# Patient Record
Sex: FEMALE | Race: BLACK OR AFRICAN AMERICAN | Employment: OTHER | URBAN - METROPOLITAN AREA
[De-identification: names, ages, dates, MRNs, and addresses within clinical notes are randomized per-mention and may not be internally consistent; named-entity substitution may affect disease eponyms.]

---

## 2017-01-17 ENCOUNTER — HOSPITAL ENCOUNTER (EMERGENCY)
Age: 72
Discharge: HOME | End: 2017-01-18
Attending: EMERGENCY MEDICINE
Payer: MEDICARE

## 2017-01-17 ENCOUNTER — APPOINTMENT (OUTPATIENT)
Dept: GENERAL RADIOLOGY | Age: 72
End: 2017-01-17
Attending: EMERGENCY MEDICINE
Payer: MEDICARE

## 2017-01-17 DIAGNOSIS — M19.90 ARTHRITIS: Primary | ICD-10-CM

## 2017-01-17 PROCEDURE — 72050 X-RAY EXAM NECK SPINE 4/5VWS: CPT

## 2017-01-17 PROCEDURE — 74011250637 HC RX REV CODE- 250/637: Performed by: EMERGENCY MEDICINE

## 2017-01-17 PROCEDURE — 73030 X-RAY EXAM OF SHOULDER: CPT

## 2017-01-17 PROCEDURE — 96372 THER/PROPH/DIAG INJ SC/IM: CPT

## 2017-01-17 PROCEDURE — 99284 EMERGENCY DEPT VISIT MOD MDM: CPT

## 2017-01-17 PROCEDURE — 74011250636 HC RX REV CODE- 250/636: Performed by: EMERGENCY MEDICINE

## 2017-01-17 PROCEDURE — 73562 X-RAY EXAM OF KNEE 3: CPT

## 2017-01-17 PROCEDURE — 73090 X-RAY EXAM OF FOREARM: CPT

## 2017-01-17 RX ORDER — NAPROXEN 500 MG/1
500 TABLET ORAL 2 TIMES DAILY WITH MEALS
Qty: 20 TAB | Refills: 0 | Status: SHIPPED | OUTPATIENT
Start: 2017-01-17 | End: 2017-01-19

## 2017-01-17 RX ORDER — HYDROCODONE BITARTRATE AND ACETAMINOPHEN 5; 325 MG/1; MG/1
1 TABLET ORAL
Qty: 8 TAB | Refills: 0 | Status: SHIPPED | OUTPATIENT
Start: 2017-01-17 | End: 2017-01-19

## 2017-01-17 RX ORDER — HYDROCODONE BITARTRATE AND ACETAMINOPHEN 10; 325 MG/1; MG/1
1 TABLET ORAL
Status: COMPLETED | OUTPATIENT
Start: 2017-01-17 | End: 2017-01-17

## 2017-01-17 RX ORDER — KETOROLAC TROMETHAMINE 30 MG/ML
30 INJECTION, SOLUTION INTRAMUSCULAR; INTRAVENOUS
Status: COMPLETED | OUTPATIENT
Start: 2017-01-17 | End: 2017-01-17

## 2017-01-17 RX ADMIN — HYDROCODONE BITARTRATE AND ACETAMINOPHEN 1 TABLET: 10; 325 TABLET ORAL at 22:07

## 2017-01-17 RX ADMIN — KETOROLAC TROMETHAMINE 30 MG: 30 INJECTION, SOLUTION INTRAMUSCULAR at 22:07

## 2017-01-18 VITALS
BODY MASS INDEX: 41.28 KG/M2 | WEIGHT: 263 LBS | OXYGEN SATURATION: 94 % | DIASTOLIC BLOOD PRESSURE: 58 MMHG | TEMPERATURE: 98.3 F | SYSTOLIC BLOOD PRESSURE: 135 MMHG | RESPIRATION RATE: 18 BRPM | HEART RATE: 97 BPM | HEIGHT: 67 IN

## 2017-01-18 PROCEDURE — 77030036550 HC SLNG ARM PCH S2SG -A

## 2017-01-18 NOTE — DISCHARGE INSTRUCTIONS
Osteoarthritis: Care Instructions  Your Care Instructions    Arthritis is a common health problem in which the joints are inflamed. There are several kinds of arthritis. Osteoarthritis is caused by a breakdown of cartilage, the hard, thick tissue that cushions the joints. It causes pain, stiffness, and swelling, often in the spine, fingers, hips, and knees. Osteoarthritis can happen at any age, but it is most common in older people. Osteoarthritis never goes away completely, but it can be controlled. Medicine and home treatment can reduce the pain and prevent the arthritis from getting worse. Follow-up care is a key part of your treatment and safety. Be sure to make and go to all appointments, and call your doctor if you are having problems. Its also a good idea to know your test results and keep a list of the medicines you take. How can you care for yourself at home? · Take a warm shower or bath in the morning to relieve stiffness. Avoid sitting still afterwards. · If the joint is not swollen, use moist heat, like a warm, damp towel, for 20 to 30 minutes, 2 or 3 times a day. Do not use heat on a swollen joint. · If the joint is swollen, use ice or cold packs for 10 to 20 minutes, once an hour. Cold will help relieve pain and reduce inflammation. Put a thin cloth between the ice and your skin. · To prevent stiffness, gently move the joint through its full range of motion several times a day. · If the joint hurts, avoid activities that put a strain on it for a few days. Take rest breaks throughout the day. · Get regular exercise. Walking, swimming, yoga, biking, and water aerobics are good exercises that are gentle on the joints. · Reach and stay at a healthy weight. If you need to lose or maintain weight, regular exercise and a healthy diet will help. Extra weight can strain the joints, especially the knees and hips, and make the pain worse. Losing even a few pounds may help.   · Take pain medicines exactly as directed. ¨ If the doctor gave you a prescription medicine for pain, take it as prescribed. ¨ If you are not taking a prescription pain medicine, ask your doctor if you can take an over-the-counter medicine. When should you call for help? Call your doctor now or seek immediate medical care if:  · The pain is so bad that you cannot use the joint. · You have sudden back pain with weakness in your legs or loss of bowel or bladder control. · Your stools are black and tarlike or have streaks of blood. · You have severe pain and swelling in more than one joint. Watch closely for changes in your health, and be sure to contact your doctor if:  · You have side effects from the medicines, like belly pain, ongoing heartburn, or nausea. · Joint pain continues for more than 6 weeks, and home treatment is not helping. Where can you learn more? Go to http://anastasia-dang.info/. Enter B166 in the search box to learn more about \"Osteoarthritis: Care Instructions. \"  Current as of: February 24, 2016  Content Version: 11.1  © 8048-8543 [employer]. Care instructions adapted under license by [employer] (which disclaims liability or warranty for this information). If you have questions about a medical condition or this instruction, always ask your healthcare professional. [address] any warranty or liability for your use of this information. We hope that we have addressed all of your medical concerns. The examination and treatment you received in the Emergency Department were for an emergent problem and were not intended as complete care. It is important that you follow up with your healthcare provider(s) for ongoing care. If your symptoms worsen or do not improve as expected, and you are unable to reach your usual health care provider(s), you should return to the Emergency Department.       Today's healthcare is undergoing tremendous change, and patient satisfaction surveys are one of the many tools to assess the quality of medical care. You may receive a survey from the [employer] regarding your experience in the Emergency Department. I hope that your experience has been completely positive, particularly the medical care that I provided. As such, please participate in the survey; anything less than excellent does not meet my expectations or intentions. [facility] and [facility] participate in nationally recognized quality of care measures. If your blood pressure is greater than 120/80, as reported below, we urge that you seek medical care to address the potential of high blood pressure, commonly known as hypertension. Hypertension can be hereditary or can be caused by certain medical conditions, pain, stress, or \"white coat syndrome. \"       Please make an appointment with your health care provider(s) for follow up of your Emergency Department visit. VITALS:   Patient Vitals for the past 8 hrs:   Temp Pulse Resp BP SpO2   01/17/17 2030 - (!) 101 - 198/78 97 %   01/17/17 1952 - (!) 108 18 (!) 208/79 98 %   01/17/17 1927 98.3 °F (36.8 °C) (!) 108 20 (!) 225/106 98 %          Thank you for allowing us to provide you with medical care today. We realize that you have many choices for your emergency care needs. Please choose us in the future for any continued health care needs. Soyla Feeling Victory Nissen, [address] [facility] Jony Clifton [place]: [phone]            No results found for this or any previous visit (from the past 24 hour(s)). Xr Spine Cerv 4 Or 5 V    Result Date: 1/17/2017  INDICATION:  Neck and left arm pain. COMPARISON:  No old study. FINDINGS:  Five views, AP, lateral, bilateral oblique, and open-mouth odontoid were obtained of the cervical spine. C7 is poorly visualized on the lateral views.  The visualized vertebral bodies show satisfactory height and alignment. Vertebral body spurring at C4-C7 and facet arthropathy are seen. The prevertebral soft tissues are not widened. The odontoid appears intact. IMPRESSION:  Cervical degenerative spondylosis. Xr Shoulder Lt Ap/lat Min 2 V    Result Date: 1/17/2017  EXAM:  XR SHOULDER LT AP/LAT MIN 2 V INDICATION:   Left shoulder pain and difficulty with abduction. No injury. COMPARISON: None. FINDINGS: Three views of the left shoulder demonstrate no fracture, dislocation or other acute abnormality. AC joint osteoarthritis is age-appropriate with marginal osteophytes. Glenohumeral joint osteoarthritis is mild. Bone mineralization is within normal limits for age. There are calcifications in the expected location of the distal rotator cuff tendons. IMPRESSION:  1. No fracture or dislocation. 2. Age-appropriate osteoarthritis. 3. Calcific rotator cuff tendinosis. Mitzy Coma Forearm Lt Ap/lat    Result Date: 1/17/2017  EXAM:  XR FOREARM LT AP/LAT INDICATION:   Left forearm pain, heaviness, and coolness. No injury. COMPARISON: None. FINDINGS: Two views of the left radius and ulna demonstrate no fracture or other acute osseous, articular or soft tissue abnormality. Surgical anchors in the greater tuberosity indicate previous biceps tendon repair. Enthesophytes arise at the origin of the common extensor tendons of the elbow. Visualized joints are within normal limits. Bone mineralization is within normal limits. IMPRESSION:  1. No fracture. 2. Previous biceps tendon reinsertion. Xr Knee Lt 3 V    Result Date: 1/17/2017  EXAM:  XR KNEE LT 3 V INDICATION:   Left knee pain, heaviness, and coolness. No injury. COMPARISON: None. FINDINGS: Three views of the left knee demonstrate moderate suprapatellar joint effusion. There is normal bone mineralization. No acute fracture or dislocation. Meniscal chondrocalcinosis is present. Mild narrowing of the patellofemoral and medial compartments.  No erosion. IMPRESSION:  1. No fracture. 2. Joint effusion. 3. Mild arthritis. Differential diagnosis includes CPPD arthropathy and chronic osteoarthritis.

## 2017-01-18 NOTE — ED TRIAGE NOTES
Patient reports she has had left arm pain, specifically shoulder with trouble lifting arm, as well as left leg pains which are increased with lifting and standing beginning 3 weeks ago. She states she saw her PCP last Wednesday and was given a \"muscle relaxer\" and had labs. She denies any falls. Denies using any assistive devices.

## 2017-01-18 NOTE — ED PROVIDER NOTES
HPI Comments: 80 y.o. female with past medical history significant for HTN, DM, breast cancer, depression, anxiety, glaucoma, ectopic pregnancy, and miscarriage who presents to the ED with chief complaint of arm pain. Pt reports left arm pain onset about 3 weeks ago accompanied by numbness and decreased sensation in the fingers on her left hand, says her left arm \"feels cold. \" Pt states her left arm pain is exacerbated by raising it, says her left shoulder \"locks on her\" and it \"feels like someone is tying a knot in her arm. \" Pt states she has had intermittent swelling in her left arm but does not have any currently. Pt states she also has left leg pain mostly in her posterior knee and has difficulty ambulating due to the pain. Pt states she has hx of chronic back pain and has had two back surgeries. Pt denies hx of afib. Pt denies seeing an orthopedist. Pt denies neck pain, bowel or bladder issues, numbness in her genital area, fever, chills, or abdominal pain. There are no other acute medical complaints voiced at this time. Social Hx: . PCP: Hesham Zelaya MD    Note written by [place] Cecilia Dewitt, as dictated by Damaris Calix. Brian Varela MD 9:04 PM     The history is provided by the patient. Past Medical History:   Diagnosis Date    Breast cancer ([facility] [address]      right masectomy    Depression with anxiety     Diabetes ([facility] [address]     Ectopic pregnancy     Glaucoma     Hypertension     Miscarriage        Past Surgical History:   Procedure Laterality Date    Pr breast surgery procedure unlisted       right mastectomy    Hx orthopaedic       back surgery x 2    Hx orthopaedic Left      ligament attachment    Hx carpal tunnel release Bilateral     Hx tumor removal Right      leg         History reviewed. No pertinent family history.     Social History     Social History    Marital status:      Spouse name: N/A    Number of children: N/A    Years of education: N/A     Occupational History  Not on file. Social History Main Topics    Smoking status: Never Smoker    Smokeless tobacco: Never Used    Alcohol use No    Drug use: No    Sexual activity: Not on file     Other Topics Concern    Not on file     Social History Narrative         ALLERGIES: Sulfa (sulfonamide antibiotics)    Review of Systems   Constitutional: Negative for chills and fever. HENT: Negative for ear pain and sore throat. Eyes: Negative for pain. Respiratory: Negative for chest tightness and shortness of breath. Cardiovascular: Negative for chest pain and leg swelling. Gastrointestinal: Negative for abdominal pain, blood in stool, constipation, diarrhea, nausea and vomiting. Genitourinary: Negative for decreased urine volume, difficulty urinating, dysuria, flank pain, frequency, hematuria and urgency. Musculoskeletal: Positive for back pain (chronic) and gait problem (difficulty ambulating due to leg pain). Negative for neck pain. +left arm pain with intermittent swelling and \"coolness\"  +left leg pain mostly in posterior knee   Skin: Negative for rash. Neurological: Positive for numbness (fingers left hand). Negative for headaches. All other systems reviewed and are negative. Vitals:    01/17/17 1927 01/17/17 1952 01/17/17 2030   BP: (!) 225/106 (!) 208/79 198/78   Pulse: (!) 108 (!) 108 (!) 101   Resp: 20 18    Temp: 98.3 °F (36.8 °C)     SpO2: 98% 98% 97%   Weight: 119.3 kg (263 lb)     Height: 5' 7\" (1.702 m)              Physical Exam   Constitutional: No distress. Obese, elderly. HENT:   Head: Normocephalic and atraumatic. Eyes: EOM are normal. Pupils are equal, round, and reactive to light. No scleral icterus. Neck: Neck supple. No tracheal deviation present. C spine nontender. Decreased ROM neck. Cardiovascular: Normal rate, regular rhythm and normal heart sounds. Pulmonary/Chest: Effort normal and breath sounds normal. No respiratory distress. Abdominal: Soft.  She exhibits no distension. There is no tenderness. There is no rebound and no guarding. Genitourinary:   Genitourinary Comments: deferred   Musculoskeletal: She exhibits edema (2+ pitting edema). Decreased and painful ROM of left shoulder. Tenderness to palpation over left shoulder. Left knee tender to palpation. Neurological: She is alert. Decreased sensation in left upper extremity. Skin: Skin is warm and dry. Psychiatric: She has a normal mood and affect. Nursing note and vitals reviewed. Note written by Cecilia Bond, as dictated by Felecia Ascencio. Trudy Mcdaniel MD 9:05 PM    [facility]  ED Course   Pt presents with LUE numbness and \"locking\" of left shoulder. Diff dx: cervical radiculopathy, arthritis, rotator cuff arthropathy. Procedures    EKG Per My Interpretation:      LABORATORY TESTS:  No results found for this or any previous visit (from the past 12 hour(s)). IMAGING RESULTS:  Xr Spine Cerv 4 Or 5 V    Result Date: 1/17/2017  INDICATION:  Neck and left arm pain. COMPARISON:  No old study. FINDINGS:  Five views, AP, lateral, bilateral oblique, and open-mouth odontoid were obtained of the cervical spine. C7 is poorly visualized on the lateral views. The visualized vertebral bodies show satisfactory height and alignment. Vertebral body spurring at C4-C7 and facet arthropathy are seen. The prevertebral soft tissues are not widened. The odontoid appears intact. IMPRESSION:  Cervical degenerative spondylosis. Xr Shoulder Lt Ap/lat Min 2 V    Result Date: 1/17/2017  EXAM:  XR SHOULDER LT AP/LAT MIN 2 V INDICATION:   Left shoulder pain and difficulty with abduction. No injury. COMPARISON: None. FINDINGS: Three views of the left shoulder demonstrate no fracture, dislocation or other acute abnormality. AC joint osteoarthritis is age-appropriate with marginal osteophytes. Glenohumeral joint osteoarthritis is mild. Bone mineralization is within normal limits for age.  There are calcifications in the expected location of the distal rotator cuff tendons. IMPRESSION:  1. No fracture or dislocation. 2. Age-appropriate osteoarthritis. 3. Calcific rotator cuff tendinosis. Silvina Walsh Forearm Lt Ap/lat    Result Date: 1/17/2017  EXAM:  XR FOREARM LT AP/LAT INDICATION:   Left forearm pain, heaviness, and coolness. No injury. COMPARISON: None. FINDINGS: Two views of the left radius and ulna demonstrate no fracture or other acute osseous, articular or soft tissue abnormality. Surgical anchors in the greater tuberosity indicate previous biceps tendon repair. Enthesophytes arise at the origin of the common extensor tendons of the elbow. Visualized joints are within normal limits. Bone mineralization is within normal limits. IMPRESSION:  1. No fracture. 2. Previous biceps tendon reinsertion. Xr Knee Lt 3 V    Result Date: 1/17/2017  EXAM:  XR KNEE LT 3 V INDICATION:   Left knee pain, heaviness, and coolness. No injury. COMPARISON: None. FINDINGS: Three views of the left knee demonstrate moderate suprapatellar joint effusion. There is normal bone mineralization. No acute fracture or dislocation. Meniscal chondrocalcinosis is present. Mild narrowing of the patellofemoral and medial compartments. No erosion. IMPRESSION:  1. No fracture. 2. Joint effusion. 3. Mild arthritis. Differential diagnosis includes CPPD arthropathy and chronic osteoarthritis. MEDICATIONS GIVEN:  Medications   HYDROcodone-acetaminophen (NORCO)  mg tablet 1 Tab (1 Tab Oral Given 1/17/17 2207)   ketorolac (TORADOL) injection 30 mg (30 mg IntraMUSCular Given 1/17/17 2207)       IMPRESSION:  1. Arthritis        PLAN:  1. Naprosyn  2. Norco PRN  3. Follow up with Ortho  Return to ED if worsening symptoms or new concerns    Discharge Note  11:00 PM  The patient is ready for discharge.  The patient's signs, symptoms, diagnosis, and discharge instructions have been discussed and the patient has conveyed their understanding. The patient is to follow up as recommended or return to the ER should their symptoms worsen. Plan has been discussed and the patient is in agreement.     Nathaniel Leon MD

## 2017-01-19 ENCOUNTER — HOSPITAL ENCOUNTER (EMERGENCY)
Age: 72
Discharge: HOME | End: 2017-01-19
Attending: EMERGENCY MEDICINE
Payer: MEDICARE

## 2017-01-19 VITALS
BODY MASS INDEX: 41.28 KG/M2 | DIASTOLIC BLOOD PRESSURE: 49 MMHG | OXYGEN SATURATION: 97 % | HEART RATE: 105 BPM | RESPIRATION RATE: 16 BRPM | SYSTOLIC BLOOD PRESSURE: 182 MMHG | HEIGHT: 67 IN | TEMPERATURE: 98 F | WEIGHT: 263 LBS

## 2017-01-19 DIAGNOSIS — M25.512 LEFT SHOULDER PAIN, UNSPECIFIED CHRONICITY: Primary | ICD-10-CM

## 2017-01-19 LAB
ALBUMIN SERPL BCP-MCNC: 3.3 G/DL (ref 3.5–5)
ALBUMIN/GLOB SERPL: 0.7 {RATIO} (ref 1.1–2.2)
ALP SERPL-CCNC: 75 U/L (ref 45–117)
ALT SERPL-CCNC: 27 U/L (ref 12–78)
ANION GAP BLD CALC-SCNC: 9 MMOL/L (ref 5–15)
AST SERPL W P-5'-P-CCNC: 22 U/L (ref 15–37)
ATRIAL RATE: 91 BPM
BASOPHILS # BLD AUTO: 0 K/UL (ref 0–0.1)
BASOPHILS # BLD: 0 % (ref 0–1)
BILIRUB SERPL-MCNC: 0.5 MG/DL (ref 0.2–1)
BUN SERPL-MCNC: 15 MG/DL (ref 6–20)
BUN/CREAT SERPL: 17 (ref 12–20)
CALCIUM SERPL-MCNC: 8.9 MG/DL (ref 8.5–10.1)
CALCULATED P AXIS, ECG09: 7 DEGREES
CALCULATED R AXIS, ECG10: 1 DEGREES
CALCULATED T AXIS, ECG11: 64 DEGREES
CHLORIDE SERPL-SCNC: 103 MMOL/L (ref 97–108)
CO2 SERPL-SCNC: 30 MMOL/L (ref 21–32)
CREAT SERPL-MCNC: 0.86 MG/DL (ref 0.55–1.02)
DIAGNOSIS, 93000: NORMAL
EOSINOPHIL # BLD: 0.2 K/UL (ref 0–0.4)
EOSINOPHIL NFR BLD: 2 % (ref 0–7)
ERYTHROCYTE [DISTWIDTH] IN BLOOD BY AUTOMATED COUNT: 15.3 % (ref 11.5–14.5)
GLOBULIN SER CALC-MCNC: 4.6 G/DL (ref 2–4)
GLUCOSE BLD STRIP.AUTO-MCNC: 114 MG/DL (ref 65–100)
GLUCOSE BLD STRIP.AUTO-MCNC: 70 MG/DL (ref 65–100)
GLUCOSE SERPL-MCNC: 75 MG/DL (ref 65–100)
HCT VFR BLD AUTO: 42.9 % (ref 35–47)
HGB BLD-MCNC: 14 G/DL (ref 11.5–16)
LYMPHOCYTES # BLD AUTO: 19 % (ref 12–49)
LYMPHOCYTES # BLD: 1.9 K/UL (ref 0.8–3.5)
MAGNESIUM SERPL-MCNC: 1.7 MG/DL (ref 1.6–2.4)
MCH RBC QN AUTO: 27.6 PG (ref 26–34)
MCHC RBC AUTO-ENTMCNC: 32.6 G/DL (ref 30–36.5)
MCV RBC AUTO: 84.6 FL (ref 80–99)
MONOCYTES # BLD: 0.4 K/UL (ref 0–1)
MONOCYTES NFR BLD AUTO: 4 % (ref 5–13)
NEUTS SEG # BLD: 7.7 K/UL (ref 1.8–8)
NEUTS SEG NFR BLD AUTO: 75 % (ref 32–75)
P-R INTERVAL, ECG05: 148 MS
PLATELET # BLD AUTO: 301 K/UL (ref 150–400)
POTASSIUM SERPL-SCNC: 3.5 MMOL/L (ref 3.5–5.1)
PROT SERPL-MCNC: 7.9 G/DL (ref 6.4–8.2)
Q-T INTERVAL, ECG07: 370 MS
QRS DURATION, ECG06: 78 MS
QTC CALCULATION (BEZET), ECG08: 455 MS
RBC # BLD AUTO: 5.07 M/UL (ref 3.8–5.2)
SERVICE CMNT-IMP: ABNORMAL
SERVICE CMNT-IMP: NORMAL
SODIUM SERPL-SCNC: 142 MMOL/L (ref 136–145)
TROPONIN I SERPL-MCNC: <0.04 NG/ML
VENTRICULAR RATE, ECG03: 91 BPM
WBC # BLD AUTO: 10.4 K/UL (ref 3.6–11)

## 2017-01-19 PROCEDURE — 99285 EMERGENCY DEPT VISIT HI MDM: CPT

## 2017-01-19 PROCEDURE — 83735 ASSAY OF MAGNESIUM: CPT | Performed by: PHYSICIAN ASSISTANT

## 2017-01-19 PROCEDURE — 36415 COLL VENOUS BLD VENIPUNCTURE: CPT | Performed by: PHYSICIAN ASSISTANT

## 2017-01-19 PROCEDURE — 80053 COMPREHEN METABOLIC PANEL: CPT | Performed by: PHYSICIAN ASSISTANT

## 2017-01-19 PROCEDURE — 85025 COMPLETE CBC W/AUTO DIFF WBC: CPT | Performed by: PHYSICIAN ASSISTANT

## 2017-01-19 PROCEDURE — 84484 ASSAY OF TROPONIN QUANT: CPT | Performed by: PHYSICIAN ASSISTANT

## 2017-01-19 PROCEDURE — 96374 THER/PROPH/DIAG INJ IV PUSH: CPT

## 2017-01-19 PROCEDURE — 74011250636 HC RX REV CODE- 250/636: Performed by: PHYSICIAN ASSISTANT

## 2017-01-19 PROCEDURE — 93005 ELECTROCARDIOGRAM TRACING: CPT

## 2017-01-19 PROCEDURE — 82962 GLUCOSE BLOOD TEST: CPT

## 2017-01-19 RX ORDER — DICLOFENAC SODIUM 10 MG/G
GEL TOPICAL 4 TIMES DAILY
Qty: 4 G | Refills: 0 | Status: ON HOLD | OUTPATIENT
Start: 2017-01-19 | End: 2018-06-11

## 2017-01-19 RX ORDER — HYDRALAZINE HYDROCHLORIDE 20 MG/ML
20 INJECTION INTRAMUSCULAR; INTRAVENOUS
Status: COMPLETED | OUTPATIENT
Start: 2017-01-19 | End: 2017-01-19

## 2017-01-19 RX ADMIN — HYDRALAZINE HYDROCHLORIDE 20 MG: 20 INJECTION INTRAMUSCULAR; INTRAVENOUS at 16:33

## 2017-01-19 NOTE — ED PROVIDER NOTES
HPI Comments: Sasha Damico is a 80 y.o. female who presents ambulatory to the ED with a c/o being sent over by Dr. Karen Weller for evaluation of blood pressure. Pt notes she was seen in the ER 2 days ago with 2 weeks worth of left arm aching and left leg pain, worse with movement. She states she was told it was  Orthopedic in nature and to follow up. She went to ortho today, but because her blood pressure was high and there was no EKG performed in the ER, pt was sent to the ER to rule out cardiac disease as the source. Pt notes she has no cp, sob n/v/d, f/c or abd pain. Pt notes her knee pain is worse with walking and her left upper arm pain is worse with lifting her arm or adducing/ internally rotating her arm. PCP:changing doctors (was Dr Izabel Chase)    PMHx significant for: Past Medical History:    Breast cancer ([facility]                                             Comment:right masectomy    Depression with anxiety                                       Diabetes ([facility]                                                Ectopic pregnancy                                             Glaucoma                                                      Hypertension                                                  Miscarriage                                                 PSHx significant for: Past Surgical History:    MD BREAST SURGERY PROCEDURE UNLISTED                             Comment:right mastectomy    HX ORTHOPAEDIC                                                   Comment:back surgery x 2    HX ORTHOPAEDIC                                  Left                 Comment:ligament attachment    HX CARPAL TUNNEL RELEASE                        Bilateral               HX TUMOR REMOVAL                                Right                 Comment:leg  Social Hx: Tobacco: denies  EtOH: denies Illicit drug use: denies    There are no further complaints or symptoms at this time. The history is provided by the patient.         Past Medical History:   Diagnosis Date    Breast cancer ([facility]      right masectomy    Depression with anxiety     Diabetes ([facility]     Ectopic pregnancy     Glaucoma     Hypertension     Miscarriage        Past Surgical History:   Procedure Laterality Date    Pr breast surgery procedure unlisted       right mastectomy    Hx orthopaedic       back surgery x 2    Hx orthopaedic Left      ligament attachment    Hx carpal tunnel release Bilateral     Hx tumor removal Right      leg         History reviewed. No pertinent family history. Social History     Social History    Marital status:      Spouse name: N/A    Number of children: N/A    Years of education: N/A     Occupational History    Not on file. Social History Main Topics    Smoking status: Never Smoker    Smokeless tobacco: Never Used    Alcohol use No    Drug use: No    Sexual activity: Not on file     Other Topics Concern    Not on file     Social History Narrative         ALLERGIES: Lisinopril; Percocet [oxycodone-acetaminophen]; Percodan [oxycodone-aspirin]; Prednisone; and Sulfa (sulfonamide antibiotics)    Review of Systems   Constitutional: Negative for chills and fever. HENT: Negative for congestion, rhinorrhea, sneezing and sore throat. Eyes: Negative for redness and visual disturbance. Respiratory: Negative for shortness of breath. Cardiovascular: Negative for chest pain and leg swelling. Gastrointestinal: Negative for abdominal pain, nausea and vomiting. Genitourinary: Negative for difficulty urinating and frequency. Musculoskeletal: Positive for arthralgias and myalgias. Negative for back pain and neck stiffness. Skin: Negative for rash. Neurological: Negative for dizziness, syncope, weakness and headaches. Hematological: Negative for adenopathy.        Patient Vitals for the past 12 hrs:   Temp Pulse Resp BP SpO2   01/19/17 1702 - (!) 105 16 - 97 %   01/19/17 1700 - - - 182/49 -   01/19/17 1615 - 83 19 194/86 96 %   01/19/17 1605 - 81 22 (!) 213/71 94 %   01/19/17 1539 - 93 21 - 98 %   01/19/17 1533 - - - (!) 216/71 -   01/19/17 1517 98 °F (36.7 °C) 91 16 (!) 225/94 98 %              Physical Exam   Constitutional: She is oriented to person, place, and time. She appears well-developed and well-nourished. No distress. HENT:   Head: Normocephalic and atraumatic. Right Ear: External ear normal.   Left Ear: External ear normal.   Nose: Nose normal.   Mouth/Throat: Oropharynx is clear and moist.   Neck: Neck supple. Cardiovascular: Normal rate, regular rhythm, normal heart sounds and intact distal pulses. Exam reveals no gallop and no friction rub. No murmur heard. Pulmonary/Chest: Effort normal and breath sounds normal. No stridor. No respiratory distress. She has no wheezes. She has no rales. She exhibits no tenderness. Abdominal: Soft. Bowel sounds are normal. She exhibits no distension and no mass. There is no tenderness. There is no rebound and no guarding. Musculoskeletal: Normal range of motion. She exhibits tenderness. She exhibits no edema or deformity. Left upper arm TTP , worse with abducting arm or internally rotating arm. Distal n/v intact. Cap refill brisk. Normothermic. No lesions  5/5 bilaterally   Neurological: She is alert and oriented to person, place, and time. No cranial nerve deficit. Coordination normal.   Skin: No rash noted. No erythema. No pallor. Psychiatric: She has a normal mood and affect. Her behavior is normal.   Nursing note and vitals reviewed.        MDM  Number of Diagnoses or Management Options  Elevated blood pressure:   Left shoulder pain, unspecified chronicity:      Amount and/or Complexity of Data Reviewed  Clinical lab tests: ordered and reviewed  Tests in the medicine section of CPT®: reviewed and ordered  Review and summarize past medical records: yes  Independent visualization of images, tracings, or specimens: yes    Patient Progress  Patient progress: stable    ED Course       Procedures  4:17 PM  Discussed pt, sx, hx and current findings with Dr. Zo Hopkins. He is in agreement with plan   Bronwyn Phoenix. BALDEMAR Rios      ED EKG interpretation: 4:17 PM  Rhythm: normal sinus rhythm; and regular . Rate (approx.): 91; Axis: normal; P wave: normal; QRS interval: normal ; ST/T wave: non specific changes in inferior leads; Other findings: abnormal ekg, poor quality ekg. This EKG was interpreted by Frances Forbes MD,ED Provider. 5:32 PM   bp improved. Will discharge home. Bronwyn Phoenix. BALDEMAR Rios    LABORATORY TESTS:  Recent Results (from the past 12 hour(s))   CBC WITH AUTOMATED DIFF    Collection Time: 01/19/17  3:50 PM   Result Value Ref Range    WBC 10.4 3.6 - 11.0 K/uL    RBC 5.07 3.80 - 5.20 M/uL    HGB 14.0 11.5 - 16.0 g/dL    HCT 42.9 35.0 - 47.0 %    MCV 84.6 80.0 - 99.0 FL    MCH 27.6 26.0 - 34.0 PG    MCHC 32.6 30.0 - 36.5 g/dL    RDW 15.3 (H) 11.5 - 14.5 %    PLATELET 198 774 - 892 K/uL    NEUTROPHILS 75 32 - 75 %    LYMPHOCYTES 19 12 - 49 %    MONOCYTES 4 (L) 5 - 13 %    EOSINOPHILS 2 0 - 7 %    BASOPHILS 0 0 - 1 %    ABS. NEUTROPHILS 7.7 1.8 - 8.0 K/UL    ABS. LYMPHOCYTES 1.9 0.8 - 3.5 K/UL    ABS. MONOCYTES 0.4 0.0 - 1.0 K/UL    ABS. EOSINOPHILS 0.2 0.0 - 0.4 K/UL    ABS. BASOPHILS 0.0 0.0 - 0.1 K/UL   METABOLIC PANEL, COMPREHENSIVE    Collection Time: 01/19/17  3:50 PM   Result Value Ref Range    Sodium 142 136 - 145 mmol/L    Potassium 3.5 3.5 - 5.1 mmol/L    Chloride 103 97 - 108 mmol/L    CO2 30 21 - 32 mmol/L    Anion gap 9 5 - 15 mmol/L    Glucose 75 65 - 100 mg/dL    BUN 15 6 - 20 MG/DL    Creatinine 0.86 0.55 - 1.02 MG/DL    BUN/Creatinine ratio 17 12 - 20      GFR est AA >60 >60 ml/min/1.73m2    GFR est non-AA >60 >60 ml/min/1.73m2    Calcium 8.9 8.5 - 10.1 MG/DL    Bilirubin, total 0.5 0.2 - 1.0 MG/DL    ALT 27 12 - 78 U/L    AST 22 15 - 37 U/L    Alk.  phosphatase 75 45 - 117 U/L    Protein, total 7.9 6.4 - 8.2 g/dL    Albumin 3.3 (L) 3.5 - 5.0 g/dL    Globulin 4.6 (H) 2.0 - 4.0 g/dL    A-G Ratio 0.7 (L) 1.1 - 2.2     TROPONIN I    Collection Time: 17  3:50 PM   Result Value Ref Range    Troponin-I, Qt. <0.04 <0.05 ng/mL   MAGNESIUM    Collection Time: 17  3:50 PM   Result Value Ref Range    Magnesium 1.7 1.6 - 2.4 mg/dL   GLUCOSE, POC    Collection Time: 17  5:26 PM   Result Value Ref Range    Glucose (POC) 70 65 - 100 mg/dL    Performed by Mandy Johnson        IMAGING RESULTS:  No orders to display       MEDICATIONS GIVEN:  Medications   hydrALAZINE (APRESOLINE) 20 mg/mL injection 20 mg (20 mg IntraVENous Given 17 4293)       IMPRESSION:  1. Left shoulder pain, unspecified chronicity    2. Elevated blood pressure        PLAN:  1. Current Discharge Medication List      START taking these medications    Details   diclofenac (VOLTAREN) 1 % gel Apply  to affected area four (4) times daily. Qty: 4 g, Refills: 0         CONTINUE these medications which have NOT CHANGED    Details   insulin regular (NOVOLIN R, HUMULIN R) 100 unit/mL injection 5 Units by SubCUTAneous route Before breakfast, lunch, and dinner. Qty: 1 Vial, Refills: 0      amLODIPine (NORVASC) 10 mg tablet Take 10 mg by mouth daily. losartan (COZAAR) 100 mg tablet Take 100 mg by mouth daily. PARoxetine (PAXIL) 20 mg tablet Take 20 mg by mouth daily. hydrochlorothiazide (HYDRODIURIL) 25 mg tablet Take 25 mg by mouth daily.          STOP taking these medications       naproxen (NAPROSYN) 500 mg tablet Comments:   Reason for Stoppin.   Follow-up Information     Follow up With Details Comments [address], [place] Schedule an appointment as soon as possible for a visit 2-4 days for recheck [address]  [phone]      your new pcp Schedule an appointment as soon as possible for a visit 2-4 days for recheck         Return to ED if worse     5:32 PM  Pt has been reexamined. Pt has no new complaints, changes or physical findings. Care plan outlined and precautions discussed. All available results were reviewed with pt. All medications were reviewed with pt. All of pt's questions and concerns were addressed. Pt agrees to F/U as instructed and agrees to return to ED upon further deterioration. Pt is ready to go home.   Leah Cordoba PA-C

## 2017-01-19 NOTE — ED TRIAGE NOTES
Sent by Latoya Lazcano MD/ortho for EKG prior to medication administration at his office. No Chest pain reported, no SOB reported.

## 2017-01-19 NOTE — DISCHARGE INSTRUCTIONS
High Blood Pressure: Care Instructions  Your Care Instructions  If your blood pressure is usually above 140/90, you have high blood pressure, or hypertension. That means the top number is 140 or higher or the bottom number is 90 or higher, or both. Despite what a lot of people think, high blood pressure usually doesn't cause headaches or make you feel dizzy or lightheaded. It usually has no symptoms. But it does increase your risk for heart attack, stroke, and kidney or eye damage. The higher your blood pressure, the more your risk increases. Your doctor will give you a goal for your blood pressure. Your goal will be based on your health and your age. An example of a goal is to keep your blood pressure below 140/90. Lifestyle changes, such as eating healthy and being active, are always important to help lower blood pressure. You might also take medicine to reach your blood pressure goal.  Follow-up care is a key part of your treatment and safety. Be sure to make and go to all appointments, and call your doctor if you are having problems. It's also a good idea to know your test results and keep a list of the medicines you take. How can you care for yourself at home? Medical treatment  · If you stop taking your medicine, your blood pressure will go back up. You may take one or more types of medicine to lower your blood pressure. Be safe with medicines. Take your medicine exactly as prescribed. Call your doctor if you think you are having a problem with your medicine. · Talk to your doctor before you start taking aspirin every day. Aspirin can help certain people lower their risk of a heart attack or stroke. But taking aspirin isn't right for everyone, because it can cause serious bleeding. · See your doctor regularly. You may need to see the doctor more often at first or until your blood pressure comes down.   · If you are taking blood pressure medicine, talk to your doctor before you take decongestants or anti-inflammatory medicine, such as ibuprofen. Some of these medicines can raise blood pressure. · Learn how to check your blood pressure at home. Lifestyle changes  · Stay at a healthy weight. This is especially important if you put on weight around the waist. Losing even 10 pounds can help you lower your blood pressure. · If your doctor recommends it, get more exercise. Walking is a good choice. Bit by bit, increase the amount you walk every day. Try for at least 30 minutes on most days of the week. You also may want to swim, bike, or do other activities. · Avoid or limit alcohol. Talk to your doctor about whether you can drink any alcohol. · Try to limit how much sodium you eat to less than 2,300 milligrams (mg) a day. Your doctor may ask you to try to eat less than 1,500 mg a day. · Eat plenty of fruits (such as bananas and oranges), vegetables, legumes, whole grains, and low-fat dairy products. · Lower the amount of saturated fat in your diet. Saturated fat is found in animal products such as milk, cheese, and meat. Limiting these foods may help you lose weight and also lower your risk for heart disease. · Do not smoke. Smoking increases your risk for heart attack and stroke. If you need help quitting, talk to your doctor about stop-smoking programs and medicines. These can increase your chances of quitting for good. When should you call for help? Call 911 anytime you think you may need emergency care. This may mean having symptoms that suggest that your blood pressure is causing a serious heart or blood vessel problem. Your blood pressure may be over 180/110. For example, call 911 if:  · You have symptoms of a heart attack. These may include:  ¨ Chest pain or pressure, or a strange feeling in the chest.  ¨ Sweating. ¨ Shortness of breath. ¨ Nausea or vomiting. ¨ Pain, pressure, or a strange feeling in the back, neck, jaw, or upper belly or in one or both shoulders or arms.   ¨ Lightheadedness or sudden weakness. ¨ A fast or irregular heartbeat. · You have symptoms of a stroke. These may include:  ¨ Sudden numbness, tingling, weakness, or loss of movement in your face, arm, or leg, especially on only one side of your body. ¨ Sudden vision changes. ¨ Sudden trouble speaking. ¨ Sudden confusion or trouble understanding simple statements. ¨ Sudden problems with walking or balance. ¨ A sudden, severe headache that is different from past headaches. · You have severe back or belly pain. Do not wait until your blood pressure comes down on its own. Get help right away. Call your doctor now or seek immediate care if:  · Your blood pressure is much higher than normal (such as 180/110 or higher), but you don't have symptoms. · You think high blood pressure is causing symptoms, such as:  ¨ Severe headache. ¨ Blurry vision. Watch closely for changes in your health, and be sure to contact your doctor if:  · Your blood pressure measures 140/90 or higher at least 2 times. That means the top number is 140 or higher or the bottom number is 90 or higher, or both. · You think you may be having side effects from your blood pressure medicine. · Your blood pressure is usually normal, but it goes above normal at least 2 times. Where can you learn more? Go to http://anastasia-dang.info/. Enter N528 in the search box to learn more about \"High Blood Pressure: Care Instructions. \"  Current as of: August 8, 2016  Content Version: 11.1  © 8104-9015 [employer] (?????). Care instructions adapted under license by [employer] (which disclaims liability or warranty for this information). If you have questions about a medical condition or this instruction, always ask your healthcare professional. [place] [address] any warranty or liability for your use of this information.        Shoulder Pain: Care Instructions  Your Care Instructions    You can hurt your shoulder by using it too much during an activity, such as fishing or baseball. It can also happen as part of the everyday wear and tear of getting older. Shoulder injuries can be slow to heal, but your shoulder should get better with time. Your doctor may recommend a sling to rest your shoulder. If you have injured your shoulder, you may need testing and treatment. Follow-up care is a key part of your treatment and safety. Be sure to make and go to all appointments, and call your doctor if you are having problems. It's also a good idea to know your test results and keep a list of the medicines you take. How can you care for yourself at home? · Take pain medicines exactly as directed. ¨ If the doctor gave you a prescription medicine for pain, take it as prescribed. ¨ If you are not taking a prescription pain medicine, ask your doctor if you can take an over-the-counter medicine. ¨ Do not take two or more pain medicines at the same time unless the doctor told you to. Many pain medicines contain acetaminophen, which is Tylenol. Too much acetaminophen (Tylenol) can be harmful. · If your doctor recommends that you wear a sling, use it as directed. Do not take it off before your doctor tells you to. · Put ice or a cold pack on the sore area for 10 to 20 minutes at a time. Put a thin cloth between the ice and your skin. · If there is no swelling, you can put moist heat, a heating pad, or a warm cloth on your shoulder. Some doctors suggest alternating between hot and cold. · Rest your shoulder for a few days. If your doctor recommends it, you can then begin gentle exercise of the shoulder, but do not lift anything heavy. When should you call for help? Call 911 anytime you think you may need emergency care. For example, call if:  · You have chest pain or pressure. This may occur with:  ¨ Sweating. ¨ Shortness of breath. ¨ Nausea or vomiting.   ¨ Pain that spreads from the chest to the neck, jaw, or one or both shoulders or arms.  ¨ Dizziness or lightheadedness. ¨ A fast or uneven pulse. After calling 911, chew 1 adult-strength aspirin. Wait for an ambulance. Do not try to drive yourself. · Your arm or hand is cool or pale or changes color. Call your doctor now or seek immediate medical care if:  · You have signs of infection, such as:  ¨ Increased pain, swelling, warmth, or redness in your shoulder. ¨ Red streaks leading from a place on your shoulder. ¨ Pus draining from an area of your shoulder. ¨ Swollen lymph nodes in your neck, armpits, or groin. ¨ A fever. Watch closely for changes in your health, and be sure to contact your doctor if:  · You cannot use your shoulder. · Your shoulder does not get better as expected. Where can you learn more? Go to http://anastasiaEndeavor Energydang.info/. Enter E861 in the search box to learn more about \"Shoulder Pain: Care Instructions. \"  Current as of: May 23, 2016  Content Version: 11.1  © 2657-7008 [employer]. Care instructions adapted under license by [employer] (which disclaims liability or warranty for this information). If you have questions about a medical condition or this instruction, always ask your healthcare professional. [place] [address] any warranty or liability for your use of this information. We hope that we have addressed all of your medical concerns. The examination and treatment you received in the Emergency Department were for an emergent problem and were not intended as complete care. It is important that you follow up with your healthcare provider(s) for ongoing care. If your symptoms worsen or do not improve as expected, and you are unable to reach your usual health care provider(s), you should return to the Emergency Department. Today's healthcare is undergoing tremendous change, and patient satisfaction surveys are one of the many tools to assess the quality of medical care.   You may receive a survey from the [employer] regarding your experience in the Emergency Department. I hope that your experience has been completely positive, particularly the medical care that I provided. As such, please participate in the survey; anything less than excellent does not meet my expectations or intentions. [facility] and [facility] participate in nationally recognized quality of care measures. If your blood pressure is greater than 120/80, as reported below, we urge that you seek medical care to address the potential of high blood pressure, commonly known as hypertension. Hypertension can be hereditary or can be caused by certain medical conditions, pain, stress, or \"white coat syndrome. \"       Please make an appointment with your health care provider(s) for follow up of your Emergency Department visit. VITALS:   Patient Vitals for the past 8 hrs:   Temp Pulse Resp BP SpO2   01/19/17 1702 - (!) 105 16 - 97 %   01/19/17 1700 - - - 182/49 -   01/19/17 1615 - 83 19 194/86 96 %   01/19/17 1605 - 81 22 (!) 213/71 94 %   01/19/17 1539 - 93 21 - 98 %   01/19/17 1533 - - - (!) 216/71 -   01/19/17 1517 98 °F (36.7 °C) 91 16 (!) 225/94 98 %          Thank you for allowing us to provide you with medical care today. We realize that you have many choices for your emergency care needs. Please choose us in the future for any continued health care needs. [employer]  Quick, [address]: [phone]            Recent Results (from the past 24 hour(s))   CBC WITH AUTOMATED DIFF    Collection Time: 01/19/17  3:50 PM   Result Value Ref Range    WBC 10.4 3.6 - 11.0 K/uL    RBC 5.07 3.80 - 5.20 M/uL    HGB 14.0 11.5 - 16.0 g/dL    HCT 42.9 35.0 - 47.0 %    MCV 84.6 80.0 - 99.0 FL    MCH 27.6 26.0 - 34.0 PG    MCHC 32.6 30.0 - 36.5 g/dL    RDW 15.3 (H) 11.5 - 14.5 %    PLATELET 038 921 - 580 K/uL NEUTROPHILS 75 32 - 75 %    LYMPHOCYTES 19 12 - 49 %    MONOCYTES 4 (L) 5 - 13 %    EOSINOPHILS 2 0 - 7 %    BASOPHILS 0 0 - 1 %    ABS. NEUTROPHILS 7.7 1.8 - 8.0 K/UL    ABS. LYMPHOCYTES 1.9 0.8 - 3.5 K/UL    ABS. MONOCYTES 0.4 0.0 - 1.0 K/UL    ABS. EOSINOPHILS 0.2 0.0 - 0.4 K/UL    ABS. BASOPHILS 0.0 0.0 - 0.1 K/UL   METABOLIC PANEL, COMPREHENSIVE    Collection Time: 01/19/17  3:50 PM   Result Value Ref Range    Sodium 142 136 - 145 mmol/L    Potassium 3.5 3.5 - 5.1 mmol/L    Chloride 103 97 - 108 mmol/L    CO2 30 21 - 32 mmol/L    Anion gap 9 5 - 15 mmol/L    Glucose 75 65 - 100 mg/dL    BUN 15 6 - 20 MG/DL    Creatinine 0.86 0.55 - 1.02 MG/DL    BUN/Creatinine ratio 17 12 - 20      GFR est AA >60 >60 ml/min/1.73m2    GFR est non-AA >60 >60 ml/min/1.73m2    Calcium 8.9 8.5 - 10.1 MG/DL    Bilirubin, total 0.5 0.2 - 1.0 MG/DL    ALT 27 12 - 78 U/L    AST 22 15 - 37 U/L    Alk. phosphatase 75 45 - 117 U/L    Protein, total 7.9 6.4 - 8.2 g/dL    Albumin 3.3 (L) 3.5 - 5.0 g/dL    Globulin 4.6 (H) 2.0 - 4.0 g/dL    A-G Ratio 0.7 (L) 1.1 - 2.2     TROPONIN I    Collection Time: 01/19/17  3:50 PM   Result Value Ref Range    Troponin-I, Qt. <0.04 <0.05 ng/mL   MAGNESIUM    Collection Time: 01/19/17  3:50 PM   Result Value Ref Range    Magnesium 1.7 1.6 - 2.4 mg/dL       No results found.

## 2017-01-19 NOTE — ED NOTES
Prior to d/c pt began to complain of feeling \"like my sugar is getting low\". Blood glucose checked, 70 at this time. Pt provided soda, crackers and peanut butter at this time.      Will recheck blood glucose

## 2017-04-04 ENCOUNTER — HOSPITAL ENCOUNTER (OUTPATIENT)
Dept: MRI IMAGING | Age: 72
Discharge: HOME | End: 2017-04-04
Attending: ORTHOPAEDIC SURGERY
Payer: MEDICARE

## 2017-04-04 DIAGNOSIS — M25.562 LEFT KNEE PAIN: ICD-10-CM

## 2017-04-04 PROCEDURE — 73721 MRI JNT OF LWR EXTRE W/O DYE: CPT

## 2017-09-19 ENCOUNTER — HOSPITAL ENCOUNTER (OUTPATIENT)
Dept: MRI IMAGING | Age: 72
Discharge: HOME | End: 2017-09-19
Attending: ORTHOPAEDIC SURGERY
Payer: MEDICARE

## 2017-09-19 DIAGNOSIS — M17.11 OSTEOARTHRITIS OF RIGHT KNEE: ICD-10-CM

## 2017-09-19 PROCEDURE — 73721 MRI JNT OF LWR EXTRE W/O DYE: CPT

## 2018-06-11 ENCOUNTER — HOSPITAL ENCOUNTER (INPATIENT)
Age: 73
LOS: 2 days | Discharge: HOME HEALTH | DRG: 872 | End: 2018-06-13
Attending: EMERGENCY MEDICINE | Admitting: INTERNAL MEDICINE
Payer: MEDICARE

## 2018-06-11 DIAGNOSIS — R50.9 FEVER, UNSPECIFIED FEVER CAUSE: ICD-10-CM

## 2018-06-11 DIAGNOSIS — R00.0 TACHYCARDIA: ICD-10-CM

## 2018-06-11 DIAGNOSIS — D72.829 LEUKOCYTOSIS, UNSPECIFIED TYPE: ICD-10-CM

## 2018-06-11 DIAGNOSIS — L72.3 INFECTED SEBACEOUS CYST: ICD-10-CM

## 2018-06-11 DIAGNOSIS — L03.90 CELLULITIS, UNSPECIFIED CELLULITIS SITE: Primary | ICD-10-CM

## 2018-06-11 DIAGNOSIS — L08.9 INFECTED SEBACEOUS CYST: ICD-10-CM

## 2018-06-11 PROBLEM — E66.01 MORBID OBESITY (HCC): Status: ACTIVE | Noted: 2018-06-11

## 2018-06-11 PROBLEM — L02.91 ABSCESS: Status: ACTIVE | Noted: 2018-06-11

## 2018-06-11 LAB
ALBUMIN SERPL-MCNC: 2.8 G/DL (ref 3.5–5)
ALBUMIN/GLOB SERPL: 0.5 {RATIO} (ref 1.1–2.2)
ALP SERPL-CCNC: 107 U/L (ref 45–117)
ALT SERPL-CCNC: 34 U/L (ref 12–78)
ANION GAP SERPL CALC-SCNC: 7 MMOL/L (ref 5–15)
AST SERPL-CCNC: 24 U/L (ref 15–37)
BASOPHILS # BLD: 0 K/UL (ref 0–0.1)
BASOPHILS NFR BLD: 0 % (ref 0–1)
BILIRUB SERPL-MCNC: 0.7 MG/DL (ref 0.2–1)
BUN SERPL-MCNC: 9 MG/DL (ref 6–20)
BUN/CREAT SERPL: 8 (ref 12–20)
CALCIUM SERPL-MCNC: 8.7 MG/DL (ref 8.5–10.1)
CHLORIDE SERPL-SCNC: 98 MMOL/L (ref 97–108)
CO2 SERPL-SCNC: 32 MMOL/L (ref 21–32)
CREAT SERPL-MCNC: 1.11 MG/DL (ref 0.55–1.02)
DIFFERENTIAL METHOD BLD: ABNORMAL
EOSINOPHIL # BLD: 0.2 K/UL (ref 0–0.4)
EOSINOPHIL NFR BLD: 1 % (ref 0–7)
ERYTHROCYTE [DISTWIDTH] IN BLOOD BY AUTOMATED COUNT: 15.7 % (ref 11.5–14.5)
GLOBULIN SER CALC-MCNC: 5.3 G/DL (ref 2–4)
GLUCOSE BLD STRIP.AUTO-MCNC: 101 MG/DL (ref 65–100)
GLUCOSE BLD STRIP.AUTO-MCNC: 102 MG/DL (ref 65–100)
GLUCOSE BLD STRIP.AUTO-MCNC: 208 MG/DL (ref 65–100)
GLUCOSE SERPL-MCNC: 279 MG/DL (ref 65–100)
HCT VFR BLD AUTO: 42.2 % (ref 35–47)
HGB BLD-MCNC: 13.8 G/DL (ref 11.5–16)
LACTATE SERPL-SCNC: 2 MMOL/L (ref 0.4–2)
LYMPHOCYTES # BLD: 1.7 K/UL (ref 0.8–3.5)
LYMPHOCYTES NFR BLD: 10 % (ref 12–49)
MCH RBC QN AUTO: 27.6 PG (ref 26–34)
MCHC RBC AUTO-ENTMCNC: 32.7 G/DL (ref 30–36.5)
MCV RBC AUTO: 84.4 FL (ref 80–99)
MONOCYTES # BLD: 1.1 K/UL (ref 0–1)
MONOCYTES NFR BLD: 6 % (ref 5–13)
NEUTS SEG # BLD: 14.3 K/UL (ref 1.8–8)
NEUTS SEG NFR BLD: 83 % (ref 32–75)
PLATELET # BLD AUTO: 273 K/UL (ref 150–400)
PMV BLD AUTO: 10.9 FL (ref 8.9–12.9)
POTASSIUM SERPL-SCNC: 3.8 MMOL/L (ref 3.5–5.1)
PROT SERPL-MCNC: 8.1 G/DL (ref 6.4–8.2)
RBC # BLD AUTO: 5 M/UL (ref 3.8–5.2)
SERVICE CMNT-IMP: ABNORMAL
SODIUM SERPL-SCNC: 137 MMOL/L (ref 136–145)
WBC # BLD AUTO: 17.3 K/UL (ref 3.6–11)
XXWBCSUS: 0

## 2018-06-11 PROCEDURE — 96361 HYDRATE IV INFUSION ADD-ON: CPT

## 2018-06-11 PROCEDURE — 74011250637 HC RX REV CODE- 250/637: Performed by: NURSE PRACTITIONER

## 2018-06-11 PROCEDURE — 83605 ASSAY OF LACTIC ACID: CPT | Performed by: NURSE PRACTITIONER

## 2018-06-11 PROCEDURE — 85025 COMPLETE CBC W/AUTO DIFF WBC: CPT | Performed by: NURSE PRACTITIONER

## 2018-06-11 PROCEDURE — 65660000000 HC RM CCU STEPDOWN

## 2018-06-11 PROCEDURE — 96365 THER/PROPH/DIAG IV INF INIT: CPT

## 2018-06-11 PROCEDURE — 36415 COLL VENOUS BLD VENIPUNCTURE: CPT | Performed by: NURSE PRACTITIONER

## 2018-06-11 PROCEDURE — 74011250637 HC RX REV CODE- 250/637: Performed by: INTERNAL MEDICINE

## 2018-06-11 PROCEDURE — 74011636637 HC RX REV CODE- 636/637: Performed by: INTERNAL MEDICINE

## 2018-06-11 PROCEDURE — 80053 COMPREHEN METABOLIC PANEL: CPT | Performed by: NURSE PRACTITIONER

## 2018-06-11 PROCEDURE — 74011250636 HC RX REV CODE- 250/636: Performed by: INTERNAL MEDICINE

## 2018-06-11 PROCEDURE — 74011250636 HC RX REV CODE- 250/636: Performed by: NURSE PRACTITIONER

## 2018-06-11 PROCEDURE — 87040 BLOOD CULTURE FOR BACTERIA: CPT | Performed by: NURSE PRACTITIONER

## 2018-06-11 PROCEDURE — 74011000258 HC RX REV CODE- 258: Performed by: INTERNAL MEDICINE

## 2018-06-11 PROCEDURE — 82962 GLUCOSE BLOOD TEST: CPT

## 2018-06-11 PROCEDURE — 93005 ELECTROCARDIOGRAM TRACING: CPT

## 2018-06-11 PROCEDURE — 99284 EMERGENCY DEPT VISIT MOD MDM: CPT

## 2018-06-11 RX ORDER — CARVEDILOL 6.25 MG/1
6.25 TABLET ORAL 2 TIMES DAILY WITH MEALS
Status: ON HOLD | COMMUNITY
End: 2020-03-06 | Stop reason: SDUPTHER

## 2018-06-11 RX ORDER — PAROXETINE HYDROCHLORIDE 40 MG/1
40 TABLET, FILM COATED ORAL DAILY
COMMUNITY
End: 2020-07-02

## 2018-06-11 RX ORDER — LOSARTAN POTASSIUM 100 MG/1
50 TABLET ORAL EVERY EVENING
COMMUNITY
End: 2020-10-19 | Stop reason: SDUPTHER

## 2018-06-11 RX ORDER — ENOXAPARIN SODIUM 100 MG/ML
40 INJECTION SUBCUTANEOUS EVERY 24 HOURS
Status: DISCONTINUED | OUTPATIENT
Start: 2018-06-11 | End: 2018-06-11

## 2018-06-11 RX ORDER — BUSPIRONE HYDROCHLORIDE 7.5 MG/1
7.5 TABLET ORAL 3 TIMES DAILY
Status: ON HOLD | COMMUNITY
End: 2018-06-11

## 2018-06-11 RX ORDER — ENOXAPARIN SODIUM 100 MG/ML
40 INJECTION SUBCUTANEOUS EVERY 12 HOURS
Status: DISCONTINUED | OUTPATIENT
Start: 2018-06-12 | End: 2018-06-13 | Stop reason: HOSPADM

## 2018-06-11 RX ORDER — HYDROCHLOROTHIAZIDE 25 MG/1
50 TABLET ORAL DAILY
Status: DISCONTINUED | OUTPATIENT
Start: 2018-06-12 | End: 2018-06-11

## 2018-06-11 RX ORDER — HUMAN INSULIN 100 [IU]/ML
INJECTION, SOLUTION SUBCUTANEOUS 3 TIMES DAILY
Status: ON HOLD | COMMUNITY
End: 2021-07-30 | Stop reason: SDUPTHER

## 2018-06-11 RX ORDER — ASPIRIN 81 MG/1
81 TABLET ORAL DAILY
COMMUNITY
End: 2022-10-21 | Stop reason: ALTCHOICE

## 2018-06-11 RX ORDER — CARVEDILOL 3.12 MG/1
6.25 TABLET ORAL 2 TIMES DAILY WITH MEALS
Status: DISCONTINUED | OUTPATIENT
Start: 2018-06-11 | End: 2018-06-13 | Stop reason: HOSPADM

## 2018-06-11 RX ORDER — ACETAMINOPHEN 325 MG/1
650 TABLET ORAL
Status: DISCONTINUED | OUTPATIENT
Start: 2018-06-11 | End: 2018-06-13 | Stop reason: HOSPADM

## 2018-06-11 RX ORDER — MELATONIN
1000 DAILY
COMMUNITY
End: 2018-07-20

## 2018-06-11 RX ORDER — OXYCODONE HYDROCHLORIDE 5 MG/1
5 TABLET ORAL
Status: DISCONTINUED | OUTPATIENT
Start: 2018-06-11 | End: 2018-06-13 | Stop reason: HOSPADM

## 2018-06-11 RX ORDER — INSULIN LISPRO 100 [IU]/ML
INJECTION, SOLUTION INTRAVENOUS; SUBCUTANEOUS
Status: DISCONTINUED | OUTPATIENT
Start: 2018-06-11 | End: 2018-06-11

## 2018-06-11 RX ORDER — LOSARTAN POTASSIUM 50 MG/1
100 TABLET ORAL DAILY
Status: DISCONTINUED | OUTPATIENT
Start: 2018-06-12 | End: 2018-06-13 | Stop reason: HOSPADM

## 2018-06-11 RX ORDER — HYDROCHLOROTHIAZIDE 50 MG/1
50 TABLET ORAL DAILY
COMMUNITY
End: 2020-04-20

## 2018-06-11 RX ORDER — INSULIN LISPRO 100 [IU]/ML
INJECTION, SOLUTION INTRAVENOUS; SUBCUTANEOUS
Status: DISCONTINUED | OUTPATIENT
Start: 2018-06-11 | End: 2018-06-13 | Stop reason: HOSPADM

## 2018-06-11 RX ORDER — DEXTROSE 50 % IN WATER (D50W) INTRAVENOUS SYRINGE
12.5-25 AS NEEDED
Status: DISCONTINUED | OUTPATIENT
Start: 2018-06-11 | End: 2018-06-13 | Stop reason: HOSPADM

## 2018-06-11 RX ORDER — SODIUM CHLORIDE 9 MG/ML
100 INJECTION, SOLUTION INTRAVENOUS CONTINUOUS
Status: DISCONTINUED | OUTPATIENT
Start: 2018-06-11 | End: 2018-06-13

## 2018-06-11 RX ORDER — ONDANSETRON 2 MG/ML
4 INJECTION INTRAMUSCULAR; INTRAVENOUS
Status: DISCONTINUED | OUTPATIENT
Start: 2018-06-11 | End: 2018-06-13 | Stop reason: HOSPADM

## 2018-06-11 RX ORDER — ASPIRIN 81 MG/1
81 TABLET ORAL DAILY
Status: DISCONTINUED | OUTPATIENT
Start: 2018-06-12 | End: 2018-06-13 | Stop reason: HOSPADM

## 2018-06-11 RX ORDER — SODIUM CHLORIDE 0.9 % (FLUSH) 0.9 %
5-10 SYRINGE (ML) INJECTION AS NEEDED
Status: DISCONTINUED | OUTPATIENT
Start: 2018-06-11 | End: 2018-06-13 | Stop reason: HOSPADM

## 2018-06-11 RX ORDER — MAGNESIUM SULFATE 100 %
4 CRYSTALS MISCELLANEOUS AS NEEDED
Status: DISCONTINUED | OUTPATIENT
Start: 2018-06-11 | End: 2018-06-13 | Stop reason: HOSPADM

## 2018-06-11 RX ORDER — TRIAMCINOLONE ACETONIDE 1 MG/G
OINTMENT TOPICAL
COMMUNITY

## 2018-06-11 RX ORDER — ACETAMINOPHEN 325 MG/1
975 TABLET ORAL
Status: COMPLETED | OUTPATIENT
Start: 2018-06-11 | End: 2018-06-11

## 2018-06-11 RX ORDER — SODIUM CHLORIDE 0.9 % (FLUSH) 0.9 %
5-10 SYRINGE (ML) INJECTION EVERY 8 HOURS
Status: DISCONTINUED | OUTPATIENT
Start: 2018-06-11 | End: 2018-06-13 | Stop reason: HOSPADM

## 2018-06-11 RX ORDER — PAROXETINE HYDROCHLORIDE 20 MG/1
40 TABLET, FILM COATED ORAL DAILY
Status: DISCONTINUED | OUTPATIENT
Start: 2018-06-12 | End: 2018-06-13 | Stop reason: HOSPADM

## 2018-06-11 RX ADMIN — CARVEDILOL 6.25 MG: 3.12 TABLET, FILM COATED ORAL at 17:36

## 2018-06-11 RX ADMIN — ACETAMINOPHEN 650 MG: 325 TABLET ORAL at 21:09

## 2018-06-11 RX ADMIN — VANCOMYCIN HYDROCHLORIDE 2000 MG: 1 INJECTION, POWDER, LYOPHILIZED, FOR SOLUTION INTRAVENOUS at 13:22

## 2018-06-11 RX ADMIN — HUMAN INSULIN 40 UNITS: 100 INJECTION, SUSPENSION SUBCUTANEOUS at 17:35

## 2018-06-11 RX ADMIN — ACETAMINOPHEN 975 MG: 325 TABLET ORAL at 12:18

## 2018-06-11 RX ADMIN — ENOXAPARIN SODIUM 40 MG: 100 INJECTION SUBCUTANEOUS at 16:38

## 2018-06-11 RX ADMIN — OXYCODONE HYDROCHLORIDE 5 MG: 5 TABLET ORAL at 22:31

## 2018-06-11 RX ADMIN — SODIUM CHLORIDE 125 ML/HR: 900 INJECTION, SOLUTION INTRAVENOUS at 15:21

## 2018-06-11 RX ADMIN — CEFEPIME HYDROCHLORIDE 2 G: 2 INJECTION, POWDER, FOR SOLUTION INTRAVENOUS at 17:43

## 2018-06-11 RX ADMIN — INSULIN LISPRO 3 UNITS: 100 INJECTION, SOLUTION INTRAVENOUS; SUBCUTANEOUS at 16:38

## 2018-06-11 RX ADMIN — Medication 10 ML: at 15:22

## 2018-06-11 RX ADMIN — SODIUM CHLORIDE 1000 ML: 900 INJECTION, SOLUTION INTRAVENOUS at 12:22

## 2018-06-11 NOTE — ROUTINE PROCESS
Primary Nurse Rolo Tse and Daiana Dsouza RN performed a dual skin assessment on this patient Impairment noted- see wound doc flow sheet  Clayton score is 20  R lower back abscess.

## 2018-06-11 NOTE — ED NOTES
TRANSFER - OUT REPORT:    Verbal report given to Jacob Burciaga RN(name) on Jesus Stallworth  being transferred to [address] [place] [address](unit) for routine progression of care       Report consisted of patients Situation, Background, Assessment and   Recommendations(SBAR). Information from the following report(s) SBAR, ED Summary, MAR, Recent Results and Cardiac Rhythm Sinus Tach was reviewed with the receiving nurse. Lines:   Peripheral IV 06/11/18 Left Antecubital (Active)   Site Assessment Clean, dry, & intact 6/11/2018 12:13 PM   Phlebitis Assessment 0 6/11/2018 12:13 PM   Infiltration Assessment 0 6/11/2018 12:13 PM   Dressing Status Clean, dry, & intact 6/11/2018 12:13 PM   Dressing Type Transparent 6/11/2018 12:13 PM   Hub Color/Line Status Pink 6/11/2018 12:13 PM        Opportunity for questions and clarification was provided.       Patient transported with:   Monitor    IV Vanc

## 2018-06-11 NOTE — PROGRESS NOTES
[facility]: MED RECONCILIATION    Information obtained from: Patient     Allergies: Lisinopril; Percocet [oxycodone-acetaminophen]; Percodan [oxycodone-aspirin]; Prednisone; and Sulfa (sulfonamide antibiotics)    Comment:   - Patient reports using unknown over the counter antibiotic cream on her current infection site. It might be triple antibiotic. She is not sure. Prior to Admission Medications:     Medication Documentation Review Audit       Reviewed by Phill Walker PHARMD (Pharmacist) on 06/11/18 at 1707         Medication Sig Documenting Provider Last Dose Status Taking?      aspirin delayed-release 81 mg tablet Take 81 mg by mouth daily. Historical Provider 6/11/2018 Active Yes    carvedilol (COREG) 6.25 mg tablet Take 6.25 mg by mouth two (2) times daily (with meals). Historical Provider 6/11/2018 AM Active Yes    cholecalciferol (VITAMIN D3) 1,000 unit tablet Take 1,000 Units by mouth daily. Historical Provider 6/10/2018 Active Yes    hydroCHLOROthiazide (HYDRODIURIL) 50 mg tablet Take 50 mg by mouth daily. Historical Provider 6/10/2018 Active Yes    insulin NPH (NOVOLIN N NPH U-100 INSULIN) 100 unit/mL injection 75 Units by SubCUTAneous route daily (with breakfast). Historical Provider 6/11/2018 AM Active Yes    insulin NPH (NOVOLIN N NPH U-100 INSULIN) 100 unit/mL injection 80 Units by SubCUTAneous route daily (with dinner). Historical Provider 6/10/2018 Active Yes    insulin regular (NOVOLIN R REGULAR U-100 INSULN) 100 unit/mL injection 30 Units by SubCUTAneous route daily (with breakfast). Historical Provider 6/11/2018 AM Active Yes    insulin regular (NOVOLIN R REGULAR U-100 INSULN) 100 unit/mL injection 30 Units by SubCUTAneous route daily (with lunch). Historical Provider 6/10/2018 Active Yes    insulin regular (NOVOLIN R REGULAR U-100 INSULN) 100 unit/mL injection 35 Units by SubCUTAneous route daily (with dinner).  Historical Provider 6/10/2018 Active Yes    losartan (COZAAR) 100 mg tablet Take 100 mg by mouth daily. Historical Provider 6/11/2018 AM Active Yes    PARoxetine (PAXIL) 40 mg tablet Take 40 mg by mouth daily. Historical Provider 6/10/2018 Active Yes    triamcinolone acetonide (KENALOG) 0.1 % ointment Apply  to affected area two (2) times a day.  use thin layer  Applies on face at bipap allergy site Historical Provider 6/10/2018 Active Yes                  Sav Aquino, PHARMD   Contact: [phone]

## 2018-06-11 NOTE — IP AVS SNAPSHOT
[address] 
[phone] Patient: Zi Oswald MRN: [id number] EGE:[birth year] A check panda indicates which time of day the medication should be taken. My Medications START taking these medications Instructions Each Dose to Equal  
 Morning Noon Evening Bedtime  
 cefdinir 300 mg capsule Commonly known as:  OMNICEF Your last dose was: Your next dose is: Take 1 Cap by mouth two (2) times a day for 10 days. 300 mg  
    
   
   
   
  
 doxycycline 100 mg tablet Commonly known as:  ADOXA Your last dose was: Your next dose is: Take 1 Tab by mouth two (2) times a day for 10 days. 100 mg  
    
   
   
   
  
 oxyCODONE IR 5 mg immediate release tablet Commonly known as:  Arsh Garrison Your last dose was: Your next dose is: Take 1 Tab by mouth every six (6) hours as needed. Max Daily Amount: 20 mg.  
 5 mg CONTINUE taking these medications Instructions Each Dose to Equal  
 Morning Noon Evening Bedtime  
 aspirin delayed-release 81 mg tablet Your last dose was: Your next dose is: Take 81 mg by mouth daily. 81 mg  
    
   
   
   
  
 cholecalciferol 1,000 unit tablet Commonly known as:  VITAMIN D3 Your last dose was: Your next dose is: Take 1,000 Units by mouth daily. 1000 Units COREG 6.25 mg tablet Generic drug:  carvedilol Your last dose was: Your next dose is: Take 6.25 mg by mouth two (2) times daily (with meals). 6.25 mg  
    
   
   
   
  
 hydroCHLOROthiazide 50 mg tablet Commonly known as:  HYDRODIURIL Your last dose was: Your next dose is: Take 50 mg by mouth daily. 50 mg  
    
   
   
   
  
 losartan 100 mg tablet Commonly known as:  COZAAR Your last dose was: Your next dose is: Take 100 mg by mouth daily. 100 mg * NovoLIN N NPH U-100 Insulin 100 unit/mL injection Generic drug:  insulin NPH Your last dose was: Your next dose is:    
   
   
 75 Units by SubCUTAneous route daily (with breakfast). 75 Units * NovoLIN N NPH U-100 Insulin 100 unit/mL injection Generic drug:  insulin NPH Your last dose was: Your next dose is:    
   
   
 80 Units by SubCUTAneous route daily (with dinner). 80 Units * NovoLIN R Regular U-100 Insuln 100 unit/mL injection Generic drug:  insulin regular Your last dose was: Your next dose is:    
   
   
 30 Units by SubCUTAneous route daily (with breakfast). 30 Units * NovoLIN R Regular U-100 Insuln 100 unit/mL injection Generic drug:  insulin regular Your last dose was: Your next dose is:    
   
   
 30 Units by SubCUTAneous route daily (with lunch). 30 Units * NovoLIN R Regular U-100 Insuln 100 unit/mL injection Generic drug:  insulin regular Your last dose was: Your next dose is:    
   
   
 35 Units by SubCUTAneous route daily (with dinner). 35 Units PAXIL 40 mg tablet Generic drug:  PARoxetine Your last dose was: Your next dose is: Take 40 mg by mouth daily. 40 mg  
    
   
   
   
  
 triamcinolone acetonide 0.1 % ointment Commonly known as:  KENALOG Your last dose was: Your next dose is:    
   
   
 Apply  to affected area two (2) times a day. use thin layer Applies on face at bipap allergy site * Notice: This list has 5 medication(s) that are the same as other medications prescribed for you.  Read the directions carefully, and ask your doctor or other care provider to review them with you. Where to Get Your Medications Information on where to get these meds will be given to you by the nurse or doctor. ! Ask your nurse or doctor about these medications  
  cefdinir 300 mg capsule  
 doxycycline 100 mg tablet  
 oxyCODONE IR 5 mg immediate release tablet

## 2018-06-11 NOTE — ED NOTES
Timeout performed with Best [place] Paramedic with [facility]. Crew verbalizes understanding of patient destination and condition. All concerns and questions addressed. Crew given sukhjinder aguilar, ed summary. Transfer Assessment: Patient A&O x4 and in no distress. Physical re-examination reveals mild improvement in pts condition with reassessment of vital signs completed at the time of admission transfer and/or discharge.

## 2018-06-11 NOTE — H&P
Admission History and Physical      NAME:  Joe Sun   :   1945   MRN:  [id number]     PCP:  Not On File [facility]     Date/Time:  2018           Assessment/Plan:       Abscess (2018): Unclear etiology, but has worsened in the past 2 weeks. No drainage currently. Bedside US done in ED w/o obvious fluid collection noted. However, has area of induration beyond erythema with central lesion. -- check MRSA screen   -- continue vanc   -- add cefepime with hx of DM   -- gen surg consult for possible I&D    SIRS (systemic inflammatory response syndrome) ([facility] (2016): Had fever at home and currently with leukocytosis and tachycardia. Lactate 2.0. Fever seems improved in ED.   -- continue IVF   -- tylenol prn for fever    Hypertension ():  BP elevated in ED.   -- resume losartan, amlodipine, and coreg    Type 2 diabetes mellitus ([facility] ():   -- continue NPH, but use lower dose than home   -- hold mealtime insulin   -- add SSI   -- diabetic diet if I&D not needed    Depression with anxiety ():   -- continue buspar and paxil    Morbid obesity ([facility] (2018):   -- nutrition evaluation    Tachycardia:  Suspect this is due to infection. EKG done consistent with sinus tachycardia. -- continue IVF       Subjective:     CHIEF COMPLAINT:  Pain in lower back. HISTORY OF PRESENT ILLNESS:     Ms. Yudith Mayberry is a 68 y.o.  female who is admitted with Abscess. Ms. Yudith Mayberry presented to the Emergency Department today complaining of pain in right lower back. Started 2 weeks ago as small bump. Believes there was some drainage from it at first, but none recently. Has had increasing pain and a larger area of pain from the area. Also describes a tightness to the skin. Had low grade fever intermittently, but last night had temp to 103. Has nausea, but no vomiting. No HA or dizziness. No diarrhea. No abdominal pains.       Past Medical History:   Diagnosis Date    Breast cancer ([facility]     right masectomy    Cervical cancer ([facility]     Depression with anxiety     Diabetes ([facility]     Ectopic pregnancy     Glaucoma     Hypertension     Miscarriage         Past Surgical History:   Procedure Laterality Date    BREAST SURGERY PROCEDURE UNLISTED      right mastectomy    HX CARPAL TUNNEL RELEASE Bilateral     HX HYSTERECTOMY      HX ORTHOPAEDIC      back surgery x 2    HX ORTHOPAEDIC Left     ligament attachment    HX TUMOR REMOVAL Right     leg       Social History   Substance Use Topics    Smoking status: Never Smoker    Smokeless tobacco: Never Used    Alcohol use No        Family History   Problem Relation Age of Onset    Diabetes Mother     Hypertension Mother         Allergies   Allergen Reactions    Lisinopril Cough    Percocet [Oxycodone-Acetaminophen] Nausea Only    Percodan [Oxycodone-Aspirin] Nausea Only    Prednisone Nausea Only    Sulfa (Sulfonamide Antibiotics) Unknown (comments)        Prior to Admission medications    Medication Sig Start Date End Date Taking? Authorizing Provider   busPIRone (BUSPAR) 7.5 mg tablet Take 7.5 mg by mouth three (3) times daily. Yes Hesham Zelaya MD   insulin regular (NOVOLIN R, HUMULIN R) 100 unit/mL injection 5 Units by SubCUTAneous route Before breakfast, lunch, and dinner. Patient taking differently: 30 Units by SubCUTAneous route Before breakfast, lunch, and dinner. 30 units am, 30 units at lunch, 35 units pm 6/15/16  Yes Robbie BLEVINS Do, MD   hydrochlorothiazide (HYDRODIURIL) 25 mg tablet Take 25 mg by mouth daily. Yes Hesham Zelaya MD   losartan (COZAAR) 100 mg tablet Take 100 mg by mouth daily. Yes Hesham Zelaya MD   PARoxetine (PAXIL) 20 mg tablet Take 20 mg by mouth daily. Yes Hesham Zelaya MD   diclofenac (VOLTAREN) 1 % gel Apply  to affected area four (4) times daily. 1/19/17   MARLEY Ta   amLODIPine (NORVASC) 10 mg tablet Take 10 mg by mouth daily.     Historical Provider         Review of Systems:  (bold if positive, if negative)    Gen:  feverchillsEyes:  ENT:  CVS:  Pulm:  GI:  nausea  :    MS:  Skin:  erythemawoundEndo:    Hem:  Renal:    Neuro:            Objective:      VITALS:    Vital signs reviewed; most recent are:    Visit Vitals    /86 (BP 1 Location: Left arm, BP Patient Position: At rest)    Pulse (!) 112    Temp 99.5 °F (37.5 °C)    Resp 20    Ht 5' 7\" (1.702 m)    Wt 122.7 kg (270 lb 8.1 oz)    SpO2 94%    Breastfeeding No    BMI 42.37 kg/m2     SpO2 Readings from Last 6 Encounters:   06/11/18 94%   01/19/17 97%   01/17/17 94%   07/16/16 96%   06/15/16 95%   11/24/12 98%        No intake or output data in the 24 hours ending 06/11/18 8248         Exam:     Physical Exam:    Gen:  obese, in no acute distress  HEENT:  Pink conjunctivae, PERRL, hearing intact to voice, moist mucous membranes  Neck:  Supple  Resp:  No accessory muscle use, clear breath sounds without wheezes rales or rhonchi  Card:  No murmurs, normal S1, S2 without thrills or peripheral edema, radial pulses 2+ b/l  Abd:  Soft, non-tender, non-distended, normoactive bowel sounds are present  Musc:  No cyanosis, cap refill < 2 sec  Skin:  See image below for right lower back skin lesion  Neuro:  Cranial nerves 3-12 are grossly intact,  strength is 5/5 bilaterally, dorsi / plantarflexion strength is 5/5 bilaterally, follows commands appropriately  Psych:  Alert with good insight. Oriented to person, place, and time             Labs:    Recent Labs      06/11/18   1212   WBC  17.3*   HGB  13.8   HCT  42.2   PLT  273     Recent Labs      06/11/18   1212   NA  137   K  3.8   CL  98   CO2  32   GLU  279*   BUN  9   CREA  1.11*   CA  8.7   ALB  2.8*   TBILI  0.7   SGOT  24   ALT  34     Lab Results   Component Value Date/Time    Glucose (POC) 114 (H) 01/19/2017 05:45 PM    Glucose (POC) 70 01/19/2017 05:26 PM     No results for input(s): PH, PCO2, PO2, HCO3, FIO2 in the last 72 hours.   No results for input(s): INR in the last 72 hours. No lab exists for component: INREXT    Medical records reviewed in preparation for this admission: Old medical records.     Surrogate decision maker:  spouse    Total time spent in care of this patient: [address] discussed with: Patient and Family    Discussed:  Care Plan    Prophylaxis:  Lovenox    Probable Disposition:  Home w/Family           ___________________________________________________    Attending Physician: Nehemiah Banks MD

## 2018-06-11 NOTE — ROUTINE PROCESS
Bedside shift change report given to Rosy (oncoming nurse) by May Quinn (offgoing nurse). Report included the following information SBAR, Kardex and MAR.

## 2018-06-11 NOTE — PROGRESS NOTES
6/11/2018  4:19 PM  Case Management note    Met with patient and spouse to discuss discharge planning. Confirmed charted demographics correct. They live in a single story home with 2 steps to enter. Patient has a walker for use if needed. Patient has used home health in the past but cant recall name.  cell number is  and can transport on discharge. Patient does drive and normally preforms all ADL's independently. Patient gets her RX filled at [facility] in [facility] on [address]. She follows Nallely Becerril NP at [facility] in [place]. Will continue to folow    Reason for Admission:   Abscess                   RRAT Score:          12           Plan for utilizing home health:      Unable to determine at this time                    Likelihood of Readmission:  green                         Transition of Care Plan:            Most likely home, good family support. Patient was independent before hospital visit. Care Management Interventions  PCP Verified by CM:  Yes  Mode of Transport at Discharge: Self  Transition of Care Consult (CM Consult): Discharge Planning  Physical Therapy Consult: No  Occupational Therapy Consult: No  Speech Therapy Consult: No  Current Support Network: Lives with Spouse  Confirm Follow Up Transport: Family  Plan discussed with Pt/Family/Caregiver: Yes  Discharge Location  Discharge Placement: Home with family assistance  [place], [place]

## 2018-06-11 NOTE — ED PROVIDER NOTES
HPI Comments: The patient is a 78-year-old female who presents to the emergency room with complaints of \"boil\". She states that there was an eruption on her back that has come and gone for the last several months. Over the last week she has noticed significant pain, elevated blood glucose levels, and intermittent fevers. History of abscess to the left breast having to be admitted for one week and treated with antimicrobial therapy. Pt denies chills, night sweats, chest pain, pressure, SOB, BATISTA, PND, orthopnea, abdominal pain, n/v/d, melena, hematuria, dysuria, constipation, HA, dizziness, and syncope. Past Medical History:  No date: Breast cancer ([facility]      Comment: right masectomy  No date: Cervical cancer ([facility])  No date: Depression with anxiety  No date: Diabetes ([facility]  No date: Ectopic pregnancy  No date: Glaucoma  No date: Hypertension  No date: Miscarriage    Past Surgical History:  No date: BREAST SURGERY PROCEDURE UNLISTED      Comment: right mastectomy  No date: HX CARPAL TUNNEL RELEASE Bilateral  No date: HX HYSTERECTOMY  No date: HX ORTHOPAEDIC      Comment: back surgery x 2  No date: HX ORTHOPAEDIC Left      Comment: ligament attachment  No date: HX TUMOR REMOVAL Right      Comment: leg    PCP:  Not On File Bsi        Patient is a 68 y.o. female presenting with abscess. The history is provided by the patient. Abscess           Past Medical History:   Diagnosis Date    Breast cancer ([facility]     right masectomy    Cervical cancer ([facility]     Depression with anxiety     Diabetes ([facility]     Ectopic pregnancy     Glaucoma     Hypertension     Miscarriage        Past Surgical History:   Procedure Laterality Date    BREAST SURGERY PROCEDURE UNLISTED      right mastectomy    HX CARPAL TUNNEL RELEASE Bilateral     HX HYSTERECTOMY      HX ORTHOPAEDIC      back surgery x 2    HX ORTHOPAEDIC Left     ligament attachment    HX TUMOR REMOVAL Right     leg         History reviewed.  No pertinent family history. Social History     Social History    Marital status:      Spouse name: N/A    Number of children: N/A    Years of education: N/A     Occupational History    Not on file. Social History Main Topics    Smoking status: Never Smoker    Smokeless tobacco: Never Used    Alcohol use No    Drug use: No    Sexual activity: Not on file     Other Topics Concern    Not on file     Social History Narrative         ALLERGIES: Lisinopril; Percocet [oxycodone-acetaminophen]; Percodan [oxycodone-aspirin]; Prednisone; and Sulfa (sulfonamide antibiotics)    Review of Systems   Constitutional: Positive for fatigue and fever. Negative for activity change, appetite change, chills, diaphoresis and unexpected weight change. HENT: Negative for congestion, ear pain, rhinorrhea, sinus pressure, sore throat and tinnitus. Eyes: Negative for photophobia, pain, discharge and visual disturbance. Respiratory: Negative for apnea, cough, choking, chest tightness, shortness of breath, wheezing and stridor. Cardiovascular: Negative for chest pain, palpitations and leg swelling. Gastrointestinal: Negative for abdominal pain, constipation, diarrhea, nausea and vomiting. Endocrine: Negative for polydipsia, polyphagia and polyuria. Genitourinary: Negative for decreased urine volume, dyspareunia, dysuria, enuresis, flank pain, frequency, hematuria and urgency. Musculoskeletal: Negative for arthralgias, back pain, gait problem, myalgias and neck pain. Skin: Positive for wound. Negative for color change, pallor and rash. Allergic/Immunologic: Negative for immunocompromised state. Neurological: Negative for dizziness, seizures, syncope, weakness, light-headedness and headaches. Hematological: Does not bruise/bleed easily. Psychiatric/Behavioral: Negative for agitation and confusion. The patient is not nervous/anxious.         Vitals:    06/11/18 1159   BP: 161/67   Pulse: (!) 128   Resp: 20   Temp: 100.3 °F (37.9 °C)   SpO2: 96%   Weight: 122.7 kg (270 lb 8.1 oz)   Height: 5' 7\" (1.702 m)            Physical Exam   Constitutional: She is oriented to person, place, and time. She appears well-developed and well-nourished. No distress. HENT:   Head: Normocephalic. Right Ear: External ear normal.   Left Ear: External ear normal.   Mouth/Throat: Oropharynx is clear and moist. No oropharyngeal exudate. Eyes: Conjunctivae and EOM are normal. Pupils are equal, round, and reactive to light. Right eye exhibits no discharge. Left eye exhibits no discharge. No scleral icterus. Neck: Normal range of motion. Neck supple. No JVD present. No tracheal deviation present. No thyromegaly present. Cardiovascular: Regular rhythm, normal heart sounds and intact distal pulses. Tachycardia present. Exam reveals no gallop and no friction rub. No murmur heard. Pulmonary/Chest: Effort normal and breath sounds normal. No stridor. No respiratory distress. She has no wheezes. She has no rales. She exhibits no tenderness. Abdominal: Soft. Bowel sounds are normal. She exhibits no distension and no mass. There is no tenderness. There is no rebound and no guarding. Musculoskeletal: Normal range of motion. She exhibits no edema or tenderness. Lymphadenopathy:     She has no cervical adenopathy. Neurological: She is alert and oriented to person, place, and time. She displays normal reflexes. No cranial nerve deficit. Coordination normal.   Skin: Skin is warm and dry. No rash noted. She is not diaphoretic. No erythema. No pallor. Psychiatric: She has a normal mood and affect. Her behavior is normal. Judgment and thought content normal.   Nursing note and vitals reviewed.        MDM  Number of Diagnoses or Management Options  Diagnosis management comments:    * routine laboratory data and blood cultures        Amount and/or Complexity of Data Reviewed  Clinical lab tests: ordered and reviewed  Tests in the radiology section of CPT®: reviewed and ordered  Discussion of test results with the performing providers: yes  Review and summarize past medical records: yes  Discuss the patient with other providers: yes    Risk of Complications, Morbidity, and/or Mortality  General comments:    - hemodynamically stable pt in NAD    Patient Progress  Patient progress: stable        ED Course       Bedside US  Date/Time: 6/11/2018 1:05 PM  Performed by: Asif Mary  Authorized by: Asif Mary     Performed by: Attending  Supervising provider:  Isaiah Espinosa  Type of procedure: Focused soft tissue  Left leg: Indications:  Swelling, fever and pain    Skin and subcutaneous tissue:  Adequate    Subcutaneous Collection:  Absent    Interpretation:  Cellulitis        CONSULT NOTE:   1:06 PM  Marko Moreno MD spoke with Taisha Strauss MD,   Specialty: Hospitalist   Discussed pt's hx, disposition, and available diagnostic and imaging results. Reviewed care plans. Consultant agrees with plans as outlined. Admit to inpatient. Abel Santos NP         CONSULT NOTE:   1:34 PM  Dr. Isaiah Espinosa MD spoke with Dr. Aramis Duggan MD,   Specialty: Emergency Medicine at [facility]  Discussed pt's hx, disposition, and available diagnostic and imaging results. Reviewed care plans. Consultant agrees with plans as outlined. MD accepts pt for transfer to the ED. Abel Santos NP      1:35 PM  Patient is being admitted to the hospital.  The results of their tests and reasons for their admission have been discussed with them and/or available family. They convey agreement and understanding for the need to be admitted and for their admission diagnosis. Consultation has been made with the inpatient physician specialist for hospitalization.     LABORATORY TESTS:  Recent Results (from the past 12 hour(s))   METABOLIC PANEL, COMPREHENSIVE    Collection Time: 06/11/18 12:12 PM   Result Value Ref Range    Sodium 137 136 - 145 mmol/L    Potassium 3.8 3.5 - 5.1 mmol/L    Chloride 98 97 - 108 mmol/L    CO2 32 21 - 32 mmol/L    Anion gap 7 5 - 15 mmol/L    Glucose 279 (H) 65 - 100 mg/dL    BUN 9 6 - 20 MG/DL    Creatinine 1.11 (H) 0.55 - 1.02 MG/DL    BUN/Creatinine ratio 8 (L) 12 - 20      GFR est AA 59 (L) >60 ml/min/1.73m2    GFR est non-AA 48 (L) >60 ml/min/1.73m2    Calcium 8.7 8.5 - 10.1 MG/DL    Bilirubin, total 0.7 0.2 - 1.0 MG/DL    ALT (SGPT) 34 12 - 78 U/L    AST (SGOT) 24 15 - 37 U/L    Alk. phosphatase 107 45 - 117 U/L    Protein, total 8.1 6.4 - 8.2 g/dL    Albumin 2.8 (L) 3.5 - 5.0 g/dL    Globulin 5.3 (H) 2.0 - 4.0 g/dL    A-G Ratio 0.5 (L) 1.1 - 2.2     CBC WITH AUTOMATED DIFF    Collection Time: 06/11/18 12:12 PM   Result Value Ref Range    WBC 17.3 (H) 3.6 - 11.0 K/uL    RBC 5.00 3.80 - 5.20 M/uL    HGB 13.8 11.5 - 16.0 g/dL    HCT 42.2 35.0 - 47.0 %    MCV 84.4 80.0 - 99.0 FL    MCH 27.6 26.0 - 34.0 PG    MCHC 32.7 30.0 - 36.5 g/dL    RDW 15.7 (H) 11.5 - 14.5 %    PLATELET 316 444 - 990 K/uL    MPV 10.9 8.9 - 12.9 FL    NEUTROPHILS 83 (H) 32 - 75 %    LYMPHOCYTES 10 (L) 12 - 49 %    MONOCYTES 6 5 - 13 %    EOSINOPHILS 1 0 - 7 %    BASOPHILS 0 0 - 1 %    ABS. NEUTROPHILS 14.3 (H) 1.8 - 8.0 K/UL    ABS. LYMPHOCYTES 1.7 0.8 - 3.5 K/UL    ABS. MONOCYTES 1.1 (H) 0.0 - 1.0 K/UL    ABS. EOSINOPHILS 0.2 0.0 - 0.4 K/UL    ABS. BASOPHILS 0.0 0.0 - 0.1 K/UL    DF AUTOMATED      [id number] 0     LACTIC ACID    Collection Time: 06/11/18 12:12 PM   Result Value Ref Range    Lactic acid 2.0 0.4 - 2.0 MMOL/L       IMAGING RESULTS:  No orders to display     No results found. MEDICATIONS GIVEN:  Medications   vancomycin (VANCOCIN) 2,000 mg in 0.9% sodium chloride 500 mL IVPB (2,000 mg IntraVENous New Bag 6/11/18 1322)   acetaminophen (TYLENOL) tablet 975 mg (975 mg Oral Given 6/11/18 1218)   sodium chloride 0.9 % bolus infusion 1,000 mL (0 mL IntraVENous IV Completed 6/11/18 1327)       IMPRESSION:  1. Cellulitis, unspecified cellulitis site    2. Leukocytosis, unspecified type    3. Tachycardia    4. Fever, unspecified fever cause        PLAN:  1.  Admit to Hospitalist at [address] [place] [address] via [phone], NP  1:35 PM

## 2018-06-11 NOTE — ED TRIAGE NOTES
Patient presents ambulatory to treatment area. Patient complains of \"boil\" on right lower back that has been bothering her intermittently for about two weeks. Patient states temperature has not been over 100 at home. 100.3 temp in triage.

## 2018-06-11 NOTE — PROGRESS NOTES
Pharmacy Dosing Services: 06/11/18  Lovenox dose change per P&T protocol for morbid obesity  Physician: Dr Tesfaye Ware  Enoxaparin Indication:  DVT prophylaxis  Previous Dose  Lovenox 40 mg SC daily   Serum Creatinine Lab Results   Component Value Date/Time    Creatinine 1.11 (H) 06/11/2018 12:12 PM      Creatinine Clearance Estimated Creatinine Clearance: 62.2 mL/min (based on Cr of 1.11).    Platelets Lab Results   Component Value Date/Time    PLATELET 106 03/33/7512 12:12 PM       H/H Lab Results   Component Value Date/Time    HGB 13.8 06/11/2018 12:12 PM        Adjustments:  Lovenox changed to 40 mg SC q12hr for BMI >40    Continue to monitor  Signed Bibi Davies

## 2018-06-11 NOTE — ED NOTES
IV vancomycin infusing via pump without difficulty as ordered. Patient updated regarding plan of care and associated time constraints with admission/transfer process. Patient verbalizes understanding and agreement. Spouse at bedside. Patient tolerating treatment well thus far. Call bell in reach. Will continue to monitor.

## 2018-06-11 NOTE — ED NOTES
Verbal report given to Salvador Manrique RN(name) on Hitesh Rodriguez being transferred to [facility] ED(unit) for routine progression of care    Report consisted of patient's Situation, Background, Assessment and Recommendations (SBAR)    Information from the following report(s)  SBAR, ED Summary, Intake/Output, MAR, Recent Results and Cardiac Rhythm Sinus Tachy was reviewed with the receiving nurse. Opportunity for questions and clarification was provided.     Patient transported with:  Monitor    Last Filed Values:  Temp: 100.3 °F (37.9 °C) (06/11/18 1159)  Pulse (Heart Rate): (!) 122 (06/11/18 1315)  Resp Rate: 21 (06/11/18 1315)  O2 Sat (%): 95 % (06/11/18 1315)  BP: 155/74 (06/11/18 1315)  MAP (Monitor): 91 (06/11/18 1315)  MAP (Calculated): 98 (06/11/18 1159)  Level of Consciousness: Alert (06/11/18 1315)      Lab Results   Component Value Date/Time    WBC 17.3 (H) 06/11/2018 12:12 PM    Lactic acid 2.0 06/11/2018 12:12 PM       Repeat LA:  Time Due N/A    Blood Cultures Drawn:  yes    Fluid Resuscitation:  Total needed 1000 ordered and given, Status completed, amount 1000    All Antibiotics Started:  yes, Dose Due N/A    VS x 2 post-fluid resuscitation:   yes    Vasopressor Infusion:  no nonr    Provider Reassessment needed and notified:  yes ,   Additional Interventions/Comments:  none

## 2018-06-11 NOTE — ED NOTES
IV fluids infusing via gravity without difficulty as ordered. Patient tolerating well. Patient in no distress at this time; moderate to severe discomfort with any movement. Awaiting results of ordered testing. Patient updated regarding plan of care and associated time constraints. Patient verbalizes understanding and agreement. Call bell in reach. Will continue to monitor.

## 2018-06-11 NOTE — PROGRESS NOTES
[facility] Pharmacy Dosing Services: Antimicrobial Stewardship Progress Note  Consult for antibiotic dosing of Vancomycin/Cefepime by Dr. Estella Tao  Pharmacist reviewed antibiotic appropriateness for 68year old female  for indication of an Abscess  Day of Therapy: 1    Plan:  Vancomycin therapy:  Loading dose: Vancomycin 2000 mg IV x1 dose in ED  Maintenance dose: Vancomycin 1750 mg IV q12hr  Dose calculated to approximate a therapeutic trough of 10-15 mcg/mL. Last trough level / Plan for level: after 3rd dose  Pharmacy to follow daily and will make changes to dose and/or frequency based on clinical status. Non-Kinetic Antimicrobial Dosing:   Begin Cefepime 2 gm IV q8hr    Other Antimicrobial  (not dosed by pharmacist)   none   Cultures     6/11/2018: Blood: pending  6/1/12018: Blood: pending   Serum Creatinine     Lab Results   Component Value Date/Time    Creatinine 1.11 (H) 06/11/2018 12:12 PM       Creatinine Clearance Estimated Creatinine Clearance: 62.2 mL/min (based on Cr of 1.11).      Temp   99.5 °F (37.5 °C)    WBC   Lab Results   Component Value Date/Time    WBC 17.3 (H) 06/11/2018 12:12 PM       H/H   Lab Results   Component Value Date/Time    HGB 13.8 06/11/2018 12:12 PM        Platelets   Lab Results   Component Value Date/Time    PLATELET 146 90/90/1488 12:12 PM        Pharmacist: [address] [place] [address] information: [phone]

## 2018-06-11 NOTE — IP AVS SNAPSHOT
[facility] 
 
 
 [address] [facility] 
[phone] Patient: Greg Donohue MRN: [id number] KAITLYN:[id number] About your hospitalization You were admitted on:  June 11, 2018 You last received care in the:  [facility]  MED SURG 2 You were discharged on:  June 13, 2018 Why you were hospitalized Your primary diagnosis was: Infected Sebaceous Cyst  
 Your diagnoses also included:  Abscess, Hypertension, Type 2 Diabetes Mellitus (Hcc), Depression With Anxiety, Sirs (Systemic Inflammatory Response Syndrome) (Hcc), Morbid Obesity (Hcc) Follow-up Information Follow up With Details Comments Contact Info  
 your primary care doctor Schedule an appointment as soon as possible for a visit in 1 week Delfina Flores MD Schedule an appointment as soon as possible for a visit in 2 weeks  [address] [facility] [address] 
[phone] Hesham Zelaya, MD   Patient can only remember the practice name and not the physician Discharge Orders None A check panda indicates which time of day the medication should be taken. My Medications START taking these medications Instructions Each Dose to Equal  
 Morning Noon Evening Bedtime  
 cefdinir 300 mg capsule Commonly known as:  OMNICEF Your last dose was: Your next dose is: Take 1 Cap by mouth two (2) times a day for 10 days. 300 mg  
    
   
   
   
  
 doxycycline 100 mg tablet Commonly known as:  ADOXA Your last dose was: Your next dose is: Take 1 Tab by mouth two (2) times a day for 10 days. 100 mg  
    
   
   
   
  
 oxyCODONE IR 5 mg immediate release tablet Commonly known as:  Gargopal Maid Your last dose was: Your next dose is: Take 1 Tab by mouth every six (6) hours as needed. Max Daily Amount: 20 mg.  
 5 mg CONTINUE taking these medications Instructions Each Dose to Equal  
 Morning Noon Evening Bedtime  
 aspirin delayed-release 81 mg tablet Your last dose was: Your next dose is: Take 81 mg by mouth daily. 81 mg  
    
   
   
   
  
 cholecalciferol 1,000 unit tablet Commonly known as:  VITAMIN D3 Your last dose was: Your next dose is: Take 1,000 Units by mouth daily. 1000 Units COREG 6.25 mg tablet Generic drug:  carvedilol Your last dose was: Your next dose is: Take 6.25 mg by mouth two (2) times daily (with meals). 6.25 mg  
    
   
   
   
  
 hydroCHLOROthiazide 50 mg tablet Commonly known as:  HYDRODIURIL Your last dose was: Your next dose is: Take 50 mg by mouth daily. 50 mg  
    
   
   
   
  
 losartan 100 mg tablet Commonly known as:  COZAAR Your last dose was: Your next dose is: Take 100 mg by mouth daily. 100 mg * NovoLIN N NPH U-100 Insulin 100 unit/mL injection Generic drug:  insulin NPH Your last dose was: Your next dose is:    
   
   
 75 Units by SubCUTAneous route daily (with breakfast). 75 Units * NovoLIN N NPH U-100 Insulin 100 unit/mL injection Generic drug:  insulin NPH Your last dose was: Your next dose is:    
   
   
 80 Units by SubCUTAneous route daily (with dinner). 80 Units * NovoLIN R Regular U-100 Insuln 100 unit/mL injection Generic drug:  insulin regular Your last dose was: Your next dose is:    
   
   
 30 Units by SubCUTAneous route daily (with breakfast). 30 Units * NovoLIN R Regular U-100 Insuln 100 unit/mL injection Generic drug:  insulin regular Your last dose was: Your next dose is: 30 Units by SubCUTAneous route daily (with lunch). 30 Units * NovoLIN R Regular U-100 Insuln 100 unit/mL injection Generic drug:  insulin regular Your last dose was: Your next dose is:    
   
   
 35 Units by SubCUTAneous route daily (with dinner). 35 Units PAXIL 40 mg tablet Generic drug:  PARoxetine Your last dose was: Your next dose is: Take 40 mg by mouth daily. 40 mg  
    
   
   
   
  
 triamcinolone acetonide 0.1 % ointment Commonly known as:  KENALOG Your last dose was: Your next dose is:    
   
   
 Apply  to affected area two (2) times a day. use thin layer Applies on face at [facility] allergy site * Notice: This list has 5 medication(s) that are the same as other medications prescribed for you. Read the directions carefully, and ask your doctor or other care provider to review them with you. Where to Get Your Medications Information on where to get these meds will be given to you by the nurse or doctor. ! Ask your nurse or doctor about these medications  
  cefdinir 300 mg capsule  
 doxycycline 100 mg tablet  
 oxyCODONE IR 5 mg immediate release tablet Opioid Education Prescription Opioids: What You Need to Know: 
 
 
ADMIT DATE: 2018 DISCHARGE DATE: 2018 ADMITTING DIAGNOSIS: 
Lower back abscess, infected sebaceous cyst 
 
DISCHARGE DIAGNOSIS: 
same MEDICATIONS: 
See after visit summary · It is important that you take the medication exactly as they are prescribed. · Keep your medication in the bottles provided by the pharmacist and keep a list of the medication names, dosages, and times to be taken in your wallet. · Do not take other medications without consulting your doctor Pain Management: per above medications What to do at [facility] Recommended diet:  Diabetic Diet Recommended activity: Activity as tolerated 1) Return to the hospital if you feel worse 2) If you experience any of the following symptoms then please call your primary care physician or return to the emergency room if you cannot get hold of your doctor: 
Fever, chills, nausea, vomiting, diarrhea, change in mentation, falling, bleeding, shortness of breath, chest pain, severe headache, severe abdominal pain, 3) Continue wound care per surgery instructions 4) Finish antibiotics 5) Your MRSA nasal screen was negative Follow Up: Follow-up Information Follow up With Details Comments Contact Info  
 your primary care doctor Schedule an appointment as soon as possible for a visit in 1 week Ky Reyes MD Schedule an appointment as soon as possible for a visit in 2 weeks  [facility] [address] 
[phone] Information obtained by : 
I understand that if any problems occur once I am at home I am to contact my physician. I understand and acknowledge receipt of the instructions indicated above. Physician's or R.N.'s Signature                                                                  Date/Time Patient or Representative Signature                                                          Date/Time Skin Abscess: Care Instructions Your Care Instructions A skin abscess is a bacterial infection that forms a pocket of pus. A boil is a kind of skin abscess. The doctor may have cut an opening in the abscess so that the pus can drain out. You may have gauze in the cut so that the abscess will stay open and keep draining. You may need antibiotics. You will need to follow up with your doctor to make sure the infection has gone away. The doctor has checked you carefully, but problems can develop later. If you notice any problems or new symptoms, get medical treatment right away. Follow-up care is a key part of your treatment and safety. Be sure to make and go to all appointments, and call your doctor if you are having problems. It's also a good idea to know your test results and keep a list of the medicines you take. How can you care for yourself at home? · Apply warm and dry compresses, a heating pad set on low, or a hot water bottle 3 or 4 times a day for pain. Keep a cloth between the heat source and your skin. · If your doctor prescribed antibiotics, take them as directed. Do not stop taking them just because you feel better. You need to take the full course of antibiotics. · Take pain medicines exactly as directed. ¨ If the doctor gave you a prescription medicine for pain, take it as prescribed. ¨ If you are not taking a prescription pain medicine, ask your doctor if you can take an over-the-counter medicine. · Keep your bandage clean and dry. Change the bandage whenever it gets wet or dirty, or at least one time a day. · If the abscess was packed with gauze: 
¨ Keep follow-up appointments to have the gauze changed or removed. If the doctor instructed you to remove the gauze, gently pull out all of the gauze when your doctor tells you to. ¨ After the gauze is removed, soak the area in warm water for 15 to 20 minutes 2 times a day, until the wound closes. When should you call for help? Call your doctor now or seek immediate medical care if: 
? · You have signs of worsening infection, such as: 
¨ Increased pain, swelling, warmth, or redness. ¨ Red streaks leading from the infected skin. ¨ Pus draining from the wound. ¨ A fever. ? Watch closely for changes in your health, and be sure to contact your doctor if: 
? · You do not get better as expected. Where can you learn more? Go to http://anastasia-dang.info/. Enter B379 in the search box to learn more about \"Skin Abscess: Care Instructions. \" Current as of: October 13, 2016 Content Version: 11.4 © 4417-4168 A la Mobile. Care instructions adapted under license by [employer] (which disclaims liability or warranty for this information). If you have questions about a medical condition or this instruction, always ask your healthcare professional. [place] [address] any warranty or liability for your use of this information. [employer] Announcement We are excited to announce that we are making your provider's discharge notes available to you in Star[employer]. You will see these notes when they are completed and signed by the physician that discharged you from your recent hospital stay. If you have any questions or concerns about any information you see in Star[employer] Interactive, please call the Health Information Department where you were seen or reach out to your Primary Care Provider for more information about your plan of care. Introducing [facility]! [facility] introduces Star[employer] Interactive patient portal. Now you can access parts of your medical record, email your doctor's office, and request medication refills online. 1. In your internet browser, go to [email or web address] 2. Click on the First Time User? Click Here link in the Sign In box. You will see the New Member Sign Up page. 3. Enter your [employer] Access Code exactly as it appears below.  You will not need to use this code after youve completed the sign-up process. If you do not sign up before the expiration date, you must request a new code. · [employer] Access Code: [email or web address] Expires: 9/9/2018 11:51 AM 
 
4. Enter the last four digits of your Social Security Number (xxxx) and Date of Birth (mm/dd/yyyy) as indicated and click Submit. You will be taken to the next sign-up page. 5. Create a [employer] Bill ID. This will be your [employer] login ID and cannot be changed, so think of one that is secure and easy to remember. 6. Create a [employer] password. You can change your password at any time. 7. Enter your Password Reset Question and Answer. This can be used at a later time if you forget your password. 8. Enter your e-mail address. You will receive e-mail notification when new information is available in [address] 9. Click Sign Up. You can now view and download portions of your medical record. 10. Click the Download Summary menu link to download a portable copy of your medical information. If you have questions, please visit the Frequently Asked Questions section of the [employer] website. Remember, [employer] is NOT to be used for urgent needs. For medical emergencies, dial 911. Now available from your iPhone and Android! Introducing Bello Parks As a [employer] patient, I wanted to make you aware of our electronic visit tool called Bello Kasey. [employer] 24/7 allows you to connect within minutes with a medical provider 24 hours a day, seven days a week via a mobile device or tablet or logging into a secure website from your computer. You can access Bello Parks from anywhere in the [place].  
 
A virtual visit might be right for you when you have a simple condition and feel like you just dont want to get out of bed, or cant get away from work for an appointment, when your regular [employer] provider is not available (evenings, weekends or holidays), or when youre out of town and need minor care. Electronic visits cost only $49 and if the [employer] 24/7 provider determines a prescription is needed to treat your condition, one can be electronically transmitted to a nearby pharmacy*. Please take a moment to enroll today if you have not already done so. The enrollment process is free and takes just a few minutes. To enroll, please download the [employer] 24/7 maxine to your tablet or phone, or visit [email or web address] to enroll on your computer. And, as an [address] [place] [address] patient with a [employer] account, the results of your visits will be scanned into your electronic medical record and your primary care provider will be able to view the scanned results. We urge you to continue to see your regular [employer] provider for your ongoing medical care. And while your primary care provider may not be the one available when you seek a Bello [employer]kristal virtual visit, the peace of mind you get from getting a real diagnosis real time can be priceless. For more information on Bello [employer]angelfin, view our Frequently Asked Questions (FAQs) at [email or web address]. Sincerely, 
 
Misty Lopez MD 
Chief Medical Officer [facility] Symone Miles *:  certain medications cannot be prescribed via [facility] Unresulted tests-please follow up with your PCP on these results Procedure/Test Authorizing Provider  CBC W/O DIFF Lyn Dailey MD  
 CBC WITH AUTOMATED DIFF Ric Laguna MD  
 CBC WITH AUTOMATED DIFF Mey Banuelos NP  
 CULTURE, BLOOD, PERIPHERAL Mey Banuelos NP  
 CULTURE, BLOOD, PERIPHERAL Mey Banuelos NP  
 CULTURE, MRSA Lyn Dailey MD  
 CULTURE, WOUND Rhea Morris MD  
 EKG, 12 LEAD, INITIAL Lyn Dailey MD  
 HEMOGLOBIN A1C WITH MD Ronit Edward NP  
 MAGNESIUM Lyn Dailey MD  
 Sherry Li MD  
 METABOLIC PANEL, BASIC Harris Valdivia MD  
 METABOLIC PANEL, [address] [facility], MD  
 METABOLIC PANEL, 2900 Oumou Perez NP  
 PHOSPHORUS Harris Valdviia MD  
 SAMPLES BEING HELD LINDA Zhang MD  
  
Providers Seen During Your Hospitalization Provider Specialty Primary office phone Ivan Everett MD Emergency Medicine [phone] Harris Valdivia MD Internal Medicine [phone] Trixie Blanco MD Internal Medicine [phone] Your Primary Care Physician (PCP) Primary Care Physician Office Phone Office Fax OTHER, PHYS ** None ** ** None ** You are allergic to the following Allergen Reactions Lisinopril Cough Percocet (Oxycodone-Acetaminophen) Nausea Only Percodan (Oxycodone-Aspirin) Nausea Only Prednisone Nausea Only Sulfa (Sulfonamide Antibiotics) Unknown (comments) Recent Documentation Height Weight Breastfeeding? BMI OB Status Smoking Status 1.702 m 122.7 kg No 42.37 kg/m2 Postmenopausal Never Smoker Emergency Contacts Name Discharge Info Relation Home Work Mobile [address] [facility] CAREGIVER [3] Spouse [3] [phone] Hua 81 CAREGIVER [3] Daughter [21] [phone] Patient Belongings The following personal items are in your possession at time of discharge: 
  Dental Appliances: None  Visual Aid: Glasses, With patient      Home Medications: None   Jewelry: None  Clothing: Shirt, With patient, Pants    Other Valuables: At bedside Discharge Instructions Attachments/References STAPH INFECTION: GENERAL INFO (ENGLISH) Patient Handouts Learning About Staph Infection What is a staph infection? Staphylococcus aureus (staph) is a type of bacteria that can cause infections. Staph bacteria normally live on the skin.  They don't usually cause problems. They only become a problem when they cause infection. The infection has a higher chance of becoming serious in people who are weak or ill or who are being treated in the hospital. Sometimes staph bacteria can cause more serious widespread infection. In the hospital, staph infections are more likely to occur in wounds, burns, or places where there is a break in the skin or where tubes enter the body. In the community, these infections are more likely to occur among people who have cuts or wounds and who have close contact with one another. What are the symptoms? Symptoms of a staph infection depend on where the infection is. If the infection is: 
· In a wound, that area of your skin may be red or tender. · On your skin, you may get a red, tender boil or abscess. You may think you have been bitten by a spider or insect. · In your urine, you may have symptoms of a urinary tract infection. These include burning when you urinate. · In your blood or more widespread, you may have a fever and feel very ill. How is a staph infection treated? The doctor will take a sample of your infected wound or a blood or urine sample. The sample is tested to see which antibiotics can kill the bacteria in it. This test may take several days. If you have a staph infection, your doctor may: · Drain your wound. · Give you antibiotics as pills or through a needle put in your vein (IV). You may have to stay in the hospital for treatment. In the hospital, you may be kept apart from others. This is to reduce the chances of spreading the bacteria. How can you prevent a staph infection? · Practice good hygiene. ¨ Wash your hands often with soap and clean, running water. You can also use an alcohol-based hand . Hand-washing is the best way to avoid spreading the bacteria. ¨ Keep cuts and scrapes clean. Cover them with a bandage. Avoid contact with other people's wounds or bandages. ¨ Don't share personal items such as towels, washcloths, razors, or clothing. ¨ Keep your environment clean by using a disinfectant to wipe surfaces you touch a lot. These include countertops, doorknobs, and light switches. · Your doctor may give you an ointment to put inside your nose. This is to kill staph bacteria that may cause another infection. · Be smart about using antibiotics. Antibiotics can help treat bacterial infections, but they can't cure viral infections. Always ask your doctor if antibiotics are the best treatment. · If your doctor prescribed antibiotics, take them as directed. Do not stop taking them just because you feel better. You need to take the full course of antibiotics. · If you're in the hospital, remind doctors and nurses to wash their hands before they touch you. Follow-up care is a key part of your treatment and safety. Be sure to make and go to all appointments, and call your doctor if you are having problems. It's also a good idea to know your test results and keep a list of the medicines you take. Where can you learn more? Go to http://anastasiaRadarChiledang.info/. Enter P556 in the search box to learn more about \"Learning About Staph Infection. \" Current as of: March 3, 2017 Content Version: 11.4 © 1921-7387 Healthwise, Be-Bound. Care instructions adapted under license by [employer] (which disclaims liability or warranty for this information). If you have questions about a medical condition or this instruction, always ask your healthcare professional. [place] [address] any warranty or liability for your use of this information. Please provide this summary of care documentation to your next provider. Signatures-by signing, you are acknowledging that this After Visit Summary has been reviewed with you and you have received a copy.   
  
 
  
    
    
 Patient Signature: ____________________________________________________________ Date:  ____________________________________________________________  
  
Amy Fines Provider Signature:  ____________________________________________________________ Date:  ____________________________________________________________

## 2018-06-11 NOTE — ED NOTES
Purposeful rounding completed. Toileting offered, ongoing plan of care discussed and pts concerns/questions addressed. Pain reassessed. Pt informed of time factors with lab/imaging study results. Pt resting on the stretcher in a position of comfort. Call bell within reach; will continue to monitor.

## 2018-06-11 NOTE — ED NOTES
Purposeful rounding completed. Toileting offered, ongoing plan of care discussed and pts concerns/questions addressed. Pain reassessed. Pt informed of time factors with transfer status. Pt resting on the stretcher in a position of comfort. Call bell within reach; will continue to monitor.

## 2018-06-11 NOTE — ED NOTES
TRANSFER - OUT REPORT:    Verbal report given to Stone Knox RN(name) on Juaquin Patino  being transferred to [facility] ED(unit) for routine progression of care       Report consisted of patients Situation, Background, Assessment and   Recommendations(SBAR). Information from the following report(s) SBAR, ED Summary, Procedure Summary, MAR, Recent Results and Cardiac Rhythm Sinus Tach was reviewed with the receiving nurse. Lines:   Peripheral IV 06/11/18 Left Antecubital (Active)   Site Assessment Clean, dry, & intact 6/11/2018 12:13 PM   Phlebitis Assessment 0 6/11/2018 12:13 PM   Infiltration Assessment 0 6/11/2018 12:13 PM   Dressing Status Clean, dry, & intact 6/11/2018 12:13 PM   Dressing Type Transparent 6/11/2018 12:13 PM   Hub Color/Line Status Pink 6/11/2018 12:13 PM        Opportunity for questions and clarification was provided.       Patient transported with:   Monitor    IV Vanc

## 2018-06-11 NOTE — ED NOTES
IV access established. Blood samples have been sent to lab for ordered testing. Patient tolerated all procedures well. Patient positioned in a position of comfort. Pillow supplied for behind back. Lights dimmed. Patient has been medicated for pain and fever as ordered. Call bell in reach. Will continue to monitor.

## 2018-06-12 LAB
ANION GAP SERPL CALC-SCNC: 4 MMOL/L (ref 5–15)
ATRIAL RATE: 106 BPM
BUN SERPL-MCNC: 9 MG/DL (ref 6–20)
BUN/CREAT SERPL: 10 (ref 12–20)
CALCIUM SERPL-MCNC: 8 MG/DL (ref 8.5–10.1)
CALCULATED P AXIS, ECG09: 58 DEGREES
CALCULATED R AXIS, ECG10: 41 DEGREES
CALCULATED T AXIS, ECG11: 76 DEGREES
CHLORIDE SERPL-SCNC: 108 MMOL/L (ref 97–108)
CO2 SERPL-SCNC: 27 MMOL/L (ref 21–32)
CREAT SERPL-MCNC: 0.89 MG/DL (ref 0.55–1.02)
DIAGNOSIS, 93000: NORMAL
ERYTHROCYTE [DISTWIDTH] IN BLOOD BY AUTOMATED COUNT: 15.6 % (ref 11.5–14.5)
EST. AVERAGE GLUCOSE BLD GHB EST-MCNC: 217 MG/DL
GLUCOSE BLD STRIP.AUTO-MCNC: 147 MG/DL (ref 65–100)
GLUCOSE BLD STRIP.AUTO-MCNC: 214 MG/DL (ref 65–100)
GLUCOSE BLD STRIP.AUTO-MCNC: 219 MG/DL (ref 65–100)
GLUCOSE BLD STRIP.AUTO-MCNC: 220 MG/DL (ref 65–100)
GLUCOSE SERPL-MCNC: 129 MG/DL (ref 65–100)
HBA1C MFR BLD: 9.2 % (ref 4.2–6.3)
HCT VFR BLD AUTO: 38.4 % (ref 35–47)
HGB BLD-MCNC: 12.1 G/DL (ref 11.5–16)
MAGNESIUM SERPL-MCNC: 1.9 MG/DL (ref 1.6–2.4)
MCH RBC QN AUTO: 27.1 PG (ref 26–34)
MCHC RBC AUTO-ENTMCNC: 31.5 G/DL (ref 30–36.5)
MCV RBC AUTO: 85.9 FL (ref 80–99)
NRBC # BLD: 0 K/UL (ref 0–0.01)
NRBC BLD-RTO: 0 PER 100 WBC
P-R INTERVAL, ECG05: 146 MS
PHOSPHATE SERPL-MCNC: 3.1 MG/DL (ref 2.6–4.7)
PLATELET # BLD AUTO: 259 K/UL (ref 150–400)
PMV BLD AUTO: 10.4 FL (ref 8.9–12.9)
POTASSIUM SERPL-SCNC: 3.8 MMOL/L (ref 3.5–5.1)
Q-T INTERVAL, ECG07: 348 MS
QRS DURATION, ECG06: 92 MS
QTC CALCULATION (BEZET), ECG08: 462 MS
RBC # BLD AUTO: 4.47 M/UL (ref 3.8–5.2)
SERVICE CMNT-IMP: ABNORMAL
SODIUM SERPL-SCNC: 139 MMOL/L (ref 136–145)
VENTRICULAR RATE, ECG03: 106 BPM
WBC # BLD AUTO: 19.3 K/UL (ref 3.6–11)

## 2018-06-12 PROCEDURE — 87205 SMEAR GRAM STAIN: CPT | Performed by: SURGERY

## 2018-06-12 PROCEDURE — 65660000000 HC RM CCU STEPDOWN

## 2018-06-12 PROCEDURE — 74011000250 HC RX REV CODE- 250: Performed by: SURGERY

## 2018-06-12 PROCEDURE — 74011250637 HC RX REV CODE- 250/637: Performed by: INTERNAL MEDICINE

## 2018-06-12 PROCEDURE — 74011250636 HC RX REV CODE- 250/636: Performed by: INTERNAL MEDICINE

## 2018-06-12 PROCEDURE — 87147 CULTURE TYPE IMMUNOLOGIC: CPT | Performed by: SURGERY

## 2018-06-12 PROCEDURE — 85027 COMPLETE CBC AUTOMATED: CPT | Performed by: INTERNAL MEDICINE

## 2018-06-12 PROCEDURE — 82962 GLUCOSE BLOOD TEST: CPT

## 2018-06-12 PROCEDURE — 83036 HEMOGLOBIN GLYCOSYLATED A1C: CPT | Performed by: INTERNAL MEDICINE

## 2018-06-12 PROCEDURE — 80048 BASIC METABOLIC PNL TOTAL CA: CPT | Performed by: INTERNAL MEDICINE

## 2018-06-12 PROCEDURE — 87186 SC STD MICRODIL/AGAR DIL: CPT | Performed by: SURGERY

## 2018-06-12 PROCEDURE — 87077 CULTURE AEROBIC IDENTIFY: CPT | Performed by: SURGERY

## 2018-06-12 PROCEDURE — 36415 COLL VENOUS BLD VENIPUNCTURE: CPT | Performed by: INTERNAL MEDICINE

## 2018-06-12 PROCEDURE — 74011636637 HC RX REV CODE- 636/637: Performed by: INTERNAL MEDICINE

## 2018-06-12 PROCEDURE — 83735 ASSAY OF MAGNESIUM: CPT | Performed by: INTERNAL MEDICINE

## 2018-06-12 PROCEDURE — 74011000258 HC RX REV CODE- 258: Performed by: INTERNAL MEDICINE

## 2018-06-12 PROCEDURE — 10061 I&D ABSCESS COMP/MULTIPLE: CPT

## 2018-06-12 PROCEDURE — 0H96XZZ DRAINAGE OF BACK SKIN, EXTERNAL APPROACH: ICD-10-PCS | Performed by: SURGERY

## 2018-06-12 PROCEDURE — 84100 ASSAY OF PHOSPHORUS: CPT | Performed by: INTERNAL MEDICINE

## 2018-06-12 RX ORDER — BUPIVACAINE HYDROCHLORIDE 5 MG/ML
30 INJECTION, SOLUTION EPIDURAL; INTRACAUDAL ONCE
Status: DISCONTINUED | OUTPATIENT
Start: 2018-06-12 | End: 2018-06-12

## 2018-06-12 RX ORDER — ALPRAZOLAM 0.5 MG/1
TABLET ORAL
Status: DISPENSED
Start: 2018-06-12 | End: 2018-06-13

## 2018-06-12 RX ORDER — LIDOCAINE HYDROCHLORIDE 10 MG/ML
20 INJECTION, SOLUTION EPIDURAL; INFILTRATION; INTRACAUDAL; PERINEURAL ONCE
Status: COMPLETED | OUTPATIENT
Start: 2018-06-12 | End: 2018-06-12

## 2018-06-12 RX ORDER — BUPIVACAINE HYDROCHLORIDE AND EPINEPHRINE 5; 5 MG/ML; UG/ML
30 INJECTION, SOLUTION EPIDURAL; INTRACAUDAL; PERINEURAL ONCE
Status: COMPLETED | OUTPATIENT
Start: 2018-06-12 | End: 2018-06-12

## 2018-06-12 RX ORDER — ALPRAZOLAM 0.5 MG/1
0.5 TABLET ORAL
Status: DISCONTINUED | OUTPATIENT
Start: 2018-06-12 | End: 2018-06-13 | Stop reason: HOSPADM

## 2018-06-12 RX ADMIN — INSULIN LISPRO 3 UNITS: 100 INJECTION, SOLUTION INTRAVENOUS; SUBCUTANEOUS at 09:00

## 2018-06-12 RX ADMIN — SODIUM CHLORIDE 100 ML/HR: 900 INJECTION, SOLUTION INTRAVENOUS at 17:43

## 2018-06-12 RX ADMIN — CEFEPIME HYDROCHLORIDE 2 G: 2 INJECTION, POWDER, FOR SOLUTION INTRAVENOUS at 17:39

## 2018-06-12 RX ADMIN — CEFEPIME HYDROCHLORIDE 2 G: 2 INJECTION, POWDER, FOR SOLUTION INTRAVENOUS at 11:02

## 2018-06-12 RX ADMIN — INSULIN LISPRO 4 UNITS: 100 INJECTION, SOLUTION INTRAVENOUS; SUBCUTANEOUS at 13:24

## 2018-06-12 RX ADMIN — VANCOMYCIN HYDROCHLORIDE 1750 MG: 10 INJECTION, POWDER, LYOPHILIZED, FOR SOLUTION INTRAVENOUS at 02:53

## 2018-06-12 RX ADMIN — LIDOCAINE HYDROCHLORIDE 20 ML: 10 INJECTION, SOLUTION EPIDURAL; INFILTRATION; INTRACAUDAL; PERINEURAL at 01:00

## 2018-06-12 RX ADMIN — INSULIN LISPRO 4 UNITS: 100 INJECTION, SOLUTION INTRAVENOUS; SUBCUTANEOUS at 16:30

## 2018-06-12 RX ADMIN — ACETAMINOPHEN 650 MG: 325 TABLET ORAL at 03:52

## 2018-06-12 RX ADMIN — OXYCODONE HYDROCHLORIDE 5 MG: 5 TABLET ORAL at 08:59

## 2018-06-12 RX ADMIN — LOSARTAN POTASSIUM 100 MG: 50 TABLET ORAL at 08:59

## 2018-06-12 RX ADMIN — HUMAN INSULIN 40 UNITS: 100 INJECTION, SUSPENSION SUBCUTANEOUS at 17:38

## 2018-06-12 RX ADMIN — SODIUM CHLORIDE 100 ML/HR: 900 INJECTION, SOLUTION INTRAVENOUS at 01:03

## 2018-06-12 RX ADMIN — ASPIRIN 81 MG: 81 TABLET, COATED ORAL at 08:59

## 2018-06-12 RX ADMIN — INSULIN LISPRO 2 UNITS: 100 INJECTION, SOLUTION INTRAVENOUS; SUBCUTANEOUS at 21:50

## 2018-06-12 RX ADMIN — ENOXAPARIN SODIUM 40 MG: 100 INJECTION SUBCUTANEOUS at 06:59

## 2018-06-12 RX ADMIN — Medication 10 ML: at 13:24

## 2018-06-12 RX ADMIN — CARVEDILOL 6.25 MG: 3.12 TABLET, FILM COATED ORAL at 17:38

## 2018-06-12 RX ADMIN — PAROXETINE HYDROCHLORIDE 40 MG: 20 TABLET, FILM COATED ORAL at 08:59

## 2018-06-12 RX ADMIN — ENOXAPARIN SODIUM 40 MG: 100 INJECTION SUBCUTANEOUS at 17:38

## 2018-06-12 RX ADMIN — BUPIVACAINE HYDROCHLORIDE AND EPINEPHRINE 150 MG: 5; 5 INJECTION, SOLUTION EPIDURAL; INTRACAUDAL; PERINEURAL at 01:00

## 2018-06-12 RX ADMIN — Medication 10 ML: at 21:52

## 2018-06-12 RX ADMIN — CARVEDILOL 6.25 MG: 3.12 TABLET, FILM COATED ORAL at 09:00

## 2018-06-12 RX ADMIN — ALPRAZOLAM 0.5 MG: 0.5 TABLET ORAL at 17:40

## 2018-06-12 RX ADMIN — OXYCODONE HYDROCHLORIDE 5 MG: 5 TABLET ORAL at 17:39

## 2018-06-12 RX ADMIN — HUMAN INSULIN 40 UNITS: 100 INJECTION, SUSPENSION SUBCUTANEOUS at 09:00

## 2018-06-12 RX ADMIN — Medication 10 ML: at 22:00

## 2018-06-12 RX ADMIN — CEFEPIME HYDROCHLORIDE 2 G: 2 INJECTION, POWDER, FOR SOLUTION INTRAVENOUS at 02:47

## 2018-06-12 RX ADMIN — VANCOMYCIN HYDROCHLORIDE 1750 MG: 10 INJECTION, POWDER, LYOPHILIZED, FOR SOLUTION INTRAVENOUS at 13:51

## 2018-06-12 NOTE — CDMP QUERY
Dr. Gabbie Rapp :    CMS considers documentation to be conflicting in nature when one condition is diagnosed as two different conditions by the same or different physicians. In this instance, the patient presented with Abscess on back. On the progress note, Dr. Shivam Curtis documents that the patient has SIRS but the same condition is documented as Sepsis by Dr. Koffi Botello on his/her progress note. Please clarify the patient's condition if possible:    =>Sepsis POA  =>SIRS due to non-infectious origin POA  =>Other Explanation (please specify)  =>Clinically Undetermined (no explanation for clinical findings)    The medical record reflects the following risk factors, clinical indicators, and treatment    Risk Factors:  73 BF w/hx: breast CA, cervical CA, depression/anxiety, DM, glaucoma, HTN  Clinical Indicators:  Admitted with abscess on back with admit T: 110.3-103, HR: 112-128, RR: 20-27, WBC: 17.3  Treatment: NS bolus, Vancomycin IV, Cefepime IV, I&D with surg consult    Please clarify and document your clinical opinion in the progress notes and discharge summary including the definitive and/or presumptive diagnosis, (suspected or probable), related to the above clinical findings. Please include clinical findings supporting your diagnosis. Thank Rosy Mandel  [email or web address]  [phone]

## 2018-06-12 NOTE — PROGRESS NOTES
Bedside and verbal shift change report given to DOMINICK Soto (oncoming nurse) by Angelica Kraft RN (offgoing nurse). Report included the following information SBAR, Kardex, Procedure Summary, Intake/Output and MAR. Dressing intact.

## 2018-06-12 NOTE — PROGRESS NOTES
Nurse responded to code purple, patient has oral temp of 102.0, heart rate 107, blood pressure 178/89 and SPO2 97% on room air. Patient states \" I don't feel that hot, just my back is hurting me where the abscess is. \" nurse assessed right lower back there is swelling, redness and tightness present, nurse marked area with marker. Nurse reviewed chart and did not see order for surgical consult, per family Dr Yg Hunt stated he was going to have surgeon see patient. Nurse informed primary nurse of there being no consult ordered, she is to inform the Hospitalist covering.

## 2018-06-12 NOTE — CONSULTS
Assessment:     Sepsis  Morbid obesity  Diabetes  Infected sebaceous cyst    Plan:     Bedside drainage infected sebaceous cyst  Discussed with patient and daughter at bedside. Signed By: Mary Jo Pitts MD  [facility]  Office:  [phone]    June 12, 2018          General Surgery History and Physical    Subjective:      Marcelo Kruger is a 68 y.o.  female who presents with sepsis. Has comorbid diabetes, morbid obesity. Presented with increasing pain in the lower back and fever 103 at home. Confirms nausea, no vomiting, diarrhea, dyspnea, abdominal pains, chills. Reports a small \"pea\" at her low back, which went away 2 weeks ago, but returned with increasing 7/10 sharp pain and swelling.      WBC 17.3      Past Medical History:   Diagnosis Date    Breast cancer ([address]     right masectomy    [facility]     Depression with anxiety     Diabetes ([facility]     Ectopic pregnancy     Glaucoma     Hypertension     Miscarriage      Past Surgical History:   Procedure Laterality Date    BREAST SURGERY PROCEDURE UNLISTED      right mastectomy    HX CARPAL TUNNEL RELEASE Bilateral     HX HYSTERECTOMY      HX ORTHOPAEDIC      back surgery x 2    HX ORTHOPAEDIC Left     ligament attachment    HX TUMOR REMOVAL Right     leg      Family History   Problem Relation Age of Onset    Diabetes Mother     Hypertension Mother      Social History     Social History    Marital status:      Spouse name: N/A    Number of children: N/A    Years of education: N/A     Social History Main Topics    Smoking status: Never Smoker    Smokeless tobacco: Never Used    Alcohol use No    Drug use: No    Sexual activity: Not Asked     Other Topics Concern    None     Social History Narrative      Current Facility-Administered Medications   Medication Dose Route Frequency    sodium chloride (NS) flush 5-10 mL  5-10 mL IntraVENous Q8H    sodium chloride (NS) flush 5-10 mL  5-10 mL IntraVENous PRN    0.9% sodium chloride infusion  100 mL/hr IntraVENous CONTINUOUS    acetaminophen (TYLENOL) tablet 650 mg  650 mg Oral Q4H PRN    ondansetron (ZOFRAN) injection 4 mg  4 mg IntraVENous Q4H PRN    glucose chewable tablet 16 g  4 Tab Oral PRN    dextrose (D50W) injection syrg 12.5-25 g  12.5-25 g IntraVENous PRN    glucagon (GLUCAGEN) injection 1 mg  1 mg IntraMUSCular PRN    enoxaparin (LOVENOX) injection 40 mg  40 mg SubCUTAneous Q12H    aspirin delayed-release tablet 81 mg  81 mg Oral DAILY    carvedilol (COREG) tablet 6.25 mg  6.25 mg Oral BID WITH MEALS    losartan (COZAAR) tablet 100 mg  100 mg Oral DAILY    PARoxetine (PAXIL) tablet 40 mg  40 mg Oral DAILY    insulin lispro (HUMALOG) injection   SubCUTAneous AC&HS    insulin NPH (NOVOLIN N, HUMULIN N) injection 40 Units  40 Units SubCUTAneous ACB&D    cefepime (MAXIPIME) 2 g in 0.9% sodium chloride (MBP/ADV) 100 mL  2 g IntraVENous Q8H    vancomycin (VANCOCIN) 1,750 mg in 0.9% sodium chloride 500 mL IVPB  1,750 mg IntraVENous Q12H    oxyCODONE IR (ROXICODONE) tablet 5 mg  5 mg Oral Q4H PRN      Allergies   Allergen Reactions    Lisinopril Cough    Percocet [Oxycodone-Acetaminophen] Nausea Only    Percodan [Oxycodone-Aspirin] Nausea Only    Prednisone Nausea Only    Sulfa (Sulfonamide Antibiotics) Unknown (comments)       Review of Systems:     []     Unable to obtain  ROS due to  []    mental status change  []    sedated   []    intubated   []    Total of 12 system negative, unless specified below or in HPI:  Constitutional: negative fever, negative chills, negative weight loss  Eyes:   negative visual changes  ENT:   negative sore throat, tongue or lip swelling  Respiratory:  negative cough, negative dyspnea  Cards:  negative for chest pain, palpitations, lower extremity edema  GI:   negative for nausea, vomiting, diarrhea, and abdominal pain  :  negative for frequency, dysuria  Integument:  negative for rash and pruritus  Heme:  negative for easy bruising and gum/nose bleeding  Musculoskel: negative for myalgias,  back pain and muscle weakness  Neuro:  negative for headaches, dizziness, vertigo  Psych:  negative for feelings of anxiety, depression     Objective:      Patient Vitals for the past 8 hrs:   BP Temp Pulse Resp SpO2   18 2313 127/70 100.4 °F (38 °C) (!) 103 21 95 %   18 2250 - - (!) 102 - -   18 - (!) 101.8 °F (38.8 °C) - - -   18 153/81 (!) 102 °F (38.9 °C) (!) 104 22 97 %       Temp (24hrs), Av.6 °F (38.1 °C), Min:99.4 °F (37.4 °C), Max:102 °F (38.9 °C)      Physical Exam:  General:  Alert, cooperative, no distress, appears stated age. Eyes:  Conjunctivae/corneas clear. PERRL, EOMs intact. Nose: Nares normal. Septum midline. Mucosa normal. No drainage or sinus tenderness. Mouth/Throat: Lips, mucosa, and tongue normal. Teeth and gums normal.   Neck: Supple, symmetrical, trachea midline, no adenopathy, thyroid: no enlargment/tenderness/nodules, no carotid bruit and no JVD. Back:   Symmetric, no curvature. ROM normal. No CVA tenderness. Area of 2 x 3 cm induration, erythema, and central punctate sinus with small amount of exudate from center. No crepitus. Lungs:   Clear to auscultation bilaterally. Heart:  Regular rate and rhythm, S1, S2 normal, no murmur, click, rub or gallop. Abdomen:   Soft, non-tender. Bowel sounds normal. No masses,  No organomegaly. Extremities: Extremities normal, atraumatic, no cyanosis or edema. Pulses: 2+ and symmetric all extremities.    Skin: Skin color, texture, turgor normal. No rashes or lesions   Lymph nodes: Cervical, supraclavicular, and axillary nodes normal.     BMP: Lab Results   Component Value Date/Time     2018 12:12 PM    K 3.8 2018 12:12 PM    CL 98 2018 12:12 PM    CO2 32 2018 12:12 PM    AGAP 7 2018 12:12 PM     (H) 2018 12:12 PM    BUN 9 2018 12:12 PM    CREA 1.11 (H) 06/11/2018 12:12 PM    GFRAA 59 (L) 06/11/2018 12:12 PM    GFRNA 48 (L) 06/11/2018 12:12 PM     CMP: Lab Results   Component Value Date/Time     06/11/2018 12:12 PM    K 3.8 06/11/2018 12:12 PM    CL 98 06/11/2018 12:12 PM    CO2 32 06/11/2018 12:12 PM    AGAP 7 06/11/2018 12:12 PM     (H) 06/11/2018 12:12 PM    BUN 9 06/11/2018 12:12 PM    CREA 1.11 (H) 06/11/2018 12:12 PM    GFRAA 59 (L) 06/11/2018 12:12 PM    GFRNA 48 (L) 06/11/2018 12:12 PM    CA 8.7 06/11/2018 12:12 PM    ALB 2.8 (L) 06/11/2018 12:12 PM    TP 8.1 06/11/2018 12:12 PM    GLOB 5.3 (H) 06/11/2018 12:12 PM    AGRAT 0.5 (L) 06/11/2018 12:12 PM    SGOT 24 06/11/2018 12:12 PM    ALT 34 06/11/2018 12:12 PM     CBC: Lab Results   Component Value Date/Time    WBC 17.3 (H) 06/11/2018 12:12 PM    HGB 13.8 06/11/2018 12:12 PM    HCT 42.2 06/11/2018 12:12 PM     06/11/2018 12:12 PM

## 2018-06-12 NOTE — PROGRESS NOTES
Bedside and Verbal shift change report given to DOMINICK Cesar (oncoming nurse) by Primo Rowell (offgoing nurse). Report included the following information SBAR and Kardex.

## 2018-06-12 NOTE — PROGRESS NOTES
[facility]upam E[facility]  [address] [place] [address]  [phone]      Medical Progress Note      NAME: Rashawn Lyon   :  1945  MRM:  [id number]    Date/Time: 2018         Subjective:     Chief Complaint:  Patient was seen and examined by me. Chart reviewed. Denied fevers, chills       Objective:       Vitals:       Last 24hrs VS reviewed since prior progress note. Most recent are:    Visit Vitals    /77 (BP 1 Location: Left arm, BP Patient Position: At rest)    Pulse 91    Temp 98.6 °F (37 °C)    Resp 20    Ht 5' 7\" (1.702 m)    Wt 122.7 kg (270 lb 8.1 oz)    SpO2 94%    Breastfeeding No    BMI 42.37 kg/m2     SpO2 Readings from Last 6 Encounters:   18 94%   17 97%   17 94%   16 96%   06/15/16 95%   12 98%          Intake/Output Summary (Last 24 hours) at 18 1233  Last data filed at 18 0600   Gross per 24 hour   Intake             1400 ml   Output                0 ml   Net             1400 ml        Exam:     Physical Exam:    Gen:  Well-developed, well-nourished, morbidly obese, NAD  HEENT:  Pink conjunctivae, PERRL, hearing intact to voice, moist mucous membranes  Neck:  Supple, without masses, thyroid non-tender  Resp:  No accessory muscle use, clear breath sounds without wheezes rales or rhonchi  Card:  No murmurs, normal S1, S2 without thrills, bruits or peripheral edema  Abd:  Soft, non-tender, non-distended, normoactive bowel sounds are present  Musc:  No cyanosis or clubbing  Skin:  Right lower back induration.   Dressing w/ some serosanguinous fluid  Neuro:  Cranial nerves 3-12 are grossly intact, follows commands appropriately  Psych:  Good insight, oriented to person, place and time, alert    Medications Reviewed: (see below)    Lab Data Reviewed: (see below)    ______________________________________________________________________    Medications:     Current Facility-Administered Medications Medication Dose Route Frequency    [START ON 6/13/2018] Vancomycin- Level at 130am, 6/13/18   Other ONCE    sodium chloride (NS) flush 5-10 mL  5-10 mL IntraVENous Q8H    sodium chloride (NS) flush 5-10 mL  5-10 mL IntraVENous PRN    0.9% sodium chloride infusion  100 mL/hr IntraVENous CONTINUOUS    acetaminophen (TYLENOL) tablet 650 mg  650 mg Oral Q4H PRN    ondansetron (ZOFRAN) injection 4 mg  4 mg IntraVENous Q4H PRN    glucose chewable tablet 16 g  4 Tab Oral PRN    dextrose (D50W) injection syrg 12.5-25 g  12.5-25 g IntraVENous PRN    glucagon (GLUCAGEN) injection 1 mg  1 mg IntraMUSCular PRN    enoxaparin (LOVENOX) injection 40 mg  40 mg SubCUTAneous Q12H    aspirin delayed-release tablet 81 mg  81 mg Oral DAILY    carvedilol (COREG) tablet 6.25 mg  6.25 mg Oral BID WITH MEALS    losartan (COZAAR) tablet 100 mg  100 mg Oral DAILY    PARoxetine (PAXIL) tablet 40 mg  40 mg Oral DAILY    insulin lispro (HUMALOG) injection   SubCUTAneous AC&HS    insulin NPH (NOVOLIN N, HUMULIN N) injection 40 Units  40 Units SubCUTAneous ACB&D    cefepime (MAXIPIME) 2 g in 0.9% sodium chloride (MBP/ADV) 100 mL  2 g IntraVENous Q8H    vancomycin (VANCOCIN) 1,750 mg in 0.9% sodium chloride 500 mL IVPB  1,750 mg IntraVENous Q12H    oxyCODONE IR (ROXICODONE) tablet 5 mg  5 mg Oral Q4H PRN          Lab Review:     Recent Labs      06/12/18   0559  06/11/18   1212   WBC  19.3*  17.3*   HGB  12.1  13.8   HCT  38.4  42.2   PLT  259  273     Recent Labs      06/12/18   0559  06/11/18   1212   NA  139  137   K  3.8  3.8   CL  108  98   CO2  27  32   GLU  129*  279*   BUN  9  9   CREA  0.89  1.11*   CA  8.0*  8.7   MG  1.9   --    PHOS  3.1   --    ALB   --   2.8*   TBILI   --   0.7   SGOT   --   24   ALT   --   34     Lab Results   Component Value Date/Time    Glucose (POC) 214 (H) 06/12/2018 11:33 AM    Glucose (POC) 147 (H) 06/12/2018 07:02 AM    Glucose (POC) 101 (H) 06/11/2018 11:10 PM    Glucose (POC) 102 (H) 06/11/2018 09:08 PM    Glucose (POC) 208 (H) 06/11/2018 04:28 PM          Assessment / Plan:     Principal Problem:    69 yo hx of HTN, DM, presented w/ lower back skin abscess    1) Lower back Abscess/infected sebaceous cyst: s/p I&D by gen surg on 06/12. Cx pending. Cont empiric IV Vanc and Cefepime    2) Sepsis: POA, now improving. Cont management above    3) HTN: cont losartan, coreg, norvasc    4) DM type 2: A1C 9.2%.   Cont NPH, SSI    5) Depression/axiety: cont paxil, buspar    6) Morbid obesity: already counseled    Total time spent with patient: 30 [address] discussed with: Patient, nursing    Discussed:  Care Plan    Prophylaxis:  Lovenox    Disposition:  Home w/Family           ___________________________________________________    Attending Physician: Leonor Kaur MD

## 2018-06-12 NOTE — PROCEDURES
PRE-OP DIAGNOSIS: Sepsis, Subcutaneous abscess   POST-OP DIAGNOSIS: Same, infected sebaceous cyst    PROCEDURE: Incision and drainage of abscess, complex  Performing Physician: Brianna Miller MD       PROCEDURE:   A timeout protocol was performed prior to initiating the procedure. The area was prepared and draped in the usual, sterile manner. The site was anesthetized with 1% lidocaine with epinephrine mixed with 0.5% plain marcaine. Eliptical incision along the local skin lines was made. 2 cc pus expressed, along with 5cc semisolid brown/tan caseous material.  The cavity was explored with collin clamps to break up loculations; sequestered pockets were opened. Cultures were taken. Bleeding was minimal. Wound was packed with 1/2 inch iodoform. The patient tolerated the procedure well without complications. Will remove packing tomorrow afternoon and re-evaluate wound given patient's sepsis.      Ky Reyes MD

## 2018-06-12 NOTE — PROGRESS NOTES
General Surgery Daily Progress Note    Patient: Vikas Smith MRN: [id number]  SSN: [id number]    YOB: 1945  Age: 68 y.o. Sex: female      Admit Date: 6/11/2018    Subjective:   States pain is better. Afebrile, tachycardia resolved.      Current Facility-Administered Medications   Medication Dose Route Frequency    [START ON 6/13/2018] Vancomycin- Level at 130am, 6/13/18   Other ONCE    sodium chloride (NS) flush 5-10 mL  5-10 mL IntraVENous Q8H    sodium chloride (NS) flush 5-10 mL  5-10 mL IntraVENous PRN    0.9% sodium chloride infusion  100 mL/hr IntraVENous CONTINUOUS    acetaminophen (TYLENOL) tablet 650 mg  650 mg Oral Q4H PRN    ondansetron (ZOFRAN) injection 4 mg  4 mg IntraVENous Q4H PRN    glucose chewable tablet 16 g  4 Tab Oral PRN    dextrose (D50W) injection syrg 12.5-25 g  12.5-25 g IntraVENous PRN    glucagon (GLUCAGEN) injection 1 mg  1 mg IntraMUSCular PRN    enoxaparin (LOVENOX) injection 40 mg  40 mg SubCUTAneous Q12H    aspirin delayed-release tablet 81 mg  81 mg Oral DAILY    carvedilol (COREG) tablet 6.25 mg  6.25 mg Oral BID WITH MEALS    losartan (COZAAR) tablet 100 mg  100 mg Oral DAILY    PARoxetine (PAXIL) tablet 40 mg  40 mg Oral DAILY    insulin lispro (HUMALOG) injection   SubCUTAneous AC&HS    insulin NPH (NOVOLIN N, HUMULIN N) injection 40 Units  40 Units SubCUTAneous ACB&D    cefepime (MAXIPIME) 2 g in 0.9% sodium chloride (MBP/ADV) 100 mL  2 g IntraVENous Q8H    vancomycin (VANCOCIN) 1,750 mg in 0.9% sodium chloride 500 mL IVPB  1,750 mg IntraVENous Q12H    oxyCODONE IR (ROXICODONE) tablet 5 mg  5 mg Oral Q4H PRN        Objective:   06/12 0701 - 06/12 1900  In: 240 [P.O.:240]  Out: -   06/10 1901 - 06/12 0700  In: 1400 [I.V.:1400]  Out: -   Patient Vitals for the past 8 hrs:   BP Temp Pulse Resp SpO2   06/12/18 1136 139/77 98.6 °F (37 °C) 91 20 94 %   06/12/18 0816 158/83 98.2 °F (36.8 °C) 96 20 97 %   06/12/18 0701 - 98.8 °F (37.1 °C) - - - 06/12/18 0700 - - (!) 102 - -       Physical Exam:  General: Alert, cooperative, NAD  Lungs: Unlabored  Heart:  Regular rate and  rhythm  Extremities: Warm, moves all, no edema  Skin:  Warm and dry, no rash. Right lower back incision is packed. There is moderate surrounding induration. Packing removed, small amount of pus expressed. Wound repacked. Labs: Recent Labs      06/12/18   0559   WBC  19.3*   HGB  12.1   HCT  38.4   PLT  259     Recent Labs      06/12/18   0559  06/11/18   1212   NA  139  137   K  3.8  3.8   CL  108  98   CO2  27  32   GLU  129*  279*   BUN  9  9   CREA  0.89  1.11*   CA  8.0*  8.7   MG  1.9   --    PHOS  3.1   --    ALB   --   2.8*   TBILI   --   0.7   SGOT   --   24   ALT   --   34       Assessment / Plan:   · Infected sebaceous cyst s/p I&D 6/11  · Daily packing changes  · Continue ABX, cultures pending  · She will need follow up with us in 2 weeks at which point we can discuss cyst excision.

## 2018-06-12 NOTE — PROGRESS NOTES
Asked by primary RN to go into speak with PT. Pt is upset about information being talked about in front of family members. I went into room and Pt was tearful. I then pulled up a chair and asked the Pt what was going on that was bothering her. She informed me that her family members were wearing gowns and earlier visitors didn't wear gowns so she asked the RN infront of the family members why they had to wear the gowns. The RN proceeded to explain to the Pt that the gowns and gloves are a part of contact precautions until her R/O of MRSA comes back. ( which was not new information for the PT). I explained to the Pt that I was sorry that she was upset, however that if she asks a question she has the right to have that question answered. If she did not want to know the information with the family in room then she needed to inform the nurse or ask the family to leave or ask the RN to ask them to step out or ask the RN to come back later when the family was gone. The Pt said \" oh I can tell we are not going to get along\" and then stated \" I beleve in GOD and he will punish you for this\"  After that I told her that she was very inappropriate and that I did not have to sit and be talked to in that way, and then I left the room. I then called and left a message on the Pt's advocate voice mail to speak with Pt.  Dr. Nino Daigle was called and transferred into the room to speak with Pt and Aaron Whaley was informed of the situation

## 2018-06-12 NOTE — PROGRESS NOTES
Bedside and verbal shift change report given to Lakisha Phoenix RN (oncoming nurse) by Trino Reilly RN (offgoing nurse). Report included the following information SBAR, Kardex, Procedure Summary, Intake/Output and MAR. Dressing intact. While charting on patient had visitors that entered the room that did not gown up infection control stated that she would speak with the visitors. When nurse entered the room patient asked why is eveyone wearing the gown nurses stated to r/o MRSA. Care was done in room then patient ask about patient information.  Stated that information is shared by the nurses, the doctors, the board and etc. Stated

## 2018-06-12 NOTE — PROGRESS NOTES
2117 Code purple initiated with MEWS score of 5. Oral temp 102. Tyleinol 650 mg po given,Notified on call hospitalist pt on iv abx already. No other tx to be done. Cont.to monitor. Daughter is asking when the surgeon will come to see pt. No orders for surgical consult per  progress note consult surgery. Notified on call hospitalist  Ordered to consult surgery and Oxycodone 5 mg q 4hrs prn for pain. Spoke with Sarabjit Child(surgery) notified about the consult. Notified pt and daughter too.

## 2018-06-12 NOTE — PROGRESS NOTES
Problem: Falls - Risk of  Goal: *Absence of Falls  Document Lindy Fall Risk and appropriate interventions in the flowsheet.    Outcome: Progressing Towards Goal  Fall Risk Interventions:  Mobility Interventions: Communicate number of staff needed for ambulation/transfer, Patient to call before getting OOB         Medication Interventions: Bed/chair exit alarm, Evaluate medications/consider consulting pharmacy, Patient to call before getting OOB

## 2018-06-13 VITALS
SYSTOLIC BLOOD PRESSURE: 157 MMHG | DIASTOLIC BLOOD PRESSURE: 88 MMHG | TEMPERATURE: 98.2 F | BODY MASS INDEX: 42.46 KG/M2 | HEART RATE: 98 BPM | RESPIRATION RATE: 18 BRPM | OXYGEN SATURATION: 18 % | HEIGHT: 67 IN | WEIGHT: 270.5 LBS

## 2018-06-13 PROBLEM — L08.9 INFECTED SEBACEOUS CYST: Status: ACTIVE | Noted: 2018-06-13

## 2018-06-13 PROBLEM — L72.3 INFECTED SEBACEOUS CYST: Status: ACTIVE | Noted: 2018-06-13

## 2018-06-13 LAB
ANION GAP SERPL CALC-SCNC: 6 MMOL/L (ref 5–15)
BACTERIA SPEC CULT: NORMAL
BACTERIA SPEC CULT: NORMAL
BASOPHILS # BLD: 0.1 K/UL (ref 0–0.1)
BASOPHILS NFR BLD: 0 % (ref 0–1)
BUN SERPL-MCNC: 11 MG/DL (ref 6–20)
BUN/CREAT SERPL: 12 (ref 12–20)
CALCIUM SERPL-MCNC: 8.2 MG/DL (ref 8.5–10.1)
CHLORIDE SERPL-SCNC: 106 MMOL/L (ref 97–108)
CO2 SERPL-SCNC: 29 MMOL/L (ref 21–32)
CREAT SERPL-MCNC: 0.89 MG/DL (ref 0.55–1.02)
DATE LAST DOSE: ABNORMAL
DIFFERENTIAL METHOD BLD: ABNORMAL
EOSINOPHIL # BLD: 0.4 K/UL (ref 0–0.4)
EOSINOPHIL NFR BLD: 3 % (ref 0–7)
ERYTHROCYTE [DISTWIDTH] IN BLOOD BY AUTOMATED COUNT: 15.6 % (ref 11.5–14.5)
GLUCOSE BLD STRIP.AUTO-MCNC: 101 MG/DL (ref 65–100)
GLUCOSE BLD STRIP.AUTO-MCNC: 229 MG/DL (ref 65–100)
GLUCOSE SERPL-MCNC: 109 MG/DL (ref 65–100)
HCT VFR BLD AUTO: 39.5 % (ref 35–47)
HGB BLD-MCNC: 12.2 G/DL (ref 11.5–16)
IMM GRANULOCYTES # BLD: 0.1 K/UL (ref 0–0.04)
IMM GRANULOCYTES NFR BLD AUTO: 1 % (ref 0–0.5)
LYMPHOCYTES # BLD: 1.3 K/UL (ref 0.8–3.5)
LYMPHOCYTES NFR BLD: 8 % (ref 12–49)
MAGNESIUM SERPL-MCNC: 2 MG/DL (ref 1.6–2.4)
MCH RBC QN AUTO: 26.7 PG (ref 26–34)
MCHC RBC AUTO-ENTMCNC: 30.9 G/DL (ref 30–36.5)
MCV RBC AUTO: 86.4 FL (ref 80–99)
MONOCYTES # BLD: 1 K/UL (ref 0–1)
MONOCYTES NFR BLD: 6 % (ref 5–13)
NEUTS SEG # BLD: 13 K/UL (ref 1.8–8)
NEUTS SEG NFR BLD: 82 % (ref 32–75)
NRBC # BLD: 0 K/UL (ref 0–0.01)
NRBC BLD-RTO: 0 PER 100 WBC
PLATELET # BLD AUTO: 283 K/UL (ref 150–400)
PMV BLD AUTO: 10.9 FL (ref 8.9–12.9)
POTASSIUM SERPL-SCNC: 3.9 MMOL/L (ref 3.5–5.1)
RBC # BLD AUTO: 4.57 M/UL (ref 3.8–5.2)
REPORTED DOSE,DOSE: ABNORMAL UNITS
REPORTED DOSE/TIME,TMG: ABNORMAL
SERVICE CMNT-IMP: ABNORMAL
SERVICE CMNT-IMP: ABNORMAL
SERVICE CMNT-IMP: NORMAL
SODIUM SERPL-SCNC: 141 MMOL/L (ref 136–145)
VANCOMYCIN TROUGH SERPL-MCNC: 10.4 UG/ML (ref 5–10)
WBC # BLD AUTO: 15.9 K/UL (ref 3.6–11)

## 2018-06-13 PROCEDURE — 74011250637 HC RX REV CODE- 250/637: Performed by: INTERNAL MEDICINE

## 2018-06-13 PROCEDURE — 36415 COLL VENOUS BLD VENIPUNCTURE: CPT | Performed by: INTERNAL MEDICINE

## 2018-06-13 PROCEDURE — 83735 ASSAY OF MAGNESIUM: CPT | Performed by: INTERNAL MEDICINE

## 2018-06-13 PROCEDURE — 82962 GLUCOSE BLOOD TEST: CPT

## 2018-06-13 PROCEDURE — 74011000258 HC RX REV CODE- 258: Performed by: INTERNAL MEDICINE

## 2018-06-13 PROCEDURE — 74011250636 HC RX REV CODE- 250/636: Performed by: INTERNAL MEDICINE

## 2018-06-13 PROCEDURE — 85025 COMPLETE CBC W/AUTO DIFF WBC: CPT | Performed by: INTERNAL MEDICINE

## 2018-06-13 PROCEDURE — 80048 BASIC METABOLIC PNL TOTAL CA: CPT | Performed by: INTERNAL MEDICINE

## 2018-06-13 PROCEDURE — 74011636637 HC RX REV CODE- 636/637: Performed by: INTERNAL MEDICINE

## 2018-06-13 PROCEDURE — 80202 ASSAY OF VANCOMYCIN: CPT | Performed by: INTERNAL MEDICINE

## 2018-06-13 RX ORDER — OXYCODONE HYDROCHLORIDE 5 MG/1
5 TABLET ORAL
Qty: 20 TAB | Refills: 0 | Status: SHIPPED | OUTPATIENT
Start: 2018-06-13 | End: 2018-07-20

## 2018-06-13 RX ORDER — DOXYCYCLINE 100 MG/1
100 TABLET ORAL 2 TIMES DAILY
Qty: 20 TAB | Refills: 0 | Status: SHIPPED | OUTPATIENT
Start: 2018-06-13 | End: 2018-06-23

## 2018-06-13 RX ORDER — CEFDINIR 300 MG/1
300 CAPSULE ORAL 2 TIMES DAILY
Qty: 20 CAP | Refills: 0 | Status: SHIPPED | OUTPATIENT
Start: 2018-06-13 | End: 2018-06-23

## 2018-06-13 RX ADMIN — LOSARTAN POTASSIUM 100 MG: 50 TABLET ORAL at 08:12

## 2018-06-13 RX ADMIN — CEFEPIME HYDROCHLORIDE 2 G: 2 INJECTION, POWDER, FOR SOLUTION INTRAVENOUS at 10:41

## 2018-06-13 RX ADMIN — ASPIRIN 81 MG: 81 TABLET, COATED ORAL at 08:12

## 2018-06-13 RX ADMIN — HUMAN INSULIN 40 UNITS: 100 INJECTION, SUSPENSION SUBCUTANEOUS at 08:12

## 2018-06-13 RX ADMIN — PAROXETINE HYDROCHLORIDE 40 MG: 20 TABLET, FILM COATED ORAL at 08:12

## 2018-06-13 RX ADMIN — Medication 10 ML: at 06:13

## 2018-06-13 RX ADMIN — VANCOMYCIN HYDROCHLORIDE 1750 MG: 10 INJECTION, POWDER, LYOPHILIZED, FOR SOLUTION INTRAVENOUS at 02:59

## 2018-06-13 RX ADMIN — ENOXAPARIN SODIUM 40 MG: 100 INJECTION SUBCUTANEOUS at 06:12

## 2018-06-13 RX ADMIN — CEFEPIME HYDROCHLORIDE 2 G: 2 INJECTION, POWDER, FOR SOLUTION INTRAVENOUS at 02:43

## 2018-06-13 RX ADMIN — CARVEDILOL 6.25 MG: 3.12 TABLET, FILM COATED ORAL at 08:12

## 2018-06-13 NOTE — PROGRESS NOTES
Bedside and Verbal shift change report given to Venecia Bello RN (oncoming nurse) by Moisés Gonzalez (offgoing nurse). Report included the following information SBAR, Kardex, Procedure Summary, Intake/Output, MAR, Recent Results and Med Rec Status.

## 2018-06-13 NOTE — PROGRESS NOTES
6/13/2018  11:33 AM  Case management Note    Referral sent to [facility] home health for wound care. Dsicharge orders are in.  Patient has ride here waiting to discharge  Ronnie Mccullough, [address]

## 2018-06-13 NOTE — PROGRESS NOTES
Problem: Falls - Risk of  Goal: *Absence of Falls  Document Lindy Fall Risk and appropriate interventions in the flowsheet.    Outcome: Progressing Towards Goal  Fall Risk Interventions:  Mobility Interventions: Patient to call before getting OOB         Medication Interventions: Patient to call before getting OOB

## 2018-06-13 NOTE — ROUTINE PROCESS
Discharge instructions reviewed with patient, patient received a copy and signed our paper copy that was put into the chart. IV removed. Patient given prescriptions and wheeled down by volunteer with no additional questions. Wound dressing change completed.

## 2018-06-13 NOTE — PROGRESS NOTES
[address] [address] Pharmacy Dosing Services: Antimicrobial Stewardship Daily Doc    Consult for antibiotic dosing of vancomycin by Dr. Fine Navjot  Indication: abscess  Day of Therapy 3    Ht Readings from Last 1 Encounters:   06/11/18 170.2 cm (67\")        Wt Readings from Last 1 Encounters:   06/11/18 122.7 kg (270 lb 8.1 oz)          Vancomycin therapy:  Current maintenance dose: 1750 mg every 12 hours    Dose calculated to approximate a therapeutic trough of 10-15 mcg/mL. Last trough level 10.4 mcg/ml at 0148 6/13/18 12 hours after last dose  Plan for level / Adjustment in Therapy: continue current therapy  Dose administration notes:   Doses given appropriately as scheduled    Non-Kinetic Antimicrobial Dosing Regimen:   Current Regimen:  cefepime 2 g IV q8h  Recommendation: continue same  Dose administration notes:   Doses given appropriately as scheduled    Other Antimicrobial   (not dosed by pharmacist) none   Cultures 6/12 Wound:  pending  6/11 MRSA, nare:  negative- final  6/11 Blood x2: NGTD- pending   Serum Creatinine Lab Results   Component Value Date/Time    Creatinine 0.89 06/13/2018 01:46 AM         Creatinine Clearance Estimated Creatinine Clearance: 77.6 mL/min (based on Cr of 0.89).      Temp Temp: 98.5 °F (36.9 °C)       WBC Lab Results   Component Value Date/Time    WBC 15.9 (H) 06/13/2018 01:46 AM        H/H Lab Results   Component Value Date/Time    HGB 12.2 06/13/2018 01:46 AM        Platelets    Lab Results   Component Value Date/Time    PLATELET 971 96/34/8933 01:46 AM            Thank you,  Adelso Barros, Student Pharmacist

## 2018-06-13 NOTE — PROGRESS NOTES
Bedside and Verbal shift change report given to Sarabjit Chance RN (oncoming nurse) by Georges Hopkins (offgoing nurse). Report included the following information SBAR, Kardex, Procedure Summary, Intake/Output, MAR, Recent Results and Med Rec Status.

## 2018-06-13 NOTE — DISCHARGE SUMMARY
Anupam Eid fernie [place] [address]  [address]  [phone]    Physician Discharge Summary     Patient ID:  Nora Plummer  [id number]  68 y.o.  1945    Admit date: 6/11/2018    Discharge date and time: 6/13/2018    Admission Diagnoses: Abscess    Discharge Diagnoses:  Principal Diagnosis Infected sebaceous cyst                                            Principal Problem:    Infected sebaceous cyst (6/13/2018)    Active Problems:    SIRS (systemic inflammatory response syndrome) ([address] (6/11/2016)      Hypertension ()      Type 2 diabetes mellitus ([facility] ()      Depression with anxiety ()      Abscess (6/11/2018)      Morbid obesity ([facility] (6/11/2018)           Hospital Course:     67 yo hx of HTN, DM, presented w/ lower back skin abscess     1) Lower back Abscess/infected sebaceous cyst: s/p I&D by gen surg on 06/12. Cx w/ staph. MRSA screen was negative. Patient was on empiric IV Vanc and Cefepime, but will complete a course of oral Doxy/Omnicef. She will f/u with Gen surg for a cyst excision      2) Sepsis: POA, now resolved     3) HTN: cont losartan, coreg, norvasc     4) DM type 2: A1C 9.2%. Cont NPH, SSI     5) Depression/axiety: cont paxil, buspar     6) Morbid obesity: already counseled    PCP: Hesham Zelaya, MD     Consults: General Surgery    Significant Diagnostic Studies: I&D    Discharge Exam:  Physical Exam:    Gen:  Well-developed, well-nourished, morbidly obese, NAD  HEENT:  Pink conjunctivae, PERRL, hearing intact to voice, moist mucous membranes  Neck:  Supple, without masses, thyroid non-tender  Resp:  No accessory muscle use, clear breath sounds without wheezes rales or rhonchi  Card:  No murmurs, normal S1, S2 without thrills, bruits or peripheral edema  Abd:  Soft, non-tender, non-distended, normoactive bowel sounds are present  Musc:  No cyanosis or clubbing  Skin:  Right lower back induration.   Dressing w/ some serosanguinous fluid  Neuro: Cranial nerves 3-12 are grossly intact, follows commands appropriately  Psych:  Good insight, oriented to person, place and time, alert    Disposition: [facility]  Discharge Condition: Stable    Patient Instructions:   Current Discharge Medication List      START taking these medications    Details   oxyCODONE IR (ROXICODONE) 5 mg immediate release tablet Take 1 Tab by mouth every six (6) hours as needed. Max Daily Amount: 20 mg.  Qty: 20 Tab, Refills: 0    Associated Diagnoses: Infected sebaceous cyst      doxycycline (ADOXA) 100 mg tablet Take 1 Tab by mouth two (2) times a day for 10 days. Qty: 20 Tab, Refills: 0      cefdinir (OMNICEF) 300 mg capsule Take 1 Cap by mouth two (2) times a day for 10 days. Qty: 20 Cap, Refills: 0         CONTINUE these medications which have NOT CHANGED    Details   hydroCHLOROthiazide (HYDRODIURIL) 50 mg tablet Take 50 mg by mouth daily. !! insulin NPH (NOVOLIN N NPH U-100 INSULIN) 100 unit/mL injection 75 Units by SubCUTAneous route daily (with breakfast). !! insulin NPH (NOVOLIN N NPH U-100 INSULIN) 100 unit/mL injection 80 Units by SubCUTAneous route daily (with dinner). !! insulin regular (NOVOLIN R REGULAR U-100 INSULN) 100 unit/mL injection 30 Units by SubCUTAneous route daily (with breakfast). !! insulin regular (NOVOLIN R REGULAR U-100 INSULN) 100 unit/mL injection 30 Units by SubCUTAneous route daily (with lunch). !! insulin regular (NOVOLIN R REGULAR U-100 INSULN) 100 unit/mL injection 35 Units by SubCUTAneous route daily (with dinner). losartan (COZAAR) 100 mg tablet Take 100 mg by mouth daily. PARoxetine (PAXIL) 40 mg tablet Take 40 mg by mouth daily. triamcinolone acetonide (KENALOG) 0.1 % ointment Apply  to affected area two (2) times a day. use thin layer  Applies on face at bipap allergy site      carvedilol (COREG) 6.25 mg tablet Take 6.25 mg by mouth two (2) times daily (with meals).       cholecalciferol (VITAMIN D3) 1,000 unit tablet Take 1,000 Units by mouth daily. aspirin delayed-release 81 mg tablet Take 81 mg by mouth daily. !! - Potential duplicate medications found. Please discuss with provider.         Activity: Activity as tolerated  Diet: Diabetic Diet  Wound Care: As directed    Follow-up with  Follow-up Information     Follow up With Details Comments Contact Info    your primary care doctor Schedule an appointment as soon as possible for a visit in 1 week      Darlene Woodall MD Schedule an appointment as soon as possible for a visit in 2 weeks  [address] [place] [address]  [phone]            Follow-up tests/labs none    Signed:  Clint Suarez MD  6/13/2018  10:59 AM    I spent 34 min on discharge

## 2018-06-13 NOTE — DISCHARGE INSTRUCTIONS
HOSPITALIST DISCHARGE INSTRUCTIONS  NAME: Keven Perales   :  1945   MRN:  [id number]     Date/Time:  2018 10:58 AM    ADMIT DATE: 2018     DISCHARGE DATE: 2018     ADMITTING DIAGNOSIS:  Lower back abscess, infected sebaceous cyst    DISCHARGE DIAGNOSIS:  same    MEDICATIONS:  See after visit summary       · It is important that you take the medication exactly as they are prescribed. · Keep your medication in the bottles provided by the pharmacist and keep a list of the medication names, dosages, and times to be taken in your wallet. · Do not take other medications without consulting your doctor     Pain Management: per above medications    What to do at Home    Recommended diet:  Diabetic Diet    Recommended activity: Activity as tolerated    1) Return to the hospital if you feel worse    2) If you experience any of the following symptoms then please call your primary care physician or return to the emergency room if you cannot get hold of your doctor:  Fever, chills, nausea, vomiting, diarrhea, change in mentation, falling, bleeding, shortness of breath, chest pain, severe headache, severe abdominal pain,     3) Continue wound care per surgery instructions    4) Finish antibiotics    5) Your MRSA nasal screen was negative    Follow Up: Follow-up Information     Follow up With Details Comments Contact Info    your primary care doctor Schedule an appointment as soon as possible for a visit in 1 week      Yadira Persaud MD Schedule an appointment as soon as possible for a visit in 2 weeks  [address] [place] [address]  [phone]              Information obtained by :  I understand that if any problems occur once I am at home I am to contact my physician. I understand and acknowledge receipt of the instructions indicated above. Physician's or R.N.'s Signature                                                                  Date/Time                                                                                                                                              Patient or Representative Signature                                                          Date/Time           Skin Abscess: Care Instructions  Your Care Instructions    A skin abscess is a bacterial infection that forms a pocket of pus. A boil is a kind of skin abscess. The doctor may have cut an opening in the abscess so that the pus can drain out. You may have gauze in the cut so that the abscess will stay open and keep draining. You may need antibiotics. You will need to follow up with your doctor to make sure the infection has gone away. The doctor has checked you carefully, but problems can develop later. If you notice any problems or new symptoms, get medical treatment right away. Follow-up care is a key part of your treatment and safety. Be sure to make and go to all appointments, and call your doctor if you are having problems. It's also a good idea to know your test results and keep a list of the medicines you take. How can you care for yourself at home? · Apply warm and dry compresses, a heating pad set on low, or a hot water bottle 3 or 4 times a day for pain. Keep a cloth between the heat source and your skin. · If your doctor prescribed antibiotics, take them as directed. Do not stop taking them just because you feel better. You need to take the full course of antibiotics. · Take pain medicines exactly as directed. ¨ If the doctor gave you a prescription medicine for pain, take it as prescribed. ¨ If you are not taking a prescription pain medicine, ask your doctor if you can take an over-the-counter medicine. · Keep your bandage clean and dry.  Change the bandage whenever it gets wet or dirty, or at least one time a day.  · If the abscess was packed with gauze:  ¨ Keep follow-up appointments to have the gauze changed or removed. If the doctor instructed you to remove the gauze, gently pull out all of the gauze when your doctor tells you to. ¨ After the gauze is removed, soak the area in warm water for 15 to 20 minutes 2 times a day, until the wound closes. When should you call for help? Call your doctor now or seek immediate medical care if:  ? · You have signs of worsening infection, such as:  ¨ Increased pain, swelling, warmth, or redness. ¨ Red streaks leading from the infected skin. ¨ Pus draining from the wound. ¨ A fever. ? Watch closely for changes in your health, and be sure to contact your doctor if:  ? · You do not get better as expected. Where can you learn more? Go to http://anastasia-dang.info/. Enter Q204 in the search box to learn more about \"Skin Abscess: Care Instructions. \"  Current as of: October 13, 2016  Content Version: 11.4  © 9882-9734 [employer]. Care instructions adapted under license by [employer] (which disclaims liability or warranty for this information). If you have questions about a medical condition or this instruction, always ask your healthcare professional. [address] any warranty or liability for your use of this information.

## 2018-06-13 NOTE — PROGRESS NOTES
General Surgery Daily Progress Note    Patient: Joe Sun MRN: [id number]  SSN: [id number]    YOB: 1945  Age: 68 y.o. Sex: female      Admit Date: 6/11/2018    Subjective:   States pain is better, no fever overnight.      Current Facility-Administered Medications   Medication Dose Route Frequency    ALPRAZolam (XANAX) tablet 0.5 mg  0.5 mg Oral Q8H PRN    sodium chloride (NS) flush 5-10 mL  5-10 mL IntraVENous Q8H    sodium chloride (NS) flush 5-10 mL  5-10 mL IntraVENous PRN    acetaminophen (TYLENOL) tablet 650 mg  650 mg Oral Q4H PRN    ondansetron (ZOFRAN) injection 4 mg  4 mg IntraVENous Q4H PRN    glucose chewable tablet 16 g  4 Tab Oral PRN    dextrose (D50W) injection syrg 12.5-25 g  12.5-25 g IntraVENous PRN    glucagon (GLUCAGEN) injection 1 mg  1 mg IntraMUSCular PRN    enoxaparin (LOVENOX) injection 40 mg  40 mg SubCUTAneous Q12H    aspirin delayed-release tablet 81 mg  81 mg Oral DAILY    carvedilol (COREG) tablet 6.25 mg  6.25 mg Oral BID WITH MEALS    losartan (COZAAR) tablet 100 mg  100 mg Oral DAILY    PARoxetine (PAXIL) tablet 40 mg  40 mg Oral DAILY    insulin lispro (HUMALOG) injection   SubCUTAneous AC&HS    insulin NPH (NOVOLIN N, HUMULIN N) injection 40 Units  40 Units SubCUTAneous ACB&D    cefepime (MAXIPIME) 2 g in 0.9% sodium chloride (MBP/ADV) 100 mL  2 g IntraVENous Q8H    vancomycin (VANCOCIN) 1,750 mg in 0.9% sodium chloride 500 mL IVPB  1,750 mg IntraVENous Q12H    oxyCODONE IR (ROXICODONE) tablet 5 mg  5 mg Oral Q4H PRN        Objective:      06/11 1901 - 06/13 0700  In: 1890 [P.O.:490; I.V.:1400]  Out: -   Patient Vitals for the past 8 hrs:   BP Temp Pulse Resp SpO2   06/13/18 1154 157/88 98.2 °F (36.8 °C) 98 - (!) 18 %   06/13/18 0929 186/80 - - - -   06/13/18 0830 (!) 196/94 98.5 °F (36.9 °C) (!) 103 18 96 %   06/13/18 0708 - - (!) 106 - -   06/13/18 0432 171/84 99.1 °F (37.3 °C) (!) 104 19 93 %       Physical Exam:  General: Alert, cooperative, NAD  Lungs: Unlabored  Heart:  Regular rate and  rhythm  Extremities: Warm, moves all, no edema  Skin:  Warm and dry, no rash. Right lower back incision is packed. Induration is improving     Labs:   Recent Labs      06/13/18   0146   WBC  15.9*   HGB  12.2   HCT  39.5   PLT  283     Recent Labs      06/13/18   0146  06/12/18   0559   06/11/18   1212   NA  141  139   --   137   K  3.9  3.8   --   3.8   CL  106  108   --   98   CO2  29  27   --   32   GLU  109*  129*   --   279*   BUN  11  9   --   9   CREA  0.89  0.89   --   1.11*   CA  8.2*  8.0*   --   8.7   MG  2.0  1.9   < >   --    PHOS   --   3.1   --    --    ALB   --    --    --   2.8*   TBILI   --    --    --   0.7   SGOT   --    --    --   24   ALT   --    --    --   34    < > = values in this interval not displayed. Assessment / Plan:   · Infected sebaceous cyst s/p I&D 6/11  · Daily packing changes. Change today per orders. · Continue ABX, cultures in progress, gm + cocci   · She will need follow up with us in 2 weeks at which point we can discuss cyst excision.

## 2018-06-14 LAB
BACTERIA SPEC CULT: ABNORMAL
GRAM STN SPEC: ABNORMAL
GRAM STN SPEC: ABNORMAL
SERVICE CMNT-IMP: ABNORMAL

## 2018-06-15 ENCOUNTER — PATIENT OUTREACH (OUTPATIENT)
Dept: FAMILY MEDICINE CLINIC | Age: 73
End: 2018-06-15

## 2018-06-15 NOTE — PROGRESS NOTES
Hospital Discharge Follow-Up      Date/Time:  6/15/2018 9:23 AM    Nikita follow up call for non Wilfrid Walker PCP patient. Patient was admitted to [facility] on 18-18 for infected sebaceous cyst  The physician discharge summary was available at the time of outreach. Patient was contacted within 2 business days of discharge. Inpatient RRAT score: 15  Was this a readmission? no     Nurse Navigator (NN) contacted the patient by telephone to perform post hospital discharge assessment. Verified name and  with patient as identifiers. Provided introduction to self, and explanation of the Nurse Navigator role. Reviewed discharge instructions and red flags with patient who verbalized understanding. Patient given an opportunity to ask questions and does not have any further questions or concerns at this time. The patient agrees to contact her PCP office for questions related to their healthcare. Agreeable to follow up call post MD appts to see if any problems or concerns have developed and to verify patient's appts. Patient's dressing has been soaking through. Discussed reinforcing with more gauze. States  will not do that type of thing but was able to get neighbor to. Discussed laying pad down on bed when sleeping. Will see PCP today and HH tomorrow. Understands that discharge will start slowing down. Taking antibiotics. Home Health orders at discharge: Yes, SN for wound care  [facility]:  [place]  Date of initial visit: 18    New Medications at Discharge: Oxycodone 5mg every 6h prn, Doxycycline 100 mg BID-10 days and Omnicef 300 mg BID-10 days  Changed Medications at Discharge: none  Discontinued Medications at Discharge: none  . Yvette Anglin      Attends follow-up appointments as directed.             6/15/18    Follow up appt with PCP today 6/15/18 with Dr Sina Bosworth  Follow up appt with Surgeon Flavia Villatoro 18    Enc to bring hospital discharge paper work to both appts. CM F/U Plan: Call back post surgeon appt.

## 2018-06-17 LAB
BACTERIA SPEC CULT: NORMAL
BACTERIA SPEC CULT: NORMAL
SERVICE CMNT-IMP: NORMAL
SERVICE CMNT-IMP: NORMAL

## 2018-06-28 ENCOUNTER — PATIENT OUTREACH (OUTPATIENT)
Dept: FAMILY MEDICINE CLINIC | Age: 73
End: 2018-06-28

## 2018-06-28 NOTE — PROGRESS NOTES
Patient Name: Linda Irby  YOB: 1945  [facility] 6/11-6/13 Infected Sebaceous Cyst    Goals      Attends follow-up appointments as directed. 6/15/18    Follow up appt with PCP today 6/15/18 with Dr Nadya Wilkins  Follow up appt with Surgeon Mame Archuleta 6/27/18  Enc to bring hospital discharge paper work to both appts. 6/28- Appts completed. Resolving goal and resolving episode. No further action by NN.

## 2018-07-07 ENCOUNTER — HOSPITAL ENCOUNTER (EMERGENCY)
Age: 73
Discharge: HOME | End: 2018-07-07
Attending: EMERGENCY MEDICINE | Admitting: EMERGENCY MEDICINE
Payer: MEDICARE

## 2018-07-07 VITALS
HEIGHT: 67 IN | SYSTOLIC BLOOD PRESSURE: 167 MMHG | DIASTOLIC BLOOD PRESSURE: 85 MMHG | TEMPERATURE: 98.6 F | HEART RATE: 98 BPM | BODY MASS INDEX: 42.38 KG/M2 | RESPIRATION RATE: 12 BRPM | WEIGHT: 270 LBS | OXYGEN SATURATION: 97 %

## 2018-07-07 DIAGNOSIS — L02.212 BACK ABSCESS: Primary | ICD-10-CM

## 2018-07-07 PROCEDURE — 99283 EMERGENCY DEPT VISIT LOW MDM: CPT

## 2018-07-07 PROCEDURE — 77030019895 HC PCKNG STRP IODO -A

## 2018-07-07 PROCEDURE — 75810000289 HC I&D ABSCESS SIMP/COMP/MULT

## 2018-07-07 PROCEDURE — 74011250637 HC RX REV CODE- 250/637: Performed by: EMERGENCY MEDICINE

## 2018-07-07 PROCEDURE — 74011000250 HC RX REV CODE- 250: Performed by: EMERGENCY MEDICINE

## 2018-07-07 RX ORDER — LIDOCAINE HYDROCHLORIDE 10 MG/ML
10 INJECTION, SOLUTION EPIDURAL; INFILTRATION; INTRACAUDAL; PERINEURAL ONCE
Status: COMPLETED | OUTPATIENT
Start: 2018-07-07 | End: 2018-07-07

## 2018-07-07 RX ORDER — DOXYCYCLINE HYCLATE 100 MG
100 TABLET ORAL 2 TIMES DAILY
Qty: 14 TAB | Refills: 0 | Status: SHIPPED | OUTPATIENT
Start: 2018-07-07 | End: 2018-07-07

## 2018-07-07 RX ORDER — DOXYCYCLINE HYCLATE 100 MG
100 TABLET ORAL 2 TIMES DAILY
Qty: 14 TAB | Refills: 0 | Status: SHIPPED | OUTPATIENT
Start: 2018-07-07 | End: 2018-07-14

## 2018-07-07 RX ORDER — DOXYCYCLINE HYCLATE 100 MG
100 TABLET ORAL
Status: COMPLETED | OUTPATIENT
Start: 2018-07-07 | End: 2018-07-07

## 2018-07-07 RX ADMIN — LIDOCAINE HYDROCHLORIDE 10 ML: 10 INJECTION, SOLUTION EPIDURAL; INFILTRATION; INTRACAUDAL; PERINEURAL at 19:39

## 2018-07-07 RX ADMIN — DOXYCYCLINE HYCLATE 100 MG: 100 TABLET, COATED ORAL at 20:12

## 2018-07-07 NOTE — ED PROVIDER NOTES
HPI Comments: Patient is a 68year old female with a past medical history significant for DM, HTN, Glaucoma, Cervical CA, Depression, Breast CA and a past surgical history of Right Mastectomy, Back Surgery, Hysterectomy who presents ambulatory to the ED with a chief complaint of a painful cyst on her back which she noticed 2 days ago. Pt currently rates her pain at 7/10 and states pain is exacerbated with palpation. Of note, pt was admitted for 3 days on June 11th, 2018 , for a cyst in her back. She was hospitalized due to infection spreading to her \"bloodstream.\" She presents to the ED today for further evaluation due to her previous skin infection starting with similar sx. Pt reports an intermittent subjective fever, nausea, fatigue. She denies any chest pain, cough, shortness of breath, HA, or any other acute sx. Pt is negative for tobacco use/EtOH use. There are no additional medical complaints at this time. Signed by: rodolfo Claudioibmarla for Bebe Burns on July 7th, 2018. The history is provided by the patient. The history is limited by a language barrier. No  was used. Past Medical History:  
Diagnosis Date  Breast cancer ([address] right masectomy  Cervical cancer ([address]  Depression with anxiety  Diabetes ([address]  Ectopic pregnancy  Glaucoma  Hypertension  Miscarriage Past Surgical History:  
Procedure Laterality Date  BREAST SURGERY PROCEDURE UNLISTED    
 right mastectomy  HX CARPAL TUNNEL RELEASE Bilateral   
 HX HYSTERECTOMY  HX ORTHOPAEDIC    
 back surgery x 2  
 HX ORTHOPAEDIC Left   
 ligament attachment  HX TUMOR REMOVAL Right   
 leg Family History:  
Problem Relation Age of Onset  Diabetes Mother  Hypertension Mother Social History Social History  Marital status:  Spouse name: N/A  
 Number of children: N/A  
 Years of education: N/A Occupational History  Not on file. Social History Main Topics  Smoking status: Never Smoker  Smokeless tobacco: Never Used  Alcohol use No  
 Drug use: No  
 Sexual activity: Not on file Other Topics Concern  Not on file Social History Narrative ALLERGIES: Lisinopril; Percocet [oxycodone-acetaminophen]; Percodan [oxycodone-aspirin]; Prednisone; and Sulfa (sulfonamide antibiotics) Review of Systems Constitutional: Positive for fatigue and fever (subjective). HENT: Negative for rhinorrhea and sore throat. Respiratory: Negative for cough and shortness of breath. Cardiovascular: Negative for chest pain and leg swelling. Gastrointestinal: Positive for nausea. Negative for vomiting. Genitourinary: Negative for dysuria and hematuria. Skin: Negative for rash. Positive for pain, swelling, abscess Neurological: Negative for weakness and numbness. Vitals:  
 07/07/18 1856 BP: 170/90 Pulse: (!) 102 Resp: 12 Temp: 98.6 °F (37 °C) SpO2: 97% Weight: 122.5 kg (270 lb) Height: 5' 7\" (1.702 m) Physical Exam  
Constitutional: She appears well-developed and well-nourished.  
-obese HENT:  
Head: Normocephalic and atraumatic. Eyes: Conjunctivae are normal. No scleral icterus. Neck: No JVD present. No tracheal deviation present. Cardiovascular: Normal rate. Pulmonary/Chest: Effort normal.  
Abdominal: She exhibits no distension. Musculoskeletal: She exhibits no edema. Neurological: She is alert. oriented Skin: No rash noted. No pallor.  
-Firm, indurated, golf-ball sized area in mid-thoracic region 
to the right of the midline; there is a healing abscess adjacent to the active one. Psychiatric: She has a normal mood and affect. [facility] 
 
 
ED Course I&D Abcess Complex Date/Time: 7/7/2018 8:00 PM 
Performed by: Mina Covington Authorized by: Mina Covington Consent:  
  Consent obtained:  Verbal 
  Consent given by: Patient Location:  
  Type:  Abscess Location:  Trunk Trunk location:  Back Pre-procedure details:  
  Skin preparation:  Betadine Anesthesia (see MAR for exact dosages): Anesthesia method:  Local infiltration Local anesthetic:  Lidocaine 1% w/o epi Procedure type:  
  Complexity:  Complex Procedure details:  
  Incision types:  Single with marsupialization Scalpel blade:  11 Wound management:  Probed and deloculated Drainage:  Purulent Drainage amount:  Scant Packing materials:  1/4 in iodoform gauze Post-procedure details:  
  Patient tolerance of procedure: Tolerated well, no immediate complications A/P: back abscess - I & D'ed; no sx's/signs systemic infection; Doxy.   Oswaldo Padilla MD

## 2018-07-08 NOTE — DISCHARGE INSTRUCTIONS

## 2018-07-09 ENCOUNTER — HOSPITAL ENCOUNTER (EMERGENCY)
Age: 73
Discharge: HOME | End: 2018-07-09
Attending: EMERGENCY MEDICINE
Payer: MEDICARE

## 2018-07-09 VITALS
SYSTOLIC BLOOD PRESSURE: 180 MMHG | WEIGHT: 264 LBS | TEMPERATURE: 98.5 F | HEIGHT: 67 IN | RESPIRATION RATE: 18 BRPM | OXYGEN SATURATION: 98 % | DIASTOLIC BLOOD PRESSURE: 80 MMHG | HEART RATE: 101 BPM | BODY MASS INDEX: 41.44 KG/M2

## 2018-07-09 DIAGNOSIS — Z51.89 ENCOUNTER FOR WOUND RE-CHECK: Primary | ICD-10-CM

## 2018-07-09 DIAGNOSIS — L02.212 ABSCESS OF LOWER BACK: ICD-10-CM

## 2018-07-09 PROCEDURE — 75810000275 HC EMERGENCY DEPT VISIT NO LEVEL OF CARE

## 2018-07-09 RX ORDER — LIDOCAINE HYDROCHLORIDE AND EPINEPHRINE 10; 10 MG/ML; UG/ML
1.5 INJECTION, SOLUTION INFILTRATION; PERINEURAL ONCE
Status: DISCONTINUED | OUTPATIENT
Start: 2018-07-09 | End: 2018-07-09

## 2018-07-09 NOTE — ED PROVIDER NOTES
HPI Comments: 68 y.o. female with past medical history significant for HTN, DM, glaucoma, breast CA, cervical CA who presents via private vehicle with chief complaint of wound recheck. Pt was seen in the ED 2 days ago and had an abscess on his L lower back drained and was d/c home on doxycycline. Pt returns to the ED today to have the packing removed. Pt was admitted to the hospital on 6/11 for an abscess to her R lower back, which is well healing. Pt has an appointment with Dr. Whitney Russo in 2 days for a recheck of the initial abscess. Denies fever, chills, nausea, vomiting, cp, sob, or worsening pain to her abscesses    There are no other acute medical concerns at this time. Social hx: denies tobacco use, denies EtOH consumption    Note written by Cecilia Laguerre, as dictated by MARLEY Mayorga 11:15AM        The history is provided by the patient. No  was used. Past Medical History:   Diagnosis Date    Breast cancer ([facility]     right masectomy    Cervical cancer ([facility]     Depression with anxiety     Diabetes ([facility]     Ectopic pregnancy     Glaucoma     Hypertension     Miscarriage        Past Surgical History:   Procedure Laterality Date    BREAST SURGERY PROCEDURE UNLISTED      right mastectomy    HX CARPAL TUNNEL RELEASE Bilateral     HX HYSTERECTOMY      HX ORTHOPAEDIC      back surgery x 2    HX ORTHOPAEDIC Left     ligament attachment    HX TUMOR REMOVAL Right     leg         Family History:   Problem Relation Age of Onset    Diabetes Mother     Hypertension Mother        Social History     Social History    Marital status:      Spouse name: N/A    Number of children: N/A    Years of education: N/A     Occupational History    Not on file.      Social History Main Topics    Smoking status: Never Smoker    Smokeless tobacco: Never Used    Alcohol use No    Drug use: No    Sexual activity: Not on file     Other Topics Concern    Not on file Social History Narrative         ALLERGIES: Lisinopril; Percocet [oxycodone-acetaminophen]; Percodan [oxycodone-aspirin]; Prednisone; and Sulfa (sulfonamide antibiotics)    Review of Systems   Constitutional: Negative for chills and fever. HENT: Negative for congestion, ear pain, rhinorrhea, sneezing and sore throat. Eyes: Negative for photophobia, pain, redness and visual disturbance. Respiratory: Negative for chest tightness and shortness of breath. Cardiovascular: Negative for chest pain and leg swelling. Gastrointestinal: Negative for abdominal pain, nausea and vomiting. Genitourinary: Negative for difficulty urinating, dysuria, flank pain and frequency. Musculoskeletal: Negative for back pain, myalgias, neck pain and neck stiffness. Skin: Positive for wound. Negative for rash. Neurological: Negative for dizziness, syncope, weakness, light-headedness and headaches. Hematological: Negative for adenopathy. Vitals:    07/09/18 1059   BP: 180/80   Pulse: (!) 101   Resp: 18   Temp: 98.5 °F (36.9 °C)   SpO2: 98%   Weight: 119.7 kg (264 lb)   Height: 5' 7\" (1.702 m)            Physical Exam   Constitutional: She is oriented to person, place, and time. She appears well-developed and well-nourished. No distress. HENT:   Head: Normocephalic and atraumatic. Right Ear: External ear normal.   Left Ear: External ear normal.   Eyes: EOM are normal. Pupils are equal, round, and reactive to light. Neck: Neck supple. Cardiovascular: Normal rate, regular rhythm, normal heart sounds and intact distal pulses. Exam reveals no gallop and no friction rub. No murmur heard. Pulmonary/Chest: Effort normal and breath sounds normal. No stridor. No respiratory distress. She has no wheezes. She has no rales. She exhibits no tenderness. Abdominal: Soft. Bowel sounds are normal. She exhibits no distension and no mass. There is no tenderness. There is no rebound and no guarding.    Musculoskeletal: Normal range of motion. She exhibits tenderness. She exhibits no edema or deformity. Neurological: She is alert and oriented to person, place, and time. No cranial nerve deficit. Coordination normal.   Skin: Rash noted. No erythema. No pallor. Healing abscess x 2 to low back with scant drainage to abscess to left low back without erythema, normothermic. Well healing. Psychiatric: She has a normal mood and affect. Her behavior is normal.   Nursing note and vitals reviewed. MDM  Number of Diagnoses or Management Options  Abscess of lower back:   Encounter for wound re-check:      Amount and/or Complexity of Data Reviewed  Review and summarize past medical records: yes  Independent visualization of images, tracings, or specimens: yes    Patient Progress  Patient progress: stable        ED Course       Procedures  Procedure Note - I&D Wound Check  11:27 AM   Performed by: Cedric Fernandez. BALDEMAR Rios  Packing was removed from left low back abscess. No signs/sxs of infection noted. Wound healing well. Packing removed without incident or complications. Packing replaced: no  Estimated blood loss: none  The procedure took 1-15 minutes, and pt tolerated well.      11:26 AM  Discussed pt, sx, hx and current findings with Magalys Evans MD. He is in agreement with plan and will see pt. Pt to continue doxy and keep follow up with Dr Liza Lopez. BALDEMAR Rios      LABORATORY TESTS:  No results found for this or any previous visit (from the past 12 hour(s)). IMAGING RESULTS:    No results found. MEDICATIONS GIVEN:  Medications - No data to display    IMPRESSION:  1. Encounter for wound re-check    2. Abscess of lower back        PLAN:  1. Current Discharge Medication List      CONTINUE these medications which have NOT CHANGED    Details   doxycycline (VIBRA-TABS) 100 mg tablet Take 1 Tab by mouth two (2) times a day for 7 days.   Qty: 14 Tab, Refills: 0      oxyCODONE IR (ROXICODONE) 5 mg immediate release tablet Take 1 Tab by mouth every six (6) hours as needed. Max Daily Amount: 20 mg.  Qty: 20 Tab, Refills: 0    Associated Diagnoses: Infected sebaceous cyst      hydroCHLOROthiazide (HYDRODIURIL) 50 mg tablet Take 50 mg by mouth daily. !! insulin NPH (NOVOLIN N NPH U-100 INSULIN) 100 unit/mL injection 75 Units by SubCUTAneous route daily (with breakfast). !! insulin NPH (NOVOLIN N NPH U-100 INSULIN) 100 unit/mL injection 80 Units by SubCUTAneous route daily (with dinner). !! insulin regular (NOVOLIN R REGULAR U-100 INSULN) 100 unit/mL injection 30 Units by SubCUTAneous route daily (with breakfast). !! insulin regular (NOVOLIN R REGULAR U-100 INSULN) 100 unit/mL injection 30 Units by SubCUTAneous route daily (with lunch). !! insulin regular (NOVOLIN R REGULAR U-100 INSULN) 100 unit/mL injection 35 Units by SubCUTAneous route daily (with dinner). losartan (COZAAR) 100 mg tablet Take 100 mg by mouth daily. PARoxetine (PAXIL) 40 mg tablet Take 40 mg by mouth daily. triamcinolone acetonide (KENALOG) 0.1 % ointment Apply  to affected area two (2) times a day. use thin layer  Applies on face at bipap allergy site      carvedilol (COREG) 6.25 mg tablet Take 6.25 mg by mouth two (2) times daily (with meals). cholecalciferol (VITAMIN D3) 1,000 unit tablet Take 1,000 Units by mouth daily. aspirin delayed-release 81 mg tablet Take 81 mg by mouth daily. !! - Potential duplicate medications found. Please discuss with provider. 2.   Follow-up Information     Follow up With Details Comments [id number] Davi Child MD Schedule an appointment as soon as possible for a visit 2-4 days for recheck  [address] [place] [address] Ave  [phone]          Return to ED if worse       11:27 AM  Pt has been reexamined. Pt has no new complaints, changes or physical findings.  Care plan outlined and precautions discussed. All available results were reviewed with pt. All medications were reviewed with pt. All of pt's questions and concerns were addressed. Pt agrees to F/U as instructed and agrees to return to ED upon further deterioration. Pt is ready to go home.   MARLEY Crockett

## 2018-07-09 NOTE — ED NOTES
Dr. Kemar Lawson reviewed discharge instructions with the patient. The patient verbalized understanding. All questions and concerns were addressed. The patient declined a wheelchair and is discharged ambulatory in the care of family members with instructions and prescriptions in hand. Pt is alert and oriented x 4. Respirations are clear and unlabored.

## 2018-07-09 NOTE — DISCHARGE INSTRUCTIONS

## 2018-07-20 ENCOUNTER — HOSPITAL ENCOUNTER (OUTPATIENT)
Dept: PREADMISSION TESTING | Age: 73
Discharge: HOME | End: 2018-07-20
Payer: MEDICARE

## 2018-07-20 VITALS
HEART RATE: 94 BPM | RESPIRATION RATE: 20 BRPM | TEMPERATURE: 98 F | OXYGEN SATURATION: 97 % | BODY MASS INDEX: 42.38 KG/M2 | SYSTOLIC BLOOD PRESSURE: 189 MMHG | WEIGHT: 270 LBS | DIASTOLIC BLOOD PRESSURE: 82 MMHG | HEIGHT: 67 IN

## 2018-07-20 LAB
ANION GAP SERPL CALC-SCNC: 4 MMOL/L (ref 5–15)
BUN SERPL-MCNC: 13 MG/DL (ref 6–20)
BUN/CREAT SERPL: 16 (ref 12–20)
CALCIUM SERPL-MCNC: 9 MG/DL (ref 8.5–10.1)
CHLORIDE SERPL-SCNC: 102 MMOL/L (ref 97–108)
CO2 SERPL-SCNC: 35 MMOL/L (ref 21–32)
CREAT SERPL-MCNC: 0.82 MG/DL (ref 0.55–1.02)
GLUCOSE SERPL-MCNC: 68 MG/DL (ref 65–100)
POTASSIUM SERPL-SCNC: 3.7 MMOL/L (ref 3.5–5.1)
SODIUM SERPL-SCNC: 141 MMOL/L (ref 136–145)

## 2018-07-20 PROCEDURE — 36415 COLL VENOUS BLD VENIPUNCTURE: CPT | Performed by: ANESTHESIOLOGY

## 2018-07-20 PROCEDURE — 80048 BASIC METABOLIC PNL TOTAL CA: CPT | Performed by: ANESTHESIOLOGY

## 2018-07-20 RX ORDER — ACETAMINOPHEN 500 MG
1000 TABLET ORAL
COMMUNITY
End: 2020-07-02

## 2018-07-20 RX ORDER — CALCIUM CARBONATE 600 MG
600 TABLET ORAL DAILY
COMMUNITY
End: 2019-04-03 | Stop reason: ALTCHOICE

## 2018-07-20 NOTE — PERIOP NOTES
INSTRUCTIONS [address] MAIN OR [phone] MAIN PRE OP [phone] AMBULATORY PRE OP [phone] PRE-ADMISSION TESTING 21  Surgery Date:   7/27/18 Is surgery arrival time given by surgeon? NO If Magalys Jean staff will call you between 3 and 7pm the day before your surgery with your arrival time. (If your surgery is on a Monday, we will call you the Friday before.) Call [phone] after 7pm Monday-Friday if you did not receive your arrival time. Answers to Common Questions When You 
Arrive Arrive at the 2nd [address] [facility] on the day of your surgery Have your insurance card, photo ID, and any copayment (if needed) Food 
 and  
Drink NO food or drink after midnight the night before surgery This means NO water, gum, mints, coffee, juice, etc. 
No alcohol (beer, wine, liquor) 24 hours before and after surgery Medicine to TAKE Morning of Surgery MEDICATIONS TO TAKE THE MORNING OF SURGERY WITH A SIP OF WATER:  
 Carvedilol,Paroxetine Medicine To 
STOP  
FOR PAIN 
 NO Aspirin for pain  NO Non-Steroidal Anti-Inflammatory Drugs (NSAIDs:  
for example, Ibuprofen (Advil, Motrin), Naproxen (Aleve)  STOP herbal supplements and vitamins 1 week before surgery  You can take Tylenol  follow instructions on the bottle Blood Thinners  If you take Aspirin, Plavix, Coumadin, blood-thinning or anti-clot medicine, talk to your surgeon and/or follow the instructions from the doctor who told you to take that medicine Patient to call Janis Guzman for instructions for taking insulin preoperatively and ask if she should purchase glucose tablet Clothing Jewelry Valuables Bathing CLOTHING 
 Wear loose, comfortable clothes  Wear glasses instead of contacts  Leave money, jewelry and valuables at home  No make-up, particularly sudheer, the day of surgery  REMOVE ALL piercings, rings, and jewelry - leave at home  Wear hair loose or down; no pony-tails, buns, or metal hair clips BATHING 
 Follow all special bath instructions (for total joint replacement, spine and bowel surgeries.)  If you shower the morning of surgery, please do not apply any lotions, powders, or deodorants afterwards. Do not shave or trim anywhere 24 hours before surgery. Going Home 
or Spending the Night  SAME-DAY SURGERY: You must have a responsible adult drive you home and stay with you 24 hours after surgery  ADMITS: If your doctor is keeping you into the hospital after surgery, leave personal belongings/luggage in your car until you have a hospital room number. Hospital discharge time is 12 noon Drivers must be here before 12 noon unless you are told differently Follow all instructions so your surgery wont be cancelled. Please, be on time. If a situation occurs and you are delayed the day of surgery, call [phone] . If your physical condition changes (like a fever, cold, flu, etc.) call your surgeon as soon as possible. The [facility] staff can be reached at [phone]. OTHER SPECIAL INSTRUCTIONS:  Free  parking,Bring completed PTA medication list with you on the day of your surgery. The patient was contacted  in person. She  verbalize  understanding of all instructions and does not  need reinforcement.

## 2018-07-26 ENCOUNTER — ANESTHESIA EVENT (OUTPATIENT)
Dept: SURGERY | Age: 73
End: 2018-07-26
Payer: MEDICARE

## 2018-07-26 RX ORDER — HYDROMORPHONE HYDROCHLORIDE 1 MG/ML
0.5 INJECTION, SOLUTION INTRAMUSCULAR; INTRAVENOUS; SUBCUTANEOUS
Status: CANCELLED | OUTPATIENT
Start: 2018-07-26 | End: 2018-07-26

## 2018-07-26 RX ORDER — DIPHENHYDRAMINE HYDROCHLORIDE 50 MG/ML
12.5 INJECTION, SOLUTION INTRAMUSCULAR; INTRAVENOUS AS NEEDED
Status: CANCELLED | OUTPATIENT
Start: 2018-07-26 | End: 2018-07-26

## 2018-07-26 RX ORDER — ONDANSETRON 2 MG/ML
4 INJECTION INTRAMUSCULAR; INTRAVENOUS AS NEEDED
Status: CANCELLED | OUTPATIENT
Start: 2018-07-26

## 2018-07-26 RX ORDER — ALBUTEROL SULFATE 0.83 MG/ML
2.5 SOLUTION RESPIRATORY (INHALATION) AS NEEDED
Status: CANCELLED | OUTPATIENT
Start: 2018-07-26

## 2018-07-26 RX ORDER — SODIUM CHLORIDE, SODIUM LACTATE, POTASSIUM CHLORIDE, CALCIUM CHLORIDE 600; 310; 30; 20 MG/100ML; MG/100ML; MG/100ML; MG/100ML
125 INJECTION, SOLUTION INTRAVENOUS CONTINUOUS
Status: CANCELLED | OUTPATIENT
Start: 2018-07-26

## 2018-07-27 ENCOUNTER — ANESTHESIA (OUTPATIENT)
Dept: SURGERY | Age: 73
End: 2018-07-27
Payer: MEDICARE

## 2018-07-27 ENCOUNTER — HOSPITAL ENCOUNTER (OUTPATIENT)
Age: 73
Setting detail: OUTPATIENT SURGERY
Discharge: HOME | End: 2018-07-27
Attending: SURGERY | Admitting: SURGERY
Payer: MEDICARE

## 2018-07-27 VITALS
BODY MASS INDEX: 42.47 KG/M2 | WEIGHT: 270.59 LBS | DIASTOLIC BLOOD PRESSURE: 74 MMHG | OXYGEN SATURATION: 94 % | TEMPERATURE: 98.6 F | HEART RATE: 95 BPM | HEIGHT: 67 IN | RESPIRATION RATE: 20 BRPM | SYSTOLIC BLOOD PRESSURE: 177 MMHG

## 2018-07-27 DIAGNOSIS — R65.10 SIRS (SYSTEMIC INFLAMMATORY RESPONSE SYNDROME) (HCC): Primary | ICD-10-CM

## 2018-07-27 LAB
GLUCOSE BLD STRIP.AUTO-MCNC: 78 MG/DL (ref 65–100)
GLUCOSE BLD STRIP.AUTO-MCNC: 81 MG/DL (ref 65–100)
GLUCOSE BLD STRIP.AUTO-MCNC: 91 MG/DL (ref 65–100)
GLUCOSE BLD STRIP.AUTO-MCNC: 94 MG/DL (ref 65–100)
SERVICE CMNT-IMP: NORMAL

## 2018-07-27 PROCEDURE — 74011250636 HC RX REV CODE- 250/636

## 2018-07-27 PROCEDURE — 76010000149 HC OR TIME 1 TO 1.5 HR: Performed by: SURGERY

## 2018-07-27 PROCEDURE — 76210000022 HC REC RM PH II 1.5 TO 2 HR: Performed by: SURGERY

## 2018-07-27 PROCEDURE — 74011000250 HC RX REV CODE- 250: Performed by: SURGERY

## 2018-07-27 PROCEDURE — 76060000033 HC ANESTHESIA 1 TO 1.5 HR: Performed by: SURGERY

## 2018-07-27 PROCEDURE — 77030011640 HC PAD GRND REM COVD -A: Performed by: SURGERY

## 2018-07-27 PROCEDURE — 77030002933 HC SUT MCRYL J&J -A: Performed by: SURGERY

## 2018-07-27 PROCEDURE — 77030031139 HC SUT VCRL2 J&J -A: Performed by: SURGERY

## 2018-07-27 PROCEDURE — 74011000250 HC RX REV CODE- 250

## 2018-07-27 PROCEDURE — 74011258636 HC RX REV CODE- 258/636: Performed by: ANESTHESIOLOGY

## 2018-07-27 PROCEDURE — 77030020782 HC GWN BAIR PAWS FLX 3M -B

## 2018-07-27 PROCEDURE — 82962 GLUCOSE BLOOD TEST: CPT

## 2018-07-27 PROCEDURE — 74011250636 HC RX REV CODE- 250/636: Performed by: ANESTHESIOLOGY

## 2018-07-27 PROCEDURE — 77030018836 HC SOL IRR NACL ICUM -A: Performed by: SURGERY

## 2018-07-27 PROCEDURE — 77030032490 HC SLV COMPR SCD KNE COVD -B: Performed by: SURGERY

## 2018-07-27 PROCEDURE — 74011250636 HC RX REV CODE- 250/636: Performed by: SURGERY

## 2018-07-27 PROCEDURE — 88304 TISSUE EXAM BY PATHOLOGIST: CPT | Performed by: SURGERY

## 2018-07-27 RX ORDER — SODIUM CHLORIDE, SODIUM LACTATE, POTASSIUM CHLORIDE, CALCIUM CHLORIDE 600; 310; 30; 20 MG/100ML; MG/100ML; MG/100ML; MG/100ML
150 INJECTION, SOLUTION INTRAVENOUS CONTINUOUS
Status: DISCONTINUED | OUTPATIENT
Start: 2018-07-27 | End: 2018-07-27 | Stop reason: HOSPADM

## 2018-07-27 RX ORDER — DEXTROSE, SODIUM CHLORIDE, SODIUM LACTATE, POTASSIUM CHLORIDE, AND CALCIUM CHLORIDE 5; .6; .31; .03; .02 G/100ML; G/100ML; G/100ML; G/100ML; G/100ML
50 INJECTION, SOLUTION INTRAVENOUS CONTINUOUS
Status: DISCONTINUED | OUTPATIENT
Start: 2018-07-27 | End: 2018-07-27 | Stop reason: HOSPADM

## 2018-07-27 RX ORDER — PROPOFOL 10 MG/ML
INJECTION, EMULSION INTRAVENOUS
Status: DISCONTINUED | OUTPATIENT
Start: 2018-07-27 | End: 2018-07-27 | Stop reason: HOSPADM

## 2018-07-27 RX ORDER — BACITRACIN ZINC 500 UNIT/G
OINTMENT (GRAM) TOPICAL AS NEEDED
Status: DISCONTINUED | OUTPATIENT
Start: 2018-07-27 | End: 2018-07-27 | Stop reason: HOSPADM

## 2018-07-27 RX ORDER — PROPOFOL 10 MG/ML
INJECTION, EMULSION INTRAVENOUS AS NEEDED
Status: DISCONTINUED | OUTPATIENT
Start: 2018-07-27 | End: 2018-07-27 | Stop reason: HOSPADM

## 2018-07-27 RX ORDER — FENTANYL CITRATE 50 UG/ML
INJECTION, SOLUTION INTRAMUSCULAR; INTRAVENOUS AS NEEDED
Status: DISCONTINUED | OUTPATIENT
Start: 2018-07-27 | End: 2018-07-27 | Stop reason: HOSPADM

## 2018-07-27 RX ORDER — MIDAZOLAM HYDROCHLORIDE 1 MG/ML
INJECTION, SOLUTION INTRAMUSCULAR; INTRAVENOUS AS NEEDED
Status: DISCONTINUED | OUTPATIENT
Start: 2018-07-27 | End: 2018-07-27 | Stop reason: HOSPADM

## 2018-07-27 RX ORDER — LIDOCAINE HYDROCHLORIDE 10 MG/ML
0.1 INJECTION, SOLUTION EPIDURAL; INFILTRATION; INTRACAUDAL; PERINEURAL AS NEEDED
Status: DISCONTINUED | OUTPATIENT
Start: 2018-07-27 | End: 2018-07-27 | Stop reason: HOSPADM

## 2018-07-27 RX ORDER — LIDOCAINE HYDROCHLORIDE 20 MG/ML
INJECTION, SOLUTION INFILTRATION; PERINEURAL AS NEEDED
Status: DISCONTINUED | OUTPATIENT
Start: 2018-07-27 | End: 2018-07-27 | Stop reason: HOSPADM

## 2018-07-27 RX ORDER — TRAMADOL HYDROCHLORIDE 50 MG/1
50-100 TABLET ORAL
Qty: 30 TAB | Refills: 0 | Status: SHIPPED | OUTPATIENT
Start: 2018-07-27 | End: 2019-04-03 | Stop reason: ALTCHOICE

## 2018-07-27 RX ADMIN — CEFAZOLIN 3 G: 1 INJECTION, POWDER, FOR SOLUTION INTRAMUSCULAR; INTRAVENOUS; PARENTERAL at 12:44

## 2018-07-27 RX ADMIN — PROPOFOL 20 MG: 10 INJECTION, EMULSION INTRAVENOUS at 12:42

## 2018-07-27 RX ADMIN — SODIUM CHLORIDE, SODIUM LACTATE, POTASSIUM CHLORIDE, CALCIUM CHLORIDE, AND DEXTROSE MONOHYDRATE 50 ML/HR: 600; 310; 30; 20; 5 INJECTION, SOLUTION INTRAVENOUS at 09:40

## 2018-07-27 RX ADMIN — PROPOFOL 20 MG: 10 INJECTION, EMULSION INTRAVENOUS at 12:35

## 2018-07-27 RX ADMIN — FENTANYL CITRATE 25 MCG: 50 INJECTION, SOLUTION INTRAMUSCULAR; INTRAVENOUS at 12:29

## 2018-07-27 RX ADMIN — FENTANYL CITRATE 25 MCG: 50 INJECTION, SOLUTION INTRAMUSCULAR; INTRAVENOUS at 12:37

## 2018-07-27 RX ADMIN — LIDOCAINE HYDROCHLORIDE 80 MG: 20 INJECTION, SOLUTION INFILTRATION; PERINEURAL at 12:35

## 2018-07-27 RX ADMIN — PROPOFOL 10 MG: 10 INJECTION, EMULSION INTRAVENOUS at 12:51

## 2018-07-27 RX ADMIN — SODIUM CHLORIDE, SODIUM LACTATE, POTASSIUM CHLORIDE, AND CALCIUM CHLORIDE 150 ML/HR: 600; 310; 30; 20 INJECTION, SOLUTION INTRAVENOUS at 09:40

## 2018-07-27 RX ADMIN — PROPOFOL 10 MG: 10 INJECTION, EMULSION INTRAVENOUS at 13:03

## 2018-07-27 RX ADMIN — PROPOFOL 10 MG: 10 INJECTION, EMULSION INTRAVENOUS at 13:05

## 2018-07-27 RX ADMIN — PROPOFOL 10 MG: 10 INJECTION, EMULSION INTRAVENOUS at 12:57

## 2018-07-27 RX ADMIN — PROPOFOL 50 MCG/KG/MIN: 10 INJECTION, EMULSION INTRAVENOUS at 12:39

## 2018-07-27 RX ADMIN — PROPOFOL 10 MG: 10 INJECTION, EMULSION INTRAVENOUS at 13:25

## 2018-07-27 RX ADMIN — MIDAZOLAM HYDROCHLORIDE 1 MG: 1 INJECTION, SOLUTION INTRAMUSCULAR; INTRAVENOUS at 12:29

## 2018-07-27 NOTE — ANESTHESIA PREPROCEDURE EVALUATION
Anesthetic History Review of Systems / Medical History Patient summary reviewed, nursing notes reviewed and pertinent labs reviewed Pulmonary Sleep apnea: CPAP Neuro/Psych Psychiatric history Comments: Anxiety/depression Chronic low back pain Cardiovascular Hypertension: well controlled Exercise tolerance: >4 METS 
  
GI/Hepatic/Renal 
  
 
 
 
 
 
 Endo/Other Diabetes: well controlled, type 2, using insulin Morbid obesity and cancer Comments: Right Breast cancer Cervical cancer Other Findings Physical Exam 
 
Airway Mallampati: IV Neck ROM: normal range of motion Mouth opening: Normal 
 
 Cardiovascular Rhythm: regular Rate: normal 
 
 
 
 Dental 
 
Dentition: Lower partial plate Pulmonary Breath sounds clear to auscultation Abdominal 
 
 
 
 Other Findings Anesthetic Plan ASA: 3 Anesthesia type: MAC Induction: Intravenous Anesthetic plan and risks discussed with: Patient Informed consent obtained.

## 2018-07-27 NOTE — DISCHARGE INSTRUCTIONS
DISCHARGE SUMMARY from your Nurse    The following personal items collected during your admission are returned to you:   Dental Appliance: Dental Appliances: At home, Partials, Lowers  Vision: Visual Aid: Glasses  Hearing Aid:    Jewelry: Jewelry: None  Clothing: Clothing: Other (comment) (street clothing placed in bag and in locker)  Other Valuables: Other Valuables: Eyeglasses (given to family)  Valuables sent to safe:      PATIENT INSTRUCTIONS:    After general anesthesia or intravenous sedation, for 24 hours or while taking prescription Narcotics:  · Limit your activities  · Do not drive and operate hazardous machinery  · Do not make important personal or business decisions  · Do  not drink alcoholic beverages  · If you have not urinated within 8 hours after discharge, please contact your surgeon on call. Report the following to your surgeon:  · Excessive pain, swelling, redness or odor of or around the surgical area  · Temperature over 100.5  · Nausea and vomiting lasting longer than 4 hours or if unable to take medications  · Any signs of decreased circulation or nerve impairment to extremity: change in color, persistent  numbness, tingling, coldness or increase pain  · Any questions    COUGH AND DEEP BREATHE    Breathing deep and coughing are very important exercises to do after surgery. Deep breathing and coughing open the little air tubes and air sacks in your lungs. You take deep breaths every day. You may not even notice - it is just something you do when you sigh or yawn. It is a natural exercise you do to keep these air passages open. After surgery, take deep breaths and cough, on purpose. Coughing and deep breathing help prevent bronchitis and pneumonia after surgery. If you had chest or belly surgery, use a pillow as a \"hug buddy\" and hold it tightly to your chest or belly when you cough. DIRECTIONS:  6. Take 10 to 15 slow deep breaths every hour while awake.   7. Breathe in deeply, and hold it for 2 seconds. 8. Exhale slowly through puckered lips, like blowing up a balloon. 9. After every 4th or 5th deep breath, hug your pillow to your chest or belly and give a hard, deep cough. Yes, it will probably hurt. But doing this exercise is very important part of healing after surgery. Take your pain medicine to help you do this exercise without too much pain. IF YOU HAVE BEEN DIAGNOSED WITH SLEEP APNEA, PLEASE USE YOUR SLEEP APNEA DEVICE OR CPAP MACHINE WHEN YOU INTEND TO NAP AFTER TAKING PAIN MEDICATION. Ankle Pumps    Ankle pumps increase the circulation of oxygenated blood to your lower extremities and decrease your risk for circulation problems such as blood clots. They also stretch the muscles, tendons and ligaments in your foot and ankle, and prevent joint contracture in the ankle and foot, especially after surgeries on the legs. It is important to do ankle pump exercises regularly after surgery because immobility increases your risk for developing a blood clot. Your doctor may also have you take an Aspirin for the next few days as well. If your doctor did not ask you to take an Aspirin, consult with him before starting Aspirin therapy on your own. Slowly point your foot forward, feeling the muscles on the top of your lower leg stretch, and hold this position for 5 seconds. Next, pull your foot back toward you as far as possible, stretching the calf muscles, and hold that position for 5 seconds. Repeat with the other foot. Perform 10 repetitions every hour while awake for both ankles if possible (down and then up with the foot once is one repetition). You should feel gentle stretching of the muscles in your lower leg when doing this exercise. If you feel pain, or your range of motion is limited, don't  Push too hard. Only go the limit your joint and muscles will let you go.   If you have increasing pain, progressively worsening leg warmth or swelling, STOP the exercise and call your doctor. Below is information about the medications your doctor is prescribing after your visit:    Other information in your discharge envelope:  []     PRESCRIPTIONS  []     PHYSICAL THERAPY PRESCRIPTION  []     APPOINTMENT CARDS  []     Regional Anesthesia Pamphlet for block or block with On-Q Catheter from Anesthesia Service  []     Medical device information sheets/pamphlets from their    []     School/work excuse note. []     /parent work excuse note. These are general instructions for a healthy lifestyle:    *  Please give a list of your current medications to your Primary Care Provider. *  Please update this list whenever your medications are discontinued, doses are      changed, or new medications (including over-the-counter products) are added. *  Please carry medication information at all times in case of emergency situations. About Smoking  No smoking / No tobacco products / Avoid exposure to second hand smoke    Surgeon General's Warning:  Quitting smoking now greatly reduces serious risk to your health. Obesity, smoking, and sedentary lifestyle greatly increases your risk for illness and disease. A healthy diet, regular physical exercise & weight monitoring are important for maintaining a healthy lifestyle. Congestive Heart Failure  You may be retaining fluid if you have a history of heart failure or if you experience any of the following symptoms:  Weight gain of 3 pounds or more overnight or 5 pounds in a week, increased swelling in our hands or feet or shortness of breath while lying flat in bed. Please call your doctor as soon as you notice any of these symptoms; do not wait until your next office visit.     Recognize signs and symptoms of STROKE:  F - face looks uneven  A - arms unable to move or move even  S - speech slurred or non-existent  T - time-call 911 as soon as signs and symptoms begin-DO NOT go         Back to bed or wait to see if you get better-TIME IS BRAIN. Warning signs of HEART ATTACK  Call 911 if you have these symptoms    · Chest discomfort. Most heart attacks involve discomfort in the center of the chest that lasts more than a few minutes, or that goes away and comes back. It can feel like uncomfortable pressure, squeezing, fullness, or pain. · Discomfort in other areas of the upper body. Symptoms can include pain or discomfort in one or both        Arms, the back, neck, jaw, or stomach. ·  Shortness of breath with or without chest discomfort. · Other signs may include breaking out in a cold sweat, nausea, or lightheadedness    Don't wait more than five minutes to call 911 - MINUTES MATTER! Fast action can save your life. Calling 911 is almost always the fastest way to get lifesaving treatment. Emergency Medical Services staff can begin treatment when they arrive - up to an hour sooner than if someone gets to the hospital by car. LOI [facility]UARE[facility] MEDICATION AND SIDE EFFECT GUIDE    The [facility] MEDICATION AND SIDE EFFECT GUIDE was provided to the PATIENT AND CARE PROVIDER. Information provided includes instruction about drug purpose and common side effects for the following medications:    · TRAMADOL- for pain may take 1-2 tabs every 6 hours as needed for pain;         [facility]   Call their office (or call my office) if there is no direct coordination/communication by the next morning. - it has been set up by case management and they should be coming out to see you tomorrow 07/28/18  Dressing to be changed daily until seen by Dr Roberth Jimenez. Gross wound dimensions:  Two mid back defects, both paramedian on the left and right side. Both defect ~ 3 cm total length (craniocaudal) x 1 cm x 3 cm (deep). No undermining. Clean surgical wound.     Instructions:  Have the patient shower and get the wound and packing wet prior to your arrival.  It's ok to leave the entire dressing intact on in the shower. The patient can clean their surrounding skin with mild soap and water (or dermaclens). Remove the midline dressings, if not removed by patient in the shower. After at least 30 minutes of the dressings being saturated in the shower, perform the wound care. The patient may want to pre-medicate with medication for pain control if needed. 1) Moisten new packing with normal saline. Wring out dressing, unravel it, and place it on a clean surface. 2) Remove the dressing that is in the wound. 3) With an cotton-tipped applicator, place saline moistened packing until the wound is full (Kerlix usually works well). 4) Cover the wound with 4x4s, an ABD and appropriate tape. Thank you in advance! Aundra All         Skin Abscess: Care Instructions  Your Care Instructions    A skin abscess is a bacterial infection that forms a pocket of pus. A boil is a kind of skin abscess. The doctor may have cut an opening in the abscess so that the pus can drain out. You may have gauze in the cut so that the abscess will stay open and keep draining. You may need antibiotics. You will need to follow up with your doctor to make sure the infection has gone away. The doctor has checked you carefully, but problems can develop later. If you notice any problems or new symptoms, get medical treatment right away. Follow-up care is a key part of your treatment and safety. Be sure to make and go to all appointments, and call your doctor if you are having problems. It's also a good idea to know your test results and keep a list of the medicines you take. How can you care for yourself at home? · Apply warm and dry compresses, a heating pad set on low, or a hot water bottle 3 or 4 times a day for pain. Keep a cloth between the heat source and your skin. · If your doctor prescribed antibiotics, take them as directed. Do not stop taking them just because you feel better.  You need to take the full course of antibiotics. · Take pain medicines exactly as directed. ¨ If the doctor gave you a prescription medicine for pain, take it as prescribed. ¨ If you are not taking a prescription pain medicine, ask your doctor if you can take an over-the-counter medicine. · Keep your bandage clean and dry. Change the bandage whenever it gets wet or dirty, or at least one time a day. · If the abscess was packed with gauze:  ¨ Keep follow-up appointments to have the gauze changed or removed. If the doctor instructed you to remove the gauze, gently pull out all of the gauze when your doctor tells you to. ¨ After the gauze is removed, soak the area in warm water for 15 to 20 minutes 2 times a day, until the wound closes. When should you call for help? Call your doctor now or seek immediate medical care if:    · You have signs of worsening infection, such as:  ¨ Increased pain, swelling, warmth, or redness. ¨ Red streaks leading from the infected skin. ¨ Pus draining from the wound. ¨ A fever.    Watch closely for changes in your health, and be sure to contact your doctor if:    · You do not get better as expected. Where can you learn more? Go to http://anastasia-dang.info/. Enter P815 in the search box to learn more about \"Skin Abscess: Care Instructions. \"  Current as of: May 10, 2017  Content Version: 11.7  © 9852-9778 [email or web address]. Care instructions adapted under license by [employer] (which disclaims liability or warranty for this information). If you have questions about a medical condition or this instruction, always ask your healthcare professional. [address] any warranty or liability for your use of this information.

## 2018-07-27 NOTE — PROGRESS NOTES
7/27/2018 3:39 PM Home health order received. Discussed with pt and pt's family at bedside. Pt reported she was open to a home health company prior to surgery out of [place] and does not want to continue with this company. Pt was agreeable to [address] [place] [address] for home health. Spoke with Lv Gonzalez and Ramon Mead at [place] [address] who confirmed they will see pt at home on 7/28 to perform woundcare. Referral sent via [employer]. CM spoke with [facility] who confirmed pt was open to them prior to surgery and they will discharge pt from their services. Lee Garcia, CORINNAW

## 2018-07-27 NOTE — PERIOP NOTES
Pt dressed and sitting up in wheelchair, drinking diet pepsi. Pt's family brought to the room and family reports they were told she would be staying over night.

## 2018-07-27 NOTE — PERIOP NOTES
Dr Kamaljit Rubio at bedside to speak with pt and called [address] [facility], they will not be able to see pt until Wednesday. Dr Kamaljit Rubio placed case management consult but will contact them also. Will await to hear from Dr Kamaljit Rubio or case management as he would like dressing changed on Saturday or Sunday.

## 2018-07-27 NOTE — BRIEF OP NOTE
BRIEF OPERATIVE NOTE Date of Procedure: 7/27/2018 Preoperative Diagnosis: SEPSIS, INFECTED MID BACK SEBACEOUS CYST Postoperative Diagnosis: SAME Procedure(s): EXCISION INFECTED SEBACEOUS BACK CYST, LEFT, RIGHT BACK Surgeon(s) and Role: Towana Goldberg, MD - Primary Surgical Staff: 
Circ-1: Marge Marie RN Scrub Tech-1: Kenzie Chen Scrub RN-Relief: Rannie Butter Surg Asst-1: Di Barajas RN Event Time In Incision Start 1037 Incision Close Anesthesia: MAC Estimated Blood Loss: min Specimens:  
ID Type Source Tests Collected by Time Destination 1 : Right Back Sebaceous cyst Preservative Back  Kala Cortes MD 7/27/2018 1326 Pathology Findings: Infected sebaceous cyst, bilateral 
Complications: none Implants: * No implants in log *

## 2018-07-27 NOTE — OP NOTES
[address] REPORT    Klever Redd  MR#: [id number]  : 1945  ACCOUNT #: [de-identified]   DATE OF SERVICE: 2018    PREOPERATIVE DIAGNOSIS:  Sepsis from infected mid back sebaceous cyst.    POSTOPERATIVE DIAGNOSIS:  Sepsis from infected mid back sebaceous cyst.      PROCEDURE PERFORMED:  Excision of infected sebaceous back cyst, left and right back. SURGEON:  Norma Au MD    ANESTHESIA:  MAC.    ESTIMATED BLOOD LOSS:  Minimal.    SPECIMENS REMOVED/FINDINGS:  Infected sebaceous cyst bilateral.    COMPLICATIONS:  None. IMPLANTS:  None. INDICATIONS:  The patient is a 61-year-old female with recent sepsis from source likely related to infected mid back sebaceous cyst.    DESCRIPTION OF PROCEDURE:  Consent was obtained. The patient was taken to the operating room and placed in supine position. SCDs were turned on and working. Monitored anesthesia care was instituted successfully. The patient was placed on a beanbag and placed in the left lateral decubitus position with all bony prominences padded. The mid back was prepped and draped in normal sterile fashion. 0.5% Marcaine plain plus 1% lidocaine with epinephrine was injected into the surgical site. Preincision timeout performed per protocol and there were no questions or concerns. A 2 x 4 cm elliptical incision was made over the more acutely infected and likely deeper infection over the right back. This incision was carried down through thick dermis tissues with a knife. The sebaceous cyst was then carried down to the base of the wound where the tract was partially excised and exposed dark granulated matter. This tract was completely excised from the base of the wound going towards the midline. .  I did not violate the thoracolumbar fascia of the mid back. Attention was turned to the left back.   A 1 x 3 cm elliptical incision was made with a skin knife around the infected cyst.  This was carried down through the dermis, subcutaneous tissue with a knife. It does not track as deeply as the cyst on the right back. The specimen was passed off. Hemostasis was satisfactory. A povidone-soaked gauze was then packed into the wound, covered with clean dressings. The patient tolerated the procedure well, was delivered to PACU in stable condition. I went and discussed the findings of the case with her family in the waiting area.       MD Mikala Dalal / Curly Lepe  D: 07/27/2018 13:53     T: 07/27/2018 14:43  JOB #: [id number]

## 2018-07-27 NOTE — ANESTHESIA POSTPROCEDURE EVALUATION
Post-Anesthesia Evaluation and Assessment Patient: Marilou Washington MRN: [id number]  SSN: [id number] YOB: 1945  Age: 68 y.o. Sex: female Cardiovascular Function/Vital Signs Visit Vitals  /80  Pulse 95  Temp 37 °C (98.6 °F)  Resp 16  
 Ht 5' 7\" (1.702 m)  Wt 122.7 kg (270 lb 9.5 oz)  SpO2 96%  BMI 42.38 kg/m2 Patient is status post MAC anesthesia for Procedure(s): EXCISION INFECTED SEBACEOUS BACK CYST. Nausea/Vomiting: None Postoperative hydration reviewed and adequate. Pain: 
Pain Scale 1: Numeric (0 - 10) (07/27/18 1353) Pain Intensity 1: 0 (07/27/18 1353) Managed Neurological Status:  
Neuro (WDL): Within Defined Limits (07/27/18 1353) Neuro LUE Motor Response: Purposeful (07/27/18 1353) LLE Motor Response: Purposeful (07/27/18 1353) RUE Motor Response: Purposeful (07/27/18 1353) RLE Motor Response: Purposeful (07/27/18 1353) At baseline Mental Status and Level of Consciousness: Arousable Pulmonary Status:  
O2 Device: Room air (07/27/18 1356) Adequate oxygenation and airway patent Complications related to anesthesia: None Post-anesthesia assessment completed. No concerns Signed By: Xu Trejo MD   
 July 27, 2018

## 2018-07-27 NOTE — IP AVS SNAPSHOT
[address] 
[phone] Patient: Dino Garcia MRN: [id number] [facility]:[birth year]14 A check panda indicates which time of day the medication should be taken. My Medications START taking these medications Instructions Each Dose to Equal  
 Morning Noon Evening Bedtime  
 traMADol 50 mg tablet Commonly known as:  ULTRAM  
   
Your last dose was: Your next dose is: Take 1-2 Tabs by mouth every six (6) hours as needed for Pain. Max Daily Amount: 400 mg.  
  mg CONTINUE taking these medications Instructions Each Dose to Equal  
 Morning Noon Evening Bedtime  
 aspirin delayed-release 81 mg tablet Your last dose was: Your next dose is: Take 81 mg by mouth daily. 81 mg  
    
   
   
   
  
 calcium carbonate 600 mg calcium (1,500 mg) tablet Commonly known as:  Irma Leal Your last dose was: Your next dose is: Take 600 mg by mouth daily. 600 mg COREG 6.25 mg tablet Generic drug:  carvedilol Your last dose was: Your next dose is: Take 6.25 mg by mouth two (2) times daily (with meals). 6.25 mg  
    
   
   
   
  
 hydroCHLOROthiazide 50 mg tablet Commonly known as:  HYDRODIURIL Your last dose was: Your next dose is: Take 50 mg by mouth daily. 50 mg  
    
   
   
   
  
 losartan 100 mg tablet Commonly known as:  COZAAR Your last dose was: Your next dose is: Take 100 mg by mouth daily. 100 mg * NovoLIN N NPH U-100 Insulin 100 unit/mL injection Generic drug:  insulin NPH Your last dose was: Your next dose is:    
   
   
 75 Units by SubCUTAneous route daily (with breakfast). 75 Units * NovoLIN N NPH U-100 Insulin 100 unit/mL injection Generic drug:  insulin NPH Your last dose was: Your next dose is:    
   
   
 80 Units by SubCUTAneous route daily (with dinner). 80 Units * NovoLIN R Regular U-100 Insuln 100 unit/mL injection Generic drug:  insulin regular Your last dose was: Your next dose is:    
   
   
 30 Units by SubCUTAneous route daily (with breakfast). 30 Units * NovoLIN R Regular U-100 Insuln 100 unit/mL injection Generic drug:  insulin regular Your last dose was: Your next dose is:    
   
   
 30 Units by SubCUTAneous route daily (with lunch). 30 Units * NovoLIN R Regular U-100 Insuln 100 unit/mL injection Generic drug:  insulin regular Your last dose was: Your next dose is:    
   
   
 35 Units by SubCUTAneous route daily (with dinner). 35 Units PAXIL 40 mg tablet Generic drug:  PARoxetine Your last dose was: Your next dose is: Take 40 mg by mouth daily. 40 mg  
    
   
   
   
  
 triamcinolone acetonide 0.1 % ointment Commonly known as:  KENALOG Your last dose was: Your next dose is:    
   
   
 Apply  to affected area two (2) times a day. use thin layer Applies on face at bipap allergy site TYLENOL EXTRA STRENGTH 500 mg tablet Generic drug:  acetaminophen Your last dose was: Your next dose is: Take 1,000 mg by mouth every six (6) hours as needed for Pain. 1000 mg * Notice: This list has 5 medication(s) that are the same as other medications prescribed for you. Read the directions carefully, and ask your doctor or other care provider to review them with you. Where to Get Your Medications Information on where to get these meds will be given to you by the nurse or doctor. ! Ask your nurse or doctor about these medications  
  traMADol 50 mg tablet

## 2018-07-27 NOTE — IP AVS SNAPSHOT
[address] 
[phone] Patient: Alexsander De La Fuente MRN: [id number] JWR:[birth year] About your hospitalization You were admitted on:  July 27, 2018 You last received care in the:  [facility] You were discharged on:  July 27, 2018 Why you were hospitalized Your primary diagnosis was:  Not on File Follow-up Information Follow up With Details Comments Contact Info Aretha Pandya MD Schedule an appointment as soon as possible for a visit in 2 week(s)  [address] [facility] [address] 
[phone] Phys Nathalie, MD   Patient can only remember the practice name and not the physician Discharge Orders None A check panda indicates which time of day the medication should be taken. My Medications START taking these medications Instructions Each Dose to Equal  
 Morning Noon Evening Bedtime  
 traMADol 50 mg tablet Commonly known as:  ULTRAM  
   
Your last dose was: Your next dose is: Take 1-2 Tabs by mouth every six (6) hours as needed for Pain. Max Daily Amount: 400 mg.  
  mg CONTINUE taking these medications Instructions Each Dose to Equal  
 Morning Noon Evening Bedtime  
 aspirin delayed-release 81 mg tablet Your last dose was: Your next dose is: Take 81 mg by mouth daily. 81 mg  
    
   
   
   
  
 calcium carbonate 600 mg calcium (1,500 mg) tablet Commonly known as:  [place] Your last dose was: Your next dose is: Take 600 mg by mouth daily. 600 mg COREG 6.25 mg tablet Generic drug:  carvedilol Your last dose was: Your next dose is: Take 6.25 mg by mouth two (2) times daily (with meals).   
 6.25 mg  
    
   
   
   
  
 hydroCHLOROthiazide 50 mg tablet Commonly known as:  HYDRODIURIL Your last dose was: Your next dose is: Take 50 mg by mouth daily. 50 mg  
    
   
   
   
  
 losartan 100 mg tablet Commonly known as:  COZAAR Your last dose was: Your next dose is: Take 100 mg by mouth daily. 100 mg * NovoLIN N NPH U-100 Insulin 100 unit/mL injection Generic drug:  insulin NPH Your last dose was: Your next dose is:    
   
   
 75 Units by SubCUTAneous route daily (with breakfast). 75 Units * NovoLIN N NPH U-100 Insulin 100 unit/mL injection Generic drug:  insulin NPH Your last dose was: Your next dose is:    
   
   
 80 Units by SubCUTAneous route daily (with dinner). 80 Units * NovoLIN R Regular U-100 Insuln 100 unit/mL injection Generic drug:  insulin regular Your last dose was: Your next dose is:    
   
   
 30 Units by SubCUTAneous route daily (with breakfast). 30 Units * NovoLIN R Regular U-100 Insuln 100 unit/mL injection Generic drug:  insulin regular Your last dose was: Your next dose is:    
   
   
 30 Units by SubCUTAneous route daily (with lunch). 30 Units * NovoLIN R Regular U-100 Insuln 100 unit/mL injection Generic drug:  insulin regular Your last dose was: Your next dose is:    
   
   
 35 Units by SubCUTAneous route daily (with dinner). 35 Units PAXIL 40 mg tablet Generic drug:  PARoxetine Your last dose was: Your next dose is: Take 40 mg by mouth daily. 40 mg  
    
   
   
   
  
 triamcinolone acetonide 0.1 % ointment Commonly known as:  KENALOG Your last dose was: Your next dose is:    
   
   
 Apply  to affected area two (2) times a day.  use thin layer Applies on face at bipap allergy site TYLENOL EXTRA STRENGTH 500 mg tablet Generic drug:  acetaminophen Your last dose was: Your next dose is: Take 1,000 mg by mouth every six (6) hours as needed for Pain. 1000 mg * Notice: This list has 5 medication(s) that are the same as other medications prescribed for you. Read the directions carefully, and ask your doctor or other care provider to review them with you. Where to Get Your Medications Information on where to get these meds will be given to you by the nurse or doctor. ! Ask your nurse or doctor about these medications  
  traMADol 50 mg tablet Opioid Education Prescription Opioids: What You Need to Know: 
 
 
PATIENT INSTRUCTIONS: 
 
After general anesthesia or intravenous sedation, for 24 hours or while taking prescription Narcotics: · Limit your activities · Do not drive and operate hazardous machinery · Do not make important personal or business decisions · Do  not drink alcoholic beverages · If you have not urinated within 8 hours after discharge, please contact your surgeon on call. Report the following to your surgeon: 
· Excessive pain, swelling, redness or odor of or around the surgical area · Temperature over 100.5 · Nausea and vomiting lasting longer than 4 hours or if unable to take medications · Any signs of decreased circulation or nerve impairment to extremity: change in color, persistent  numbness, tingling, coldness or increase pain · Any questions [address] Breathing deep and coughing are very important exercises to do after surgery. Deep breathing and coughing open the little air tubes and air sacks in your lungs. You take deep breaths every day. You may not even notice - it is just something you do when you sigh or yawn. It is a natural exercise you do to keep these air passages open. After surgery, take deep breaths and cough, on purpose. Coughing and deep breathing help prevent bronchitis and pneumonia after surgery. If you had chest or belly surgery, use a pillow as a \"hug deidre\" and hold it tightly to your chest or belly when you cough. DIRECTIONS: 
6. Take 10 to 15 slow deep breaths every hour while awake. 7. Breathe in deeply, and hold it for 2 seconds. 8. Exhale slowly through puckered lips, like blowing up a balloon. 9. After every 4th or 5th deep breath, hug your pillow to your chest or belly and give a hard, deep cough. Yes, it will probably hurt. But doing this exercise is very important part of healing after surgery. Take your pain medicine to help you do this exercise without too much pain. IF YOU HAVE BEEN DIAGNOSED WITH SLEEP APNEA, PLEASE USE YOUR SLEEP APNEA DEVICE OR CPAP MACHINE WHEN YOU INTEND TO NAP AFTER TAKING PAIN MEDICATION. Ankle Pumps Ankle pumps increase the circulation of oxygenated blood to your lower extremities and decrease your risk for circulation problems such as blood clots. They also stretch the muscles, tendons and ligaments in your foot and ankle, and prevent joint contracture in the ankle and foot, especially after surgeries on the legs. It is important to do ankle pump exercises regularly after surgery because immobility increases your risk for developing a blood clot. Your doctor may also have you take an Aspirin for the next few days as well. If your doctor did not ask you to take an Aspirin, consult with him before starting Aspirin therapy on your own. Slowly point your foot forward, feeling the muscles on the top of your lower leg stretch, and hold this position for 5 seconds. Next, pull your foot back toward you as far as possible, stretching the calf muscles, and hold that position for 5 seconds. Repeat with the other foot. Perform 10 repetitions every hour while awake for both ankles if possible (down and then up with the foot once is one repetition). You should feel gentle stretching of the muscles in your lower leg when doing this exercise. If you feel pain, or your range of motion is limited, don't  Push too hard. Only go the limit your joint and muscles will let you go. If you have increasing pain, progressively worsening leg warmth or swelling, STOP the exercise and call your doctor. Below is information about the medications your doctor is prescribing after your visit: 
 
Other information in your discharge envelope: 
[]     PRESCRIPTIONS []     PHYSICAL THERAPY PRESCRIPTION 
[]     APPOINTMENT CARDS []     Regional Anesthesia Pamphlet for block or block with On-Q Catheter from Anesthesia Service []     Medical device information sheets/pamphlets from their   
[]     School/work excuse note. []     /parent work excuse note. These are general instructions for a healthy lifestyle: *  Please give a list of your current medications to your Primary Care Provider. *  Please update this list whenever your medications are discontinued, doses are 
    changed, or new medications (including over-the-counter products) are added. *  Please carry medication information at all times in case of emergency situations. About Smoking No smoking / No tobacco products / Avoid exposure to second hand smoke Surgeon General's Warning:  Quitting smoking now greatly reduces serious risk to your health. Obesity, smoking, and sedentary lifestyle greatly increases your risk for illness and disease. A healthy diet, regular physical exercise & weight monitoring are important for maintaining a healthy lifestyle. Congestive Heart Failure You may be retaining fluid if you have a history of heart failure or if you experience any of the following symptoms:  Weight gain of 3 pounds or more overnight or 5 pounds in a week, increased swelling in our hands or feet or shortness of breath while lying flat in bed. Please call your doctor as soon as you notice any of these symptoms; do not wait until your next office visit. Recognize signs and symptoms of STROKE: 
F - face looks uneven A - arms unable to move or move even S - speech slurred or non-existent T - time-call 911 as soon as signs and symptoms begin-DO NOT go Back to bed or wait to see if you get better-TIME IS BRAIN. Warning signs of HEART ATTACK Call 911 if you have these symptoms · Chest discomfort. Most heart attacks involve discomfort in the center of the chest that lasts more than a few minutes, or that goes away and comes back. It can feel like uncomfortable pressure, squeezing, fullness, or pain. · Discomfort in other areas of the upper body. Symptoms can include pain or discomfort in one or both Arms, the back, neck, jaw, or stomach. ·  Shortness of breath with or without chest discomfort. · Other signs may include breaking out in a cold sweat, nausea, or lightheadedness Don't wait more than five minutes to call 911 - MINUTES MATTER! Fast action can save your life.   Calling 911 is almost always the fastest way to get lifesaving treatment. Emergency Medical Services staff can begin treatment when they arrive - up to an hour sooner than if someone gets to the hospital by car. LOI JUAREZ MEDICATION AND SIDE EFFECT GUIDE The [address] [facility] EFFECT GUIDE was provided to the PATIENT AND CARE PROVIDER. Information provided includes instruction about drug purpose and common side effects for the following medications: · TRAMADOL- for pain may take 1-2 tabs every 6 hours as needed for pain;  
 
 
 
[facility]   Call their office (or call my office) if there is no direct coordination/communication by the next morning. - it has been set up by case management and they should be coming out to see you tomorrow 07/28/18 Dressing to be changed daily until seen by Dr Pamela Fallon. Gross wound dimensions:  Two mid back defects, both paramedian on the left and right side. Both defect ~ 3 cm total length (craniocaudal) x 1 cm x 3 cm (deep). No undermining. Clean surgical wound. Instructions:  Have the patient shower and get the wound and packing wet prior to your arrival.  It's ok to leave the entire dressing intact on in the shower. The patient can clean their surrounding skin with mild soap and water (or dermaclens). Remove the midline dressings, if not removed by patient in the shower. After at least 30 minutes of the dressings being saturated in the shower, perform the wound care. The patient may want to pre-medicate with medication for pain control if needed. 1) Moisten new packing with normal saline. Wring out dressing, unravel it, and place it on a clean surface. 2) Remove the dressing that is in the wound. 3) With an cotton-tipped applicator, place saline moistened packing until the wound is full (Kerlix usually works well). 4) Cover the wound with 4x4s, an ABD and appropriate tape. Thank you in advance! Dionicio Rehman Skin Abscess: Care Instructions Your Care Instructions A skin abscess is a bacterial infection that forms a pocket of pus. A boil is a kind of skin abscess. The doctor may have cut an opening in the abscess so that the pus can drain out. You may have gauze in the cut so that the abscess will stay open and keep draining. You may need antibiotics. You will need to follow up with your doctor to make sure the infection has gone away. The doctor has checked you carefully, but problems can develop later. If you notice any problems or new symptoms, get medical treatment right away. Follow-up care is a key part of your treatment and safety. Be sure to make and go to all appointments, and call your doctor if you are having problems. It's also a good idea to know your test results and keep a list of the medicines you take. How can you care for yourself at home? · Apply warm and dry compresses, a heating pad set on low, or a hot water bottle 3 or 4 times a day for pain. Keep a cloth between the heat source and your skin. · If your doctor prescribed antibiotics, take them as directed. Do not stop taking them just because you feel better. You need to take the full course of antibiotics. · Take pain medicines exactly as directed. ¨ If the doctor gave you a prescription medicine for pain, take it as prescribed. ¨ If you are not taking a prescription pain medicine, ask your doctor if you can take an over-the-counter medicine. · Keep your bandage clean and dry. Change the bandage whenever it gets wet or dirty, or at least one time a day. · If the abscess was packed with gauze: 
¨ Keep follow-up appointments to have the gauze changed or removed. If the doctor instructed you to remove the gauze, gently pull out all of the gauze when your doctor tells you to. ¨ After the gauze is removed, soak the area in warm water for 15 to 20 minutes 2 times a day, until the wound closes. When should you call for help? Call your doctor now or seek immediate medical care if: 
  · You have signs of worsening infection, such as: 
¨ Increased pain, swelling, warmth, or redness. ¨ Red streaks leading from the infected skin. ¨ Pus draining from the wound. ¨ A fever.  
 Watch closely for changes in your health, and be sure to contact your doctor if: 
  · You do not get better as expected. Where can you learn more? Go to http://anastasia-dang.info/. Enter D947 in the search box to learn more about \"Skin Abscess: Care Instructions. \" Current as of: May 10, 2017 Content Version: 11.7 © 4059-1051 [employer]. Care instructions adapted under license by [employer] (which disclaims liability or warranty for this information). If you have questions about a medical condition or this instruction, always ask your healthcare professional. [address] any warranty or liability for your use of this information. Introducing \[facility]\"" & HEALTH SERVICES! Dear Bry Garrido: Thank you for requesting a "[employer] account. Our records indicate that you already have an active "[employer] account. You can access your account anytime at [email or web address] Did you know that you can access your hospital and ER discharge instructions at any time in "[employer]? You can also review all of your test results from your hospital stay or ER visit. Additional Information If you have questions, please visit the Frequently Asked Questions section of the "[employer] website at [email or web address] Remember, "[employer] is NOT to be used for urgent needs. For medical emergencies, dial 911. Now available from your iPhone and Android! Introducing Bello Parks As a Josy Fernandes patient, I wanted to make you aware of our electronic visit tool called Bello Parks. Josy Fernandes 24/7 allows you to connect within minutes with a medical provider 24 hours a day, seven days a week via a mobile device or tablet or logging into a secure website from your computer. You can access [email or web address] from anywhere in the [place]. A virtual visit might be right for you when you have a simple condition and feel like you just dont want to get out of bed, or cant get away from work for an appointment, when your regular [employer] provider is not available (evenings, weekends or holidays), or when youre out of town and need minor care. Electronic visits cost only $49 and if the [employer] 24/7 provider determines a prescription is needed to treat your condition, one can be electronically transmitted to a nearby pharmacy*. Please take a moment to enroll today if you have not already done so. The enrollment process is free and takes just a few minutes. To enroll, please download the [employer] 24/7 maxine to your tablet or phone, or visit [email or web address] to enroll on your computer. And, as an [address] [place] [address] patient with a [employer] account, the results of your visits will be scanned into your electronic medical record and your primary care provider will be able to view the scanned results. We urge you to continue to see your regular [employer] provider for your ongoing medical care. And while your primary care provider may not be the one available when you seek a Bello Jasekristal virtual visit, the peace of mind you get from getting a real diagnosis real time can be priceless. For more information on [facility], view our Frequently Asked Questions (FAQs) at [email or web address]. Sincerely, 
 
Irving Farrar MD 
Chief Medical Officer [employer] *:  certain medications cannot be prescribed via Bello NovoEDkristal Providers Seen During Your Hospitalization Provider Specialty Primary office phone Jesse Chandler, [address] [facility] [phone] Your Primary Care Physician (PCP) Primary Care Physician Office Phone Office Fax OTHER, PHYS ** None ** ** None ** You are allergic to the following Allergen Reactions Lisinopril Cough Percocet (Oxycodone-Acetaminophen) Nausea Only Percodan (Oxycodone-Aspirin) Nausea Only Prednisone Nausea Only Sulfa (Sulfonamide Antibiotics) Unknown (comments) Recent Documentation Height Weight BMI OB Status Smoking Status 1.702 m 122.7 kg 42.38 kg/m2 Hysterectomy Never Smoker Emergency Contacts Name Discharge Info Relation Home Work Mobile 69 [address] Meng CAREGIVER [3] Spouse [3] [phone] Hua 81 CAREGIVER [3] Daughter [21] [phone] Patient Belongings The following personal items are in your possession at time of discharge: 
  Dental Appliances: At home, Partials, Lowers  Visual Aid: Glasses      Home Medications: None   Jewelry: None  Clothing: Other (comment) (street clothing placed in bag and in locker)    Other Valuables: Eyeglasses (given to family) Please provide this summary of care documentation to your next provider. Signatures-by signing, you are acknowledging that this After Visit Summary has been reviewed with you and you have received a copy. Patient Signature:  ____________________________________________________________ Date:  ____________________________________________________________  
  
Rani Trujillo Provider Signature:  ____________________________________________________________ Date:  ____________________________________________________________

## 2018-07-27 NOTE — PERIOP NOTES
Patient Fall Protocol Yellow arm band applied to patient and yellow non skid socks placed on Bed in low position, all side rails up, call bell in reach Pt and Family instructed in \"call- don't fall\" protocol 
 -use your call bell, wait for assistance, staff not family will assist you to get up and move about Pt and family verbalize understanding of fall precautions and the \"call don't fall\" Protocol

## 2019-02-27 ENCOUNTER — HOSPITAL ENCOUNTER (OUTPATIENT)
Dept: CT IMAGING | Age: 74
Discharge: HOME | End: 2019-02-27
Attending: NURSE PRACTITIONER
Payer: MEDICARE

## 2019-02-27 DIAGNOSIS — R30.0 DYSURIA: ICD-10-CM

## 2019-02-27 DIAGNOSIS — R19.11 ABSENT BOWEL SOUNDS: ICD-10-CM

## 2019-02-27 DIAGNOSIS — R10.32 ABDOMINAL PAIN, LEFT LOWER QUADRANT: ICD-10-CM

## 2019-02-27 PROCEDURE — 74177 CT ABD & PELVIS W/CONTRAST: CPT

## 2019-02-27 PROCEDURE — 82565 ASSAY OF CREATININE: CPT

## 2019-02-27 PROCEDURE — 74011636320 HC RX REV CODE- 636/320: Performed by: RADIOLOGY

## 2019-02-27 RX ADMIN — IOPAMIDOL 100 ML: 755 INJECTION, SOLUTION INTRAVENOUS at 18:07

## 2019-02-28 LAB — CREAT BLD-MCNC: 0.7 MG/DL (ref 0.6–1.3)

## 2019-04-03 ENCOUNTER — HOSPITAL ENCOUNTER (EMERGENCY)
Age: 74
Discharge: HOME | End: 2019-04-03
Attending: EMERGENCY MEDICINE | Admitting: EMERGENCY MEDICINE
Payer: MEDICARE

## 2019-04-03 VITALS
HEIGHT: 67 IN | OXYGEN SATURATION: 95 % | RESPIRATION RATE: 18 BRPM | TEMPERATURE: 99.2 F | WEIGHT: 269 LBS | SYSTOLIC BLOOD PRESSURE: 173 MMHG | HEART RATE: 97 BPM | DIASTOLIC BLOOD PRESSURE: 73 MMHG | BODY MASS INDEX: 42.22 KG/M2

## 2019-04-03 DIAGNOSIS — H93.8X3 EAR CONGESTION, BILATERAL: ICD-10-CM

## 2019-04-03 DIAGNOSIS — I10 ESSENTIAL HYPERTENSION: ICD-10-CM

## 2019-04-03 DIAGNOSIS — R73.9 HYPERGLYCEMIA: ICD-10-CM

## 2019-04-03 DIAGNOSIS — L02.01 FACIAL ABSCESS: Primary | ICD-10-CM

## 2019-04-03 LAB
GLUCOSE BLD STRIP.AUTO-MCNC: 145 MG/DL (ref 65–100)
SERVICE CMNT-IMP: ABNORMAL

## 2019-04-03 PROCEDURE — 87077 CULTURE AEROBIC IDENTIFY: CPT

## 2019-04-03 PROCEDURE — 75810000289 HC I&D ABSCESS SIMP/COMP/MULT

## 2019-04-03 PROCEDURE — 87205 SMEAR GRAM STAIN: CPT

## 2019-04-03 PROCEDURE — 99283 EMERGENCY DEPT VISIT LOW MDM: CPT

## 2019-04-03 PROCEDURE — 74011000250 HC RX REV CODE- 250: Performed by: EMERGENCY MEDICINE

## 2019-04-03 PROCEDURE — 87186 SC STD MICRODIL/AGAR DIL: CPT

## 2019-04-03 PROCEDURE — 77030018836 HC SOL IRR NACL ICUM -A

## 2019-04-03 PROCEDURE — 90715 TDAP VACCINE 7 YRS/> IM: CPT | Performed by: EMERGENCY MEDICINE

## 2019-04-03 PROCEDURE — 74011250636 HC RX REV CODE- 250/636: Performed by: EMERGENCY MEDICINE

## 2019-04-03 PROCEDURE — 90471 IMMUNIZATION ADMIN: CPT

## 2019-04-03 PROCEDURE — 82962 GLUCOSE BLOOD TEST: CPT

## 2019-04-03 RX ORDER — LIDOCAINE HYDROCHLORIDE AND EPINEPHRINE 10; 10 MG/ML; UG/ML
1.5 INJECTION, SOLUTION INFILTRATION; PERINEURAL ONCE
Status: COMPLETED | OUTPATIENT
Start: 2019-04-03 | End: 2019-04-03

## 2019-04-03 RX ORDER — CIPROFLOXACIN AND DEXAMETHASONE 3; 1 MG/ML; MG/ML
4 SUSPENSION/ DROPS AURICULAR (OTIC) 2 TIMES DAILY
Qty: 5 ML | Refills: 0 | Status: ON HOLD | OUTPATIENT
Start: 2019-04-03 | End: 2020-03-03

## 2019-04-03 RX ORDER — CIPROFLOXACIN AND DEXAMETHASONE 3; 1 MG/ML; MG/ML
4 SUSPENSION/ DROPS AURICULAR (OTIC) 2 TIMES DAILY
Qty: 5 ML | Refills: 0 | Status: SHIPPED | OUTPATIENT
Start: 2019-04-03 | End: 2019-04-03

## 2019-04-03 RX ORDER — INSULIN GLARGINE 100 [IU]/ML
70 INJECTION, SOLUTION SUBCUTANEOUS 2 TIMES DAILY
Status: ON HOLD | COMMUNITY
End: 2020-03-03

## 2019-04-03 RX ORDER — DOXYCYCLINE HYCLATE 100 MG
100 TABLET ORAL 2 TIMES DAILY
Qty: 20 TAB | Refills: 0 | Status: SHIPPED | OUTPATIENT
Start: 2019-04-03 | End: 2019-04-13

## 2019-04-03 RX ADMIN — LIDOCAINE HYDROCHLORIDE,EPINEPHRINE BITARTRATE 15 MG: 10; .01 INJECTION, SOLUTION INFILTRATION; PERINEURAL at 16:07

## 2019-04-03 RX ADMIN — TETANUS TOXOID, REDUCED DIPHTHERIA TOXOID AND ACELLULAR PERTUSSIS VACCINE, ADSORBED 0.5 ML: 5; 2.5; 8; 8; 2.5 SUSPENSION INTRAMUSCULAR at 15:42

## 2019-04-03 NOTE — DISCHARGE INSTRUCTIONS
Patient Education     Patient Education        High Blood Pressure: Care Instructions  Overview    It's normal for blood pressure to go up and down throughout the day. But if it stays up, you have high blood pressure. Another name for high blood pressure is hypertension. Despite what a lot of people think, high blood pressure usually doesn't cause headaches or make you feel dizzy or lightheaded. It usually has no symptoms. But it does increase your risk of stroke, heart attack, and other problems. You and your doctor will talk about your risks of these problems based on your blood pressure. Your doctor will give you a goal for your blood pressure. Your goal will be based on your health and your age. Lifestyle changes, such as eating healthy and being active, are always important to help lower blood pressure. You might also take medicine to reach your blood pressure goal.  Follow-up care is a key part of your treatment and safety. Be sure to make and go to all appointments, and call your doctor if you are having problems. It's also a good idea to know your test results and keep a list of the medicines you take. How can you care for yourself at home? Medical treatment  · If you stop taking your medicine, your blood pressure will go back up. You may take one or more types of medicine to lower your blood pressure. Be safe with medicines. Take your medicine exactly as prescribed. Call your doctor if you think you are having a problem with your medicine. · Talk to your doctor before you start taking aspirin every day. Aspirin can help certain people lower their risk of a heart attack or stroke. But taking aspirin isn't right for everyone, because it can cause serious bleeding. · See your doctor regularly. You may need to see the doctor more often at first or until your blood pressure comes down.   · If you are taking blood pressure medicine, talk to your doctor before you take decongestants or anti-inflammatory medicine, such as ibuprofen. Some of these medicines can raise blood pressure. · Learn how to check your blood pressure at home. Lifestyle changes  · Stay at a healthy weight. This is especially important if you put on weight around the waist. Losing even 10 pounds can help you lower your blood pressure. · If your doctor recommends it, get more exercise. Walking is a good choice. Bit by bit, increase the amount you walk every day. Try for at least 30 minutes on most days of the week. You also may want to swim, bike, or do other activities. · Avoid or limit alcohol. Talk to your doctor about whether you can drink any alcohol. · Try to limit how much sodium you eat to less than 2,300 milligrams (mg) a day. Your doctor may ask you to try to eat less than 1,500 mg a day. · Eat plenty of fruits (such as bananas and oranges), vegetables, legumes, whole grains, and low-fat dairy products. · Lower the amount of saturated fat in your diet. Saturated fat is found in animal products such as milk, cheese, and meat. Limiting these foods may help you lose weight and also lower your risk for heart disease. · Do not smoke. Smoking increases your risk for heart attack and stroke. If you need help quitting, talk to your doctor about stop-smoking programs and medicines. These can increase your chances of quitting for good. When should you call for help? Call 911 anytime you think you may need emergency care. This may mean having symptoms that suggest that your blood pressure is causing a serious heart or blood vessel problem. Your blood pressure may be over 180/120.   For example, call 911 if:    · You have symptoms of a heart attack. These may include:  ? Chest pain or pressure, or a strange feeling in the chest.  ? Sweating. ? Shortness of breath. ? Nausea or vomiting. ? Pain, pressure, or a strange feeling in the back, neck, jaw, or upper belly or in one or both shoulders or arms.   ? Lightheadedness or sudden weakness. ? A fast or irregular heartbeat.     · You have symptoms of a stroke. These may include:  ? Sudden numbness, tingling, weakness, or loss of movement in your face, arm, or leg, especially on only one side of your body. ? Sudden vision changes. ? Sudden trouble speaking. ? Sudden confusion or trouble understanding simple statements. ? Sudden problems with walking or balance. ? A sudden, severe headache that is different from past headaches.     · You have severe back or belly pain.    Do not wait until your blood pressure comes down on its own. Get help right away.   Call your doctor now or seek immediate care if:    · Your blood pressure is much higher than normal (such as 180/120 or higher), but you don't have symptoms.     · You think high blood pressure is causing symptoms, such as:  ? Severe headache.  ? Blurry vision.    Watch closely for changes in your health, and be sure to contact your doctor if:    · Your blood pressure measures higher than your doctor recommends at least 2 times. That means the top number is higher or the bottom number is higher, or both.     · You think you may be having side effects from your blood pressure medicine. Where can you learn more? Go to http://anastasia-dang.info/. Enter W436 in the search box to learn more about \"High Blood Pressure: Care Instructions. \"  Current as of: July 22, 2018  Content Version: 11.9  © 4837-8609 [employer]. Care instructions adapted under license by [employer] (which disclaims liability or warranty for this information). If you have questions about a medical condition or this instruction, always ask your healthcare professional. [place] [address] any warranty or liability for your use of this information. Learning About High Blood Sugar  What is high blood sugar? Your body turns the food you eat into glucose (sugar), which it uses for energy.  But if your body isn't able to use the sugar right away, it can build up in your blood and lead to high blood sugar. When the amount of sugar in your blood stays too high for too much of the time, you may have diabetes. Diabetes is a disease that can cause serious health problems. The good news is that lifestyle changes may help you get your blood sugar back to normal and avoid or delay diabetes. What causes high blood sugar? Sugar (glucose) can build up in your blood if you:  · Are overweight. · Have a family history of diabetes. · Take certain medicines, such as steroids. What are the symptoms? Having high blood sugar may not cause any symptoms at all. Or it may make you feel very thirsty or very hungry. You may also urinate more often than usual, have blurry vision, or lose weight without trying. How is high blood sugar treated? You can take steps to lower your blood sugar level if you understand what makes it get higher. Your doctor may want you to learn how to test your blood sugar level at home. Then you can see how illness, stress, or different kinds of food or medicine raise or lower your blood sugar level. Other tests may be needed to see if you have diabetes. How can you prevent high blood sugar? · Watch your weight. If you're overweight, losing just a small amount of weight may help. Reducing fat around your waist is most important. · Limit the amount of calories, sweets, and unhealthy fat you eat. Ask your doctor if a dietitian can help you. A registered dietitian can help you create meal plans that fit your lifestyle. · Get at least 30 minutes of exercise on most days of the week. Exercise helps control your blood sugar. It also helps you maintain a healthy weight. Walking is a good choice. You also may want to do other activities, such as running, swimming, cycling, or playing tennis or team sports. · If your doctor prescribed medicines, take them exactly as prescribed.  Call your doctor if you think you are having a problem with your medicine. You will get more details on the specific medicines your doctor prescribes. Follow-up care is a key part of your treatment and safety. Be sure to make and go to all appointments, and call your doctor if you are having problems. It's also a good idea to know your test results and keep a list of the medicines you take. Where can you learn more? Go to http://anastasia-dang.info/. Enter O108 in the search box to learn more about \"Learning About High Blood Sugar. \"  Current as of: July 25, 2018  Content Version: 11.9  © 2006-2018 [employer]. Care instructions adapted under license by [employer] (which disclaims liability or warranty for this information). If you have questions about a medical condition or this instruction, always ask your healthcare professional. [address] any warranty or liability for your use of this information. Patient Education        Skin Abscess: Care Instructions  Your Care Instructions    A skin abscess is a bacterial infection that forms a pocket of pus. A boil is a kind of skin abscess. The doctor may have cut an opening in the abscess so that the pus can drain out. You may have gauze in the cut so that the abscess will stay open and keep draining. You may need antibiotics. You will need to follow up with your doctor to make sure the infection has gone away. The doctor has checked you carefully, but problems can develop later. If you notice any problems or new symptoms, get medical treatment right away. Follow-up care is a key part of your treatment and safety. Be sure to make and go to all appointments, and call your doctor if you are having problems. It's also a good idea to know your test results and keep a list of the medicines you take. How can you care for yourself at home?   · Apply warm and dry compresses, a heating pad set on low, or a hot water bottle 3 or 4 times a day for pain. Keep a cloth between the heat source and your skin. · If your doctor prescribed antibiotics, take them as directed. Do not stop taking them just because you feel better. You need to take the full course of antibiotics. · Take pain medicines exactly as directed. ? If the doctor gave you a prescription medicine for pain, take it as prescribed. ? If you are not taking a prescription pain medicine, ask your doctor if you can take an over-the-counter medicine. · Keep your bandage clean and dry. Change the bandage whenever it gets wet or dirty, or at least one time a day. · If the abscess was packed with gauze:  ? Keep follow-up appointments to have the gauze changed or removed. If the doctor instructed you to remove the gauze, follow the instructions you were given for how to remove it. ? After the gauze is removed, soak the area in warm water for 15 to 20 minutes 2 times a day, until the wound closes. When should you call for help? Call your doctor now or seek immediate medical care if:    · You have signs of worsening infection, such as:  ? Increased pain, swelling, warmth, or redness. ? Red streaks leading from the infected skin. ? Pus draining from the wound. ? A fever.    Watch closely for changes in your health, and be sure to contact your doctor if:    · You do not get better as expected. Where can you learn more? Go to http://anastasia-dang.info/. Enter J491 in the search box to learn more about \"Skin Abscess: Care Instructions. \"  Current as of: April 17, 2018  Content Version: 11.9  © 4709-7403 [employer]. Care instructions adapted under license by [employer] (which disclaims liability or warranty for this information). If you have questions about a medical condition or this instruction, always ask your healthcare professional. [address] any warranty or liability for your use of this information.                 We hope that we have addressed all of your medical concerns. The examination and treatment you received in the Emergency Department were for an emergent problem and were not intended as complete care. It is important that you follow up with your healthcare provider(s) for ongoing care. If your symptoms worsen or do not improve as expected, and you are unable to reach your usual health care provider(s), you should return to the Emergency Department. Today's healthcare is undergoing tremendous change, and patient satisfaction surveys are one of the many tools to assess the quality of medical care. You may receive a survey from the [employer] organization regarding your experience in the Emergency Department. I hope that your experience has been completely positive, particularly the medical care that I provided. As such, please participate in the survey; anything less than excellent does not meet my expectations or intentions. [facility] and [facility] participate in nationally recognized quality of care measures. If your blood pressure is greater than 120/80, as reported below, we urge that you seek medical care to address the potential of high blood pressure, commonly known as hypertension. Hypertension can be hereditary or can be caused by certain medical conditions, pain, stress, or \"white coat syndrome. \"       Please make an appointment with your health care provider(s) for follow up of your Emergency Department visit. VITALS:   Patient Vitals for the past 8 hrs:   Temp Pulse Resp BP SpO2   04/03/19 1540 -- -- -- -- 96 %   04/03/19 1450 99.3 °F (37.4 °C) (!) 110 18 (!) 214/87 96 %          Thank you for allowing us to provide you with medical care today. We realize that you have many choices for your emergency care needs. Please choose us in the future for any continued health care needs. Liana Mcdaniel, [address] [facility] [place]: [phone]            Recent Results (from the past 24 hour(s))   GLUCOSE, POC    Collection Time: 04/03/19  3:37 PM   Result Value Ref Range    Glucose (POC) 145 (H) 65 - 100 mg/dL    Performed by Sasha Fernandes        No results found.

## 2019-04-03 NOTE — ED NOTES
The patient was discharged home by   in stable condition. The patient is alert and oriented, in no respiratory distress and discharge vital signs obtained. The patient's diagnosis, condition and treatment were explained. The patient expressed understanding. Prescriptions given. No work/school note given. A discharge plan has been developed. A  was not involved in the process. Aftercare instructions were given. Pt ambulatory out of the ED.   Pt discharged from the ED via w/c by     PCT with family

## 2019-04-03 NOTE — ED PROVIDER NOTES
HPI   70 yo AAF presents with facial abscess onset upon awakening this morning. Denies injury or trauma. Had area intermittently over the past 2 weeks to right lower face. New area to chin. C/o pain 7/10 to face, nonradiating. Denies fever, chills, trouble swallowing, dental pain. Also c/o congestion to b/l ears, is concerned that she has q tip stuck in ear. Pt with hx of diabetes.    Past Medical History:   Diagnosis Date    Breast cancer ([facility] [address]     right masectomy, cervical    Cervical cancer (HCC)     Chronic pain     L4-L5 discectomy    Depression with anxiety     Diabetes ([facility] [address]     Ectopic pregnancy     Glaucoma     Hypertension     Miscarriage     Sleep apnea     CPAP compliant but not using now because of rash on face from the mask       Past Surgical History:   Procedure Laterality Date    BREAST SURGERY PROCEDURE UNLISTED Right     right mastectomy    HX CARPAL TUNNEL RELEASE Bilateral     HX CATARACT REMOVAL Bilateral 2013    HX HYSTERECTOMY  1980's    cervical cancer    HX KNEE ARTHROSCOPY Bilateral     HX ORTHOPAEDIC  8179,0080    back surgery x 2    HX ORTHOPAEDIC Left 1990    ligament attachment arm    HX PELVIC LAPAROSCOPY  1970s    ectopic pregnancy    HX TUMOR REMOVAL Right     leg         Family History:   Problem Relation Age of Onset    Diabetes Mother     Hypertension Mother        Social History     Socioeconomic History    Marital status:      Spouse name: Not on file    Number of children: Not on file    Years of education: Not on file    Highest education level: Not on file   Occupational History    Not on file   Social Needs    Financial resource strain: Not on file    Food insecurity:     Worry: Not on file     Inability: Not on file    Transportation needs:     Medical: Not on file     Non-medical: Not on file   Tobacco Use    Smoking status: Never Smoker    Smokeless tobacco: Never Used   Substance and Sexual Activity    Alcohol use: No    Drug use: No    Sexual activity: Not on file   Lifestyle    Physical activity:     Days per week: Not on file     Minutes per session: Not on file    Stress: Not on file   Relationships    Social connections:     Talks on phone: Not on file     Gets together: Not on file     Attends Jew service: Not on file     Active member of club or organization: Not on file     Attends meetings of clubs or organizations: Not on file     Relationship status: Not on file    Intimate partner violence:     Fear of current or ex partner: Not on file     Emotionally abused: Not on file     Physically abused: Not on file     Forced sexual activity: Not on file   Other Topics Concern    Not on file   Social History Narrative    Not on file         ALLERGIES: Lisinopril; Percocet [oxycodone-acetaminophen]; Percodan [oxycodone-aspirin]; Prednisone; and Sulfa (sulfonamide antibiotics)    Review of Systems   Constitutional: Negative for chills and fever. HENT: Positive for ear discharge and hearing loss. Negative for ear pain, sore throat, trouble swallowing and voice change. Respiratory: Negative for cough and shortness of breath. Gastrointestinal: Negative for nausea and vomiting. Skin: Positive for rash and wound. Neurological: Negative for headaches. All other systems reviewed and are negative.       Vitals:    04/03/19 1450   BP: (!) 214/87   Pulse: (!) 110   Resp: 18   Temp: 99.3 °F (37.4 °C)   SpO2: 96%   Weight: 122 kg (269 lb)   Height: 5' 7\" (1.702 m)            Physical Exam   Physical Examination: General appearance - alert, well appearing, and in no distress, oriented to person, place, and time and normal appearing weight  Eyes - pupils equal and reactive, extraocular eye movements intact  HEENT-mild swelling to b/l ear canals L>R, no drainage, b/l TM with some fluid behind TM, pharynx normal, no trismus or uvula shift, no sublingual induration or tongue elevation, no gum swelling, swelling and pointing abscess to right lower face and another indurated area to chin, no drainage, no submental swelling  Neck - supple, no significant adenopathy  Chest - clear to auscultation, no wheezes, rales or rhonchi, symmetric air entry  Heart - normal rate, regular rhythm, normal S1, S2, no murmurs, rubs, clicks or gallops  Abdomen - soft, nontender, nondistended, no masses or organomegaly  Back exam - full range of motion, no tenderness, palpable spasm or pain on motion  Neurological - alert, oriented, normal speech, no focal findings or movement disorder noted  Musculoskeletal - no joint tenderness, deformity or swelling  Extremities - peripheral pulses normal, no pedal edema, no clubbing or cyanosis  Skin - normal coloration and turgor, no rashes, no suspicious skin lesions noted  MDM  Number of Diagnoses or Management Options     Amount and/or Complexity of Data Reviewed  Clinical lab tests: ordered and reviewed  Decide to obtain previous medical records or to obtain history from someone other than the patient: yes  Review and summarize past medical records: yes    Patient Progress  Patient progress: improved         I&D ABCESS COMP/MULTIPLE  Date/Time: 4/3/2019 3:45 PM  Performed by: Reginaldo Connell MD  Authorized by: Reginaldo Connell MD     Consent:     Consent obtained:  Written    Consent given by:  Patient    Risks discussed:  Bleeding, damage to other organs, incomplete drainage, infection and pain    Alternatives discussed:  No treatment, delayed treatment, alternative treatment, observation and referral  Location:     Type:  Abscess    Location:  Head    Head location:  Face  Pre-procedure details:     Skin preparation:  Betadine  Anesthesia (see MAR for exact dosages):      Anesthesia method:  Local infiltration    Local anesthetic:  Lidocaine 1% WITH epi  Procedure type:     Complexity:  Complex  Procedure details:     Needle aspiration: no      Incision types:  Stab incision and single straight    Incision depth:  Subcutaneous    Scalpel blade:  11    Wound management:  Probed and deloculated and irrigated with saline    Drainage:  Purulent    Drainage amount: Moderate    Wound treatment:  Wound left open    Packing materials:  None  Post-procedure details:     Patient tolerance of procedure: Tolerated well, no immediate complications      Will d/c with ciprodex for ears and doxycycline. Warm compresses and f/u with pcp or plastics for wound check in 48 hours. Return to ED for worsening symptoms. VSS, afebrile, nontoxic.

## 2019-04-03 NOTE — ED NOTES
Dr Mary Ellen Solorzano has explained I&D procedure to pt and Dr Mary Ellen Solorzano has obtained consent.

## 2019-04-03 NOTE — ED TRIAGE NOTES
Pt rpts bilateral hard of hearing for the past two months; maybe a q-tip  Is still in her ears. Pt also with abscess to right side of face and chin; + redness.

## 2019-04-07 RX ORDER — CEPHALEXIN 500 MG/1
500 CAPSULE ORAL 3 TIMES DAILY
Qty: 21 CAP | Refills: 0 | Status: SHIPPED | OUTPATIENT
Start: 2019-04-07 | End: 2019-04-14

## 2019-04-07 NOTE — PROGRESS NOTES
Pt returned call. Mild improvement on doxycycline. Discussed culture results with patient. She will stop doxycycline and start keflex 500 mg tid x 7 days  [facility].

## 2019-04-17 ENCOUNTER — HOSPITAL ENCOUNTER (OUTPATIENT)
Dept: CT IMAGING | Age: 74
Discharge: HOME | End: 2019-04-17
Attending: NURSE PRACTITIONER
Payer: MEDICARE

## 2019-04-17 DIAGNOSIS — R91.1 LUNG NODULE SEEN ON IMAGING STUDY: ICD-10-CM

## 2019-04-17 PROCEDURE — 71260 CT THORAX DX C+: CPT

## 2019-04-17 PROCEDURE — 82565 ASSAY OF CREATININE: CPT

## 2019-04-17 PROCEDURE — 74011636320 HC RX REV CODE- 636/320: Performed by: RADIOLOGY

## 2019-04-17 RX ADMIN — IOPAMIDOL 100 ML: 612 INJECTION, SOLUTION INTRAVENOUS at 14:18

## 2019-04-18 LAB — CREAT BLD-MCNC: 0.4 MG/DL (ref 0.6–1.3)

## 2019-06-25 ENCOUNTER — HOSPITAL ENCOUNTER (OUTPATIENT)
Dept: PET IMAGING | Age: 74
Discharge: HOME | End: 2019-06-25
Attending: INTERNAL MEDICINE
Payer: MEDICARE

## 2019-06-25 VITALS — BODY MASS INDEX: 43 KG/M2 | HEIGHT: 67 IN | WEIGHT: 274 LBS

## 2019-06-25 DIAGNOSIS — R91.1 SOLITARY LUNG NODULE: ICD-10-CM

## 2019-06-25 PROCEDURE — A9552 F18 FDG: HCPCS

## 2019-06-25 RX ORDER — SODIUM CHLORIDE 0.9 % (FLUSH) 0.9 %
10 SYRINGE (ML) INJECTION
Status: COMPLETED | OUTPATIENT
Start: 2019-06-25 | End: 2019-06-25

## 2019-06-25 RX ADMIN — Medication 10 ML: at 08:00

## 2019-08-07 ENCOUNTER — HOSPITAL ENCOUNTER (OUTPATIENT)
Dept: MRI IMAGING | Age: 74
Discharge: HOME | End: 2019-08-07
Attending: PHYSICAL MEDICINE & REHABILITATION
Payer: MEDICARE

## 2019-08-07 DIAGNOSIS — M47.817 LUMBOSACRAL SPONDYLOSIS WITHOUT MYELOPATHY: ICD-10-CM

## 2019-08-07 DIAGNOSIS — M54.50 LUMBAR PAIN: ICD-10-CM

## 2019-08-07 PROCEDURE — 72148 MRI LUMBAR SPINE W/O DYE: CPT

## 2019-09-27 ENCOUNTER — HOSPITAL ENCOUNTER (OUTPATIENT)
Dept: CT IMAGING | Age: 74
Discharge: HOME | End: 2019-09-27
Attending: NURSE PRACTITIONER
Payer: MEDICARE

## 2019-09-27 DIAGNOSIS — R91.1 SOLITARY LUNG NODULE: ICD-10-CM

## 2019-09-27 PROCEDURE — 71250 CT THORAX DX C-: CPT

## 2019-12-10 ENCOUNTER — HOSPITAL ENCOUNTER (OUTPATIENT)
Dept: CT IMAGING | Age: 74
Discharge: HOME | End: 2019-12-10
Attending: NURSE PRACTITIONER
Payer: MEDICARE

## 2019-12-10 DIAGNOSIS — R91.1 SOLITARY LUNG NODULE: ICD-10-CM

## 2019-12-10 PROCEDURE — 71250 CT THORAX DX C-: CPT

## 2020-01-29 ENCOUNTER — HOSPITAL ENCOUNTER (EMERGENCY)
Age: 75
Discharge: HOME | End: 2020-01-29
Attending: EMERGENCY MEDICINE
Payer: MEDICARE

## 2020-01-29 ENCOUNTER — APPOINTMENT (OUTPATIENT)
Dept: GENERAL RADIOLOGY | Age: 75
End: 2020-01-29
Attending: PHYSICIAN ASSISTANT
Payer: MEDICARE

## 2020-01-29 VITALS
BODY MASS INDEX: 43.82 KG/M2 | DIASTOLIC BLOOD PRESSURE: 79 MMHG | RESPIRATION RATE: 16 BRPM | OXYGEN SATURATION: 98 % | TEMPERATURE: 97.6 F | HEART RATE: 104 BPM | SYSTOLIC BLOOD PRESSURE: 210 MMHG | WEIGHT: 279.76 LBS

## 2020-01-29 DIAGNOSIS — M25.531 RIGHT WRIST PAIN: Primary | ICD-10-CM

## 2020-01-29 PROCEDURE — 73110 X-RAY EXAM OF WRIST: CPT

## 2020-01-29 PROCEDURE — 99282 EMERGENCY DEPT VISIT SF MDM: CPT

## 2020-01-29 RX ORDER — DICLOFENAC SODIUM 10 MG/G
GEL TOPICAL 4 TIMES DAILY
Qty: 1 EACH | Refills: 0 | Status: ON HOLD | OUTPATIENT
Start: 2020-01-29 | End: 2020-03-03

## 2020-01-29 RX ORDER — FUROSEMIDE 20 MG/1
20 TABLET ORAL DAILY
COMMUNITY
End: 2020-03-06

## 2020-01-29 NOTE — DISCHARGE INSTRUCTIONS
Patient Education   Patient Education        Maria Teresa Garcia Tenosynovitis: Care Instructions  Your Care Instructions  Maria Teresa Gacria (say \"Janell\") tenosynovitis is a problem that makes the bottom of your thumb and the side of your wrist hurt. When you have de Quervain's, the ropey fiber (tendon) that helps move your thumb away from your fingers becomes swollen. You may have pain when you move your wrist or pick things up. You may hear a creaking sound when you move your wrist or thumb. Symptoms often get better in a few weeks with home care. Your doctor may want you to start some gentle stretching exercises once your symptoms are gone. Sometimes treatment with an injection or surgery is needed. Follow-up care is a key part of your treatment and safety. Be sure to make and go to all appointments, and call your doctor if you are having problems. It's also a good idea to know your test results and keep a list of the medicines you take. How can you care for yourself at home? · Until your symptoms are better, stop the activities that caused the pain. · Avoid moving the hand and wrist that hurt. · Follow your doctor's directions for wearing a splint to keep your thumb and wrist from moving. · Try ice or heat. ? Put ice or a cold pack on your thumb and wrist for 10 to 20 minutes at a time. Put a thin cloth between the ice and your skin. ? You can use heat for 20 to 30 minutes, 2 or 3 times a day. Try using a heating pad, hot shower, or hot pack. · Ask your doctor if you can take an over-the-counter pain medicine, such as acetaminophen (Tylenol), ibuprofen (Advil, Motrin), or naproxen (Aleve). Be safe with medicines. Read and follow all instructions on the label. When should you call for help?   Watch closely for changes in your health, and be sure to contact your doctor if:    · You have new or worse pain.     · You have new or worse numbness or tingling in your hand or fingers.     · Your hand feels weaker.     · You do not get better as expected. Where can you learn more? Go to http://anastasia-dang.info/. Enter P772 in the search box to learn more about \"De Quervain's Tenosynovitis: Care Instructions. \"  Current as of: June 26, 2019  Content Version: 12.2  © 2564-5097 [employer]. Care instructions adapted under license by [employer] (which disclaims liability or warranty for this information). If you have questions about a medical condition or this instruction, always ask your healthcare professional. [address] any warranty or liability for your use of this information. Gamaliel Gondola Disease: Exercises  Introduction  Here are some examples of exercises for you to try. The exercises may be suggested for a condition or for rehabilitation. Start each exercise slowly. Ease off the exercises if you start to have pain. You will be told when to start these exercises and which ones will work best for you. How to do the exercises  Thumb lifts    1. Place your hand on a flat surface, with your palm up. 2. Lift your thumb away from your palm to make a \"C\" shape. 3. Hold for about 6 seconds. 4. Repeat 8 to 12 times. Passive thumb MP flexion    1. Hold your hand in front of you, and turn your hand so your little finger faces down and your thumb faces up. (Your hand should be in the position used for shaking someone's hand.) You may also rest your hand on a flat surface. 2. Use the fingers on your other hand to bend your thumb down at the point where your thumb connects to your palm. 3. Hold for at least 15 to 30 seconds. 4. Repeat 2 to 4 times. Finkelstein stretch    1. Hold your arms out in front of you. (Your hand should be in the position used for shaking someone's hand.)  2. Bend your thumb toward your palm.   3. Use your other hand to gently stretch your thumb and wrist downward until you feel the stretch on the thumb side of your wrist.  4. Hold for at least 15 to 30 seconds. 5. Repeat 2 to 4 times. Resisted ulnar deviation    1. Sit leaning forward with your legs slightly spread and your elbow on your thigh. 2. Grasp one end of the band with your palm down, and step on the other end with the foot opposite the hand holding the band. 3. Slowly bend your wrist sideways and away from your knee. 4. Repeat 8 to 12 times. Follow-up care is a key part of your treatment and safety. Be sure to make and go to all appointments, and call your doctor if you are having problems. It's also a good idea to know your test results and keep a list of the medicines you take. Where can you learn more? Go to http://anastasia-dang.info/. Enter R380 in the search box to learn more about \"De Quervain's Disease: Exercises. \"  Current as of: June 26, 2019  Content Version: 12.2  © 8903-6357 [employer]. Care instructions adapted under license by [employer] (which disclaims liability or warranty for this information). If you have questions about a medical condition or this instruction, always ask your healthcare professional. [address] any warranty or liability for your use of this information.

## 2020-01-29 NOTE — ED PROVIDER NOTES
26-year-old left-handed female presenting to the ED for right wrist pain. Patient reports that she has intermittently had pain in the right wrist over the radial aspect for the last couple of months, worsened with activity. Patient notes that she has seen her primary care provider, it was recommended that she wear a brace, notes that she has been doing that occasionally, also taking Tylenol without significant relief. Denies trauma. No fever or history of gout. Reports history for carpal tunnel syndrome over 10 years ago, reports that this feels different. Patient reports that she came to the emergency department today because she was already in the building for another appointment. Past medical history: Noncontributory    The history is provided by the patient. Wrist Pain    Pertinent negatives include no numbness.         Past Medical History:   Diagnosis Date    Breast cancer ([address]     right masectomy, cervical    Cervical cancer (HCC)     Chronic pain     L4-L5 discectomy    Depression with anxiety     Diabetes ([address]     Ectopic pregnancy     Glaucoma     Hypertension     Miscarriage     Sleep apnea     CPAP compliant but not using now because of rash on face from the mask       Past Surgical History:   Procedure Laterality Date    BREAST SURGERY PROCEDURE UNLISTED Right     right mastectomy    HX CARPAL TUNNEL RELEASE Bilateral     HX CATARACT REMOVAL Bilateral 2013    HX HYSTERECTOMY  1980's    cervical cancer    HX KNEE ARTHROSCOPY Bilateral     HX ORTHOPAEDIC  8825,0256    back surgery x 2    HX ORTHOPAEDIC Left 1990    ligament attachment arm    HX PELVIC LAPAROSCOPY  1970s    ectopic pregnancy    HX TUMOR REMOVAL Right     leg         Family History:   Problem Relation Age of Onset    Diabetes Mother     Hypertension Mother        Social History     Socioeconomic History    Marital status:      Spouse name: Not on file    Number of children: Not on file    Years of education: Not on file    Highest education level: Not on file   Occupational History    Not on file   Social Needs    Financial resource strain: Not on file    Food insecurity:     Worry: Not on file     Inability: Not on file    Transportation needs:     Medical: Not on file     Non-medical: Not on file   Tobacco Use    Smoking status: Never Smoker    Smokeless tobacco: Never Used   Substance and Sexual Activity    Alcohol use: No    Drug use: No    Sexual activity: Not on file   Lifestyle    Physical activity:     Days per week: Not on file     Minutes per session: Not on file    Stress: Not on file   Relationships    Social connections:     Talks on phone: Not on file     Gets together: Not on file     Attends [employer] service: Not on file     Active member of club or organization: Not on file     Attends meetings of clubs or organizations: Not on file     Relationship status: Not on file    Intimate partner violence:     Fear of current or ex partner: Not on file     Emotionally abused: Not on file     Physically abused: Not on file     Forced sexual activity: Not on file   Other Topics Concern    Not on file   Social History Narrative    Not on file         ALLERGIES: Lisinopril; Percocet [oxycodone-acetaminophen]; Percodan [oxycodone-aspirin]; Prednisone; and Sulfa (sulfonamide antibiotics)    Review of Systems   Constitutional: Negative for fever. Musculoskeletal: Positive for arthralgias. Negative for joint swelling.        + Wrist pain   Skin: Negative for color change and rash. Neurological: Negative for numbness. All other systems reviewed and are negative. Vitals:    01/29/20 1536   BP: (!) 210/79   Pulse: (!) 104   Resp: 16   Temp: 97.6 °F (36.4 °C)   SpO2: 98%   Weight: 126.9 kg (279 lb 12.2 oz)            Physical Exam  Vitals signs and nursing note reviewed. Constitutional:       Appearance: She is well-developed.       Comments: Pleasant, well-appearing -American female. Appears younger than stated age. HENT:      Head: Normocephalic. Eyes:      Conjunctiva/sclera: Conjunctivae normal.   Neck:      Musculoskeletal: Neck supple. Cardiovascular:      Rate and Rhythm: Normal rate. Pulmonary:      Effort: Pulmonary effort is normal. No respiratory distress. Musculoskeletal: Normal range of motion. Comments: Right wrist: Unremarkable to visible inspection, no erythema, edema. No warmth. Patient with tenderness over the radial aspect at the base of the thumb extending into the wrist.  + Finkelstein's maneuver. Strong radial pulse, median nerve function intact, sensation and cap refill normal.   Skin:     General: Skin is warm and dry. Neurological:      Mental Status: She is alert and oriented to person, place, and time. MDM  Number of Diagnoses or Management Options  Right wrist pain:   Diagnosis management comments: 71-year-old left-handed female presenting to the ED for several months of right wrist pain. Atraumatic. Has seen PCP, recommended wrist splint which patient has not been fully compliant with. No findings on exam concerning for gouty or septic arthritis. Given location, characterization of pain, suspect likely tenderness source of pain such as de Quervain's. Will order x-ray as imaging has not yet been performed. No acute findings on x-ray. Given age, reluctant to use oral NSAID, will do trial of topical diclofenac. Discussed with patient that she will need evaluation by an orthopedist, may need physical therapy, steroid injection, etc. for more definitive treatment. Return precautions given. Amount and/or Complexity of Data Reviewed  Tests in the radiology section of CPT®: ordered and reviewed  Discuss the patient with other providers: yes (Dr. Ced Henriquez, ED attending)           Procedures      I was personally available for consultation in the emergency department.   I have reviewed the chart and agree with the documentation recorded by the [facility], including the assessment, treatment plan, and disposition.   Polina An MD

## 2020-01-29 NOTE — ED NOTES
The patient was discharged home by MARLEY Russo in stable condition. The patient is alert and oriented, in no respiratory distress and discharge vital signs obtained. The patient's diagnosis, condition and treatment were explained. The patient expressed understanding. One prescriptions e-scribed to pharmacy on file. No work/school note given. A discharge plan has been developed. A  was not involved in the process. Aftercare instructions were given. Pt ambulatory out of the ED.

## 2020-01-29 NOTE — ED TRIAGE NOTES
Pt has had right wrist pain for about three months. Pt has seen her PCP who gave her a wrist/thumb splint, pt reports pain continues even while wearing the splint. Pt is not wearing splint on arrival. Pt has been taking tylenol for pain.

## 2020-01-30 ENCOUNTER — OFFICE VISIT (OUTPATIENT)
Dept: CARDIOLOGY CLINIC | Age: 75
End: 2020-01-30

## 2020-01-30 VITALS
SYSTOLIC BLOOD PRESSURE: 110 MMHG | WEIGHT: 280 LBS | BODY MASS INDEX: 43.95 KG/M2 | OXYGEN SATURATION: 95 % | HEIGHT: 67 IN | DIASTOLIC BLOOD PRESSURE: 82 MMHG | HEART RATE: 87 BPM

## 2020-01-30 DIAGNOSIS — R06.02 SHORTNESS OF BREATH: Primary | ICD-10-CM

## 2020-01-30 DIAGNOSIS — I10 ESSENTIAL HYPERTENSION: ICD-10-CM

## 2020-01-30 DIAGNOSIS — R06.09 DOE (DYSPNEA ON EXERTION): ICD-10-CM

## 2020-01-30 RX ORDER — HUMAN INSULIN 100 [IU]/ML
64 INJECTION, SUSPENSION SUBCUTANEOUS
COMMUNITY

## 2020-01-30 NOTE — PROGRESS NOTES
Reason for Consult: sob, Edema. Referring: Bertha Quiñones NP    HPI: Waldemar Bruner is a 77 y.o. female with past medical history significant for diabetes, hypertension, dyslipidemia is here for evaluation of possible prior history of congestive heart failure. She was recently seen by her primary care physician and on record review there was some mention about heart failure about 4 to 5 years ago when she was [facility]. She ended up in the ICU with possible fluid in the lungs or around the heart. Since then she has not seen a cardiologist.  She has esophageal stricture and requires periodic dilatation however recently when she followed up with her gastroenterologist nurse practitioner she was asked to get a clearance from cardiology for endoscopy and dilatation of the stricture. As such she does not have any symptoms of chest pain however she does have exertional shortness of breath and edema. EKG in my office today demonstrated normal sinus rhythm with T wave inversion in inferior leads with poor R wave progression in the anterior leads. Plan:    1. History of possible congestive heart failure: There is a remote history however most recently she. She was lost to follow-up by a cardiologist in past 4 years. Will further evaluate this history with additional testing including echocardiogram to evaluate LV function. 2.  Abnormal EKG demonstrating poor R wave progression as well as T wave inversions: She has risk factors for CAD including diabetes, hypertension and age greater than 54. We will further evaluate with a stress Lexiscan nuclear study. Continue aspirin, statin and Coreg. 3.  Hypertension: Blood pressure is controlled. Continue Lasix and Coreg. Continue losartan. 4.  Diabetes: Most recent hemoglobin A1c was 7.0.    5.  Follow-up with me in 1 month with follow-up of the test results.       ATTENTION:   This medical record was transcribed using an electronic medical records/speech recognition system. Although proofread, it may and can contain electronic, spelling and other errors. Corrections may be executed at a later time. Please feel free to contact us for any clarifications as needed.       Past Medical History:   Diagnosis Date    Breast cancer ([facility]     right masectomy, cervical    Cervical cancer (HCC)     Chronic pain     L4-L5 discectomy    Depression with anxiety     Diabetes ([facility]     Ectopic pregnancy     Glaucoma     Hypertension     Miscarriage     Sleep apnea     CPAP compliant but not using now because of rash on face from the mask            Past Surgical History:   Procedure Laterality Date    BREAST SURGERY PROCEDURE UNLISTED Right     right mastectomy    HX CARPAL TUNNEL RELEASE Bilateral     HX CATARACT REMOVAL Bilateral 2013    HX HYSTERECTOMY  1980's    cervical cancer    HX KNEE ARTHROSCOPY Bilateral     HX ORTHOPAEDIC  6100,5863    back surgery x 2    HX ORTHOPAEDIC Left 1990    ligament attachment arm    HX PELVIC LAPAROSCOPY  1970s    ectopic pregnancy    HX TUMOR REMOVAL Right     leg             Family History   Problem Relation Age of Onset    Diabetes Mother     Hypertension Mother            Social History     Socioeconomic History    Marital status:      Spouse name: Not on file    Number of children: Not on file    Years of education: Not on file    Highest education level: Not on file   Occupational History    Not on file   Social Needs    Financial resource strain: Not on file    Food insecurity:     Worry: Not on file     Inability: Not on file    Transportation needs:     Medical: Not on file     Non-medical: Not on file   Tobacco Use    Smoking status: Never Smoker    Smokeless tobacco: Never Used   Substance and Sexual Activity    Alcohol use: No    Drug use: No    Sexual activity: Not on file   Lifestyle    Physical activity:     Days per week: Not on file     Minutes per session: Not on file    Stress: Not on file   Relationships    Social connections:     Talks on phone: Not on file     Gets together: Not on file     Attends Jain service: Not on file     Active member of club or organization: Not on file     Attends meetings of clubs or organizations: Not on file     Relationship status: Not on file    Intimate partner violence:     Fear of current or ex partner: Not on file     Emotionally abused: Not on file     Physically abused: Not on file     Forced sexual activity: Not on file   Other Topics Concern    Not on file   Social History Narrative    Not on file         Allergies   Allergen Reactions    Lisinopril Cough    Percocet [Oxycodone-Acetaminophen] Nausea Only    Percodan [Oxycodone-Aspirin] Nausea Only    Prednisone Nausea Only    Sulfa (Sulfonamide Antibiotics) Unknown (comments)            Current Outpatient Medications   Medication Sig Dispense Refill    insulin NPH (NOVOLIN N NPH U-100 INSULIN) 100 unit/mL injection 60 Units by SubCUTAneous route once. Indications: 60 units in morning and 60 units in the evening      furosemide (LASIX) 20 mg tablet Take 20 mg by mouth daily.  diclofenac (VOLTAREN) 1 % gel Apply  to affected area four (4) times daily. 1 Each 0    acetaminophen (TYLENOL EXTRA STRENGTH) 500 mg tablet Take 1,000 mg by mouth every six (6) hours as needed for Pain.  insulin regular (NOVOLIN R REGULAR U-100 INSULN) 100 unit/mL injection 30 Units by SubCUTAneous route daily (with breakfast).  insulin regular (NOVOLIN R REGULAR U-100 INSULN) 100 unit/mL injection 30 Units by SubCUTAneous route daily (with dinner).  losartan (COZAAR) 100 mg tablet Take 100 mg by mouth daily.  PARoxetine (PAXIL) 40 mg tablet Take 40 mg by mouth daily.  triamcinolone acetonide (KENALOG) 0.1 % ointment Apply  to affected area two (2) times a day.  use thin layer  Applies on face at bipap allergy site     [address] [place] [address] carvedilol (COREG) 6.25 mg tablet Take 6.25 mg by mouth two (2) times daily (with meals).  aspirin delayed-release 81 mg tablet Take 81 mg by mouth daily.  insulin glargine (LANTUS,BASAGLAR) 100 unit/mL (3 mL) inpn 70 Units by SubCUTAneous route two (2) times a day.  ciprofloxacin-dexamethasone (CIPRODEX) 0.3-0.1 % otic suspension Administer 4 Drops into each ear two (2) times a day. (Patient not taking: Reported on 1/30/2020) 5 mL 0    hydroCHLOROthiazide (HYDRODIURIL) 50 mg tablet Take 50 mg by mouth daily.  insulin regular (NOVOLIN R REGULAR U-100 INSULN) 100 unit/mL injection 35 Units by SubCUTAneous route daily (with dinner). ROS:  12 point review of systems was performed. All negative except for HPI     Physical Exam:  Visit Vitals  /82 (BP 1 Location: Left arm, BP Patient Position: Sitting)   Pulse 87   Ht 5' 7\" (1.702 m)   Wt 280 lb (127 kg)   SpO2 95%   BMI 43.85 kg/m²       Gen:  Well-developed, well-nourished, in no acute distress  HEENT:  Pink conjunctivae, PERRL, hearing intact to voice, moist mucous membranes  Neck:  Supple, without masses, thyroid non-tender  Resp:  No accessory muscle use, clear breath sounds without wheezes rales or rhonchi  Card:  No murmurs, normal S1, S2 without thrills, bruits. Mild peripheral edema.    Abd:  Soft, non-tender, non-distended, normoactive bowel sounds are present, no palpable organomegaly and no detectable hernias  Lymph:  No cervical or inguinal adenopathy  Musc:  No cyanosis or clubbing  Skin:  No rashes or ulcers, skin turgor is good  Neuro:  Cranial nerves are grossly intact, no focal motor weakness, follows commands appropriately  Psych:  Good insight, oriented to person, place and time, alert     Labs:     Lab Results   Component Value Date/Time    WBC 15.9 (H) 06/13/2018 01:46 AM    HGB 12.2 06/13/2018 01:46 AM    HCT 39.5 06/13/2018 01:46 AM    PLATELET 407 41/07/1126 01:46 AM    MCV 86.4 06/13/2018 01:46 AM     Lab Results   Component Value Date/Time    Hemoglobin A1c 9.2 (H) 06/12/2018 05:59 AM    Hemoglobin A1c 8.8 (H) 06/12/2016 07:36 AM    Glucose 68 07/20/2018 12:26 PM    Glucose (POC) 145 (H) 04/03/2019 03:37 PM    Creatinine (POC) 0.4 (L) 04/17/2019 01:57 PM    Creatinine 0.82 07/20/2018 12:26 PM      No results found for: CHOL, CHOLPOCT, HDL, LDL, LDLC, LDLCPOC, LDLCEXT, TRIGL, TGLPOCT, CHHD, CHHDX  Lab Results   Component Value Date/Time    ALT (SGPT) 34 06/11/2018 12:12 PM    AST (SGOT) 24 06/11/2018 12:12 PM    Alk.  phosphatase 107 06/11/2018 12:12 PM    Bilirubin, total 0.7 06/11/2018 12:12 PM    Albumin 2.8 (L) 06/11/2018 12:12 PM    Protein, total 8.1 06/11/2018 12:12 PM    PLATELET 618 95/05/0568 01:46 AM     No results found for: INR, INREXT, PTMR, PTP, PT1, PT2, INREXT   Lab Results   Component Value Date/Time    GFR est non-AA >60 07/20/2018 12:26 PM    GFRNA, POC >60 04/17/2019 01:57 PM    GFR est AA >60 07/20/2018 12:26 PM    GFRAA, POC >60 04/17/2019 01:57 PM    Creatinine 0.82 07/20/2018 12:26 PM    Creatinine (POC) 0.4 (L) 04/17/2019 01:57 PM    BUN 13 07/20/2018 12:26 PM    Sodium 141 07/20/2018 12:26 PM    Potassium 3.7 07/20/2018 12:26 PM    Chloride 102 07/20/2018 12:26 PM    CO2 35 (H) 07/20/2018 12:26 PM    Magnesium 2.0 06/13/2018 01:46 AM    Phosphorus 3.1 06/12/2018 05:59 AM     No results found for: PSA, PSA2, PSAR1, PSA1, PSAR2, PSA3, PSAR3, [id number], [id number], PSALT  No results found for: TSH, TSH2, TSH3, TSHP, TSHELE, TSHEXT, TT3, T3U, T3UP, FRT3, FT3, FT4, FT4P, T4, T4P, FT4T, TT7, TSHEXT   Lab Results   Component Value Date/Time    Glucose 68 07/20/2018 12:26 PM    Glucose (POC) 145 (H) 04/03/2019 03:37 PM      Lab Results   Component Value Date/Time    Troponin-I, Qt. <0.04 01/19/2017 03:50 PM      No results found for: BNP, BNPP, BNPPPOC, XBNPT, BNPNT   Lab Results   Component Value Date/Time    Sodium 141 07/20/2018 12:26 PM    Potassium 3.7 07/20/2018 12:26 PM    Chloride 102 07/20/2018 12:26 PM    CO2 35 (H) 07/20/2018 12:26 PM    Anion gap 4 (L) 07/20/2018 12:26 PM    Glucose 68 07/20/2018 12:26 PM    BUN 13 07/20/2018 12:26 PM    Creatinine 0.82 07/20/2018 12:26 PM    BUN/Creatinine ratio 16 07/20/2018 12:26 PM    GFR est AA >60 07/20/2018 12:26 PM    GFR est non-AA >60 07/20/2018 12:26 PM    Calcium 9.0 07/20/2018 12:26 PM      Lab Results   Component Value Date/Time    Sodium 141 07/20/2018 12:26 PM    Potassium 3.7 07/20/2018 12:26 PM    Chloride 102 07/20/2018 12:26 PM    CO2 35 (H) 07/20/2018 12:26 PM    Anion gap 4 (L) 07/20/2018 12:26 PM    Glucose 68 07/20/2018 12:26 PM    BUN 13 07/20/2018 12:26 PM    Creatinine 0.82 07/20/2018 12:26 PM    BUN/Creatinine ratio 16 07/20/2018 12:26 PM    GFR est AA >60 07/20/2018 12:26 PM    GFR est non-AA >60 07/20/2018 12:26 PM    Calcium 9.0 07/20/2018 12:26 PM    Bilirubin, total 0.7 06/11/2018 12:12 PM    ALT (SGPT) 34 06/11/2018 12:12 PM    AST (SGOT) 24 06/11/2018 12:12 PM    Alk. phosphatase 107 06/11/2018 12:12 PM    Protein, total 8.1 06/11/2018 12:12 PM    Albumin 2.8 (L) 06/11/2018 12:12 PM    Globulin 5.3 (H) 06/11/2018 12:12 PM    A-G Ratio 0.5 (L) 06/11/2018 12:12 PM      Lab Results   Component Value Date/Time    Hemoglobin A1c 9.2 (H) 06/12/2018 05:59 AM         No results for input(s): CPK, CKMB, TROIQ in the last 72 hours.     No lab exists for component: CKQMB, CPKMB        Problem List:     Problem List  Date Reviewed: 1/30/2020          Codes Class Noted    Infected sebaceous cyst ICD-10-CM: L72.3, L08.9  ICD-9-CM: 706.2  6/13/2018        Abscess ICD-10-CM: L02.91  ICD-9-CM: 682.9  6/11/2018        Morbid obesity ([facility] ICD-10-CM: E66.01  ICD-9-CM: 278.01  6/11/2018        SIRS (systemic inflammatory response syndrome) ([facility] ICD-10-CM: R65.10  ICD-9-CM: 995.90  6/11/2016        Hypertension ICD-10-CM: I10  ICD-9-CM: 401.9  Unknown        Type 2 diabetes mellitus ([facility] ICD-10-CM: E11.9  ICD-9-CM: 250.00  Unknown Depression with anxiety ICD-10-CM: F41.8  ICD-9-CM: 300.4  Unknown        Glaucoma ICD-10-CM: H40.9  ICD-9-CM: 365.9  Unknown        Breast cancer ([facility] ICD-10-CM: C50.919  ICD-9-CM: 174.9  Unknown                Nathaniel Swain MD, [facility]

## 2020-01-30 NOTE — PROGRESS NOTES
Sasha Damico is a 77 y.o. female    Chief Complaint   Patient presents with    New Patient     Edema    Shortness of Breath       Chest pain No    SOB patient states SOB when walking    Dizziness No    Swelling patient states some swelling in her ankles; PCP is treating     Refills No    Visit Vitals  /82 (BP 1 Location: Left arm, BP Patient Position: Sitting)   Pulse 87   Ht 5' 7\" (1.702 m)   Wt 280 lb (127 kg)   SpO2 95%   BMI 43.85 kg/m²       1. Have you been to the ER, urgent care clinic since your last visit? Hospitalized since your last visit? No    2. Have you seen or consulted any other health care providers outside of the [address] [place] [address] since your last visit? Include any pap smears or colon screening.   No

## 2020-02-27 ENCOUNTER — TELEPHONE (OUTPATIENT)
Dept: CARDIOLOGY CLINIC | Age: 75
End: 2020-02-27

## 2020-02-27 DIAGNOSIS — R94.39 ABNORMAL STRESS TEST: ICD-10-CM

## 2020-02-27 DIAGNOSIS — R94.39 ABNORMAL STRESS ECG: ICD-10-CM

## 2020-02-27 DIAGNOSIS — R06.02 SHORTNESS OF BREATH: Primary | ICD-10-CM

## 2020-02-27 NOTE — TELEPHONE ENCOUNTER
Patient states that she was instructed by Dr. Marv Canela to call and speak with [facility]. Please advise.     Phone: [phone]

## 2020-02-27 NOTE — TELEPHONE ENCOUNTER
Patient is calling back to speak with about the possibility of getting scheduled for a catherization. Please advise.     Phone: [phone]

## 2020-02-28 DIAGNOSIS — R94.39 ABNORMAL STRESS TEST: ICD-10-CM

## 2020-02-28 DIAGNOSIS — R06.02 SHORTNESS OF BREATH: Primary | ICD-10-CM

## 2020-02-28 RX ORDER — SODIUM CHLORIDE 0.9 % (FLUSH) 0.9 %
5-40 SYRINGE (ML) INJECTION AS NEEDED
Status: CANCELLED | OUTPATIENT
Start: 2020-03-03

## 2020-02-28 RX ORDER — SODIUM CHLORIDE 0.9 % (FLUSH) 0.9 %
5-40 SYRINGE (ML) INJECTION EVERY 8 HOURS
Status: CANCELLED | OUTPATIENT
Start: 2020-03-03

## 2020-02-28 RX ORDER — SODIUM CHLORIDE 9 MG/ML
75 INJECTION, SOLUTION INTRAVENOUS CONTINUOUS
Status: CANCELLED | OUTPATIENT
Start: 2020-03-03

## 2020-02-28 NOTE — TELEPHONE ENCOUNTER
Called and spoke with patient concerning upcoming procedure (IDx2). Advised procedure has been scheduled next week with Dr. Godfrey Dooley on 3/3/2020 at [id number] drawn ASAP. Pt expressed understanding. Opportunities for questions, clarifications, and concerns provided.

## 2020-02-28 NOTE — PROGRESS NOTES
Spoke with pt concerning [facility] procedure. (Pt IDx2). Instructions given to pt per VO of Dr. Carrol Brown. Pt NPO after midnight; hold lasix and HCTZ AM of procedure and hold AM dose of insulin, half PM dose the night prior to proceure; take all other meds with sip of water in AM; have someone available to drive pt to and from procedure; pack a bag in case an overnight stay is warranted. [facility] scheduled for 1300 on 3/3/2020. Pt advised to arrive 2hrs prior to procedure for prep. Labs STAT to be drawn 2/28/2020. Pt expressed understanding. Opportunities for questions, clarifications, and concerns provided.

## 2020-02-29 LAB
ALBUMIN SERPL-MCNC: 3.8 G/DL (ref 3.7–4.7)
ALBUMIN/GLOB SERPL: 1.2 {RATIO} (ref 1.2–2.2)
ALP SERPL-CCNC: 91 IU/L (ref 39–117)
ALT SERPL-CCNC: 35 IU/L (ref 0–32)
AST SERPL-CCNC: 34 IU/L (ref 0–40)
BILIRUB SERPL-MCNC: 0.5 MG/DL (ref 0–1.2)
BUN SERPL-MCNC: 13 MG/DL (ref 8–27)
BUN/CREAT SERPL: 14 (ref 12–28)
CALCIUM SERPL-MCNC: 9.1 MG/DL (ref 8.7–10.3)
CHLORIDE SERPL-SCNC: 104 MMOL/L (ref 96–106)
CO2 SERPL-SCNC: 25 MMOL/L (ref 20–29)
CREAT SERPL-MCNC: 0.95 MG/DL (ref 0.57–1)
ERYTHROCYTE [DISTWIDTH] IN BLOOD BY AUTOMATED COUNT: 15.8 % (ref 11.7–15.4)
GLOBULIN SER CALC-MCNC: 3.2 G/DL (ref 1.5–4.5)
GLUCOSE SERPL-MCNC: 98 MG/DL (ref 65–99)
HCT VFR BLD AUTO: 40.8 % (ref 34–46.6)
HGB BLD-MCNC: 14.1 G/DL (ref 11.1–15.9)
INTERPRETATION: NORMAL
MCH RBC QN AUTO: 26.6 PG (ref 26.6–33)
MCHC RBC AUTO-ENTMCNC: 34.6 G/DL (ref 31.5–35.7)
MCV RBC AUTO: 77 FL (ref 79–97)
PLATELET # BLD AUTO: 319 X10E3/UL (ref 150–450)
POTASSIUM SERPL-SCNC: 4.3 MMOL/L (ref 3.5–5.2)
PROT SERPL-MCNC: 7 G/DL (ref 6–8.5)
RBC # BLD AUTO: 5.3 X10E6/UL (ref 3.77–5.28)
SODIUM SERPL-SCNC: 142 MMOL/L (ref 134–144)
WBC # BLD AUTO: 9.8 X10E3/UL (ref 3.4–10.8)

## 2020-03-02 DIAGNOSIS — R06.02 SHORTNESS OF BREATH: ICD-10-CM

## 2020-03-02 DIAGNOSIS — R94.39 ABNORMAL STRESS TEST: ICD-10-CM

## 2020-03-03 ENCOUNTER — HOSPITAL ENCOUNTER (INPATIENT)
Age: 75
LOS: 2 days | Discharge: HOME HEALTH | DRG: 287 | End: 2020-03-06
Attending: INTERNAL MEDICINE | Admitting: INTERNAL MEDICINE
Payer: MEDICARE

## 2020-03-03 ENCOUNTER — APPOINTMENT (OUTPATIENT)
Dept: GENERAL RADIOLOGY | Age: 75
DRG: 287 | End: 2020-03-03
Attending: INTERNAL MEDICINE
Payer: MEDICARE

## 2020-03-03 DIAGNOSIS — R06.02 SHORTNESS OF BREATH: ICD-10-CM

## 2020-03-03 DIAGNOSIS — R94.39 ABNORMAL STRESS TEST: ICD-10-CM

## 2020-03-03 LAB
ALBUMIN SERPL-MCNC: 3.3 G/DL (ref 3.5–5)
ALBUMIN/GLOB SERPL: 0.7 {RATIO} (ref 1.1–2.2)
ALP SERPL-CCNC: 100 U/L (ref 45–117)
ALT SERPL-CCNC: 106 U/L (ref 12–78)
ANION GAP SERPL CALC-SCNC: 6 MMOL/L (ref 5–15)
ANION GAP SERPL CALC-SCNC: 7 MMOL/L (ref 5–15)
AST SERPL-CCNC: 58 U/L (ref 15–37)
ATRIAL RATE: 107 BPM
BILIRUB SERPL-MCNC: 0.7 MG/DL (ref 0.2–1)
BNP SERPL-MCNC: 2141 PG/ML
BUN SERPL-MCNC: 12 MG/DL (ref 6–20)
BUN SERPL-MCNC: 13 MG/DL (ref 6–20)
BUN/CREAT SERPL: 13 (ref 12–20)
BUN/CREAT SERPL: 14 (ref 12–20)
CALCIUM SERPL-MCNC: 8.1 MG/DL (ref 8.5–10.1)
CALCIUM SERPL-MCNC: 8.5 MG/DL (ref 8.5–10.1)
CALCULATED P AXIS, ECG09: 57 DEGREES
CALCULATED R AXIS, ECG10: 22 DEGREES
CALCULATED T AXIS, ECG11: 66 DEGREES
CHLORIDE SERPL-SCNC: 108 MMOL/L (ref 97–108)
CHLORIDE SERPL-SCNC: 110 MMOL/L (ref 97–108)
CO2 SERPL-SCNC: 23 MMOL/L (ref 21–32)
CO2 SERPL-SCNC: 27 MMOL/L (ref 21–32)
CREAT SERPL-MCNC: 0.94 MG/DL (ref 0.55–1.02)
CREAT SERPL-MCNC: 0.95 MG/DL (ref 0.55–1.02)
DIAGNOSIS, 93000: NORMAL
ERYTHROCYTE [DISTWIDTH] IN BLOOD BY AUTOMATED COUNT: 17 % (ref 11.5–14.5)
GLOBULIN SER CALC-MCNC: 4.9 G/DL (ref 2–4)
GLUCOSE BLD STRIP.AUTO-MCNC: 162 MG/DL (ref 65–100)
GLUCOSE BLD STRIP.AUTO-MCNC: 213 MG/DL (ref 65–100)
GLUCOSE BLD STRIP.AUTO-MCNC: 231 MG/DL (ref 65–100)
GLUCOSE SERPL-MCNC: 172 MG/DL (ref 65–100)
GLUCOSE SERPL-MCNC: 186 MG/DL (ref 65–100)
HCT VFR BLD AUTO: 45.7 % (ref 35–47)
HGB BLD-MCNC: 13.8 G/DL (ref 11.5–16)
MCH RBC QN AUTO: 26.5 PG (ref 26–34)
MCHC RBC AUTO-ENTMCNC: 30.2 G/DL (ref 30–36.5)
MCV RBC AUTO: 87.7 FL (ref 80–99)
NRBC # BLD: 0 K/UL (ref 0–0.01)
NRBC BLD-RTO: 0 PER 100 WBC
P-R INTERVAL, ECG05: 148 MS
PLATELET # BLD AUTO: 291 K/UL (ref 150–400)
PMV BLD AUTO: 11 FL (ref 8.9–12.9)
POTASSIUM SERPL-SCNC: 4 MMOL/L (ref 3.5–5.1)
POTASSIUM SERPL-SCNC: 4.8 MMOL/L (ref 3.5–5.1)
PROT SERPL-MCNC: 8.2 G/DL (ref 6.4–8.2)
Q-T INTERVAL, ECG07: 366 MS
QRS DURATION, ECG06: 84 MS
QTC CALCULATION (BEZET), ECG08: 488 MS
RBC # BLD AUTO: 5.21 M/UL (ref 3.8–5.2)
SERVICE CMNT-IMP: ABNORMAL
SODIUM SERPL-SCNC: 140 MMOL/L (ref 136–145)
SODIUM SERPL-SCNC: 141 MMOL/L (ref 136–145)
VENTRICULAR RATE, ECG03: 107 BPM
WBC # BLD AUTO: 9.6 K/UL (ref 3.6–11)

## 2020-03-03 PROCEDURE — 96365 THER/PROPH/DIAG IV INF INIT: CPT

## 2020-03-03 PROCEDURE — 99218 HC RM OBSERVATION: CPT

## 2020-03-03 PROCEDURE — 74011250636 HC RX REV CODE- 250/636: Performed by: INTERNAL MEDICINE

## 2020-03-03 PROCEDURE — 36415 COLL VENOUS BLD VENIPUNCTURE: CPT

## 2020-03-03 PROCEDURE — 96375 TX/PRO/DX INJ NEW DRUG ADDON: CPT

## 2020-03-03 PROCEDURE — 96366 THER/PROPH/DIAG IV INF ADDON: CPT

## 2020-03-03 PROCEDURE — 74011250636 HC RX REV CODE- 250/636

## 2020-03-03 PROCEDURE — 96376 TX/PRO/DX INJ SAME DRUG ADON: CPT

## 2020-03-03 PROCEDURE — 82962 GLUCOSE BLOOD TEST: CPT

## 2020-03-03 PROCEDURE — 85027 COMPLETE CBC AUTOMATED: CPT

## 2020-03-03 PROCEDURE — 74011250636 HC RX REV CODE- 250/636: Performed by: NURSE PRACTITIONER

## 2020-03-03 PROCEDURE — 83880 ASSAY OF NATRIURETIC PEPTIDE: CPT

## 2020-03-03 PROCEDURE — 74011250637 HC RX REV CODE- 250/637: Performed by: NURSE PRACTITIONER

## 2020-03-03 PROCEDURE — 74011636637 HC RX REV CODE- 636/637: Performed by: NURSE PRACTITIONER

## 2020-03-03 PROCEDURE — 80053 COMPREHEN METABOLIC PANEL: CPT

## 2020-03-03 PROCEDURE — 71045 X-RAY EXAM CHEST 1 VIEW: CPT

## 2020-03-03 PROCEDURE — 96372 THER/PROPH/DIAG INJ SC/IM: CPT

## 2020-03-03 PROCEDURE — 77030005513 HC CATH URETH FOL11 MDII -B

## 2020-03-03 PROCEDURE — 93005 ELECTROCARDIOGRAM TRACING: CPT

## 2020-03-03 PROCEDURE — 74011000250 HC RX REV CODE- 250

## 2020-03-03 PROCEDURE — 74011250637 HC RX REV CODE- 250/637: Performed by: INTERNAL MEDICINE

## 2020-03-03 RX ORDER — ALPRAZOLAM 0.5 MG/1
0.5 TABLET ORAL 2 TIMES DAILY
Status: DISCONTINUED | OUTPATIENT
Start: 2020-03-03 | End: 2020-03-03

## 2020-03-03 RX ORDER — DOCUSATE SODIUM 100 MG/1
100 CAPSULE, LIQUID FILLED ORAL
Status: DISCONTINUED | OUTPATIENT
Start: 2020-03-03 | End: 2020-03-06 | Stop reason: HOSPADM

## 2020-03-03 RX ORDER — SODIUM CHLORIDE 0.9 % (FLUSH) 0.9 %
5-40 SYRINGE (ML) INJECTION EVERY 8 HOURS
Status: DISCONTINUED | OUTPATIENT
Start: 2020-03-03 | End: 2020-03-03 | Stop reason: HOSPADM

## 2020-03-03 RX ORDER — SODIUM CHLORIDE 0.9 % (FLUSH) 0.9 %
5-40 SYRINGE (ML) INJECTION AS NEEDED
Status: DISCONTINUED | OUTPATIENT
Start: 2020-03-03 | End: 2020-03-06 | Stop reason: HOSPADM

## 2020-03-03 RX ORDER — DEXTROSE MONOHYDRATE 100 MG/ML
0-250 INJECTION, SOLUTION INTRAVENOUS AS NEEDED
Status: DISCONTINUED | OUTPATIENT
Start: 2020-03-03 | End: 2020-03-06 | Stop reason: HOSPADM

## 2020-03-03 RX ORDER — PAROXETINE HYDROCHLORIDE 20 MG/1
40 TABLET, FILM COATED ORAL DAILY
Status: DISCONTINUED | OUTPATIENT
Start: 2020-03-04 | End: 2020-03-06 | Stop reason: HOSPADM

## 2020-03-03 RX ORDER — FUROSEMIDE 10 MG/ML
INJECTION INTRAMUSCULAR; INTRAVENOUS
Status: COMPLETED
Start: 2020-03-03 | End: 2020-03-03

## 2020-03-03 RX ORDER — ENOXAPARIN SODIUM 100 MG/ML
40 INJECTION SUBCUTANEOUS EVERY 24 HOURS
Status: DISCONTINUED | OUTPATIENT
Start: 2020-03-03 | End: 2020-03-03

## 2020-03-03 RX ORDER — FUROSEMIDE 10 MG/ML
40 INJECTION INTRAMUSCULAR; INTRAVENOUS ONCE
Status: COMPLETED | OUTPATIENT
Start: 2020-03-03 | End: 2020-03-03

## 2020-03-03 RX ORDER — NITROGLYCERIN 20 MG/100ML
0-200 INJECTION INTRAVENOUS
Status: DISCONTINUED | OUTPATIENT
Start: 2020-03-03 | End: 2020-03-03 | Stop reason: HOSPADM

## 2020-03-03 RX ORDER — LOSARTAN POTASSIUM 50 MG/1
100 TABLET ORAL DAILY
Status: DISCONTINUED | OUTPATIENT
Start: 2020-03-04 | End: 2020-03-06 | Stop reason: HOSPADM

## 2020-03-03 RX ORDER — CLONIDINE HYDROCHLORIDE 0.1 MG/1
0.1 TABLET ORAL
Status: DISCONTINUED | OUTPATIENT
Start: 2020-03-03 | End: 2020-03-06 | Stop reason: HOSPADM

## 2020-03-03 RX ORDER — CARVEDILOL 6.25 MG/1
6.25 TABLET ORAL 2 TIMES DAILY WITH MEALS
Status: DISCONTINUED | OUTPATIENT
Start: 2020-03-03 | End: 2020-03-04

## 2020-03-03 RX ORDER — HYDROCHLOROTHIAZIDE 25 MG/1
50 TABLET ORAL DAILY
Status: DISCONTINUED | OUTPATIENT
Start: 2020-03-04 | End: 2020-03-06 | Stop reason: HOSPADM

## 2020-03-03 RX ORDER — NITROGLYCERIN 20 MG/100ML
INJECTION INTRAVENOUS
Status: COMPLETED
Start: 2020-03-03 | End: 2020-03-03

## 2020-03-03 RX ORDER — ENOXAPARIN SODIUM 100 MG/ML
40 INJECTION SUBCUTANEOUS EVERY 12 HOURS
Status: DISCONTINUED | OUTPATIENT
Start: 2020-03-03 | End: 2020-03-06 | Stop reason: HOSPADM

## 2020-03-03 RX ORDER — ASPIRIN 81 MG/1
81 TABLET ORAL DAILY
Status: DISCONTINUED | OUTPATIENT
Start: 2020-03-04 | End: 2020-03-06 | Stop reason: HOSPADM

## 2020-03-03 RX ORDER — SODIUM CHLORIDE 0.9 % (FLUSH) 0.9 %
5-40 SYRINGE (ML) INJECTION AS NEEDED
Status: DISCONTINUED | OUTPATIENT
Start: 2020-03-03 | End: 2020-03-03 | Stop reason: HOSPADM

## 2020-03-03 RX ORDER — MAGNESIUM SULFATE 100 %
4 CRYSTALS MISCELLANEOUS AS NEEDED
Status: DISCONTINUED | OUTPATIENT
Start: 2020-03-03 | End: 2020-03-06 | Stop reason: HOSPADM

## 2020-03-03 RX ORDER — HYDRALAZINE HYDROCHLORIDE 20 MG/ML
20 INJECTION INTRAMUSCULAR; INTRAVENOUS ONCE
Status: COMPLETED | OUTPATIENT
Start: 2020-03-03 | End: 2020-03-03

## 2020-03-03 RX ORDER — INSULIN LISPRO 100 [IU]/ML
INJECTION, SOLUTION INTRAVENOUS; SUBCUTANEOUS
Status: DISCONTINUED | OUTPATIENT
Start: 2020-03-03 | End: 2020-03-06 | Stop reason: HOSPADM

## 2020-03-03 RX ORDER — SODIUM CHLORIDE 0.9 % (FLUSH) 0.9 %
5-40 SYRINGE (ML) INJECTION EVERY 8 HOURS
Status: DISCONTINUED | OUTPATIENT
Start: 2020-03-03 | End: 2020-03-06 | Stop reason: HOSPADM

## 2020-03-03 RX ORDER — ALPRAZOLAM 0.5 MG/1
0.5 TABLET ORAL 2 TIMES DAILY
Status: COMPLETED | OUTPATIENT
Start: 2020-03-03 | End: 2020-03-05

## 2020-03-03 RX ORDER — ACETAMINOPHEN 325 MG/1
650 TABLET ORAL
Status: DISCONTINUED | OUTPATIENT
Start: 2020-03-03 | End: 2020-03-06 | Stop reason: HOSPADM

## 2020-03-03 RX ADMIN — FUROSEMIDE 40 MG: 10 INJECTION, SOLUTION INTRAMUSCULAR; INTRAVENOUS at 21:37

## 2020-03-03 RX ADMIN — CARVEDILOL 6.25 MG: 6.25 TABLET, FILM COATED ORAL at 17:08

## 2020-03-03 RX ADMIN — FUROSEMIDE 10 MG: 10 INJECTION INTRAMUSCULAR; INTRAVENOUS at 14:18

## 2020-03-03 RX ADMIN — ENOXAPARIN SODIUM 40 MG: 40 INJECTION SUBCUTANEOUS at 21:35

## 2020-03-03 RX ADMIN — NITROGLYCERIN 50 MCG/MIN: 20 INJECTION INTRAVENOUS at 13:00

## 2020-03-03 RX ADMIN — INSULIN LISPRO 3 UNITS: 100 INJECTION, SOLUTION INTRAVENOUS; SUBCUTANEOUS at 17:08

## 2020-03-03 RX ADMIN — ALPRAZOLAM 0.5 MG: 0.5 TABLET ORAL at 21:37

## 2020-03-03 RX ADMIN — Medication 10 ML: at 16:52

## 2020-03-03 RX ADMIN — INSULIN HUMAN 30 UNITS: 100 INJECTION, SUSPENSION SUBCUTANEOUS at 21:36

## 2020-03-03 RX ADMIN — FUROSEMIDE 10 MG: 10 INJECTION, SOLUTION INTRAMUSCULAR; INTRAVENOUS at 14:18

## 2020-03-03 RX ADMIN — Medication 10 ML: at 21:38

## 2020-03-03 RX ADMIN — FUROSEMIDE 40 MG: 10 INJECTION, SOLUTION INTRAVENOUS at 13:00

## 2020-03-03 RX ADMIN — HYDRALAZINE HYDROCHLORIDE 20 MG: 20 INJECTION INTRAMUSCULAR; INTRAVENOUS at 12:07

## 2020-03-03 RX ADMIN — INSULIN LISPRO 30 UNITS: 100 INJECTION, SOLUTION INTRAVENOUS; SUBCUTANEOUS at 21:36

## 2020-03-03 RX ADMIN — ENOXAPARIN SODIUM 40 MG: 40 INJECTION SUBCUTANEOUS at 14:14

## 2020-03-03 RX ADMIN — ALPRAZOLAM 0.5 MG: 0.5 TABLET ORAL at 14:13

## 2020-03-03 RX ADMIN — FUROSEMIDE 40 MG: 10 INJECTION INTRAMUSCULAR; INTRAVENOUS at 13:00

## 2020-03-03 NOTE — PROGRESS NOTES
Reason for Admission:   Shortness of breath, positive nuclear stress test.  Hx diabetes, HTN, CHF, right breast mastectomy due to cancer. Patient was scheduled for cardiac cath today but procedure was cancelled due to increased shortness of breath                   RUR Score: 12%/low                    Plan for utilizing home health:   None at this time, has used home health services in the past but unable to remember agency. DME:  [employer], glucose meter, stopped using cpap due to skin irritation. PCP: First and Last name:  Liliane Haro   Name of Practice:    Are you a current patient: Yes/No: yes Approximate date of last visit:  2 months ago                    Current Advanced Directive/Advance Care Plan:   Not on file, next of kin is  Vlima Codding [phone]. Transition of Care Plan:      Chart reviewed, demographics verified. CM role and follow up discussed. Patient lives with her  who assists her with care needs. Patient lives in a one story home with 2 steps to enter home. Patient has prescription drug coverage, uses [employer] [facility]. Patient is independent, drives, and provides self care. PLAN:  1. Monitor patient response to treatment and recommendations. 2. Await cardiac cath evaluation. 3. Patient is likely home with family assist.  4. CM to monitor for discharge needs.     Care Management Interventions  PCP Verified by CM: Yes(Jess Boyce)  Palliative Care Criteria Met (RRAT>21 & CHF Dx)?: No  Transition of Care Consult (CM Consult): Discharge Planning  Current Support Network: Lives with Spouse  Confirm Follow Up Transport: Family  The Plan for Transition of Care is Related to the Following Treatment Goals : discharge  Discharge Location  Discharge Placement: Home with family assistance      Ashly Martini, RN, MSN, Care manager

## 2020-03-03 NOTE — Clinical Note
TRANSFER - IN REPORT:     Verbal report received from: Floor, RN. Report consisted of patient's Situation, Background, Assessment and   Recommendations(SBAR). Opportunity for questions and clarification was provided. Assessment completed upon patient's arrival to unit and care assumed. Patient transported with a Registered Nurse, Monitor and Oxygen. Oxygen used for patient = nasal cannula, @ 2 - 6 Liters.

## 2020-03-03 NOTE — H&P
Pt seen and examined  Pos stress nuc study  H and P as per Dr Garrett Antunez office note 1/30/20    LHC +/- PCI/RHC consent    The risks (including but not limited to death, myocardial infarction, cerebrovascular accident, dysrhythmia, renal failure +/-dialysis, vascular complication, allergy, and/or need for emergency surgery), indications, benefits, and alternatives of cardiac catheterization +/- PCI have been explained in detail to the patient and family. Patient and family informed that if patient needs surgery  - it will be at [facility] as [facility] does not have onsite surgery. All questions answered to patient's satisfaction. Patient agrees to proceed with the procedure and  informed consent was obtained. ASA 2    AW 2    Brianna Gray MD., [facility]    Dr Garrett Antunez note    Reason for Consult: sob, Edema.      Referring: Nancy Guevara NP     HPI: Maximo Turner is a 77 y.o. female with past medical history significant for diabetes, hypertension, dyslipidemia is here for evaluation of possible prior history of congestive heart failure. She was recently seen by her primary care physician and on record review there was some mention about heart failure about 4 to 5 years ago when she was admitted to [facility] for a surgery. She ended up in the ICU with possible fluid in the lungs or around the heart. Since then she has not seen a cardiologist.  She has esophageal stricture and requires periodic dilatation however recently when she followed up with her gastroenterologist nurse practitioner she was asked to get a clearance from cardiology for endoscopy and dilatation of the stricture. As such she does not have any symptoms of chest pain however she does have exertional shortness of breath and edema.     EKG in my office today demonstrated normal sinus rhythm with T wave inversion in inferior leads with poor R wave progression in the anterior leads.     Plan:     1.   History of possible congestive heart failure: There is a remote history however most recently she. She was lost to follow-up by a cardiologist in past 4 years. Will further evaluate this history with additional testing including echocardiogram to evaluate LV function.     2. Abnormal EKG demonstrating poor R wave progression as well as T wave inversions: She has risk factors for CAD including diabetes, hypertension and age greater than 54. We will further evaluate with a stress Lexiscan nuclear study. Continue aspirin, statin and Coreg.     3. Hypertension: Blood pressure is controlled. Continue Lasix and Coreg. Continue losartan.     4.  Diabetes: Most recent hemoglobin A1c was 7.0.     5.   Follow-up with me in 1 month with follow-up of the test results.        ATTENTION:   This medical record was transcribed using an electronic medical records/speech recognition system.  Although proofread, it may and can contain electronic, spelling and other errors.  Corrections may be executed at a later time.  Please feel free to contact us for any clarifications as needed.             Past Medical History:   Diagnosis Date    Breast cancer ([address]       right masectomy, cervical    Cervical cancer ([address]      Chronic pain       L4-L5 discectomy    Depression with anxiety      Diabetes ([address]      Ectopic pregnancy      Glaucoma      Hypertension      Miscarriage      Sleep apnea       CPAP compliant but not using now because of rash on face from the mask                     Past Surgical History:   Procedure Laterality Date    BREAST SURGERY PROCEDURE UNLISTED Right       right mastectomy    HX CARPAL TUNNEL RELEASE Bilateral      HX CATARACT REMOVAL Bilateral 2013    HX HYSTERECTOMY   1980's     cervical cancer    HX KNEE ARTHROSCOPY Bilateral      HX ORTHOPAEDIC   4007,4507     back surgery x 2    HX ORTHOPAEDIC Left 1990     ligament attachment arm    HX PELVIC LAPAROSCOPY   1970s     ectopic pregnancy    HX TUMOR REMOVAL Right       leg                     Family History   Problem Relation Age of Onset    Diabetes Mother      Hypertension Mother              Social History            Socioeconomic History    Marital status:        Spouse name: Not on file    Number of children: Not on file    Years of education: Not on file    Highest education level: Not on file   Occupational History    Not on file   Social Needs    Financial resource strain: Not on file    Food insecurity:       Worry: Not on file       Inability: Not on file    Transportation needs:       Medical: Not on file       Non-medical: Not on file   Tobacco Use    Smoking status: Never Smoker    Smokeless tobacco: Never Used   Substance and Sexual Activity    Alcohol use: No    Drug use: No    Sexual activity: Not on file   Lifestyle    Physical activity:       Days per week: Not on file       Minutes per session: Not on file    Stress: Not on file   Relationships    Social connections:       Talks on phone: Not on file       Gets together: Not on file       Attends Christianity service: Not on file       Active member of club or organization: Not on file       Attends meetings of clubs or organizations: Not on file       Relationship status: Not on file    Intimate partner violence:       Fear of current or ex partner: Not on file       Emotionally abused: Not on file       Physically abused: Not on file       Forced sexual activity: Not on file   Other Topics Concern    Not on file   Social History Narrative    Not on file                 Allergies   Allergen Reactions    Lisinopril Cough    Percocet [Oxycodone-Acetaminophen] Nausea Only    Percodan [Oxycodone-Aspirin] Nausea Only    Prednisone Nausea Only    Sulfa (Sulfonamide Antibiotics) Unknown (comments)                    Current Outpatient Medications   Medication Sig Dispense Refill    insulin NPH (NOVOLIN N NPH U-100 INSULIN) 100 unit/mL injection 60 Units by SubCUTAneous route once. Indications: 60 units in morning and 60 units in the evening        furosemide (LASIX) 20 mg tablet Take 20 mg by mouth daily.        diclofenac (VOLTAREN) 1 % gel Apply  to affected area four (4) times daily. 1 Each 0    acetaminophen (TYLENOL EXTRA STRENGTH) 500 mg tablet Take 1,000 mg by mouth every six (6) hours as needed for Pain.        insulin regular (NOVOLIN R REGULAR U-100 INSULN) 100 unit/mL injection 30 Units by SubCUTAneous route daily (with breakfast).        insulin regular (NOVOLIN R REGULAR U-100 INSULN) 100 unit/mL injection 30 Units by SubCUTAneous route daily (with dinner).        losartan (COZAAR) 100 mg tablet Take 100 mg by mouth daily.        PARoxetine (PAXIL) 40 mg tablet Take 40 mg by mouth daily.        triamcinolone acetonide (KENALOG) 0.1 % ointment Apply  to affected area two (2) times a day. use thin layer  Applies on face at bipap allergy site        carvedilol (COREG) 6.25 mg tablet Take 6.25 mg by mouth two (2) times daily (with meals).        aspirin delayed-release 81 mg tablet Take 81 mg by mouth daily.        insulin glargine (LANTUS,BASAGLAR) 100 unit/mL (3 mL) inpn 70 Units by SubCUTAneous route two (2) times a day.        ciprofloxacin-dexamethasone (CIPRODEX) 0.3-0.1 % otic suspension Administer 4 Drops into each ear two (2) times a day. (Patient not taking: Reported on 1/30/2020) 5 mL 0    hydroCHLOROthiazide (HYDRODIURIL) 50 mg tablet Take 50 mg by mouth daily.        insulin regular (NOVOLIN R REGULAR U-100 INSULN) 100 unit/mL injection 35 Units by SubCUTAneous route daily (with dinner).             ROS:  12 point review of systems was performed.  All negative except for HPI               Physical Exam:  Visit Vitals  /82 (BP 1 Location: Left arm, BP Patient Position: Sitting)   Pulse 87   Ht 5' 7\" (1.702 m)   Wt 280 lb (127 kg)   SpO2 95%   BMI 43.85 kg/m²         Gen:  Well-developed, well-nourished, in no acute distress  HEENT:  Pink conjunctivae, PERRL, hearing intact to voice, moist mucous membranes  Neck:  Supple, without masses, thyroid non-tender  Resp:  No accessory muscle use, clear breath sounds without wheezes rales or rhonchi  Card:  No murmurs, normal S1, S2 without thrills, bruits. Mild peripheral edema.    Abd:  Soft, non-tender, non-distended, normoactive bowel sounds are present, no palpable organomegaly and no detectable hernias  Lymph:  No cervical or inguinal adenopathy  Musc:  No cyanosis or clubbing  Skin:  No rashes or ulcers, skin turgor is good  Neuro:  Cranial nerves are grossly intact, no focal motor weakness, follows commands appropriately  Psych:  Good insight, oriented to person, place and time, alert

## 2020-03-03 NOTE — PROGRESS NOTES
CMS Note  3/3/2020    Patient received and signed both the Observation and [facility] DIAZ letters. Patient was given copies for their record.   Forest Jennings CMS

## 2020-03-03 NOTE — ROUTINE PROCESS
Cardiac Cath Lab Recovery Arrival Note:      Mariya Callejas arrived to Cardiac Cath Lab, Recovery Area. Staff introduced to patient. Patient identifiers verified with NAME and DATE OF BIRTH. Procedure verified with patient. Consent forms reviewed and signed by patient or authorized representative and verified. Allergies verified. Patient and family oriented to department. Patient and family informed of procedure and plan of care. Questions answered with review. Patient prepped for procedure, per orders from physician, prior to arrival.    Patient on cardiac monitor, non-invasive blood pressure, SPO2 monitor. On room air. Patient is A&Ox 3. Patient reports no CP. Patient in stretcher, in low position, with side rails up, call bell within reach, patient instructed to call if assistance as needed. Patient prep in: [address]  Prep by: THONY    1200    BP--190/110's; Dr Marcellus Moreno notified; New orders Hydralazine 20mg x 1.    1230    Pt. became acutely SOB; c/o can't breathe; Pt. placed on 100% NRB; 40mg Lasix given IV; EKG done; Dr. Rahat Reece at bedside; Ntg gtt started @ 50mcgs; Turner placed by Dolly Herrera RN. PCXR completed stat.    1300    Ntg gtt increased to 100mcgs. 1315  Pt. . placed back on NC @ 5L--sats--97--98%. 1335  Ntg gtt decreased to 120mcgs. SBP--< 160; 11beat run of Vtach and strip put on chart. 1250    TRANSFER - OUT REPORT:    Verbal report given to Waldemar Barron on Mariya Callejas being transferred to [facility]; Room 333 for routine progression of care       Report consisted of patients Situation, Background, Assessment and   Recommendations(SBAR). Information from the following report(s) SBAR was reviewed with the receiving nurse. Opportunity for questions and clarification was provided.

## 2020-03-03 NOTE — Clinical Note
TRANSFER - OUT REPORT:     Verbal report given to: Handy Villanueva RN. Report consisted of patient's Situation, Background, Assessment and   Recommendations(SBAR). Opportunity for questions and clarification was provided. Patient transported with a Registered Nurse and [address] [place] [address] / [facility] Patient transported to: [facility].

## 2020-03-03 NOTE — PROGRESS NOTES
Patient feeling better post IV diuretics on 5 L O2 NC. -3L per RN  Lungs diminished w scattered crackles.    Will give another dose of IV lasix this evening  Reassess the need for diuretics and if she is able to lay flat for cardiac cath in the morning   Reviewed plan with the family and patient

## 2020-03-03 NOTE — PROGRESS NOTES
Answered emergent page by Shyam Flood RN  Patient while waiting in the recovery area prior to cardiac catheterization developed severe shortness of breath. Her systolic blood pressure was significantly elevated on arrival and she received hydralazine 20 mg IV. She was initially placed on nasal cannula which was then transitioned to nonrebreather O2 mask. Her blood pressure continue to stay high and patient was not able to lie flat because of the dyspnea. Denies chest pain. EKG showed normal sinus rhythm,  anterior MI-age undetermined. Lasix IV 40 mg given. CBC, BMP, NT proBNP, d-dimer chest x-ray ordered. Systolic blood pressure in 200s. Will start IV nitro gtt. and titrate. Will cancel procedure for today and admit to telemetry bed. Visit Vitals  BP (!) 201/72   Pulse (!) 107   Resp 23   Ht 5' 7\" (1.702 m)   Wt 288 lb 12.8 oz (131 kg)   SpO2 99%   Breastfeeding No   BMI 45.23 kg/m²     Morbidly obese/anxious  Neck-difficult to appreciate JVD  CVS-S1-S2 present regular rate rhythm no murmur  Respiratory system: Decreased air entry bilaterally no rhonchi/rales  Abdomen-soft/obese  Lower extremities-2+ edema  Chronic swelling right upper extremity-status post right mastectomy for breast CA      Assessment and plan  1. Dyspnea-multifactorial-acute onchronic HFpEF/anxiety  2.-Positive stress nuclear study recently  3. Hypertensive urgency  4. Diabetes mellitus. 5.  Depression/anxiety  6. History of breast cancer-status post right mastectomy. 7.  Morbid obesity      Plan:  IV diuretics/I's and O's  IV nitro gtt. Restart PTA-Cozaar, Coreg, aspirin  Sliding scale-insulin/diabetic team referral for management. Continue Paxil. Will reschedule cardiac catheterization for tomorrow-Dr. Willy Alcala will do it in a.m. Discussed with family. Patient does not want right radial access. She is okay with left radial access/CFA.       Critical care time-35 minutes

## 2020-03-03 NOTE — PROGRESS NOTES
[facility] Dosing Services:      77 y.o. female, for DVT Prophylaxis. Wt Readings from Last 1 Encounters:   03/03/20 131 kg (288 lb 12.8 oz)       Ht Readings from Last 1 Encounters:   03/03/20 170.2 cm (67\")           Previous Dose 40 mg q 24 h    Creatinine Clearance Estimated Creatinine Clearance: 73.3 mL/min (based on SCr of 0.95 mg/dL). Creatinine Lab Results   Component Value Date/Time    Creatinine 0.95 03/03/2020 11:40 AM    Creatinine (POC) 0.4 (L) 04/17/2019 01:57 PM       Platelet Lab Results   Component Value Date/Time    PLATELET 444 62/06/1121 11:40 AM      H/H Lab Results   Component Value Date/Time    HGB 13.8 03/03/2020 11:40 AM         Epidural Catheter? NO    Other anticoagulants: no    Pharmacist made change to enoxaparin therapy based on:    [ X ] BMI: dose changed to: 40 mg q 12 h     Pharmacy to automatically make dose adjustment for renal dysfunction (creatinine clearance less than 30 mL/min)  Pharmacy to automatically make dose adjustment for obesity (BMI greater than 40)  Pharmacy to make dose rounding adjustments per [facility] dose adjustment scale. Pharmacy to monitor patients progress. Will make dose adjustment as needed per changing renal function. Will communicate further recommendations regarding patients anticoagulation therapy with prescriber.     Signed Edward Mayberry PHARMD . Contact information: [phone]

## 2020-03-03 NOTE — PROGRESS NOTES
TRANSFER - IN REPORT:    Verbal report received from THONY (name) on Noni Soriano  being received from CATH (unit) for routine progression of care      Report consisted of patients Situation, Background, Assessment and   Recommendations(SBAR). Information from the following report(s) SBAR, Kardex, Intake/Output, Recent Results and Cardiac Rhythm NSR-ST was reviewed with the receiving nurse. Opportunity for questions and clarification was provided. Assessment completed upon patients arrival to unit and care assumed. Completed:  Skin  Stand  Assessment questions      Bedside and Verbal shift change report given to Angel Torres (oncoming nurse) by Afua Wooten RN (offgoing nurse). Report included the following information SBAR, Kardex and Procedure Summary.

## 2020-03-04 PROBLEM — I50.9 CHF (CONGESTIVE HEART FAILURE) (HCC): Status: ACTIVE | Noted: 2020-03-04

## 2020-03-04 LAB
ANION GAP SERPL CALC-SCNC: 6 MMOL/L (ref 5–15)
BUN SERPL-MCNC: 14 MG/DL (ref 6–20)
BUN/CREAT SERPL: 16 (ref 12–20)
CALCIUM SERPL-MCNC: 8.5 MG/DL (ref 8.5–10.1)
CHLORIDE SERPL-SCNC: 104 MMOL/L (ref 97–108)
CO2 SERPL-SCNC: 31 MMOL/L (ref 21–32)
CREAT SERPL-MCNC: 0.87 MG/DL (ref 0.55–1.02)
ERYTHROCYTE [DISTWIDTH] IN BLOOD BY AUTOMATED COUNT: 17.4 % (ref 11.5–14.5)
GLUCOSE BLD STRIP.AUTO-MCNC: 127 MG/DL (ref 65–100)
GLUCOSE BLD STRIP.AUTO-MCNC: 145 MG/DL (ref 65–100)
GLUCOSE BLD STRIP.AUTO-MCNC: 148 MG/DL (ref 65–100)
GLUCOSE BLD STRIP.AUTO-MCNC: 184 MG/DL (ref 65–100)
GLUCOSE BLD STRIP.AUTO-MCNC: 236 MG/DL (ref 65–100)
GLUCOSE SERPL-MCNC: 151 MG/DL (ref 65–100)
HCT VFR BLD AUTO: 45.8 % (ref 35–47)
HGB BLD-MCNC: 14.3 G/DL (ref 11.5–16)
MCH RBC QN AUTO: 26.6 PG (ref 26–34)
MCHC RBC AUTO-ENTMCNC: 31.2 G/DL (ref 30–36.5)
MCV RBC AUTO: 85.1 FL (ref 80–99)
NRBC # BLD: 0 K/UL (ref 0–0.01)
NRBC BLD-RTO: 0 PER 100 WBC
PLATELET # BLD AUTO: 293 K/UL (ref 150–400)
PMV BLD AUTO: 10.8 FL (ref 8.9–12.9)
POTASSIUM SERPL-SCNC: 3.7 MMOL/L (ref 3.5–5.1)
RBC # BLD AUTO: 5.38 M/UL (ref 3.8–5.2)
SERVICE CMNT-IMP: ABNORMAL
SODIUM SERPL-SCNC: 141 MMOL/L (ref 136–145)
WBC # BLD AUTO: 9.6 K/UL (ref 3.6–11)

## 2020-03-04 PROCEDURE — 74011250637 HC RX REV CODE- 250/637: Performed by: INTERNAL MEDICINE

## 2020-03-04 PROCEDURE — 99152 MOD SED SAME PHYS/QHP 5/>YRS: CPT | Performed by: STUDENT IN AN ORGANIZED HEALTH CARE EDUCATION/TRAINING PROGRAM

## 2020-03-04 PROCEDURE — 99218 HC RM OBSERVATION: CPT

## 2020-03-04 PROCEDURE — 82962 GLUCOSE BLOOD TEST: CPT

## 2020-03-04 PROCEDURE — C1887 CATHETER, GUIDING: HCPCS | Performed by: STUDENT IN AN ORGANIZED HEALTH CARE EDUCATION/TRAINING PROGRAM

## 2020-03-04 PROCEDURE — C1769 GUIDE WIRE: HCPCS | Performed by: STUDENT IN AN ORGANIZED HEALTH CARE EDUCATION/TRAINING PROGRAM

## 2020-03-04 PROCEDURE — 74011250637 HC RX REV CODE- 250/637: Performed by: STUDENT IN AN ORGANIZED HEALTH CARE EDUCATION/TRAINING PROGRAM

## 2020-03-04 PROCEDURE — 85027 COMPLETE CBC AUTOMATED: CPT

## 2020-03-04 PROCEDURE — 74011636320 HC RX REV CODE- 636/320: Performed by: STUDENT IN AN ORGANIZED HEALTH CARE EDUCATION/TRAINING PROGRAM

## 2020-03-04 PROCEDURE — 74011636637 HC RX REV CODE- 636/637: Performed by: NURSE PRACTITIONER

## 2020-03-04 PROCEDURE — 96376 TX/PRO/DX INJ SAME DRUG ADON: CPT

## 2020-03-04 PROCEDURE — 94760 N-INVAS EAR/PLS OXIMETRY 1: CPT

## 2020-03-04 PROCEDURE — 77030029065 HC DRSG HEMO QCLOT ZMED -B

## 2020-03-04 PROCEDURE — 77030019569 HC BND COMPR RAD TERU -B: Performed by: STUDENT IN AN ORGANIZED HEALTH CARE EDUCATION/TRAINING PROGRAM

## 2020-03-04 PROCEDURE — 36415 COLL VENOUS BLD VENIPUNCTURE: CPT

## 2020-03-04 PROCEDURE — B2111ZZ FLUOROSCOPY OF MULTIPLE CORONARY ARTERIES USING LOW OSMOLAR CONTRAST: ICD-10-PCS | Performed by: STUDENT IN AN ORGANIZED HEALTH CARE EDUCATION/TRAINING PROGRAM

## 2020-03-04 PROCEDURE — 99153 MOD SED SAME PHYS/QHP EA: CPT | Performed by: STUDENT IN AN ORGANIZED HEALTH CARE EDUCATION/TRAINING PROGRAM

## 2020-03-04 PROCEDURE — 77030008543 HC TBNG MON PRSS MRTM -A: Performed by: STUDENT IN AN ORGANIZED HEALTH CARE EDUCATION/TRAINING PROGRAM

## 2020-03-04 PROCEDURE — C1894 INTRO/SHEATH, NON-LASER: HCPCS | Performed by: STUDENT IN AN ORGANIZED HEALTH CARE EDUCATION/TRAINING PROGRAM

## 2020-03-04 PROCEDURE — 74011250636 HC RX REV CODE- 250/636: Performed by: INTERNAL MEDICINE

## 2020-03-04 PROCEDURE — 93458 L HRT ARTERY/VENTRICLE ANGIO: CPT | Performed by: STUDENT IN AN ORGANIZED HEALTH CARE EDUCATION/TRAINING PROGRAM

## 2020-03-04 PROCEDURE — 80048 BASIC METABOLIC PNL TOTAL CA: CPT

## 2020-03-04 PROCEDURE — 65660000000 HC RM CCU STEPDOWN

## 2020-03-04 PROCEDURE — 74011250636 HC RX REV CODE- 250/636: Performed by: STUDENT IN AN ORGANIZED HEALTH CARE EDUCATION/TRAINING PROGRAM

## 2020-03-04 PROCEDURE — 74011250636 HC RX REV CODE- 250/636: Performed by: NURSE PRACTITIONER

## 2020-03-04 PROCEDURE — 4A023N7 MEASUREMENT OF CARDIAC SAMPLING AND PRESSURE, LEFT HEART, PERCUTANEOUS APPROACH: ICD-10-PCS | Performed by: STUDENT IN AN ORGANIZED HEALTH CARE EDUCATION/TRAINING PROGRAM

## 2020-03-04 PROCEDURE — 74011250637 HC RX REV CODE- 250/637: Performed by: NURSE PRACTITIONER

## 2020-03-04 PROCEDURE — 74011000250 HC RX REV CODE- 250: Performed by: STUDENT IN AN ORGANIZED HEALTH CARE EDUCATION/TRAINING PROGRAM

## 2020-03-04 RX ORDER — FUROSEMIDE 10 MG/ML
40 INJECTION INTRAMUSCULAR; INTRAVENOUS ONCE
Status: COMPLETED | OUTPATIENT
Start: 2020-03-04 | End: 2020-03-04

## 2020-03-04 RX ORDER — HEPARIN SODIUM 200 [USP'U]/100ML
INJECTION, SOLUTION INTRAVENOUS
Status: COMPLETED | OUTPATIENT
Start: 2020-03-04 | End: 2020-03-04

## 2020-03-04 RX ORDER — SODIUM CHLORIDE 0.9 % (FLUSH) 0.9 %
5-40 SYRINGE (ML) INJECTION EVERY 8 HOURS
Status: DISCONTINUED | OUTPATIENT
Start: 2020-03-04 | End: 2020-03-06 | Stop reason: HOSPADM

## 2020-03-04 RX ORDER — VERAPAMIL HYDROCHLORIDE 2.5 MG/ML
INJECTION, SOLUTION INTRAVENOUS AS NEEDED
Status: DISCONTINUED | OUTPATIENT
Start: 2020-03-04 | End: 2020-03-04 | Stop reason: HOSPADM

## 2020-03-04 RX ORDER — HEPARIN SODIUM 1000 [USP'U]/ML
INJECTION, SOLUTION INTRAVENOUS; SUBCUTANEOUS AS NEEDED
Status: DISCONTINUED | OUTPATIENT
Start: 2020-03-04 | End: 2020-03-04 | Stop reason: HOSPADM

## 2020-03-04 RX ORDER — SODIUM CHLORIDE 9 MG/ML
75 INJECTION, SOLUTION INTRAVENOUS CONTINUOUS
Status: DISCONTINUED | OUTPATIENT
Start: 2020-03-04 | End: 2020-03-04 | Stop reason: HOSPADM

## 2020-03-04 RX ORDER — FENTANYL CITRATE 50 UG/ML
INJECTION, SOLUTION INTRAMUSCULAR; INTRAVENOUS AS NEEDED
Status: DISCONTINUED | OUTPATIENT
Start: 2020-03-04 | End: 2020-03-04 | Stop reason: HOSPADM

## 2020-03-04 RX ORDER — MIDAZOLAM HYDROCHLORIDE 1 MG/ML
INJECTION, SOLUTION INTRAMUSCULAR; INTRAVENOUS AS NEEDED
Status: DISCONTINUED | OUTPATIENT
Start: 2020-03-04 | End: 2020-03-04 | Stop reason: HOSPADM

## 2020-03-04 RX ORDER — SODIUM CHLORIDE 0.9 % (FLUSH) 0.9 %
5-40 SYRINGE (ML) INJECTION AS NEEDED
Status: DISCONTINUED | OUTPATIENT
Start: 2020-03-04 | End: 2020-03-06 | Stop reason: HOSPADM

## 2020-03-04 RX ORDER — LIDOCAINE HYDROCHLORIDE 10 MG/ML
INJECTION INFILTRATION; PERINEURAL AS NEEDED
Status: DISCONTINUED | OUTPATIENT
Start: 2020-03-04 | End: 2020-03-04 | Stop reason: HOSPADM

## 2020-03-04 RX ORDER — CARVEDILOL 6.25 MG/1
12.5 TABLET ORAL 2 TIMES DAILY WITH MEALS
Status: DISCONTINUED | OUTPATIENT
Start: 2020-03-04 | End: 2020-03-06 | Stop reason: HOSPADM

## 2020-03-04 RX ORDER — POTASSIUM CHLORIDE 750 MG/1
40 TABLET, FILM COATED, EXTENDED RELEASE ORAL
Status: COMPLETED | OUTPATIENT
Start: 2020-03-04 | End: 2020-03-04

## 2020-03-04 RX ADMIN — Medication 10 ML: at 18:28

## 2020-03-04 RX ADMIN — CARVEDILOL 12.5 MG: 6.25 TABLET, FILM COATED ORAL at 08:17

## 2020-03-04 RX ADMIN — INSULIN HUMAN 30 UNITS: 100 INJECTION, SUSPENSION SUBCUTANEOUS at 21:16

## 2020-03-04 RX ADMIN — POTASSIUM CHLORIDE 40 MEQ: 750 TABLET, FILM COATED, EXTENDED RELEASE ORAL at 10:41

## 2020-03-04 RX ADMIN — LOSARTAN POTASSIUM 100 MG: 50 TABLET, FILM COATED ORAL at 08:18

## 2020-03-04 RX ADMIN — INSULIN LISPRO 2 UNITS: 100 INJECTION, SOLUTION INTRAVENOUS; SUBCUTANEOUS at 17:29

## 2020-03-04 RX ADMIN — CARVEDILOL 12.5 MG: 6.25 TABLET, FILM COATED ORAL at 18:27

## 2020-03-04 RX ADMIN — FUROSEMIDE 40 MG: 10 INJECTION, SOLUTION INTRAMUSCULAR; INTRAVENOUS at 18:26

## 2020-03-04 RX ADMIN — ALPRAZOLAM 0.5 MG: 0.5 TABLET ORAL at 08:14

## 2020-03-04 RX ADMIN — INSULIN LISPRO 2 UNITS: 100 INJECTION, SOLUTION INTRAVENOUS; SUBCUTANEOUS at 11:59

## 2020-03-04 RX ADMIN — ENOXAPARIN SODIUM 40 MG: 40 INJECTION SUBCUTANEOUS at 21:16

## 2020-03-04 RX ADMIN — Medication 10 ML: at 21:19

## 2020-03-04 RX ADMIN — ALPRAZOLAM 0.5 MG: 0.5 TABLET ORAL at 21:16

## 2020-03-04 RX ADMIN — Medication 10 ML: at 21:20

## 2020-03-04 RX ADMIN — ASPIRIN 81 MG: 81 TABLET, COATED ORAL at 08:18

## 2020-03-04 RX ADMIN — FUROSEMIDE 40 MG: 10 INJECTION, SOLUTION INTRAMUSCULAR; INTRAVENOUS at 10:40

## 2020-03-04 RX ADMIN — PAROXETINE HYDROCHLORIDE 40 MG: 20 TABLET, FILM COATED ORAL at 08:18

## 2020-03-04 RX ADMIN — Medication 5 ML: at 14:00

## 2020-03-04 RX ADMIN — HYDROCHLOROTHIAZIDE 50 MG: 25 TABLET ORAL at 08:18

## 2020-03-04 RX ADMIN — INSULIN LISPRO 2 UNITS: 100 INJECTION, SOLUTION INTRAVENOUS; SUBCUTANEOUS at 21:17

## 2020-03-04 RX ADMIN — INSULIN LISPRO 2 UNITS: 100 INJECTION, SOLUTION INTRAVENOUS; SUBCUTANEOUS at 08:14

## 2020-03-04 NOTE — PROGRESS NOTES
Shift Change:  Bedside and Verbal shift change report given to Bebe Zavala RN (oncoming nurse) by Vida Mariscal (offgoing nurse). Report included the following information SBAR, Kardex and Quality Measures. Shift Summary:  1955: No order for small in place. Paged cardiology regarding small order. Pt is due for cath tomorrow, on diuretics, heavy output needs small at this point. Orders to leave in overnight and assess post cath  0300: Pt experiencing discomfort and urge to void, adjusted small tubing to allow draining more of urine. Will reassess, advise pt to call out if she continues to feel discomfort, may need to bladder scan. 0400: Ordered consult for mobility, wound, & nutrition. End of Shift Report:  Bedside and Verbal shift change report given to Vida Mariscal (oncoming nurse) by Bebe Zavala RN (offgoing nurse). Report included the following information SBAR, Kardex and Quality Measures.      Cath procedure scheduled for 1315 on 3/4/2020

## 2020-03-04 NOTE — PROGRESS NOTES
1:22 PM    TRANSFER - IN REPORT:    [facility] Kevin Up  being received from [facility] for routine progression of care      Report consisted of patients Situation, Background, Assessment and   Recommendations(SBAR). Information from the following report(s) Procedure Summary was reviewed with the receiving nurse. Opportunity for questions and clarification was provided. Assessment completed upon patients arrival to unit and care assumed.

## 2020-03-04 NOTE — DIABETES MGMT
SALTY [facility] SPECIALIST CONSULT    Presentation   Shane Cantu is a 77 y.o. female admitted for shortness of breath. PMH includes breast cancer, cervical cancer, chronic pain, depression with anxiety, glaucoma, HTN, and sleep apnea. Patient has known Type 2 diabetes. Patient diagnosed with Type 2 diabetes 30 years ago. Last A1c by patient report was 2 months ago and was 7.0. Patient reports that her blood sugars had been well controlled prior to starting HCTZ and since starting HCTZ her sugars have been running higher. Consulted by Provider for advanced diabetes nursing assessment and care, specifically related to   [] Transitioning off Harpreet Nab   [] Inpatient management strategy  [x] Home management assessment  [] Survival skill education  Current clinical course has been uncomplicated. Diabetes-related medical history  Acute complications  Denies  Neurological complications  Denies  Microvascular disease  Denies  Macrovascular disease  Denies  Other associated conditions     Depression    Diabetes medication history  Drug class Currently in use Discontinued Never used   Biguanide  X     DDP-4 inhibitor    x   Sulfonylurea  X     Thiazolidinedione   x   GLP-1 RA   x   SGLT-2 inhibitors   x   Basal insulin X      Bolus insulin X        Subjective   I have used Lantus in the past and it works really well for me, I just couldn't afford it so they switched me to Novolin R and Novolin N.    Patient reports the following home diabetes self-care practices:  Eating pattern  [x] Breakfast Patient reports she does not usually eat breakfast  [x] Lunch  sandwich  [x] Dinner  Baked or grilled meat with vegetables  [x] Bedtime Banana or jello  [x] Snacks Reports doesn't have snacks often but if she does it is crackers.    [x] Beverages Water or lemonade with splenda  Physical activity pattern  Patient reports that she exercises \"as much as I can with my back pain\"     Monitoring pattern  Patient reports that she checks her BG two times a day at home but more often \"if I'm having trouble\". Reports fasting typically 130-140 but occassionally has BG as low as 60's     Taking medications pattern  [x] Consistent administration  [x] Affordable    Social determinants of health impacting diabetes self-management practices   Denies at this time. Objective   Physical exam  General Alert, oriented and in no acute distress. Daughter at bedside  Conversant and cooperative  Vital Signs   Visit Vitals  /79 (BP 1 Location: Left leg, BP Patient Position: At rest)   Pulse 77   Temp 97.5 °F (36.4 °C)   Resp 22   Ht 5' 7\" (1.702 m)   Wt 127.1 kg (280 lb 4.8 oz)   SpO2 98%   Breastfeeding No   BMI 43.90 kg/m²   . Orthostatic BP measurement not indicated  Skin  Warm and dry. No Acanthosis noted along neckline. No lipohypertrophy or lipoatrophy noted at injection sites   Neck   Thyroid smooth and non-tender  Heart   Regular rate and rhythm.  No murmurs, rubs or gallops  Lungs  Clear diminished in bilateral bases  Extremities Diabetic foot exam:    Left Foot     Visual Exam: normal    Pulse DP: 1+ (weak)   Filament test: normal sensation    Vibratory sensation: normal  Right Foot   Visual Exam: normal    Pulse DP: 1+ (weak)   Filament test: normal sensation    Vibratory sensation: normal      Laboratory  Lab Results   Component Value Date/Time    Hemoglobin A1c 9.2 (H) 06/12/2018 05:59 AM     No results found for: LDL, LDLC, DLDLP  Lab Results   Component Value Date/Time    Creatinine (POC) 0.4 (L) 04/17/2019 01:57 PM    Creatinine 0.87 03/04/2020 09:00 AM     Lab Results   Component Value Date/Time    Sodium 141 03/04/2020 09:00 AM    Potassium 3.7 03/04/2020 09:00 AM    Chloride 104 03/04/2020 09:00 AM    CO2 31 03/04/2020 09:00 AM    Anion gap 6 03/04/2020 09:00 AM    Glucose 151 (H) 03/04/2020 09:00 AM    BUN 14 03/04/2020 09:00 AM    Creatinine 0.87 03/04/2020 09:00 AM    BUN/Creatinine ratio 16 03/04/2020 09:00 AM    GFR est AA >60 03/04/2020 09:00 AM    GFR est non-AA >60 03/04/2020 09:00 AM    Calcium 8.5 03/04/2020 09:00 AM    Bilirubin, total 0.7 03/03/2020 02:30 PM    AST (SGOT) 58 (H) 03/03/2020 02:30 PM    Alk. phosphatase 100 03/03/2020 02:30 PM    Protein, total 8.2 03/03/2020 02:30 PM    Albumin 3.3 (L) 03/03/2020 02:30 PM    Globulin 4.9 (H) 03/03/2020 02:30 PM    A-G Ratio 0.7 (L) 03/03/2020 02:30 PM    ALT (SGPT) 106 (H) 03/03/2020 02:30 PM     Lab Results   Component Value Date/Time    ALT (SGPT) 106 (H) 03/03/2020 02:30 PM       Blood glucose pattern      Assessment and Plan   Nursing Diagnosis Risk for unstable blood glucose pattern   Nursing Intervention Domain 5250 Decision-making Support   Nursing Interventions Examined current inpatient diabetes control   Explored factors facilitating and impeding inpatient management  Identified self-management practices impeding diabetes control  Explored corrective strategies with patient and responsible inpatient provider   Informed patient of rational for basal bolus insulin strategy while hospitalized       Evaluation   This 77year old female, with known Type 2 diabetes has achieved inpatient blood glucose target of 140-180mg/dl. Basal insulin is in use. (30 units NPH twice a day)  Bolus insulin isn't in use. Patient isn't eating meals as she is NPO for cardiac cath today. Corrective insulin is in use. Recommendations   Use Subcutaneous Insulin Order set (6108):  Basal insulin     [x] 0.4 units/kg/D - 50 units per day. Patient is currently ordered for NPH 30 units twice a day. This is also patient's home dose of NPH per patient report. Recommend continuing with this dose.      Bolus insulin    [x] Insulin-resistant sensitivity (BMI > 27) - Once diet resumed after cath, recommend adding meal time bolus of 12 units Humalog (0.1 unit/kg)    Corrective insulin    [x] Insulin-resistant sensitivity (BMI >27) - Recommend the following corrective insulin     Insulin Resistant Correctional Scale     200-249: 4 units Humalog   250-299: 8 units Humalog   300-349: 12 units Humalog   350-399: 16 units Humalog   over 400: 20 units Humalog     DO NOT hold correctional dosing if NPO   Give in addition of mealtime insulin        Consider referrals    [x] Diabetes Self-Management Training through [facility] (Phone [phone] to schedule appointment)    Billing Code(s)   [address] [address] via [address] [place] [address] [id number]

## 2020-03-04 NOTE — WOUND CARE
Wound care consult:  Initial visit for skin assessment, limited mobility. Alert- no distress     Assessment  All skin folds and bony prominences assessed, turned with staff assistance. Sacral area, heels and elbows intact. Treatment  Repositioned in bed   Heels floated    Recommendations/Plan  Turn, reposition every 2 hours as tolerated, float heels  Incontinent care  Apply Zinc to all open areas, moisture barrier as needed. Will follow, reconsult as needed.     [facility]

## 2020-03-04 NOTE — ADVANCED PRACTICE NURSE
Discussed case with Alex Price NP, pt needs additional diuresis upon return from cath lab. Will order IV lasix for later today. D/C geovanny. Anticipate d/c tomorrow.

## 2020-03-04 NOTE — PROGRESS NOTES
Cardiology Progress Note                             [address] [facility]. [address], [address]                                 Phone [phone]; Fax [phone]        3/4/2020 10:04 AM     Admit Date:           3/3/2020  Admit Diagnosis:  Shortness of breath [R06.02]  Shortness of breath [R06.02]  :          1945   MRN:          [id number]   ASSESSMENT/RECOMMENDATION:   Dyspnea-multifactorial-acute on chronic HFpEF/anxiety:  - - 4 L with IV diuretics- will give another dose this AM- breathing and LE edema has improved but still w JVD/ascites. She is able to lay flat for cardiac cath today. Creatinine WNL. She agrees to proceed. -replete K  -reviewed daily dry weights at home & Na intake   -needs CHF education     Positive stress nuclear study: RHC/LHC this afternoon- reviewed procedure and possible outcomes. Hypertensive urgency: off nitro gtt/ Bp much better with IV diuretics. Resume home meds- coreg increased. Diabetes mellitus II: home insulin ordered plus sliding scale     Depression/anxiety: cont PTA paxil      History of breast cancer-status post right mastectomy- NO IV sticks! Morbid obesity Body mass index is 43.9 kg/m². '                       No intake/output data recorded. Last 3 Recorded Weights in this Encounter    20 1113 20 0313   Weight: 288 lb 12.8 oz (131 kg) 280 lb 4.8 oz (127.1 kg)          1901 -  0700  In: 600 [P.O.:600]  Out: 4625 [Urine:4625]    SUBJECTIVE               Noni FELICITY Erwin Barth denies palpitations, irregular heart beat, chest pain    LE edema- much improved.    Breathing much better - able to lay flay for cath   + chronic back pain   No lightheadedness or dizziness          Current Facility-Administered Medications   Medication Dose Route Frequency    carvediloL (COREG) tablet 12.5 mg  12.5 mg Oral BID WITH MEALS    furosemide (LASIX) injection 40 mg  40 mg IntraVENous ONCE    potassium chloride SR (KLOR-CON 10) tablet 40 mEq  40 mEq Oral NOW    sodium chloride (NS) flush 5-40 mL  5-40 mL IntraVENous Q8H    sodium chloride (NS) flush 5-40 mL  5-40 mL IntraVENous PRN    aspirin delayed-release tablet 81 mg  81 mg Oral DAILY    hydroCHLOROthiazide (HYDRODIURIL) tablet 50 mg  50 mg Oral DAILY    losartan (COZAAR) tablet 100 mg  100 mg Oral DAILY    enoxaparin (LOVENOX) injection 40 mg  40 mg SubCUTAneous Q12H    insulin lispro (HUMALOG) injection   SubCUTAneous AC&HS    glucose chewable tablet 16 g  4 Tab Oral PRN    glucagon (GLUCAGEN) injection 1 mg  1 mg IntraMUSCular PRN    PARoxetine (PAXIL) tablet 40 mg  40 mg Oral DAILY    insulin NPH (NOVOLIN N, HUMULIN N) injection 30 Units  30 Units SubCUTAneous QHS    acetaminophen (TYLENOL) tablet 650 mg  650 mg Oral Q4H PRN    docusate sodium (COLACE) capsule 100 mg  100 mg Oral BID PRN    ALPRAZolam (XANAX) tablet 0.5 mg  0.5 mg Oral BID    insulin NPH (NOVOLIN N, HUMULIN N) injection 35 Units  35 Units SubCUTAneous ACB    dextrose 10% infusion 0-250 mL  0-250 mL IntraVENous PRN    cloNIDine HCL (CATAPRES) tablet 0.1 mg  0.1 mg Oral Q6H PRN      OBJECTIVE               Intake/Output Summary (Last 24 hours) at 3/4/2020 1004  Last data filed at 3/3/2020 2249  Gross per 24 hour   Intake 600 ml   Output 4625 ml   Net -4025 ml       Review of Systems - History obtained from the patient AS PER  HPI    Telemetry NSR    PHYSICAL EXAM        Visit Vitals  /73 (BP 1 Location: Left leg, BP Patient Position: At rest;Head of bed elevated (Comment degrees))   Pulse 91   Temp 98.1 °F (36.7 °C)   Resp 22   Ht 5' 7\" (1.702 m)   Wt 280 lb 4.8 oz (127.1 kg)   SpO2 98%   Breastfeeding No   BMI 43.90 kg/m²       Gen: Well-developed, obese, in no acute distress  alert and oriented x 3  HEENT:  Pink conjunctivae, Hearing grossly normal.No scleral icterus or conjunctival, moist mucous membranes  Neck: Supple,+ JVD  Resp: No accessory muscle use, diminished bases  Card: Regular Rate,Rythm, No murmurs, rubs or gallop. No thrills. GI:         Mildly firm/distended, non-tender   MSK: No cyanosis or clubbing, good capillary refill  Skin: No rashes or ulcers, no bruising  Neuro:  Cranial nerves are grossly intact, moving all four extremities, no focal deficit, follows commands appropriately  Psych:  Good insight, oriented to person, place and time, alert, Nml Affect  LE: +1 cass edema       DATA REVIEW            Laboratory and Imaging have been reviewed by me and are notable for  No results for input(s): CPK, CKMB, TROIQ in the last 72 hours.   Recent Labs     03/04/20  0900 03/03/20  1430 03/03/20  1140    141 140   K 3.7 4.0 4.8   CO2 31 27 23   BUN 14 12 13   CREA 0.87 0.94 0.95   * 186* 172*   WBC 9.6  --  9.6   HGB 14.3  --  13.8   HCT 45.8  --  45.7     --  [address], NP

## 2020-03-04 NOTE — PROGRESS NOTES
Bedside and Verbal shift change report given to Jerri Hensley (oncoming nurse) by Mohan Dawson RN (offgoing nurse). Report included the following information SBAR, Kardex, Intake/Output, Accordion and Recent Results. SHIFT REPORT:    Cath site clean and intact. no bleeding or hematoma    Turner to be removed 3/5 AM        Bedside and Verbal shift change report given to THELMA ALANIS RN (oncoming nurse) by Agnieszka Dawson RN (offgoing nurse). Report included the following information SBAR, Kardex, Intake/Output, Accordion and Recent Results.

## 2020-03-04 NOTE — PROCEDURES
BRIEF PROCEDURE NOTE    Date of Procedure: 3/4/2020   Preoperative Diagnosis: SIHD  Postoperative Diagnosis: SIHD    Procedure: Left heart cath, coronary angiography  Interventional Cardiologist: Amor Church DO  Assistant: None  Anesthesia: local + IV moderate sedation   Estimated Blood Loss: Minimal    Access: left radial artery, 6F  Catheters:  Left coronary: JL 4, 5F  Right coronary: JR4, 5F    Findings:   L Main: large caliber, calcified, no critical disease  LAD: large caliber, calcified, 40-50% stenosis at D2 bifurcation, supplies two diagonals, D1 small caliber and D2 moderate caliber with diffuse moderate disease  LCx: large caliber, calcified, ostial 30%, supplies a few very small marginals and continues a large caliber marginal with distal 40%  stenosis  RCA: moderate caliber, diffuse mild to moderate disease, small PDA and RPLV branch    LVEDP:  25 mmhg      Specimens Removed: None    Implants: None    Closure Device: radial TR band    See full cath note. Complications: none    Findings:  1. Calcified CAD w/o significant obstructive disease  2.  Elevated lvedp    Plan:    Cont medical [facility]    Amor Church DO        [address], DO  [facility] [address]                                       Office [phone],BID [phone]

## 2020-03-04 NOTE — PROGRESS NOTES
1355 TRANSFER - IN REPORT:    Verbal report received from Aman rn(name) on Paulette Mcburney  [facility]      Report consisted of patients Situation, Background, Assessment and   Recommendations(SBAR). Information from the following report(s) Procedure Summary was reviewed with the receiving nurse. Opportunity for questions and clarification was provided. Assessment completed upon patients arrival to unit and care assumed.    1418 3 cc removed from the tr band, no oozing brusing or swelling noted  1425 3 cc removed from the tr band, no oozing bruising or swelling noted  1430 3 cc removed from the tr band no oozing bruising or swelling noted  1433 tr band removed quick clot and transparent dressing applied arm board replaced  Site soft dry in tact, pt dozing easlily arousable   1512 preparing to transfer to room 333 with monitor  1520 assessed left radial site with oncoming nurse, placed on telemetry on 3 liters 02, family at bedside

## 2020-03-05 LAB
ANION GAP SERPL CALC-SCNC: 6 MMOL/L (ref 5–15)
BNP SERPL-MCNC: 466 PG/ML
BUN SERPL-MCNC: 17 MG/DL (ref 6–20)
BUN/CREAT SERPL: 18 (ref 12–20)
CALCIUM SERPL-MCNC: 8.6 MG/DL (ref 8.5–10.1)
CHLORIDE SERPL-SCNC: 99 MMOL/L (ref 97–108)
CO2 SERPL-SCNC: 32 MMOL/L (ref 21–32)
CREAT SERPL-MCNC: 0.94 MG/DL (ref 0.55–1.02)
GLUCOSE BLD STRIP.AUTO-MCNC: 180 MG/DL (ref 65–100)
GLUCOSE BLD STRIP.AUTO-MCNC: 201 MG/DL (ref 65–100)
GLUCOSE BLD STRIP.AUTO-MCNC: 209 MG/DL (ref 65–100)
GLUCOSE BLD STRIP.AUTO-MCNC: 217 MG/DL (ref 65–100)
GLUCOSE SERPL-MCNC: 163 MG/DL (ref 65–100)
POTASSIUM SERPL-SCNC: 3.6 MMOL/L (ref 3.5–5.1)
SERVICE CMNT-IMP: ABNORMAL
SODIUM SERPL-SCNC: 137 MMOL/L (ref 136–145)

## 2020-03-05 PROCEDURE — 83880 ASSAY OF NATRIURETIC PEPTIDE: CPT

## 2020-03-05 PROCEDURE — 36415 COLL VENOUS BLD VENIPUNCTURE: CPT

## 2020-03-05 PROCEDURE — 94762 N-INVAS EAR/PLS OXIMTRY CONT: CPT

## 2020-03-05 PROCEDURE — 80048 BASIC METABOLIC PNL TOTAL CA: CPT

## 2020-03-05 PROCEDURE — 74011250637 HC RX REV CODE- 250/637: Performed by: STUDENT IN AN ORGANIZED HEALTH CARE EDUCATION/TRAINING PROGRAM

## 2020-03-05 PROCEDURE — 74011250637 HC RX REV CODE- 250/637: Performed by: INTERNAL MEDICINE

## 2020-03-05 PROCEDURE — 74011636637 HC RX REV CODE- 636/637: Performed by: NURSE PRACTITIONER

## 2020-03-05 PROCEDURE — 74011250636 HC RX REV CODE- 250/636: Performed by: NURSE PRACTITIONER

## 2020-03-05 PROCEDURE — 82962 GLUCOSE BLOOD TEST: CPT

## 2020-03-05 PROCEDURE — 74011250637 HC RX REV CODE- 250/637: Performed by: NURSE PRACTITIONER

## 2020-03-05 PROCEDURE — 65660000000 HC RM CCU STEPDOWN

## 2020-03-05 PROCEDURE — 74011250636 HC RX REV CODE- 250/636: Performed by: INTERNAL MEDICINE

## 2020-03-05 RX ORDER — BUMETANIDE 1 MG/1
1 TABLET ORAL DAILY
Status: DISCONTINUED | OUTPATIENT
Start: 2020-03-06 | End: 2020-03-06 | Stop reason: HOSPADM

## 2020-03-05 RX ORDER — POTASSIUM CHLORIDE 750 MG/1
40 TABLET, FILM COATED, EXTENDED RELEASE ORAL 2 TIMES DAILY
Status: COMPLETED | OUTPATIENT
Start: 2020-03-05 | End: 2020-03-05

## 2020-03-05 RX ORDER — FUROSEMIDE 10 MG/ML
40 INJECTION INTRAMUSCULAR; INTRAVENOUS ONCE
Status: COMPLETED | OUTPATIENT
Start: 2020-03-05 | End: 2020-03-05

## 2020-03-05 RX ADMIN — FUROSEMIDE 40 MG: 10 INJECTION, SOLUTION INTRAMUSCULAR; INTRAVENOUS at 09:38

## 2020-03-05 RX ADMIN — POTASSIUM CHLORIDE 40 MEQ: 750 TABLET, FILM COATED, EXTENDED RELEASE ORAL at 17:02

## 2020-03-05 RX ADMIN — CARVEDILOL 12.5 MG: 6.25 TABLET, FILM COATED ORAL at 08:24

## 2020-03-05 RX ADMIN — HYDROCHLOROTHIAZIDE 50 MG: 25 TABLET ORAL at 08:24

## 2020-03-05 RX ADMIN — INSULIN HUMAN 35 UNITS: 100 INJECTION, SUSPENSION SUBCUTANEOUS at 08:24

## 2020-03-05 RX ADMIN — CARVEDILOL 12.5 MG: 6.25 TABLET, FILM COATED ORAL at 16:59

## 2020-03-05 RX ADMIN — POTASSIUM CHLORIDE 40 MEQ: 750 TABLET, FILM COATED, EXTENDED RELEASE ORAL at 09:39

## 2020-03-05 RX ADMIN — LOSARTAN POTASSIUM 100 MG: 50 TABLET, FILM COATED ORAL at 08:23

## 2020-03-05 RX ADMIN — ASPIRIN 81 MG: 81 TABLET, COATED ORAL at 08:23

## 2020-03-05 RX ADMIN — INSULIN LISPRO 3 UNITS: 100 INJECTION, SOLUTION INTRAVENOUS; SUBCUTANEOUS at 16:59

## 2020-03-05 RX ADMIN — Medication 10 ML: at 06:00

## 2020-03-05 RX ADMIN — ALPRAZOLAM 0.5 MG: 0.5 TABLET ORAL at 08:24

## 2020-03-05 RX ADMIN — ENOXAPARIN SODIUM 40 MG: 40 INJECTION SUBCUTANEOUS at 09:39

## 2020-03-05 RX ADMIN — PAROXETINE HYDROCHLORIDE 40 MG: 20 TABLET, FILM COATED ORAL at 08:24

## 2020-03-05 RX ADMIN — INSULIN LISPRO 2 UNITS: 100 INJECTION, SOLUTION INTRAVENOUS; SUBCUTANEOUS at 08:24

## 2020-03-05 RX ADMIN — INSULIN LISPRO 3 UNITS: 100 INJECTION, SOLUTION INTRAVENOUS; SUBCUTANEOUS at 13:33

## 2020-03-05 RX ADMIN — Medication 10 ML: at 14:00

## 2020-03-05 RX ADMIN — FUROSEMIDE 40 MG: 10 INJECTION, SOLUTION INTRAMUSCULAR; INTRAVENOUS at 16:59

## 2020-03-05 NOTE — PROGRESS NOTES
CM follow up:  Patient may be discharge tomorrow. Discussed possible home health follow up to assist with CHF management. States she is agreeable to home health nursing and has had [facility] home health in the past.      JAVAD Plan:   PLAN:  1. Monitor patient response to treatment and recommendations. 2. Cardiac cath yesterday, diuresing with IV diuresis . 3. Patient would benefit from home health nursing follow up, follow by [facility] in the past. Need HH order. 4. CM to monitor for discharge needs.     Jovanni Alfredo RN, MSN/Care manager

## 2020-03-05 NOTE — PROGRESS NOTES
Spiritual Care Partner Volunteer visited patient in  [facility] on  3/5/20. Documented by: Octavio Samson.   To page : [phone])

## 2020-03-05 NOTE — PROGRESS NOTES
Verbal shift change report given to DOMINICK Daniels (oncoming nurse) by Kelsea Major RN (offgoing nurse). Report included the following information SBAR, Kardex, Procedure Summary, Intake/Output, MAR, Recent Results, Med Rec Status and Cardiac Rhythm NSR.

## 2020-03-05 NOTE — DIABETES MGMT
LOI JUAREZ  CLINICAL NURSE SPECIALIST   Followup Progress Note    Presentation   Paulette Mcburney is a 77 y.o. female admitted for shortness of breath,. PMH includes breast cancer, cervical cancer, chronic pain, depression with anxiety, glaugoma, HTN and sleep apnea. Patient has known Type 2 diabetes and consulted by Provider for advanced specialty nursing care related to inpatient diabetes management. Patient diagnosed with Type 2 diabetes 30 years ago. Last A1c by patient report was 2 months ago and was 7.0. Patient reports that her blood sugars had been well controlled prior to starting HCTZ and since starting HCTZ her sugars have been running higher. Subjective   I'm feeling good today.     Objective   Physical exam    General Alert, oriented and in no acute distress. Conversant and cooperative  Vital Signs   Visit Vitals  /73 (BP 1 Location: Left leg)   Pulse 85   Temp 97.6 °F (36.4 °C)   Resp 20   Ht 5' 7\" (1.702 m)   Wt 124.1 kg (273 lb 9.6 oz)   SpO2 97%   Breastfeeding No   BMI 42.85 kg/m²     Skin  Warm and dry  Heart   Regular rate and rhythm.  No murmurs, rubs or gallops  Lungs  Clear to auscultation without rales or rhonchi  Extremities No foot wounds    Laboratory  Recent Results (from the past 24 hour(s))   GLUCOSE, POC    Collection Time: 03/04/20  4:23 PM   Result Value Ref Range    Glucose (POC) 184 (H) 65 - 100 mg/dL    Performed by Irene Stokes (PCT)    GLUCOSE, POC    Collection Time: 03/04/20  9:06 PM   Result Value Ref Range    Glucose (POC) 236 (H) 65 - 100 mg/dL    Performed by Melissa Bceerril    NT-PRO BNP    Collection Time: 03/05/20 12:58 AM   Result Value Ref Range    NT pro- (H) <429 PG/ML   METABOLIC PANEL, BASIC    Collection Time: 03/05/20  1:00 AM   Result Value Ref Range    Sodium 137 136 - 145 mmol/L    Potassium 3.6 3.5 - 5.1 mmol/L    Chloride 99 97 - 108 mmol/L    CO2 32 21 - 32 mmol/L    Anion gap 6 5 - 15 mmol/L    Glucose 163 (H) 65 - 100 mg/dL    BUN 17 6 - 20 MG/DL    Creatinine 0.94 0.55 - 1.02 MG/DL    BUN/Creatinine ratio 18 12 - 20      GFR est AA >60 >60 ml/min/1.73m2    GFR est non-AA 58 (L) >60 ml/min/1.73m2    Calcium 8.6 8.5 - 10.1 MG/DL   GLUCOSE, POC    Collection Time: 03/05/20  7:32 AM   Result Value Ref Range    Glucose (POC) 180 (H) 65 - 100 mg/dL    Performed by Don Coke (PCT)    GLUCOSE, POC    Collection Time: 03/05/20 11:26 AM   Result Value Ref Range    Glucose (POC) 201 (H) 65 - 100 mg/dL    Performed by Simphaticke (PCT)        Blood glucose pattern      Assessment and Plan   Nursing Diagnosis Risk for unstable blood glucose pattern   Nursing Intervention Domain 5255 Decision-making Support   Nursing Interventions Examined current inpatient diabetes control   Explored factors facilitating and impeding inpatient management  Identified self-management practices impeding diabetes control  Explored corrective strategies with patient and responsible inpatient provider   Informed patient of rational for basal bolus insulin strategy while hospitalized       Evaluation   This 77year old female, with known Type 2 diabetes has achieved inpatient blood glucose target of 140-180mg/dl. Basal insulin is in use. (30 units NPH twice a day)  Bolus insulin isn't in use. Patient isn't eating meals as she is NPO for cardiac cath today. Corrective insulin is in use. Recommendations   Basal insulin      [x]?        0.4 units/kg/D - 50 units per day. Patient is currently ordered for NPH 30 units twice a day. This is also patient's home dose of NPH per patient report. Recommend continuing with this dose.      Bolus insulin     [x]? Insulin-resistant sensitivity (BMI > 27) - Once diet resumed after cath, recommend adding meal time bolus of 12 units Humalog (0.1 unit/kg)     Corrective insulin     [x]?         Insulin-resistant sensitivity (BMI >27) - Recommend the following corrective insulin                 Insulin Resistant Correctional Scale    200-249: 4 units Humalog              250-299: 8 units Humalog              300-349: 12 units Humalog              350-399: 16 units Humalog              over 400: 20 units Humalog                 DO NOT hold correctional dosing if NPO              Give in addition of mealtime insulin           Consider referrals     [x]?         Diabetes Self-Management Training through [facility] (Phone [phone] to schedule appointment)    Billing Code(s)   MARYBETH Azevedo  Access via [facility]

## 2020-03-05 NOTE — PROGRESS NOTES
Cardiology Progress Note                             [address], [place], [address] [facility]                                 Phone [phone]; Fax [phone]        3/5/2020 10:04 AM     Admit Date:           3/3/2020  Admit Diagnosis:  Shortness of breath [R06.02]  Shortness of breath [R06.02]  CHF (congestive heart failure) ([facility] [address] [I50.9]  :          1945   MRN:          [id number]   ASSESSMENT/RECOMMENDATION:   Dyspnea-multifactorial-Acute on chronic HFpEF/anxiety: feeling much better post diuretics. LVEDP 25 on cath yesterday- recommend continuing IV diuretics another day and plan for DC tomorrow . Creatinine stable. Will transition to PO in the AM - bumex 1 mg daily   - - 4 L with IV diuretics  -replete K  -reviewed daily dry weights at home & Na intake   -needs CHF education     Non obstructive CAD: asa /statin     Hypertensive urgency: off nitro gtt/ BP improved. Continue meds. Diabetes mellitus II: home insulin ordered plus sliding scale. Appreciate DM educator recommendations     Depression/anxiety: cont PTA paxil  - much more relaxed     History of breast cancer-status post right mastectomy- NO IV sticks! Morbid obesity Body mass index is 42.85 kg/m². '                        07 - 1900  In: 480 [P.O.:480]  Out: 100 [Urine:100]    Last 3 Recorded Weights in this Encounter    20 1113 20 0313 20 0858   Weight: 288 lb 12.8 oz (131 kg) 280 lb 4.8 oz (127.1 kg) 273 lb 9.6 oz (124.1 kg)         1901 -  0700  In: 240 [P.O.:240]  Out: 4950 [Urine:4950]    SUBJECTIVE               Noni FELICITY Vincent Messing denies palpitations, irregular heart beat, chest pain    LE edema- much improved.    Breathing much better - feels much better today   + chronic back pain   No lightheadedness or dizziness          Current Facility-Administered Medications   Medication Dose Route Frequency    potassium chloride SR (KLOR-CON 10) tablet 40 mEq  40 mEq Oral BID    carvediloL (COREG) tablet 12.5 mg  12.5 mg Oral BID WITH MEALS    sodium chloride (NS) flush 5-40 mL  5-40 mL IntraVENous Q8H    sodium chloride (NS) flush 5-40 mL  5-40 mL IntraVENous PRN    sodium chloride (NS) flush 5-40 mL  5-40 mL IntraVENous Q8H    sodium chloride (NS) flush 5-40 mL  5-40 mL IntraVENous PRN    sodium chloride (NS) flush 5-40 mL  5-40 mL IntraVENous Q8H    sodium chloride (NS) flush 5-40 mL  5-40 mL IntraVENous PRN    aspirin delayed-release tablet 81 mg  81 mg Oral DAILY    hydroCHLOROthiazide (HYDRODIURIL) tablet 50 mg  50 mg Oral DAILY    losartan (COZAAR) tablet 100 mg  100 mg Oral DAILY    enoxaparin (LOVENOX) injection 40 mg  40 mg SubCUTAneous Q12H    insulin lispro (HUMALOG) injection   SubCUTAneous AC&HS    glucose chewable tablet 16 g  4 Tab Oral PRN    glucagon (GLUCAGEN) injection 1 mg  1 mg IntraMUSCular PRN    PARoxetine (PAXIL) tablet 40 mg  40 mg Oral DAILY    insulin NPH (NOVOLIN N, HUMULIN N) injection 30 Units  30 Units SubCUTAneous QHS    acetaminophen (TYLENOL) tablet 650 mg  650 mg Oral Q4H PRN    docusate sodium (COLACE) capsule 100 mg  100 mg Oral BID PRN    insulin NPH (NOVOLIN N, HUMULIN N) injection 35 Units  35 Units SubCUTAneous ACB    dextrose 10% infusion 0-250 mL  0-250 mL IntraVENous PRN    cloNIDine HCL (CATAPRES) tablet 0.1 mg  0.1 mg Oral Q6H PRN      OBJECTIVE               Intake/Output Summary (Last 24 hours) at 3/5/2020 0951  Last data filed at 3/5/2020 0942  Gross per 24 hour   Intake 480 ml   Output 4075 ml   Net -3595 ml       Review of Systems - History obtained from the patient AS PER  HPI    Telemetry NSR    PHYSICAL EXAM        Visit Vitals  /71 (BP 1 Location: Left leg, BP Patient Position: At rest)   Pulse 86   Temp 97.8 °F (36.6 °C)   Resp 23   Ht 5' 7\" (1.702 m)   Wt 273 lb 9.6 oz (124.1 kg)   SpO2 95%   Breastfeeding No   BMI 42.85 kg/m²       Gen: Well-developed, obese, in no acute distress  alert and oriented x 3  HEENT:  Pink conjunctivae, Hearing grossly normal.No scleral icterus or conjunctival, moist mucous membranes  Neck: Supple,+ JVD  Resp: No accessory muscle use, diminished bases  Card: Regular Rate,Rythm, No murmurs, rubs or gallop. No thrills. GI:         Mildly firm, non-tender   MSK: No cyanosis or clubbing, good capillary refill  Skin: No rashes or ulcers, no bruising. Right radial site covered with CDI bandage - no drainage/redness or swelling + 2 radial pulse cass- warm fingers cass   Neuro:  Cranial nerves are grossly intact, moving all four extremities, no focal deficit, follows commands appropriately  Psych:  Good insight, oriented to person, place and time, alert, Nml Affect  LE: trace cass edema       DATA REVIEW            Laboratory and Imaging have been reviewed by me and are notable for  No results for input(s): CPK, CKMB, TROIQ in the last 72 hours.   Recent Labs     03/05/20  0100 03/04/20  0900 03/03/20  1430 03/03/20  1140    141 141 140   K 3.6 3.7 4.0 4.8   CO2 32 31 27 23   BUN 17 14 12 13   CREA 0.94 0.87 0.94 0.95   * 151* 186* 172*   WBC  --  9.6  --  9.6   HGB  --  14.3  --  13.8   HCT  --  45.8  --  45.7   PLT  --  293  --  [address], NP

## 2020-03-05 NOTE — PROGRESS NOTES
Spiritual Care Partner Volunteer visited patient in  [facility] [address]on  3/5/20. Documented by: Bora Escamilla.   To page : [phone] ([phone])

## 2020-03-05 NOTE — PROGRESS NOTES
Bedside and Verbal shift change report given to Armida Williamson (oncoming nurse) by Araceli Olivares (offgoing nurse). Report included the following information SBAR, Kardex, Intake/Output, MAR, Recent Results and Cardiac Rhythm NSR.    0830 small removed. Pt up to bathroom     1100 pt voided 450cc    Bedside and Verbal shift change report given to [employer] (oncoming nurse) by Armida Williamson (offgoing nurse). Report included the following information SBAR, Kardex, Intake/Output, MAR and Recent Results.

## 2020-03-05 NOTE — PROGRESS NOTES
Problem: Patient Education: Go to Patient Education Activity  Goal: Patient/Family Education  Outcome: Progressing Towards Goal     Problem: Cath Lab Procedures: Pre-Procedure  Goal: Off Pathway (Use only if patient is Off Pathway)  Outcome: Progressing Towards Goal  Goal: Activity/Safety  Outcome: Progressing Towards Goal  Goal: Consults, if ordered  Outcome: Progressing Towards Goal  Goal: Diagnostic Test/Procedures  Outcome: Progressing Towards Goal  Goal: Nutrition/Diet  Outcome: Progressing Towards Goal  Goal: Discharge Planning  Outcome: Progressing Towards Goal  Goal: Medications  Outcome: Progressing Towards Goal  Goal: Respiratory  Outcome: Progressing Towards Goal  Goal: Treatments/Interventions/Procedures  Outcome: Progressing Towards Goal  Goal: Psychosocial  Outcome: Progressing Towards Goal  Goal: *Verbalize description of procedure  Outcome: Progressing Towards Goal  Goal: *Consent signed  Outcome: Progressing Towards Goal     Problem: Cath Lab Procedures: Post-Cath Day of Procedure (Initiate SCIP Measures for Post-Op Care)  Goal: Off Pathway (Use only if patient is Off Pathway)  Outcome: Progressing Towards Goal  Goal: Activity/Safety  Outcome: Progressing Towards Goal  Goal: Consults, if ordered  Outcome: Progressing Towards Goal  Goal: Diagnostic Test/Procedures  Outcome: Progressing Towards Goal  Goal: Nutrition/Diet  Outcome: Progressing Towards Goal  Goal: Discharge Planning  Outcome: Progressing Towards Goal  Goal: Medications  Outcome: Progressing Towards Goal  Goal: Respiratory  Outcome: Progressing Towards Goal  Goal: Treatments/Interventions/Procedures  Outcome: Progressing Towards Goal  Goal: Psychosocial  Outcome: Progressing Towards Goal  Goal: *Procedure site is without bleeding and signs of infection six hours post sheath removal  Outcome: Progressing Towards Goal  Goal: *Hemodynamically stable  Outcome: Progressing Towards Goal  Goal: *Optimal pain control at patient's stated goal  Outcome: Progressing Towards Goal     Problem: Cath Lab Procedures: Post-Cath Day 1  Goal: Off Pathway (Use only if patient is Off Pathway)  Outcome: Progressing Towards Goal  Goal: Activity/Safety  Outcome: Progressing Towards Goal  Goal: Diagnostic Test/Procedures  Outcome: Progressing Towards Goal  Goal: Nutrition/Diet  Outcome: Progressing Towards Goal  Goal: Discharge Planning  Outcome: Progressing Towards Goal  Goal: Medications  Outcome: Progressing Towards Goal  Goal: Respiratory  Outcome: Progressing Towards Goal  Goal: Treatments/Interventions/Procedures  Outcome: Progressing Towards Goal  Goal: Psychosocial  Outcome: Progressing Towards Goal     Problem: Cath Lab Procedures: Discharge Outcomes  Goal: *Stable cardiac rhythm  Outcome: Progressing Towards Goal  Goal: *Hemodynamically stable  Outcome: Progressing Towards Goal  Goal: *Optimal pain control at patient's stated goal  Outcome: Progressing Towards Goal  Goal: *Pulses palpable, skin color within defined limits, skin temperature warm  Outcome: Progressing Towards Goal  Goal: *Lungs clear or at baseline  Outcome: Progressing Towards Goal  Goal: *Demonstrates ability to perform prescribed activity without shortness of breath or discomfort  Outcome: Progressing Towards Goal  Goal: *Verbalizes home exercise program, activity guidelines, cardiac precautions  Outcome: Progressing Towards Goal  Goal: *Verbalizes understanding and describes prescribed diet  Outcome: Progressing Towards Goal  Goal: *Verbalizes understanding and describes medication purposes and frequencies  Outcome: Progressing Towards Goal  Goal: *Identifies cardiac risk factors  Outcome: Progressing Towards Goal  Goal: *No signs and symptoms of infection or wound complications  Outcome: Progressing Towards Goal  Goal: *Anxiety reduced or absent  Outcome: Progressing Towards Goal  Goal: *Verbalizes and demonstrates incision care  Outcome: Progressing Towards Goal  Goal: *Understands and describes signs and symptoms to report to providers(Stroke Metric)  Outcome: Progressing Towards Goal  Goal: *Describes follow-up/return visits to physicians  Outcome: Progressing Towards Goal  Goal: *Describes available resources and support systems  Outcome: Progressing Towards Goal  Goal: *Influenza immunization  Outcome: Progressing Towards Goal  Goal: *Pneumococcal immunization  Outcome: Progressing Towards Goal     Problem: Falls - Risk of  Goal: *Absence of Falls  Description  Document Abner Moore Fall Risk and appropriate interventions in the flowsheet. Outcome: Progressing Towards Goal  Note: Fall Risk Interventions:  Mobility Interventions: Utilize walker, cane, or other assistive device, Patient to call before getting OOB, Communicate number of staff needed for ambulation/transfer         Medication Interventions: Teach patient to arise slowly, Patient to call before getting OOB    Elimination Interventions: Patient to call for help with toileting needs              Problem: Patient Education: Go to Patient Education Activity  Goal: Patient/Family Education  Outcome: Progressing Towards Goal     Problem: Activity Intolerance  Goal: *Oxygen saturation during activity within specified parameters  Outcome: Progressing Towards Goal  Goal: *Able to remain out of bed as prescribed  Outcome: Progressing Towards Goal     Problem: Patient Education: Go to Patient Education Activity  Goal: Patient/Family Education  Outcome: Progressing Towards Goal     Problem: Pressure Injury - Risk of  Goal: *Prevention of pressure injury  Description  Document Clayton Scale and appropriate interventions in the flowsheet. Outcome: Progressing Towards Goal  Note: Pressure Injury Interventions: Activity Interventions: Increase time out of bed    Mobility Interventions: Turn and reposition approx.  every two hours(pillow and wedges)    Nutrition Interventions: Document food/fluid/supplement intake, Offer support with meals,snacks and hydration    Friction and Shear Interventions: Lift sheet, Lift team/patient mobility team                Problem: Patient Education: Go to Patient Education Activity  Goal: Patient/Family Education  Outcome: Progressing Towards Goal

## 2020-03-05 NOTE — NURSE NAVIGATOR
Chart reviewed by Heart Failure Nurse Navigator. Heart Failure database completed. EF:  50 to 55% with mildly dilated RV and mild RV systolic dysfunction. ACEi/ARB/ARNi: Losartan 100 mg daily. BB: Carvedilol 12.5 mg BID. Aldosterone Antagonist: Not indicated. Obstructive Sleep Apnea Screening:   STOP-BANG score:   Referred to Sleep Medicine:     CRT Not indicated. NYHA Functional Class **. Heart Failure Teach Back in Patient Education. Heart Failure Avoiding Triggers on Discharge Instructions. Cardiologist: Dr. Lay Ellison discharge follow up phone call to be made within 48-72 hours of discharge. Met with patient, daughter, and granddaughter. Introduced self and role of HF NN. Assessed current understanding of HF disease and current hospitalization. Reviewed basic heart function and diastolic heart failure in setting of medically treated CAD, HTN, and DM. Reviewed HF symptoms with HF magnet. Patient notes she had all of the symptoms of HF. Reviewed HF zones, daily weight with parameters and how to be accurate with daily weight. Reviewed HF medications, action and purpose of diuretic, with purpose of daily weight. Emphasized early recognition of symptoms and notification of provider. Patient states she does not miss her diuretic and makes sure she takes it as soon as she gets home if she has to defer taking it due to going out. Patient lives with  in [place], [place]. Her activity has been limited due to chronic back pain. She occasionally takes Tylenol for this. She has been able to shop, using motorized cart in store, and able to cook at home. She states she does read nutrition labels. Reviewed 2000 mg or less sodium recommendation, how to read a label for sodium, and hidden sources of dietary sodium. She notes she does not use a salt shaker and does not have an affinity for salty foods.   Discussed general guidelines for fluids, what constitutes a fluid for HF patients, and some common myths about fluids. She does have mouth dryness and notes she has increased her fluid intake since starting lasix. Discussed tips for dry mouth and advised her to monitor her fluids to ensure she does not go over 2 liters daily. Patient states she was diagnosed with sleep apnea about 3 to 4 years ago. She used a CPAP successfully until she started breaking out around her mouth from the mask. She tried different masks with her provider, she thinks [facility]. Eventually, she saw a dermatologist who prescribed a cream and asked her to take a break from CPAP to allow healing. She never went back to using the CPAP but she has the machine at home. Emphasized with patient and family  the benefits of treating her sleep apnea and the relationship of sleep apnea to heart failure, HTN, and poor sleep.   Discussed with Cardiology NP.

## 2020-03-06 VITALS
HEIGHT: 67 IN | BODY MASS INDEX: 42.21 KG/M2 | RESPIRATION RATE: 18 BRPM | TEMPERATURE: 97.8 F | WEIGHT: 268.96 LBS | DIASTOLIC BLOOD PRESSURE: 63 MMHG | OXYGEN SATURATION: 94 % | SYSTOLIC BLOOD PRESSURE: 122 MMHG | HEART RATE: 80 BPM

## 2020-03-06 LAB
ANION GAP SERPL CALC-SCNC: 4 MMOL/L (ref 5–15)
BUN SERPL-MCNC: 21 MG/DL (ref 6–20)
BUN/CREAT SERPL: 23 (ref 12–20)
CALCIUM SERPL-MCNC: 8.6 MG/DL (ref 8.5–10.1)
CHLORIDE SERPL-SCNC: 100 MMOL/L (ref 97–108)
CO2 SERPL-SCNC: 33 MMOL/L (ref 21–32)
CREAT SERPL-MCNC: 0.93 MG/DL (ref 0.55–1.02)
GLUCOSE BLD STRIP.AUTO-MCNC: 147 MG/DL (ref 65–100)
GLUCOSE BLD STRIP.AUTO-MCNC: 189 MG/DL (ref 65–100)
GLUCOSE SERPL-MCNC: 165 MG/DL (ref 65–100)
POTASSIUM SERPL-SCNC: 3.7 MMOL/L (ref 3.5–5.1)
SERVICE CMNT-IMP: ABNORMAL
SERVICE CMNT-IMP: ABNORMAL
SODIUM SERPL-SCNC: 137 MMOL/L (ref 136–145)

## 2020-03-06 PROCEDURE — 74011250636 HC RX REV CODE- 250/636: Performed by: INTERNAL MEDICINE

## 2020-03-06 PROCEDURE — 74011250637 HC RX REV CODE- 250/637: Performed by: NURSE PRACTITIONER

## 2020-03-06 PROCEDURE — 74011250637 HC RX REV CODE- 250/637: Performed by: STUDENT IN AN ORGANIZED HEALTH CARE EDUCATION/TRAINING PROGRAM

## 2020-03-06 PROCEDURE — 36415 COLL VENOUS BLD VENIPUNCTURE: CPT

## 2020-03-06 PROCEDURE — 82962 GLUCOSE BLOOD TEST: CPT

## 2020-03-06 PROCEDURE — 80048 BASIC METABOLIC PNL TOTAL CA: CPT

## 2020-03-06 PROCEDURE — 94760 N-INVAS EAR/PLS OXIMETRY 1: CPT

## 2020-03-06 PROCEDURE — 74011636637 HC RX REV CODE- 636/637: Performed by: NURSE PRACTITIONER

## 2020-03-06 RX ORDER — CARVEDILOL 12.5 MG/1
6.25 TABLET ORAL 2 TIMES DAILY WITH MEALS
Qty: 60 TAB | Refills: 1 | Status: SHIPPED | OUTPATIENT
Start: 2020-03-06 | End: 2020-03-06 | Stop reason: SDUPTHER

## 2020-03-06 RX ORDER — CARVEDILOL 12.5 MG/1
12.5 TABLET ORAL 2 TIMES DAILY WITH MEALS
Qty: 60 TAB | Refills: 1 | Status: SHIPPED | OUTPATIENT
Start: 2020-03-06 | End: 2020-03-31 | Stop reason: SDUPTHER

## 2020-03-06 RX ORDER — POTASSIUM CHLORIDE 750 MG/1
40 TABLET, FILM COATED, EXTENDED RELEASE ORAL
Status: COMPLETED | OUTPATIENT
Start: 2020-03-06 | End: 2020-03-06

## 2020-03-06 RX ORDER — BUMETANIDE 1 MG/1
1 TABLET ORAL DAILY
Qty: 30 TAB | Refills: 1 | Status: SHIPPED | OUTPATIENT
Start: 2020-03-07 | End: 2020-07-02

## 2020-03-06 RX ADMIN — ENOXAPARIN SODIUM 40 MG: 40 INJECTION SUBCUTANEOUS at 10:20

## 2020-03-06 RX ADMIN — INSULIN HUMAN 35 UNITS: 100 INJECTION, SUSPENSION SUBCUTANEOUS at 08:14

## 2020-03-06 RX ADMIN — Medication 10 ML: at 06:03

## 2020-03-06 RX ADMIN — Medication 10 ML: at 08:16

## 2020-03-06 RX ADMIN — BUMETANIDE 1 MG: 1 TABLET ORAL at 08:12

## 2020-03-06 RX ADMIN — Medication 10 ML: at 00:29

## 2020-03-06 RX ADMIN — INSULIN HUMAN 30 UNITS: 100 INJECTION, SUSPENSION SUBCUTANEOUS at 00:25

## 2020-03-06 RX ADMIN — POTASSIUM CHLORIDE 40 MEQ: 750 TABLET, FILM COATED, EXTENDED RELEASE ORAL at 10:21

## 2020-03-06 RX ADMIN — ASPIRIN 81 MG: 81 TABLET, COATED ORAL at 08:13

## 2020-03-06 RX ADMIN — ENOXAPARIN SODIUM 40 MG: 40 INJECTION SUBCUTANEOUS at 00:28

## 2020-03-06 RX ADMIN — INSULIN LISPRO 2 UNITS: 100 INJECTION, SOLUTION INTRAVENOUS; SUBCUTANEOUS at 00:26

## 2020-03-06 RX ADMIN — HYDROCHLOROTHIAZIDE 50 MG: 25 TABLET ORAL at 08:12

## 2020-03-06 RX ADMIN — INSULIN LISPRO 2 UNITS: 100 INJECTION, SOLUTION INTRAVENOUS; SUBCUTANEOUS at 11:51

## 2020-03-06 RX ADMIN — INSULIN LISPRO 2 UNITS: 100 INJECTION, SOLUTION INTRAVENOUS; SUBCUTANEOUS at 08:15

## 2020-03-06 RX ADMIN — Medication 10 ML: at 08:15

## 2020-03-06 RX ADMIN — PAROXETINE HYDROCHLORIDE 40 MG: 20 TABLET, FILM COATED ORAL at 08:13

## 2020-03-06 RX ADMIN — LOSARTAN POTASSIUM 100 MG: 50 TABLET, FILM COATED ORAL at 08:12

## 2020-03-06 RX ADMIN — CARVEDILOL 12.5 MG: 6.25 TABLET, FILM COATED ORAL at 08:13

## 2020-03-06 NOTE — PROGRESS NOTES
Shift Change:    Bedside and Verbal shift change report given to Cortney Thao RN (oncoming nurse) by Carmen García (offgoing nurse). Report included the following information SBAR, Kardex, and Quality Measures. Shift Summary:    Pt rested quietly during the night. No events noted. End of Shift Report:    Bedside and Verbal shift change report given to DOMINICK Arana (oncoming nurse) by Cortney Thao RN (offgoing nurse). Report to include information SBAR, Kardex, and Quality Measures.

## 2020-03-06 NOTE — DISCHARGE INSTRUCTIONS
To do \"DRY\" weights every morning, meaning when you get up you use the restroom and weigh yourself with minimal clothing on. Then you write the weight down. You will need to keep a running log of these so you can see the trend. If you gain more than 3 lbs in 1 day or 5 lbs in 1 week- please take an extra Bumex pill and notify Dr Skyler Lauren your blood pressure daily- please keep a log to bring when you see Dr Luis Solitario. Goal BP less than 140 (top number). If persistently greater than 140 - please call Dr Pola Kawasaki Discharge Instructions     It is normal to feel tired the first couple days. Take it easy and follow the physicians instructions. CHECK THE CATHETER INSERTION SITE DAILY:   Remove the wrist dressing 24 hours after the procedure. You may shower 24 hours after the procedure. Wash with soap and water and pat dry. Gentle cleaning of the site with soap and water is sufficient, cover with a dry clean dressing or bandage. Do not apply creams or powders to the area. No soaking the wrist for 3 days. Leave the puncture site open to air after 24 hours post-procedure. CALL THE PHYSICIANS:   If the site becomes red, swollen or feels warm to the touch   If there is bleeding or drainage or if there is unusual pain at the radial site. If there is any minor oozing, you may apply a band-aid and remove after 12 hours. If the bleeding continues, hold pressure with the middle finger against the puncture site and the thumb against the back of the wrist,call 911 to be transported to the hospital.   DO NOT DRIVE YOURSELF, [facility] 063. ACTIVITY:   For the first 24 hours do not manipulate the wrist.   No lifting, pushing or pulling over 3-5 pounds with the affected wrist for 7 daysand no straining the insertion site. Do not life grocery bags or the garbage can, do not run the vacuum  or  for 7 days.    Start with short walks as in the hospital and gradually increase as tolerated each day. It is recommended to walk 30 minutes 5-7 days per week. Follow your physicians instructions on activity. Avoid walking outside in extremes of heat or cold. Walk inside when it is cold and windy or hot and humid. Things to keep in mind:   No driving for at least 24 hours, or as designated by your physician. Limit the number of times you go up and down the stairs   Take rests and pace yourself with activity. Be careful and do not strain with bowel movements. MEDICATIONS:   Take all medications as prescribed   Call your physician if you have any questions   Keep an updated list of your medications with you at all times and give a list to your physician and pharmacist   SIGNS AND SYMPTOMS:   Be cautious of symptoms of angina or recurrent symptoms such as chest discomfort, unusual shortness of breath or fatigue. These could be symptoms of restenosis, a new blockage or a heart attack. If your symptoms are relieved with rest it is still recommended that you notify your physician of recurrent chest pain or discomfort. For CHEST PAIN or symptoms of angina not relieved with rest: If the discomfort is not relieved with rest, and you have been prescribed Nitroglycerin, take as directed (taken under the tongue, one at a time 5 minutes apart for a total of 3 doses). If the discomfort is not relieved after the 3rd nitroglycerin, call 911. If you have not been prescribed Nitroglycerin and your chest discomfort is not relieved with rest, call 911. AFTER CARE:   Follow up with your physician as instructed. Follow a heart healthy diet with proper portion control, daily stress management, daily exercise, blood pressure and cholesterol control , and smoking cessation. Patient Education        Avoiding Triggers With Heart Failure: Care Instructions  Your Care Instructions    Triggers are anything that make your heart failure flare up.  A flare-up is also called \"sudden heart failure\" or \"acute heart failure. \" When you have a flare-up, fluid builds up in your lungs, and you have problems breathing. You might need to go to the hospital. By watching for changes in your condition and avoiding triggers, you can prevent heart failure flare-ups. Follow-up care is a key part of your treatment and safety. Be sure to make and go to all appointments, and call your doctor if you are having problems. It's also a good idea to know your test results and keep a list of the medicines you take. How can you care for yourself at home? Watch for changes in your weight and condition  · Weigh yourself without clothing at the same time each day. Record your weight. Call your doctor if you have sudden weight gain, such as more than 2 to 3 pounds in a day or 5 pounds in a week. (Your doctor may suggest a different range of weight gain.) A sudden weight gain may mean that your heart failure is getting worse. · Keep a daily record of your symptoms. Write down any changes in how you feel, such as new shortness of breath, cough, or problems eating. Also record if your ankles are more swollen than usual and if you feel more tired than usual. Note anything that you ate or did that could have triggered these changes. Limit sodium  Sodium causes your body to hold on to extra water. This may cause your heart failure symptoms to get worse. People get most of their sodium from processed foods. Fast food and restaurant meals also tend to be very high in sodium. · Your doctor may suggest that you limit sodium to 2,000 milligrams (mg) a day or less. That is less than 1 teaspoon of salt a day, including all the salt you eat in cooking or in packaged foods. · Read food labels on cans and food packages. They tell you how much sodium you get in one serving. Check the serving size. If you eat more than one serving, you are getting more sodium.   · Be aware that sodium can come in forms other than salt, including monosodium glutamate (MSG), sodium citrate, and sodium bicarbonate (baking soda). MSG is often added to Asian food. You can sometimes ask for food without MSG or salt. · Slowly reducing salt will help you adjust to the taste. Take the salt shaker off the table. · Flavor your food with garlic, lemon juice, onion, vinegar, herbs, and spices instead of salt. Do not use soy sauce, steak sauce, onion salt, garlic salt, mustard, or ketchup on your food, unless it is labeled \"low-sodium\" or \"low-salt. \"  · Make your own salad dressings, sauces, and ketchup without adding salt. · Use fresh or frozen ingredients, instead of canned ones, whenever you can. Choose low-sodium canned goods. · Eat less processed food and food from restaurants, including fast food. Exercise as directed  Moderate, regular exercise is very good for your heart. It improves your blood flow and helps control your weight. But too much exercise can stress your heart and cause a heart failure flare-up. · Check with your doctor before you start an exercise program.  · Walking is an easy way to get exercise. Start out slowly. Gradually increase the length and pace of your walk. Swimming, riding a bike, and using a treadmill are also good forms of exercise. · When you exercise, watch for signs that your heart is working too hard. You are pushing yourself too hard if you cannot talk while you are exercising. If you become short of breath or dizzy or have chest pain, stop, sit down, and rest.  · Do not exercise when you do not feel well. Take medicines correctly  · Take your medicines exactly as prescribed. Call your doctor if you think you are having a problem with your medicine. · Make a list of all the medicines you take. Include those prescribed to you by other doctors and any over-the-counter medicines, vitamins, or supplements you take. Take this list with you when you go to any doctor.   · Take your medicines at the same time every day. It may help you to post a list of all the medicines you take every day and what time of day you take them. · Make taking your medicine as simple as you can. Plan times to take your medicines when you are doing other things, such as eating a meal or getting ready for bed. This will make it easier to remember to take your medicines. · Get organized. Use helpful tools, such as daily or weekly pill containers. When should you call for help? Call 911 if you have symptoms of sudden heart failure such as:    · You have severe trouble breathing.     · You cough up pink, foamy mucus.     · You have a new irregular or rapid heartbeat.    Call your doctor now or seek immediate medical care if:    · You have new or increased shortness of breath.     · You are dizzy or lightheaded, or you feel like you may faint.     · You have sudden weight gain, such as more than 2 to 3 pounds in a day or 5 pounds in a week. (Your doctor may suggest a different range of weight gain.)     · You have increased swelling in your legs, ankles, or feet.     · You are suddenly so tired or weak that you cannot do your usual activities.    Watch closely for changes in your health, and be sure to contact your doctor if you develop new symptoms. Where can you learn more? Go to http://anastasia-dang.info/. Enter I073 in the search box to learn more about \"Avoiding Triggers With Heart Failure: Care Instructions. \"  Current as of: April 9, 2019  Content Version: 12.2  © 0029-7150 [employer]. Care instructions adapted under license by [employer] (which disclaims liability or warranty for this information). If you have questions about a medical condition or this instruction, always ask your healthcare professional. [address] any warranty or liability for your use of this information.

## 2020-03-06 NOTE — DISCHARGE SUMMARY
Cardiology Discharge Summary     Patient ID:  Latosha Golden  [id number]  88 y.o.  1945    Admit Date: 3/3/2020    Discharge Date: 3/6/2020     Admitting Physician: Yifan Perkins MD     Discharge Physician: Dr. Trina Desai, LINDA    Admission Diagnoses: Shortness of breath [R06.02]  Shortness of breath [R06.02]  CHF (congestive heart failure) ([facility] [address] [I50.9]    Discharge Diagnoses: Active Problems:    Shortness of breath (3/3/2020)      CHF (congestive heart failure) ([facility] [address] (3/4/2020)        Discharge Condition: Good    Cardiology Procedures this Admission:  EchoCardiogram/ cardiac cath     Consults: None        Discharge Exam:     Visit Vitals  /79 (BP 1 Location: Left arm, BP Patient Position: At rest;Sitting)   Pulse 73   Temp 98.5 °F (36.9 °C)   Resp 18   Ht 5' 7\" (1.702 m)   Wt 268 lb 15.4 oz (122 kg)   SpO2 94%   Breastfeeding No   BMI 42.13 kg/m²     General Appearance:  Well developed, obese, alert and oriented x 3, and individual in no acute distress. Ears/Nose/Mouth/Throat:   Hearing grossly normal.         Neck: Supple. Chest:   Lungs clear to auscultation bilaterally. Mastectomy- r breast     Cardiovascular:  Regular rate and rhythm,  no murmur. Abdomen:   Soft, non-tender, bowel sounds are active. Extremities: trace edema bilaterally. Skin: Warm and dry. Right wrist no edema, hematoma or drainage                HOSPITAL COURSE: Latosha Golden is 77 y.o. female who came to [facility] for a scheduled cardiac cath d/t to an abnormal stress test, cath was cancelled as patient was found to be in hypertensive urgency/heart failure w pulmonary edema. Therefor she was admitted for IV diuretics and BP control. A/P  Dyspnea-multifactorial-Acute on chronic HFpEF/anxiety: feeling much better post diuretics. LVEDP 25 on cath yesterday- recommend continuing IV diuretics another day and plan for DC tomorrow . Creatinine stable.  Will transition to PO in the AM - bumex 1 mg daily   - - 9 L with IV diuretics since admission   -replete K  -reviewed daily dry weights at home & Na intake   -reviewed CHF education daily w her  -BNP significantly down      Non obstructive CAD: asa /statin      Hypertensive urgency: off nitro gtt/ BP controlled. Continue meds.      Diabetes mellitus II: home insulin ordered plus sliding scale. Appreciate DM educator recommendations      Depression/anxiety: cont PTA paxil  - much more relaxed      History of breast cancer-status post right mastectomy- NO IV sticks!     Morbid obesity Body mass index is 42.85 kg/m².'    Probable RAHEL: refer for sleep study OP     Disposition: home with home health and close office follow up    Patient Instructions:   Current Discharge Medication List      START taking these medications    Details   bumetanide (BUMEX) 1 mg tablet Take 1 Tab by mouth daily. Qty: 30 Tab, Refills: 1         CONTINUE these medications which have CHANGED    Details   carvediloL (COREG) 12.5 mg tablet Take 1 Tab by mouth two (2) times daily (with meals). Qty: 60 Tab, Refills: 1         CONTINUE these medications which have NOT CHANGED    Details   insulin NPH (NOVOLIN N NPH U-100 INSULIN) 100 unit/mL injection 35 Units by SubCUTAneous route once. 35u morning; 30u at bedtime  Indications: 60 units in morning and 60 units in the evening      acetaminophen (TYLENOL EXTRA STRENGTH) 500 mg tablet Take 1,000 mg by mouth every six (6) hours as needed for Pain.      hydroCHLOROthiazide (HYDRODIURIL) 50 mg tablet Take 50 mg by mouth daily. !! insulin regular (NOVOLIN R REGULAR U-100 INSULN) 100 unit/mL injection 10 Units by SubCUTAneous route daily (with breakfast). losartan (COZAAR) 100 mg tablet Take 100 mg by mouth daily. PARoxetine (PAXIL) 40 mg tablet Take 40 mg by mouth daily. triamcinolone acetonide (KENALOG) 0.1 % ointment Apply  to affected area two (2) times a day.  use thin layer  Applies on face at bipap allergy site      aspirin delayed-release 81 mg tablet Take 81 mg by mouth daily. !! insulin regular (NOVOLIN R REGULAR U-100 INSULN) 100 unit/mL injection 10 Units by SubCUTAneous route daily (with dinner). !! - Potential duplicate medications found. Please discuss with provider. STOP taking these medications       furosemide (LASIX) 20 mg tablet Comments:   Reason for Stopping:               Referenced discharge instructions provided by nursing for diet and activity. Follow-up with   Future Appointments   Date Time Provider Carla Grant   3/11/2020  3:20 PM [facility]   6/5/2020 11:00 AM [facility] ER 3 HR[facility]         Signed:  Ramonia Holstein, NP  3/6/2020  9:33 AM      Cardiology Attending:    Patient personally seen and examined. All the elements of history and examination were personally performed. Assessment and plan was discussed and agree as written above. Discharge time >30min    Nathaniel Villa MD, [place]

## 2020-03-06 NOTE — PROGRESS NOTES
CMS Note  3/6/2020    Patient received and signed their 2nd IMM letter. Patient was given a copy for their record.   Satinder Marroquin CMS

## 2020-03-06 NOTE — PROGRESS NOTES
Pharmacist Discharge Medication Reconciliation    Discharge Provider:  Mathew Watkins NP       Discharge Medications:      My Medications        START taking these medications        Instructions Each Dose to Equal Morning Noon Evening Bedtime   bumetanide 1 mg tablet  Commonly known as:  BUMEX  Start taking on:  March 7, 2020  Your last dose was:  03/06/2020 812am  Your next dose is:  03/07/2020 8am      Take 1 Tab by mouth daily. 1 mg                CHANGE how you take these medications        Instructions Each Dose to Equal Morning Noon Evening Bedtime   carvediloL 12.5 mg tablet  Commonly known as:  COREG  What changed:    medication strength  how much to take  Your last dose was:  03/06/2020 813am  Your next dose is:  03/06/2020 with dinner      Take 1 Tab by mouth two (2) times daily (with meals). 12.5 mg                CONTINUE taking these medications        Instructions Each Dose to Equal Morning Noon Evening Bedtime   aspirin delayed-release 81 mg tablet  Your last dose was:  03/06/2020 813am  Your next dose is:  03/07/2020 8am      Take 81 mg by mouth daily. 81 mg         hydroCHLOROthiazide 50 mg tablet  Commonly known as:  HYDRODIURIL  Your last dose was:  03/06/2020 812am  Your next dose is:  03/07/2020 8am      Take 50 mg by mouth daily. 50 mg         losartan 100 mg tablet  Commonly known as:  COZAAR  Your last dose was:  03/06/2020 0812am  Your next dose is:  03/07/2020 8am      Take 100 mg by mouth daily. 100 mg         NovoLIN N NPH U-100 Insulin 100 unit/mL injection  Generic drug:  insulin NPH  Your last dose was:  03/06/2020 814am  Your next dose is:  As per schedule      35 Units by SubCUTAneous route once.  35u morning; 30u at bedtime  Indications: 60 units in morning and 60 units in the evening   35 Units         * NovoLIN R Regular U-100 Insuln 100 unit/mL injection  Generic drug:  insulin regular  Your next dose is:  As per your schedule      10 Units by SubCUTAneous route daily (with breakfast). 10 Units         * NovoLIN R Regular U-100 Insuln 100 unit/mL injection  Generic drug:  insulin regular  Your next dose is:  As per your schedule      10 Units by SubCUTAneous route daily (with dinner). 10 Units         PaxiL 40 mg tablet  Generic drug:  PARoxetine  Your last dose was:  03/06/2020 813am  Your next dose is:  03/07/2020 8am      Take 40 mg by mouth daily. 40 mg         triamcinolone acetonide 0.1 % ointment  Commonly known as:  KENALOG      Apply  to affected area two (2) times a day. use thin layer  Applies on face at bipap allergy site          Tylenol Extra Strength 500 mg tablet  Generic drug:  acetaminophen      Take 1,000 mg by mouth every six (6) hours as needed for Pain.   1,000 mg               * This list has 2 medication(s) that are the same as other medications prescribed for you. Read the directions carefully, and ask your doctor or other care provider to review them with you.                 STOP taking these medications      furosemide 20 mg tablet  Commonly known as:  LASIX                  Where to Get Your Medications        These medications were sent to [facility] [address] [id number]      Phone:  [phone]   bumetanide 1 mg tablet  carvediloL 12.5 mg tablet            The patient's chart, MAR, and AVS were reviewed by   Tatum Guajardo PharmD, BCPS  Contact: [phone]

## 2020-03-06 NOTE — NURSE NAVIGATOR
Met with patient at bedside prior to discharge. Patient was able to teach back how to do daily weight and parameters to call MD for. Reviewed new medication, bumex. Reviewed action and purpose as well as instructed on stopping lasix. Patient states she can  this medication today. Emphasized importance of not missing doses of diuretics. Reviewed tips for dry mouth and guidelines to avoid excessive fluid intake. Patient again states she was forcing herself to drink extra fluids because she thought this would flush fluid out of her body. [facility] She has Cardiology follow up in 5 days. Readmission risk score is 16. [facility] PCP follow up scheduled for first available next week.

## 2020-03-06 NOTE — PROGRESS NOTES
CM follow up:    Order received for home health nursing. Talked with patient about home health follow up, patient agreeable and states she has been followed by [facility] in the past and prefers them again. Freedom of Choice letter signed and placed on chart. Referral sent to [facility], await response. JAVAD Plan:   1. Monitor patient response to treatment and recommendations. 2. Home health order received, referral sent to [facility], await response. 3. Patient to be transported home per family. 4. CM to monitor for discharge needs. 10:35am  Patient accepted by [place] office. AVS updated.     Miller Phelps, RN, MSN/Care manager

## 2020-03-10 ENCOUNTER — PATIENT OUTREACH (OUTPATIENT)
Dept: CARDIOLOGY CLINIC | Age: 75
End: 2020-03-10

## 2020-03-10 NOTE — PROGRESS NOTES
Hospital Discharge Follow-Up      Date/Time:  3/10/2020 10:33 AM  Arlen Aparicio RN, Care Transition Nurse  [address] [facility] Ambulatory Care Coordination Team  Direct phone:  [phone]    Patient was admitted to [facility] on 3/3/20 and discharged on 3/6/20 for elective cath- postponed due to HFpEF and HTN. The physician discharge summary was available at the time of outreach. Patient was contacted within 2 business days of discharge. Top Challenges reviewed with the provider   Sent CC message to cardiology provider about two medications that she had not been taking PTA- relayed her reasons for not taking. Advance Care Planning:   Does patient have an Advance Directive:  not on file        Method of communication with provider :staff message    Was this a readmission? no     Care Transition Nurse (CTN) contacted the patient by telephone to perform post hospital discharge assessment. Verified name and  with patient as identifiers. Provided introduction to self, and explanation of the CTN role. Patient received hospital discharge instructions. CTN reviewed discharge instructions and red flags with patient who verbalized understanding. Patient given an opportunity to ask questions and does not have any further questions or concerns at this time. The patient agrees to contact the PCP office for questions related to their healthcare. CTN provided contact information for future reference. Disease Specific:   CHF   Heart Failure Note    Do you have a Scale:    yes   How often do you weigh:  weighing each morning    Daily Weight (document daily weights in flowsheets):   Decrease   Amount:  255.4 this morning        Provider Notified:   No     Zone:(Pt Reported)  green     Echo done on: 20  · Normal cavity size and systolic function (ejection fraction normal). Mild concentric hypertrophy. Estimated left ventricular ejection fraction is 50 - 55%.  Visually measured ejection fraction. · Mildly dilated RV with mild RV systolic dysfunction. · Image quality for this study was technically difficult. Contrast used: DEFINITY. Type of HF:   HFpEF     Cardiac Device present: none      Heart Failure Medications: HCTZ 50 mg, ASA 81 mg, ACE/ARB, Betablocker, Diuretic       Patients top risk factors for readmission:  financial, functional physical ability, ineffective coping, lack of knowledge about disease, level of motivation, medical condition, medication management, multi health system providers, PCP relationship, polypharmacy, utilization of services    Home Health orders at discharge: lives outside the [facility] , PT, OT, [address]: [address] [facility] office  Date of initial visit: 3/7/20    Durable Medical Equipment ordered at discharge: none    Medication(s):   New Medications at Discharge: bumetanide 1 mg daily  Changed Medications at Discharge: increased carvedilol to 12.5 mg BID   Discontinued Medications at Discharge: furosemide 20 mg    Medication reconciliation was performed with patient, who verbalizes understanding of administration of home medications. There were no barriers to obtaining medications identified at this time. Referral to Pharm D needed: no     Current Outpatient Medications   Medication Sig    bumetanide (BUMEX) 1 mg tablet Take 1 Tab by mouth daily.  carvediloL (COREG) 12.5 mg tablet Take 1 Tab by mouth two (2) times daily (with meals).  insulin NPH (NOVOLIN N NPH U-100 INSULIN) 100 unit/mL injection 35 Units by SubCUTAneous route once. 35u morning; 30u at bedtime  Indications: 60 units in morning and 60 units in the evening    acetaminophen (TYLENOL EXTRA STRENGTH) 500 mg tablet Take 1,000 mg by mouth every six (6) hours as needed for Pain.  insulin regular (NOVOLIN R REGULAR U-100 INSULN) 100 unit/mL injection 10 Units by SubCUTAneous route daily (with breakfast).     insulin regular (NOVOLIN R REGULAR U-100 INSULN) 100 unit/mL injection 10 Units by SubCUTAneous route daily (with dinner).  triamcinolone acetonide (KENALOG) 0.1 % ointment Apply  to affected area two (2) times a day. use thin layer  Applies on face at bipap allergy site    aspirin delayed-release 81 mg tablet Take 81 mg by mouth daily.  hydroCHLOROthiazide (HYDRODIURIL) 50 mg tablet Take 50 mg by mouth daily.  losartan (COZAAR) 100 mg tablet Take 100 mg by mouth daily.  PARoxetine (PAXIL) 40 mg tablet Take 40 mg by mouth daily. No current facility-administered medications for this visit. There are no discontinued medications. [facility] follow up appointment(s):   Future Appointments   Date Time Provider Carla Juanita   3/11/2020  3:20 PM Jaciel Samuels MD [address]   6/5/2020 11:00 AM [facility] ER [address] [facility]       Non-[facility] follow up appointment(s):   PCP- Renée Johnson NP 3/13 at 10:30  [facility]:  out of service area       Goals        Heart Failure     Initiate and reinforce education of the patient/family about management of disease and lifestyle changes. 3/10/20- spoke with Ms. Vincent Sarwatmarla-   · Wt this morning was 255.4 lbs - bumex is working- stated that furosemide was not \"working\" so she can see why they changed it. · She is not experiencing any symptoms- activity limited by chronic back pain- s/p 3 back surgeries. Uses scooter for long shopping trips- but if she runs in to  meds- she can walk in. Denies any LE edema. · She has a BP cuff but has not gotten back into routine of checking- HH has checked and she has had so many visitors that she has been off schedule. Explained mechanism of action of increased carvedilol dose-lowering HR. Asked her to check BP and HR BID prior to taking carvedilol. Review of medications show that she has not been taking: HCTZ for a while- stated that she read that if you have diabetes- she should not take.    Losartan- said that when she takes it- it makes her legs weak. · Has not been using CPAP- still has it at home due to masks- she tried several and they all gave her a rash around her mask area- still still has a slight rash and is still using cream BID as prescribed. She does not feel confident that there is a mask that would not cause this rash. · She does use salt, does look at labels- explained that less is best- staying under 1500 mg per day is better for BP control. Aim for 300-400 mg per meal including beverages. · In the setting of eating low NA- asked her to drink fluids to stay hydrated 48-64 ounces     Updated Cardiology on above. Updated her care team for current providers.   [employer]

## 2020-03-11 ENCOUNTER — OFFICE VISIT (OUTPATIENT)
Dept: CARDIOLOGY CLINIC | Age: 75
End: 2020-03-11

## 2020-03-11 VITALS
HEART RATE: 80 BPM | OXYGEN SATURATION: 97 % | DIASTOLIC BLOOD PRESSURE: 64 MMHG | SYSTOLIC BLOOD PRESSURE: 138 MMHG | HEIGHT: 67 IN | WEIGHT: 275 LBS | BODY MASS INDEX: 43.16 KG/M2 | RESPIRATION RATE: 16 BRPM

## 2020-03-11 DIAGNOSIS — I50.31 ACUTE DIASTOLIC CHF (CONGESTIVE HEART FAILURE) (HCC): Primary | ICD-10-CM

## 2020-03-11 DIAGNOSIS — R06.09 DOE (DYSPNEA ON EXERTION): ICD-10-CM

## 2020-03-11 DIAGNOSIS — I10 ESSENTIAL HYPERTENSION: ICD-10-CM

## 2020-03-11 NOTE — LETTER
NOTIFICATION OF RETURN TO WORK / SCHOOL 
 
3/11/2020 3:59 PM 
 
Ms. Paulina Scott [address] [facility]  [address] [place] To Whom It May Concern: 
 
Paulina Scott was under the care of [address] from 03/03/20 to 03/06/20. If there are questions or concerns please have the patient contact our office. Sincerely, Abbey Keller MD

## 2020-03-11 NOTE — PATIENT INSTRUCTIONS
Stop Hydrochlorthiazide. Increase Coreg to 25mg po twice daily. Reduce Cozaar to 50mg po daily. See Dr. Teddy Muniz in 3 months.

## 2020-03-11 NOTE — PROGRESS NOTES
Chief Complaint   Patient presents with    Follow-up     1 month    CHF    Hypertension    Other     s/p cath 3/2/20      Visit Vitals  Ht 5' 7\" (1.702 m)   BMI 42.13 kg/m²       Pt presents in office f/u after hositalizaion        Denies Chest pain, SOB,denies palpitations,swelling. Recent hospital visit 3/3-3/6/20 SOB, CHF. No Refills needed.     States has not been taking HCTZ lately because she feels like it increases her blood glucose

## 2020-03-11 NOTE — PROGRESS NOTES
Office Follow-up    NAME: Arianna Pritchard   :  1945  MRM:  [id number]    Date:  3/11/2020            Assessment:     Problem List  Date Reviewed: 2020          Codes Class Noted    Shortness of breath ICD-10-CM: R06.02  ICD-9-CM: 786.05  3/3/2020        CHF (congestive heart failure) ([facility] ICD-10-CM: I50.9  ICD-9-CM: 428.0  3/4/2020        Infected sebaceous cyst ICD-10-CM: L72.3, L08.9  ICD-9-CM: 706.2  2018        Abscess ICD-10-CM: L02.91  ICD-9-CM: 682.9  2018        Morbid obesity ([facility] ICD-10-CM: E66.01  ICD-9-CM: 278.01  2018        SIRS (systemic inflammatory response syndrome) ([place] [address].) ICD-10-CM: R65.10  ICD-9-CM: 995.90  2016        Hypertension ICD-10-CM: I10  ICD-9-CM: 401.9  Unknown        Type 2 diabetes mellitus (HCC) ICD-10-CM: E11.9  ICD-9-CM: 250.00  Unknown        Depression with anxiety ICD-10-CM: F41.8  ICD-9-CM: 300.4  Unknown        Glaucoma ICD-10-CM: H40.9  ICD-9-CM: 365.9  Unknown        Breast cancer ([place] [address].) ICD-10-CM: C50.919  ICD-9-CM: 174.9  Unknown                 Plan:     1. Accelerated hypertension: Blood pressure is significantly better . Continue Coreg however we will increase the dose to 25 mg twice daily. We will reduce the dosage of losartan to 50 mg p.o. daily since she is having slightly lower diastolic pressure and feeling of substernal discomfort after taking losartan. I will discontinue hydrochlorothiazide because of concern for hyperglycemia. Continue Bumex 1 mg p.o. daily. 2. Diastolic congestive heart failure: She is now euvolemic. There is no significant peripheral edema. Continue Bumex. Control blood pressure. Discontinue hydrochlorothiazide. 3. CAD: Moderate CAD with heavy calcification of the coronary arteries. Continue medical management with blood pressure control. Continue baby aspirin every day. She does not want to be on statins. 4. See Dr. Maria Guadalupe Webster in 3 months.                    ATTENTION:   This medical record was transcribed using an electronic medical records/speech recognition system. Although proofread, it may and can contain electronic, spelling and other errors. Corrections may be executed at a later time. Please feel free to contact us for any clarifications as needed. Subjective:     Josseline Miller, a 77y.o. year-old who presents for followup. She was recently admitted to the hospital on March 3, 2024 cardiac catheterization after an abnormal stress test however on the day of the cardiac catheterization she was quite anxious and her blood pressure was severely elevated therefore she was admitted to the hospital for blood pressure control. She also had mild pulmonary edema. She was given IV diuretics and also her medications were titrated. Once the blood pressure was better controlled she underwent for left heart catheterization next day which demonstrated severe coronary calcification but no significant obstructive disease. She had 40 to 50% LAD stenosis at D2 bifurcation. LCx had 30% ostial and distal 40% lesion. She was discharged home with medication and medical management. She was found followed by our nurse navigator on March 10, 2020. Medication reconciliation was performed. She is doing better and feels better. Her main concerns are her blood sugar being elevated after taking hydrochlorothiazide and she sometimes feels substernal discomfort when she takes Cozaar. No symptoms of chest pain. Symptoms of shortness of breath has improved. This is a JAVAD visit.  Patient discharged on 3/6/20, NN f/u on March 10th 2020      [facility] on 3/4/2020:    L Main: large caliber, calcified, no critical disease  LAD: large caliber, calcified, 40-50% stenosis at D2 bifurcation, supplies two diagonals, D1 small caliber and D2 moderate caliber with diffuse moderate disease  LCx: large caliber, calcified, ostial 30%, supplies a few very small marginals and continues a large caliber marginal with distal 40%  stenosis  RCA: moderate caliber, diffuse mild to moderate disease, small PDA and RPLV branch    Exam:     Physical Exam:  Visit Vitals  /64 (BP 1 Location: Left arm, BP Patient Position: Sitting)   Pulse 80   Resp 16   Ht 5' 7\" (1.702 m)   Wt 275 lb (124.7 kg)   SpO2 97%   BMI 43.07 kg/m²     General appearance - alert, well appearing, and in no distress  Mental status - affect appropriate to mood  Eyes - sclera anicteric, moist mucous membranes  Neck - supple, no significant adenopathy  Chest - clear to auscultation, no wheezes, rales or rhonchi  Heart - normal rate, regular rhythm, normal S1, S2, no murmurs, rubs, clicks or gallops  Abdomen - soft, nontender, nondistended, no masses or organomegaly  Extremities - peripheral pulses normal, no pedal edema  Skin - normal coloration  no rashes    Medications:     Current Outpatient Medications   Medication Sig    bumetanide (BUMEX) 1 mg tablet Take 1 Tab by mouth daily.  carvediloL (COREG) 12.5 mg tablet Take 1 Tab by mouth two (2) times daily (with meals).  insulin NPH (NovoLIN N NPH U-100 Insulin) 100 unit/mL injection 35 Units by SubCUTAneous route once. 35u morning; 30u at bedtime  Indications: 60 units in morning and 60 units in the evening    acetaminophen (TYLENOL EXTRA STRENGTH) 500 mg tablet Take 1,000 mg by mouth every six (6) hours as needed for Pain.  insulin regular (NOVOLIN R REGULAR U-100 INSULN) 100 unit/mL injection 10 Units by SubCUTAneous route daily (with breakfast).  insulin regular (NOVOLIN R REGULAR U-100 INSULN) 100 unit/mL injection 10 Units by SubCUTAneous route daily (with dinner).  losartan (COZAAR) 100 mg tablet Take 100 mg by mouth daily.  aspirin delayed-release 81 mg tablet Take 81 mg by mouth daily.  hydroCHLOROthiazide (HYDRODIURIL) 50 mg tablet Take 50 mg by mouth daily.  PARoxetine (PAXIL) 40 mg tablet Take 40 mg by mouth daily.     triamcinolone acetonide (KENALOG) 0.1 % ointment Apply  to affected area two (2) times a day. use thin layer  Applies on face at bipap allergy site     No current facility-administered medications for this visit. Diagnostic Data Review:       No specialty comments available. Lab Review:   No results found for: CHOL, CHOLX, CHLST, CHOLV, 722425, HDL, HDLP, LDL, LDLC, DLDLP, TGLX, TRIGL, TRIGP, CHHD, CHHDX  Lab Results   Component Value Date/Time    Creatinine (POC) 0.4 (L) 04/17/2019 01:57 PM    Creatinine 0.93 03/06/2020 06:01 AM     Lab Results   Component Value Date/Time    BUN 21 (H) 03/06/2020 06:01 AM     Lab Results   Component Value Date/Time    Potassium 3.7 03/06/2020 06:01 AM     Lab Results   Component Value Date/Time    Hemoglobin A1c 9.2 (H) 06/12/2018 05:59 AM     Lab Results   Component Value Date/Time    HGB 14.3 03/04/2020 09:00 AM     Lab Results   Component Value Date/Time    PLATELET 006 56/83/3583 09:00 AM     No results for input(s): CPK, CKMB, TROIQ in the last 72 hours. No lab exists for component: CKQMB, CPKMB             ___________________________________________________    Reina Lara.  Ajith Gomez MD, [facility]

## 2020-03-20 ENCOUNTER — TELEPHONE (OUTPATIENT)
Dept: CARDIOLOGY CLINIC | Age: 75
End: 2020-03-20

## 2020-03-20 NOTE — LETTER
3/25/2020 8:37 AM 
 
Ms. Magali Reza [address] [facility]  [address] To Whom it may Concern. Magali Reza underwent cardiac evaluation and based on the available data he/she is an intermediate risk candidate from cardiac standpoint to undergo an EGD. Magali Reza may proceed with his/her planned EGD. Thanks for giving us the opportunity to participate in the care of your patient. Sincerely, Mychal Maria MD

## 2020-03-20 NOTE — TELEPHONE ENCOUNTER
Received a fax from Mai Child NP with [facility] requesting cardiac clearance for EGD. Fax [phone]. Patient was last seen 3-. Patient takes aspirin.     Phone: [phone]

## 2020-03-24 ENCOUNTER — TELEPHONE (OUTPATIENT)
Dept: CARDIOLOGY CLINIC | Age: 75
End: 2020-03-24

## 2020-03-24 NOTE — TELEPHONE ENCOUNTER
Evelyne from [employer] is calling to speak with Dr. Lakisha Kong nurse regarding the patient. Please advise. Following up on a clearance on the patient that was faxed to out office on 3/19.     Phone: [phone]

## 2020-03-31 RX ORDER — CARVEDILOL 25 MG/1
25 TABLET ORAL 2 TIMES DAILY WITH MEALS
Qty: 180 TAB | Refills: 3 | Status: SHIPPED | OUTPATIENT
Start: 2020-03-31 | End: 2020-07-02 | Stop reason: ALTCHOICE

## 2020-03-31 NOTE — TELEPHONE ENCOUNTER
Per VO of Dr. Bernardo Hunter: 3/11/2020    Future Appointments   Date Time Provider Carla Grant   6/5/2020 11:00 AM [facility] ER 3 [facility]   6/12/2020  2:20 PM Mary Anne Samuels MD [address] [facility]       Requested Prescriptions     Signed Prescriptions Disp Refills    carvediloL (COREG) 25 mg tablet 180 Tab 3     Sig: Take 1 Tab by mouth two (2) times daily (with meals).      Authorizing Provider: Rodriguez Morin     Ordering User: Reji Moore

## 2020-03-31 NOTE — TELEPHONE ENCOUNTER
Patient needs refill because was it was increased at her last visit and has run out. Req'd 90 day supply.      Phone: [phone]

## 2020-04-20 NOTE — PROGRESS NOTES
Medication changes made per VO of Dr. Yumiko Saunders. Stop Hydrochlorthiazide. Increase Coreg to 25mg po twice daily.    Reduce Cozaar to 50mg po daily

## 2020-04-30 NOTE — PATIENT INSTRUCTIONS
Dr. Cervantes: Please advise.   Lexiscan stress Nuclear study- SOB, Edema. Echocardiogram- SOB See Dr. Francis Yang in 4-6 weeks.

## 2020-07-02 ENCOUNTER — VIRTUAL VISIT (OUTPATIENT)
Dept: CARDIOLOGY CLINIC | Age: 75
End: 2020-07-02

## 2020-07-02 DIAGNOSIS — I10 ESSENTIAL HYPERTENSION: Primary | ICD-10-CM

## 2020-07-02 DIAGNOSIS — E66.01 MORBID OBESITY (HCC): ICD-10-CM

## 2020-07-02 DIAGNOSIS — E11.65 UNCONTROLLED TYPE 2 DIABETES MELLITUS WITH HYPERGLYCEMIA (HCC): ICD-10-CM

## 2020-07-02 DIAGNOSIS — I50.32 CHRONIC HEART FAILURE WITH PRESERVED EJECTION FRACTION (HCC): ICD-10-CM

## 2020-07-02 DIAGNOSIS — I10 ESSENTIAL HYPERTENSION: ICD-10-CM

## 2020-07-02 DIAGNOSIS — I25.10 ATHEROSCLEROSIS OF NATIVE CORONARY ARTERY OF NATIVE HEART WITHOUT ANGINA PECTORIS: Primary | ICD-10-CM

## 2020-07-02 DIAGNOSIS — I25.10 CORONARY ARTERY DISEASE INVOLVING NATIVE CORONARY ARTERY OF NATIVE HEART WITHOUT ANGINA PECTORIS: ICD-10-CM

## 2020-07-02 RX ORDER — FUROSEMIDE 40 MG/1
40 TABLET ORAL 2 TIMES DAILY
COMMUNITY
End: 2020-10-20 | Stop reason: SDUPTHER

## 2020-07-02 RX ORDER — FAMOTIDINE 20 MG/1
40 TABLET, FILM COATED ORAL 2 TIMES DAILY
COMMUNITY
End: 2022-04-21

## 2020-07-02 RX ORDER — METOPROLOL TARTRATE 25 MG/1
25 TABLET, FILM COATED ORAL 2 TIMES DAILY
Qty: 60 TAB | Refills: 0 | Status: SHIPPED | OUTPATIENT
Start: 2020-07-02 | End: 2020-07-24

## 2020-07-02 NOTE — PROGRESS NOTES
TELEPHONE VISIT DOCUMENTATION     Pursuant to the emergency declaration under the Chen Act and the National Emergencies Act, 1135 waiver authority and the Coronavirus Preparedness and Response Supplemental Appropriations Act, this Telephone Visit was conducted, with patient's consent, to reduce the patient's risk of exposure to COVID-19 and provide continuity of care for an established patient.     She and/or health care decision maker is aware that that she may receive a bill for this telephone service, depending on her insurance coverage, and has provided verbal consent to proceed.  This is a Patient Initiated Episode with an Established Patient who has not had a related appointment within my department in the past 7 days or scheduled within the next 24 hours.         PLAN     1. Accelerated hypertension: Blood pressure is significantly better      .  Continue Coreg however we will increase the dose to 25 mg twice daily.  We will reduce the dosage of losartan to 50 mg p.o. daily since she is having slightly lower diastolic pressure and feeling of substernal discomfort after taking losartan.  I will discontinue hydrochlorothiazide because of concern for hyperglycemia.  Continue Bumex 1 mg p.o. daily.  2. Diastolic congestive heart failure: She is now euvolemic.  There is no significant peripheral edema.  Continue Bumex.  Control blood pressure.  Discontinue hydrochlorothiazide.  3. CAD: Moderate CAD with heavy calcification of the coronary arteries.  Continue medical management with blood pressure control.  Continue baby aspirin every day.   She does not want to be on statins.  4. See Dr. Samuels in 3 months.         We discussed the expected course, resolution and complications of the diagnosis(es) in detail.  Medication risks, benefits, costs, interactions, and alternatives were discussed as indicated.  I advised her to contact the office if her condition worsens, changes or fails to improve as  anticipated. She expressed understanding with the diagnosis(es) and plan    HISTORY OF PRESENTING ILLNESS      Noni Silvestre is a 77 y.o. female evaluated via telephone on 7/2/2020 due to COVID 19 restrictions. Patient unable to participate in Virtual Visit with synchronous audio/visual technology. Patient     --------------------    Ruben Han, a 77y.o. year-old who presents for followup. She was recently admitted to the hospital on March 3, 2024 cardiac catheterization after an abnormal stress test however on the day of the cardiac catheterization she was quite anxious and her blood pressure was severely elevated therefore she was admitted to the hospital for blood pressure control. She also had mild pulmonary edema. She was given IV diuretics and also her medications were titrated. Once the blood pressure was better controlled she underwent for left heart catheterization next day which demonstrated severe coronary calcification but no significant obstructive disease. She had 40 to 50% LAD stenosis at D2 bifurcation. LCx had 30% ostial and distal 40% lesion. She was discharged home with medication and medical management. She was found followed by our nurse navigator on March 10, 2020. Medication reconciliation was performed. She is doing better and feels better. Her main concerns are her blood sugar being elevated after taking hydrochlorothiazide and she sometimes feels substernal discomfort when she takes Cozaar. No symptoms of chest pain. Symptoms of shortness of breath has improved.        ACTIVE PROBLEM LIST     Patient Active Problem List    Diagnosis Date Noted    Shortness of breath 03/03/2020     Priority: 1 - One    CHF (congestive heart failure) ([facility] 03/04/2020    Infected sebaceous cyst 06/13/2018    Abscess 06/11/2018    Morbid obesity ([facility] 06/11/2018    SIRS (systemic inflammatory response syndrome) ([facility] 06/11/2016    Hypertension     Type 2 diabetes mellitus ([facility]  Depression with anxiety     Glaucoma     Breast cancer ([facility]            PAST MEDICAL HISTORY     Past Medical History:   Diagnosis Date    Breast cancer ([facility]     right masectomy, cervical    Cervical cancer (HCC)     Chronic pain     L4-L5 discectomy    Congestive heart failure (HCC)     Depression with anxiety     Diabetes (HCC)     Ectopic pregnancy     Glaucoma     Hypertension     Miscarriage     Sleep apnea     CPAP compliant but not using now because of rash on face from the mask           PAST SURGICAL HISTORY     Past Surgical History:   Procedure Laterality Date    BREAST SURGERY PROCEDURE UNLISTED Right     right mastectomy    HX CARPAL TUNNEL RELEASE Bilateral     HX CATARACT REMOVAL Bilateral 2013    HX HYSTERECTOMY  1980's    cervical cancer    HX KNEE ARTHROSCOPY Bilateral     HX ORTHOPAEDIC  8155,1161    back surgery x 2    HX ORTHOPAEDIC Left 1990    ligament attachment arm    HX PELVIC LAPAROSCOPY  1970s    ectopic pregnancy    HX TUMOR REMOVAL Right     leg          ALLERGIES     Allergies   Allergen Reactions    Lisinopril Cough    Percocet [Oxycodone-Acetaminophen] Nausea Only    Percodan [Oxycodone-Aspirin] Nausea Only    Prednisone Nausea Only    Sulfa (Sulfonamide Antibiotics) Unknown (comments)          FAMILY HISTORY     Family History   Problem Relation Age of Onset    Diabetes Mother     Hypertension Mother     negative for cardiac disease       SOCIAL HISTORY     Social History     Socioeconomic History    Marital status:      Spouse name: Not on file    Number of children: Not on file    Years of education: Not on file    Highest education level: Not on file   Tobacco Use    Smoking status: Never Smoker    Smokeless tobacco: Never Used   Substance and Sexual Activity    Alcohol use: No    Drug use: No         MEDICATIONS     Current Outpatient Medications   Medication Sig    furosemide (Lasix) 40 mg tablet Take 40 mg by mouth two (2) times a day.  famotidine (Pepcid AC) 20 mg tablet Take 40 mg by mouth two (2) times a day.  insulin NPH (NovoLIN N NPH U-100 Insulin) 100 unit/mL injection 35 Units by SubCUTAneous route once. 35u morning; 30u at bedtime  Indications: 60 units in morning and 60 units in the evening    acetaminophen (TYLENOL EXTRA STRENGTH) 500 mg tablet Take 1,000 mg by mouth every six (6) hours as needed for Pain.  insulin regular (NOVOLIN R REGULAR U-100 INSULN) 100 unit/mL injection 10 Units by SubCUTAneous route daily (with breakfast).  insulin regular (NOVOLIN R REGULAR U-100 INSULN) 100 unit/mL injection 10 Units by SubCUTAneous route daily (with dinner).  losartan (COZAAR) 100 mg tablet Take 50 mg by mouth daily.  triamcinolone acetonide (KENALOG) 0.1 % ointment Apply  to affected area two (2) times a day. use thin layer  Applies on face at bipap allergy site    aspirin delayed-release 81 mg tablet Take 81 mg by mouth daily.  carvediloL (COREG) 25 mg tablet Take 1 Tab by mouth two (2) times daily (with meals).  bumetanide (BUMEX) 1 mg tablet Take 1 Tab by mouth daily.  PARoxetine (PAXIL) 40 mg tablet Take 40 mg by mouth daily. No current facility-administered medications for this visit. I have reviewed the nurses notes, vitals, problem list, allergy list, medical history, family, social history and medications. REVIEW OF SYMPTOMS     Constitutional: Negative for fever, chills, malaise/fatigue and diaphoresis. Respiratory: Negative for cough, hemoptysis, sputum production, shortness of breath and wheezing. Cardiovascular: Negative for chest pain, palpitations, orthopnea, claudication, leg swelling and PND. Gastrointestinal: Negative for heartburn, nausea, vomiting, blood in stool and melena. Genitourinary: Negative for dysuria and flank pain. Musculoskeletal: Negative for joint pain and back pain. Skin: Negative for rash.   Neurological: Negative for focal weakness, seizures, loss of consciousness, weakness and headaches. Endo/Heme/Allergies: Negative for abnormal bleeding. Psychiatric/Behavioral: Negative for memory loss. DIAGNOSTIC DATA      No specialty comments available. LABORATORY DATA      Lab Results   Component Value Date/Time    WBC 9.6 03/04/2020 09:00 AM    HGB 14.3 03/04/2020 09:00 AM    HCT 45.8 03/04/2020 09:00 AM    PLATELET 834 13/08/1778 09:00 AM    MCV 85.1 03/04/2020 09:00 AM      Lab Results   Component Value Date/Time    Sodium 137 03/06/2020 06:01 AM    Potassium 3.7 03/06/2020 06:01 AM    Chloride 100 03/06/2020 06:01 AM    CO2 33 (H) 03/06/2020 06:01 AM    Anion gap 4 (L) 03/06/2020 06:01 AM    Glucose 165 (H) 03/06/2020 06:01 AM    BUN 21 (H) 03/06/2020 06:01 AM    Creatinine 0.93 03/06/2020 06:01 AM    BUN/Creatinine ratio 23 (H) 03/06/2020 06:01 AM    GFR est AA >60 03/06/2020 06:01 AM    GFR est non-AA 59 (L) 03/06/2020 06:01 AM    Calcium 8.6 03/06/2020 06:01 AM    Bilirubin, total 0.7 03/03/2020 02:30 PM    Alk.  phosphatase 100 03/03/2020 02:30 PM    Protein, total 8.2 03/03/2020 02:30 PM    Albumin 3.3 (L) 03/03/2020 02:30 PM    Globulin 4.9 (H) 03/03/2020 02:30 PM    A-G Ratio 0.7 (L) 03/03/2020 02:30 PM    ALT (SGPT) 106 (H) 03/03/2020 02:30 PM           FOLLOW-UP          Total Time: minutes: 11-20 minutes      MD [facility] [address] [place] [address] Drive        [phone] / [phone] Fax       Jesus Shah  [address] [place] [address], [address] [place] [address] [address]  [place]  [phone] / [phone] Fax

## 2020-07-02 NOTE — PROGRESS NOTES
VIRTUAL VISIT DOCUMENTATION     Pursuant to the emergency declaration under the [facility] waiver authority and the [employer] Act, this Virtual  Visit was conducted, with patient's consent, to reduce the patient's risk of exposure to COVID-19 and provide continuity of care for an established patient. Services were provided through a video synchronous discussion virtually to substitute for in-person clinic visit. CHIEF COMPLAINT      Flavio Diop is a 77 y.o. female who was seen by synchronous (real-time) audio-video technology on 7/2/2020. Patient is being seen today for eval of leg swelling. ASSESSMENT        ICD-10-CM ICD-9-CM    1. Essential hypertension  I10 401.9    2. Chronic heart failure with preserved ejection fraction (HCC)  I50.32 428.9    3. Coronary artery disease involving native coronary artery of native heart without angina pectoris  I25.10 414.01    4. Uncontrolled type 2 diabetes mellitus with hyperglycemia ([facility])  E11.65 250.02    5. Morbid obesity ([facility]  E66.01 278.01         PLAN     HTN, elevated - start low dose BB - metoprolol 25mg BID; pt prev dc'd coreg due to questionable side effects. Cont losartan.       HFpEF - +LE swelling - recently switched from Bumex to lasix - swelling starting to improve on lasix 40mg BID. Check updated labs at next visit (Na 144, BUN 16, Cr 0.82 5/2020).     Non-obstructive CAD: per cath 3/2020. Continue medical management with blood pressure control and aspirin 81mg/d. No anginal c/o.        Dyslipidemia - , , HDL 28, . Goal LDL <70 given DM / CAD. Pt has declined statin in the past.     DM HgbA1c 8.3% 5/2020 - needs improved controlled. On insulin therapy.   May benefit from SGLT2i; management per PCP.     F/u in 2 weeks or PRN       We discussed the expected course, resolution and complications of the diagnosis(es) in detail. Medication risks, benefits, costs, interactions, and alternatives were discussed as indicated. I advised her to contact the office if her condition worsens, changes or fails to improve as anticipated. She expressed understanding with the diagnosis(es) and plan    HISTORY OF PRESENTING ILLNESS      Mable B Ferna Litten is a 77 y.o. female being seen for eval of LE swelling. States she had significant swelling on Bumex but this was recently changed to lasix 40mg BID by PCP. Leg swelling is getting better; still not baseline. No longer taking coreg; states she didn't feel well on this medication and dc'd it. Reports increased muscle cramps which have now improved. Home BP elevated - VS this /78 with HR 95. Patient denies any chest pain, dyspnea, palpitations, syncope, orthopnea, or paroxysmal nocturnal dyspnea.          ACTIVE PROBLEM LIST     Patient Active Problem List    Diagnosis Date Noted    Shortness of breath 03/03/2020     Priority: 1 - One    CHF (congestive heart failure) ([address] 03/04/2020    Infected sebaceous cyst 06/13/2018    Abscess 06/11/2018    Morbid obesity ([address] 06/11/2018    SIRS (systemic inflammatory response syndrome) ([address] 06/11/2016    Hypertension     Type 2 diabetes mellitus ([address]     Depression with anxiety     Glaucoma     Breast cancer ([address]            PAST MEDICAL HISTORY     Past Medical History:   Diagnosis Date    Breast cancer ([address]     right masectomy, cervical    Cervical cancer (HCC)     Chronic pain     L4-L5 discectomy    Congestive heart failure (HCC)     Depression with anxiety     Diabetes ([address]     Ectopic pregnancy     Glaucoma     Hypertension     Miscarriage     Sleep apnea     CPAP compliant but not using now because of rash on face from the mask           PAST SURGICAL HISTORY     Past Surgical History:   Procedure Laterality Date    BREAST SURGERY PROCEDURE UNLISTED Right     right mastectomy    HX CARPAL TUNNEL RELEASE Bilateral     HX CATARACT REMOVAL Bilateral 2013    HX HYSTERECTOMY  1980's    cervical cancer    HX KNEE ARTHROSCOPY Bilateral     HX ORTHOPAEDIC  8485,8798    back surgery x 2    HX ORTHOPAEDIC Left 1990    ligament attachment arm    HX PELVIC LAPAROSCOPY  1970s    ectopic pregnancy    HX TUMOR REMOVAL Right     leg          ALLERGIES     Allergies   Allergen Reactions    Lisinopril Cough    Percocet [Oxycodone-Acetaminophen] Nausea Only    Percodan [Oxycodone-Aspirin] Nausea Only    Prednisone Nausea Only    Sulfa (Sulfonamide Antibiotics) Unknown (comments)          FAMILY HISTORY     Family History   Problem Relation Age of Onset    Diabetes Mother     Hypertension Mother            SOCIAL HISTORY     Social History     Socioeconomic History    Marital status:      Spouse name: Not on file    Number of children: Not on file    Years of education: Not on file    Highest education level: Not on file   Tobacco Use    Smoking status: Never Smoker    Smokeless tobacco: Never Used   Substance and Sexual Activity    Alcohol use: No    Drug use: No         MEDICATIONS     Current Outpatient Medications   Medication Sig    furosemide (Lasix) 40 mg tablet Take 40 mg by mouth two (2) times a day.  famotidine (Pepcid AC) 20 mg tablet Take 40 mg by mouth two (2) times a day.  metoprolol tartrate (LOPRESSOR) 25 mg tablet Take 1 Tab by mouth two (2) times a day. Make sure you have stopped coreg when taking this med.  insulin NPH (NovoLIN N NPH U-100 Insulin) 100 unit/mL injection 35 Units by SubCUTAneous route once. 35u morning; 30u at bedtime  Indications: 60 units in morning and 60 units in the evening    insulin regular (NOVOLIN R REGULAR U-100 INSULN) 100 unit/mL injection 10 Units by SubCUTAneous route daily (with breakfast).  losartan (COZAAR) 100 mg tablet Take 50 mg by mouth daily.     triamcinolone acetonide (KENALOG) 0.1 % ointment Apply  to affected area two (2) times a day. use thin layer  Applies on face at bipap allergy site    aspirin delayed-release 81 mg tablet Take 81 mg by mouth daily. No current facility-administered medications for this visit. I have reviewed the nurses notes, vitals, problem list, allergy list, medical history, family, social history and medications. REVIEW OF SYMPTOMS     Constitutional: Negative for fever, chills, and diaphoresis. Respiratory: Negative for cough, hemoptysis, sputum production, shortness of breath and wheezing. Cardiovascular: Negative for chest pain, palpitations, orthopnea,  and PND. Gastrointestinal: Negative for vomiting, blood in stool and melena. Genitourinary: Negative for dysuria and flank pain. Neurological: Negative for seizures, loss of consciousness,   Endo/Heme/Allergies: Negative for abnormal bleeding. Psychiatric/Behavioral: Negative for memory loss. PHYSICAL EXAMINATION      Due to this being a TeleHealth evaluation, many elements of the physical examination are unable to be assessed. General: Well developed, in no acute distress, cooperative and alert  HEENT: No marked JVD visible on video. Respiratory: No audible wheezing, no signs of respiratory distress,  Neuro: A&Ox3, speech clear, no facial droop, answering questions appropriately  Skin: Skin color is normal. Non diaphoretic on visible skin during exam     DIAGNOSTIC DATA      02/20/20   ECHO ADULT COMPLETE 02/21/2020 2/21/2020    Narrative · Normal cavity size and systolic function (ejection fraction normal). Mild concentric hypertrophy. Estimated left ventricular ejection fraction   is 50 - 55%. Visually measured ejection fraction. · Mildly dilated RV with mild RV systolic dysfunction. · Image quality for this study was technically difficult. Contrast used:   DEFINITY.         Signed by: Lazarus Lindsay, MD     03/03/20   CARDIAC PROCEDURE 03/04/2020 3/4/2020    Narrative · Dense coroanry calcification without significant obstructive disease  · Elevated lvedp     Findings:   L Main: large caliber, calcified, no critical disease  LAD: large caliber, calcified, 40-50% stenosis at D2 bifurcation, supplies   two diagonals, D1 small caliber and D2 moderate caliber with diffuse   moderate disease  LCx: large caliber, calcified, ostial 30%, supplies a few very small   marginals and continues a large caliber marginal with distal 40%  stenosis  RCA: moderate caliber, diffuse mild to moderate disease, small PDA and   RPLV branch     LVEDP:  25 mmhg       Signed by: Rosalba Nash DO      LABORATORY DATA      Lab Results   Component Value Date/Time    WBC 9.6 03/04/2020 09:00 AM    HGB 14.3 03/04/2020 09:00 AM    HCT 45.8 03/04/2020 09:00 AM    PLATELET 485 41/21/3013 09:00 AM    MCV 85.1 03/04/2020 09:00 AM      Lab Results   Component Value Date/Time    Sodium 137 03/06/2020 06:01 AM    Potassium 3.7 03/06/2020 06:01 AM    Chloride 100 03/06/2020 06:01 AM    CO2 33 (H) 03/06/2020 06:01 AM    Anion gap 4 (L) 03/06/2020 06:01 AM    Glucose 165 (H) 03/06/2020 06:01 AM    BUN 21 (H) 03/06/2020 06:01 AM    Creatinine 0.93 03/06/2020 06:01 AM    BUN/Creatinine ratio 23 (H) 03/06/2020 06:01 AM    GFR est AA >60 03/06/2020 06:01 AM    GFR est non-AA 59 (L) 03/06/2020 06:01 AM    Calcium 8.6 03/06/2020 06:01 AM    Bilirubin, total 0.7 03/03/2020 02:30 PM    Alk. phosphatase 100 03/03/2020 02:30 PM    Protein, total 8.2 03/03/2020 02:30 PM    Albumin 3.3 (L) 03/03/2020 02:30 PM    Globulin 4.9 (H) 03/03/2020 02:30 PM    A-G Ratio 0.7 (L) 03/03/2020 02:30 PM    ALT (SGPT) 106 (H) 03/03/2020 02:30 PM         FOLLOW-UP     Follow-up and Dispositions    · Return in about 2 weeks (around 7/16/2020), or if symptoms worsen or fail to improve. Patient was made aware and verbalized understanding that an appointment will be scheduled for them for a virtual visit and/or office visit within the above time frame.  Patient understanding his/her responsibility to call and change time/date if he/she so chooses. Thank you, Matthew Cummings NP for allowing me to participate in the care of [address] [place] [address]. Please do not hesitate to contact me for further questions/concerns. Greater than 20 minutes was spent in direct video patient care, planning and chart review. This visit was conducted using [employer] telemedicine services.        Venecia Hayward NP    [address], [address] [place] [address]  [address]        [phone] / [phone] Fax

## 2020-07-16 ENCOUNTER — VIRTUAL VISIT (OUTPATIENT)
Dept: CARDIOLOGY CLINIC | Age: 75
End: 2020-07-16

## 2020-07-16 DIAGNOSIS — I50.32 CHRONIC HEART FAILURE WITH PRESERVED EJECTION FRACTION (HCC): ICD-10-CM

## 2020-07-16 DIAGNOSIS — E66.01 MORBID OBESITY (HCC): ICD-10-CM

## 2020-07-16 DIAGNOSIS — I10 ESSENTIAL HYPERTENSION: Primary | ICD-10-CM

## 2020-07-16 DIAGNOSIS — I25.10 CORONARY ARTERY DISEASE INVOLVING NATIVE CORONARY ARTERY OF NATIVE HEART WITHOUT ANGINA PECTORIS: ICD-10-CM

## 2020-07-20 ENCOUNTER — HOSPITAL ENCOUNTER (OUTPATIENT)
Dept: CT IMAGING | Age: 75
Discharge: HOME | End: 2020-07-20
Attending: NURSE PRACTITIONER
Payer: MEDICARE

## 2020-07-20 ENCOUNTER — TELEPHONE (OUTPATIENT)
Dept: CARDIOLOGY CLINIC | Age: 75
End: 2020-07-20

## 2020-07-20 DIAGNOSIS — R91.1 SOLITARY LUNG NODULE: ICD-10-CM

## 2020-07-20 PROCEDURE — 71250 CT THORAX DX C-: CPT

## 2020-07-20 NOTE — TELEPHONE ENCOUNTER
Can you please mail lab slips to patient? Mao Snow should obtain next week. Meño Villeda.       Lab orders have been mailed to patient.

## 2020-07-21 ENCOUNTER — TELEPHONE (OUTPATIENT)
Dept: CARDIOLOGY CLINIC | Age: 75
End: 2020-07-21

## 2020-07-25 LAB
BUN SERPL-MCNC: 12 MG/DL (ref 8–27)
BUN/CREAT SERPL: 16 (ref 12–28)
CALCIUM SERPL-MCNC: 9.1 MG/DL (ref 8.7–10.3)
CHLORIDE SERPL-SCNC: 102 MMOL/L (ref 96–106)
CO2 SERPL-SCNC: 28 MMOL/L (ref 20–29)
CREAT SERPL-MCNC: 0.74 MG/DL (ref 0.57–1)
GLUCOSE SERPL-MCNC: 204 MG/DL (ref 65–99)
MAGNESIUM SERPL-MCNC: 1.9 MG/DL (ref 1.6–2.3)
POTASSIUM SERPL-SCNC: 4 MMOL/L (ref 3.5–5.2)
SODIUM SERPL-SCNC: 143 MMOL/L (ref 134–144)

## 2020-08-03 ENCOUNTER — TELEPHONE (OUTPATIENT)
Dept: CARDIOLOGY CLINIC | Age: 75
End: 2020-08-03

## 2020-08-13 ENCOUNTER — OFFICE VISIT (OUTPATIENT)
Dept: SURGERY | Age: 75
End: 2020-08-13
Payer: MEDICARE

## 2020-08-13 VITALS
TEMPERATURE: 98.5 F | HEART RATE: 110 BPM | BODY MASS INDEX: 42.85 KG/M2 | DIASTOLIC BLOOD PRESSURE: 85 MMHG | HEIGHT: 67 IN | OXYGEN SATURATION: 97 % | WEIGHT: 273 LBS | RESPIRATION RATE: 20 BRPM | SYSTOLIC BLOOD PRESSURE: 175 MMHG

## 2020-08-13 DIAGNOSIS — I10 ESSENTIAL HYPERTENSION: ICD-10-CM

## 2020-08-13 DIAGNOSIS — R91.1 SOLITARY PULMONARY NODULE: Primary | ICD-10-CM

## 2020-08-13 DIAGNOSIS — C50.911 MALIGNANT NEOPLASM OF RIGHT FEMALE BREAST, UNSPECIFIED ESTROGEN RECEPTOR STATUS, UNSPECIFIED SITE OF BREAST (HCC): ICD-10-CM

## 2020-08-13 DIAGNOSIS — E66.01 MORBID OBESITY (HCC): ICD-10-CM

## 2020-08-13 PROCEDURE — 3288F FALL RISK ASSESSMENT DOCD: CPT | Performed by: THORACIC SURGERY (CARDIOTHORACIC VASCULAR SURGERY)

## 2020-08-13 PROCEDURE — 1100F PTFALLS ASSESS-DOCD GE2>/YR: CPT | Performed by: THORACIC SURGERY (CARDIOTHORACIC VASCULAR SURGERY)

## 2020-08-13 PROCEDURE — G8400 PT W/DXA NO RESULTS DOC: HCPCS | Performed by: THORACIC SURGERY (CARDIOTHORACIC VASCULAR SURGERY)

## 2020-08-13 PROCEDURE — G9717 DOC PT DX DEP/BP F/U NT REQ: HCPCS | Performed by: THORACIC SURGERY (CARDIOTHORACIC VASCULAR SURGERY)

## 2020-08-13 PROCEDURE — 1090F PRES/ABSN URINE INCON ASSESS: CPT | Performed by: THORACIC SURGERY (CARDIOTHORACIC VASCULAR SURGERY)

## 2020-08-13 PROCEDURE — G8427 DOCREV CUR MEDS BY ELIG CLIN: HCPCS | Performed by: THORACIC SURGERY (CARDIOTHORACIC VASCULAR SURGERY)

## 2020-08-13 PROCEDURE — 99205 OFFICE O/P NEW HI 60 MIN: CPT | Performed by: THORACIC SURGERY (CARDIOTHORACIC VASCULAR SURGERY)

## 2020-08-13 PROCEDURE — 3017F COLORECTAL CA SCREEN DOC REV: CPT | Performed by: THORACIC SURGERY (CARDIOTHORACIC VASCULAR SURGERY)

## 2020-08-13 PROCEDURE — G8417 CALC BMI ABV UP PARAM F/U: HCPCS | Performed by: THORACIC SURGERY (CARDIOTHORACIC VASCULAR SURGERY)

## 2020-08-13 PROCEDURE — G8754 DIAS BP LESS 90: HCPCS | Performed by: THORACIC SURGERY (CARDIOTHORACIC VASCULAR SURGERY)

## 2020-08-13 PROCEDURE — G8753 SYS BP > OR = 140: HCPCS | Performed by: THORACIC SURGERY (CARDIOTHORACIC VASCULAR SURGERY)

## 2020-08-13 PROCEDURE — G8536 NO DOC ELDER MAL SCRN: HCPCS | Performed by: THORACIC SURGERY (CARDIOTHORACIC VASCULAR SURGERY)

## 2020-08-13 NOTE — PROGRESS NOTES
New Patient. RLL Nodule/GGO      1. Have you been to the ER, urgent care clinic since your last visit? Hospitalized since your last visit? No    2. Have you seen or consulted any other health care providers outside of the [address] [place] [address] since your last visit? Include any pap smears or colon screening.  Yes, Dr. Ashwin Lu @ [facility]

## 2020-08-13 NOTE — PROGRESS NOTES
Asked to see Mrs Vinny Aguilar re: RLL nodule    Referred by Dr. Avelino Olvera    Mrs Vinny Aguilar is a pleasant 76 y/o lady with a past medical history significant for obesity, HTN, DMII and breast cancer. She has been followed by Pulmonary medicine for a RLL nodule which has recently enlarged. She denies any respiratory symptoms. She is not active but can walk a block and can climb one flight of stairs. Within the past year she had a cardiac workup including a LHC. Allergies   Allergen Reactions    Lisinopril Cough    Percocet [Oxycodone-Acetaminophen] Nausea Only    Percodan [Oxycodone-Aspirin] Nausea Only    Prednisone Nausea Only    Sulfa (Sulfonamide Antibiotics) Unknown (comments)       PMHx: HTN, DMII, ovarian Ca, Breast Ca, obesity    PSHx: hysterectomy, right mastectomy,     SocHx: non smoker    Family History   Problem Relation Age of Onset    Diabetes Mother     Hypertension Mother        Outpatient Medications Marked as Taking for the 8/13/20 encounter (Office Visit) with Maryann Calvillo MD   Medication Sig Dispense Refill    metoprolol tartrate (LOPRESSOR) 25 mg tablet TAKE 1 TAB BY MOUTH TWO (2) TIMES A DAY. MAKE SURE YOU HAVE STOPPED COREG WHEN TAKING THIS MED. 60 Tab 0    furosemide (Lasix) 40 mg tablet Take 40 mg by mouth two (2) times a day.  famotidine (Pepcid AC) 20 mg tablet Take 40 mg by mouth two (2) times a day.  insulin NPH (NovoLIN N NPH U-100 Insulin) 100 unit/mL injection 60 Units by SubCUTAneous route two (2) times a day. 60u morning; 60u in the evening  Indications: 60 units in morning and 60 units in the evening      insulin regular (NOVOLIN R REGULAR U-100 INSULN) 100 unit/mL injection by SubCUTAneous route two (2) times a day. 30 am, 30 noon, 35 in the evening      losartan (COZAAR) 100 mg tablet Take 50 mg by mouth daily.  triamcinolone acetonide (KENALOG) 0.1 % ointment Apply  to affected area two (2) times a day.  use thin layer  Applies on face at bipap allergy site         ROS:    Constitutional- denies weight loss or gain  HEENT- denies dysphagia  Neuro- denies syncope  Optho- no recent changes in vision  Resp- denies hemoptysis, some dyspnea  CV- denies angina or palpitations  GI- denies abd pain  - no complaints  ID- denies recent fevers  Vasc- denies claudication    Blood pressure (!) 203/96, pulse (!) 110, temperature 98.5 °F (36.9 °C), temperature source Oral, resp. rate 20, height 5' 7\" (1.702 m), weight 273 lb (123.8 kg), SpO2 97 %. On exam she is seated upright   Alert and oriented  Obese  Not tachypneic  Normal speech, normal affect  No cervical or supraclavicular lymphadenopathy  Lungs CTA b/l  No chest wall tenderness  Rrr, no murmurs  Radial pulses palp b/l  abd sntnd, no organomegaly  LE mild edema      ==============================    Cr 0.74      ==============================    I have personally reviewed her chest CT. There is a 1.2cm heterogenous nodule in the superior segment of the RLL. No associated lymphadenopathy. This nodule has enlarged since prior imaging  ==============================    PFTs- FeV1 94%  DLCO 76%    ==============================    TTE  EF 50%    LHC  Non obstructive CAD    =============================    Diagnoses  1: Solitary pulmonary nodule  2: h/o breast cancer  3: morbid obesity  4: HTN    =============================    Mrs. Alejandra Shin has an enlarging RLL SPN. The nodule is suspicious and given her history of breast cancer she is understandably worried about whether this nodule is malignant. She has discussed CT guided biopsy with her Pulmonologist but would like to consider a Wedge biopsy which will be both diagnostic and therapeutic. Her PFTs are acceptable. Will plan for a robotic RLL wedge resection. If the nodule is malignant we will then perform a segmentectomy and LN dissection. She will need PAT and covid testing. Moderate surgical risk based on Obesity and HTN. Recent cardiac workup noted. Thank you for allowing us to care for Mrs Adithya Rodriguez spent 60 minutes with Gerardo Cottrell and their family, greater than 50% of which was spent in counseling and education reviewing their films, discussing surgical options and formulating a future care plan.

## 2020-08-28 ENCOUNTER — TELEPHONE (OUTPATIENT)
Dept: CARDIOLOGY CLINIC | Age: 75
End: 2020-08-28

## 2020-08-28 NOTE — TELEPHONE ENCOUNTER
7- [facility] lab result has been fax to Dr. Fiorella Viera as requested. Faxed: [phone].

## 2020-09-04 ENCOUNTER — HOSPITAL ENCOUNTER (OUTPATIENT)
Dept: PREADMISSION TESTING | Age: 75
Discharge: HOME | DRG: 167 | End: 2020-09-04
Payer: MEDICARE

## 2020-09-04 VITALS
TEMPERATURE: 97.8 F | HEART RATE: 77 BPM | WEIGHT: 271.17 LBS | BODY MASS INDEX: 42.56 KG/M2 | DIASTOLIC BLOOD PRESSURE: 89 MMHG | HEIGHT: 67 IN | SYSTOLIC BLOOD PRESSURE: 177 MMHG

## 2020-09-04 LAB
ABO + RH BLD: NORMAL
ALBUMIN SERPL-MCNC: 3.2 G/DL (ref 3.5–5)
ALBUMIN/GLOB SERPL: 0.7 {RATIO} (ref 1.1–2.2)
ALP SERPL-CCNC: 100 U/L (ref 45–117)
ALT SERPL-CCNC: 15 U/L (ref 12–78)
ANION GAP SERPL CALC-SCNC: 5 MMOL/L (ref 5–15)
AST SERPL-CCNC: 9 U/L (ref 15–37)
ATRIAL RATE: 74 BPM
BASOPHILS # BLD: 0 K/UL (ref 0–0.1)
BASOPHILS NFR BLD: 0 % (ref 0–1)
BILIRUB SERPL-MCNC: 0.5 MG/DL (ref 0.2–1)
BLOOD GROUP ANTIBODIES SERPL: NORMAL
BUN SERPL-MCNC: 13 MG/DL (ref 6–20)
BUN/CREAT SERPL: 15 (ref 12–20)
CALCIUM SERPL-MCNC: 9 MG/DL (ref 8.5–10.1)
CALCULATED P AXIS, ECG09: 54 DEGREES
CALCULATED R AXIS, ECG10: -5 DEGREES
CALCULATED T AXIS, ECG11: 73 DEGREES
CHLORIDE SERPL-SCNC: 105 MMOL/L (ref 97–108)
CO2 SERPL-SCNC: 31 MMOL/L (ref 21–32)
CREAT SERPL-MCNC: 0.89 MG/DL (ref 0.55–1.02)
DIAGNOSIS, 93000: NORMAL
DIFFERENTIAL METHOD BLD: ABNORMAL
EOSINOPHIL # BLD: 0.2 K/UL (ref 0–0.4)
EOSINOPHIL NFR BLD: 3 % (ref 0–7)
ERYTHROCYTE [DISTWIDTH] IN BLOOD BY AUTOMATED COUNT: 15.4 % (ref 11.5–14.5)
GLOBULIN SER CALC-MCNC: 4.4 G/DL (ref 2–4)
GLUCOSE SERPL-MCNC: 119 MG/DL (ref 65–100)
HCT VFR BLD AUTO: 46.1 % (ref 35–47)
HGB BLD-MCNC: 14.5 G/DL (ref 11.5–16)
IMM GRANULOCYTES # BLD AUTO: 0 K/UL (ref 0–0.04)
IMM GRANULOCYTES NFR BLD AUTO: 0 % (ref 0–0.5)
LYMPHOCYTES # BLD: 1.6 K/UL (ref 0.8–3.5)
LYMPHOCYTES NFR BLD: 17 % (ref 12–49)
MCH RBC QN AUTO: 26.8 PG (ref 26–34)
MCHC RBC AUTO-ENTMCNC: 31.5 G/DL (ref 30–36.5)
MCV RBC AUTO: 85.1 FL (ref 80–99)
MONOCYTES # BLD: 0.5 K/UL (ref 0–1)
MONOCYTES NFR BLD: 6 % (ref 5–13)
NEUTS SEG # BLD: 6.6 K/UL (ref 1.8–8)
NEUTS SEG NFR BLD: 74 % (ref 32–75)
NRBC # BLD: 0 K/UL (ref 0–0.01)
NRBC BLD-RTO: 0 PER 100 WBC
P-R INTERVAL, ECG05: 174 MS
PLATELET # BLD AUTO: 278 K/UL (ref 150–400)
PMV BLD AUTO: 10.7 FL (ref 8.9–12.9)
POTASSIUM SERPL-SCNC: 3.7 MMOL/L (ref 3.5–5.1)
PROT SERPL-MCNC: 7.6 G/DL (ref 6.4–8.2)
Q-T INTERVAL, ECG07: 414 MS
QRS DURATION, ECG06: 86 MS
QTC CALCULATION (BEZET), ECG08: 459 MS
RBC # BLD AUTO: 5.42 M/UL (ref 3.8–5.2)
SODIUM SERPL-SCNC: 141 MMOL/L (ref 136–145)
SPECIMEN EXP DATE BLD: NORMAL
VENTRICULAR RATE, ECG03: 74 BPM
WBC # BLD AUTO: 9 K/UL (ref 3.6–11)

## 2020-09-04 PROCEDURE — 80053 COMPREHEN METABOLIC PANEL: CPT

## 2020-09-04 PROCEDURE — 93005 ELECTROCARDIOGRAM TRACING: CPT

## 2020-09-04 PROCEDURE — 85025 COMPLETE CBC W/AUTO DIFF WBC: CPT

## 2020-09-04 PROCEDURE — 36415 COLL VENOUS BLD VENIPUNCTURE: CPT

## 2020-09-04 PROCEDURE — 86900 BLOOD TYPING SEROLOGIC ABO: CPT

## 2020-09-04 RX ORDER — VITAMIN E 268 MG
400 CAPSULE ORAL DAILY
COMMUNITY
End: 2022-04-21

## 2020-09-04 RX ORDER — ACETAMINOPHEN 325 MG/1
650 TABLET ORAL
COMMUNITY

## 2020-09-04 NOTE — PERIOP NOTES
PATIENT GIVEN SURGICAL SITE INFECTION FAQ HANDOUT AND HAND WASHING TIP SHEET. PREOP INSTRUCTIONS REVIEWED AND PATIENT VERBALIZES UNDERSTANDING OF INSTRUCTIONS. PATIENT HAS BEEN GIVEN THE OPPORTUNITY TO ASK ADDITIONAL QUESTIONS. PATIENT CALLED AND MADE AWARE OF COVID-19 TESTING NEEDED TO BE DONE WITHIN 72 HOURS OF SURGERY. COVID-19 TESTING APPOINTMENT TO BE MADE FOR PATIENT VIA PRE ADMISSION TESTING DEPARTMENT. PATIENT INSTRUCTED ON SELF QUARANTINE BETWEEN TESTING AND ARRIVAL TIME ON DAY OF SURGERY. GAVE PATIENT 2 BOTTLES OF CHG CLEANSER AND INSTRUCTIONS. PATIENT VERBALIZED UNDERSTANDING. PATIENT SEES DR. Nia LAWRENCE - CARDIO - NOTES IN CC AND LAST EKG IN CC. LAST VIRTUAL VISIT IN CC ON 7/2020, PATIENT SEES DR. SOLE NAPOLES. - GINO , DR. Pamela MINOR - FANG , DR. Hernandez SWENSON - PULM     PATIENT NOT COMFORTABLE SIGNING CONSENT FORM WITH THE WAY CONSENT ORDER READS , PATIENT WILL CALL SURGEON BEFORE DAY OF SURGERY AND GET HER QUESTIONS ANSWERED.

## 2020-09-08 NOTE — PERIOP NOTES
EKG SENT TO ANESTHESIA FOR REVIEW   EKG CAROLIN PER DR. Neha Lopes FOR SURGERY     CAROLIN EKG FAXED TO OFFICE

## 2020-09-10 ENCOUNTER — HOSPITAL ENCOUNTER (OUTPATIENT)
Dept: PREADMISSION TESTING | Age: 75
Discharge: HOME | End: 2020-09-10
Payer: MEDICARE

## 2020-09-10 DIAGNOSIS — Z01.812 PRE-PROCEDURE LAB EXAM: ICD-10-CM

## 2020-09-10 PROCEDURE — 87635 SARS-COV-2 COVID-19 AMP PRB: CPT

## 2020-09-11 LAB — SARS-COV-2, COV2NT: NOT DETECTED

## 2020-09-14 ENCOUNTER — HOSPITAL ENCOUNTER (INPATIENT)
Age: 75
LOS: 2 days | Discharge: HOME | DRG: 167 | End: 2020-09-16
Attending: THORACIC SURGERY (CARDIOTHORACIC VASCULAR SURGERY) | Admitting: THORACIC SURGERY (CARDIOTHORACIC VASCULAR SURGERY)
Payer: MEDICARE

## 2020-09-14 ENCOUNTER — ANESTHESIA EVENT (OUTPATIENT)
Dept: SURGERY | Age: 75
DRG: 167 | End: 2020-09-14
Payer: MEDICARE

## 2020-09-14 ENCOUNTER — ANESTHESIA (OUTPATIENT)
Dept: SURGERY | Age: 75
DRG: 167 | End: 2020-09-14
Payer: MEDICARE

## 2020-09-14 ENCOUNTER — APPOINTMENT (OUTPATIENT)
Dept: GENERAL RADIOLOGY | Age: 75
DRG: 167 | End: 2020-09-14
Attending: THORACIC SURGERY (CARDIOTHORACIC VASCULAR SURGERY)
Payer: MEDICARE

## 2020-09-14 DIAGNOSIS — R91.1 SOLITARY PULMONARY NODULE: ICD-10-CM

## 2020-09-14 LAB
GLUCOSE BLD STRIP.AUTO-MCNC: 137 MG/DL (ref 65–100)
GLUCOSE BLD STRIP.AUTO-MCNC: 185 MG/DL (ref 65–100)
GLUCOSE BLD STRIP.AUTO-MCNC: 189 MG/DL (ref 65–100)
GLUCOSE BLD STRIP.AUTO-MCNC: 283 MG/DL (ref 65–100)
SERVICE CMNT-IMP: ABNORMAL

## 2020-09-14 PROCEDURE — 77030037892: Performed by: THORACIC SURGERY (CARDIOTHORACIC VASCULAR SURGERY)

## 2020-09-14 PROCEDURE — 74011250637 HC RX REV CODE- 250/637: Performed by: THORACIC SURGERY (CARDIOTHORACIC VASCULAR SURGERY)

## 2020-09-14 PROCEDURE — C1729 CATH, DRAINAGE: HCPCS | Performed by: THORACIC SURGERY (CARDIOTHORACIC VASCULAR SURGERY)

## 2020-09-14 PROCEDURE — 74011250637 HC RX REV CODE- 250/637: Performed by: ANESTHESIOLOGY

## 2020-09-14 PROCEDURE — 77030035278 HC STPLR SEAL ENDOWR INTU -B: Performed by: THORACIC SURGERY (CARDIOTHORACIC VASCULAR SURGERY)

## 2020-09-14 PROCEDURE — 71045 X-RAY EXAM CHEST 1 VIEW: CPT

## 2020-09-14 PROCEDURE — C1751 CATH, INF, PER/CENT/MIDLINE: HCPCS

## 2020-09-14 PROCEDURE — 77030008756 HC TU IRR SUC STRY -B: Performed by: THORACIC SURGERY (CARDIOTHORACIC VASCULAR SURGERY)

## 2020-09-14 PROCEDURE — 76210000003 HC OR PH I REC 3.5 TO 4 HR: Performed by: THORACIC SURGERY (CARDIOTHORACIC VASCULAR SURGERY)

## 2020-09-14 PROCEDURE — 77030026438 HC STYL ET INTUB CARD -A: Performed by: ANESTHESIOLOGY

## 2020-09-14 PROCEDURE — 77030040922 HC BLNKT HYPOTHRM STRY -A

## 2020-09-14 PROCEDURE — 77030040361 HC SLV COMPR DVT MDII -B: Performed by: THORACIC SURGERY (CARDIOTHORACIC VASCULAR SURGERY)

## 2020-09-14 PROCEDURE — 77030035489 HC REDUCR CANN ENDOWR INTU -C: Performed by: THORACIC SURGERY (CARDIOTHORACIC VASCULAR SURGERY)

## 2020-09-14 PROCEDURE — 74011250636 HC RX REV CODE- 250/636: Performed by: ANESTHESIOLOGY

## 2020-09-14 PROCEDURE — C1751 CATH, INF, PER/CENT/MIDLINE: HCPCS | Performed by: ANESTHESIOLOGY

## 2020-09-14 PROCEDURE — 77030013797 HC KT TRNSDUC PRSSR EDWD -A

## 2020-09-14 PROCEDURE — 88307 TISSUE EXAM BY PATHOLOGIST: CPT

## 2020-09-14 PROCEDURE — 65660000000 HC RM CCU STEPDOWN

## 2020-09-14 PROCEDURE — 77030002996 HC SUT SLK J&J -A: Performed by: THORACIC SURGERY (CARDIOTHORACIC VASCULAR SURGERY)

## 2020-09-14 PROCEDURE — 77030013797 HC KT TRNSDUC PRSSR EDWD -A: Performed by: ANESTHESIOLOGY

## 2020-09-14 PROCEDURE — 74011636637 HC RX REV CODE- 636/637: Performed by: THORACIC SURGERY (CARDIOTHORACIC VASCULAR SURGERY)

## 2020-09-14 PROCEDURE — 88313 SPECIAL STAINS GROUP 2: CPT

## 2020-09-14 PROCEDURE — 74011000250 HC RX REV CODE- 250: Performed by: NURSE ANESTHETIST, CERTIFIED REGISTERED

## 2020-09-14 PROCEDURE — 77030014008 HC SPNG HEMSTAT J&J -C: Performed by: THORACIC SURGERY (CARDIOTHORACIC VASCULAR SURGERY)

## 2020-09-14 PROCEDURE — 77030039284 HC STPL RELOAD DISP DAVNC INTU -C: Performed by: THORACIC SURGERY (CARDIOTHORACIC VASCULAR SURGERY)

## 2020-09-14 PROCEDURE — 74011000250 HC RX REV CODE- 250: Performed by: THORACIC SURGERY (CARDIOTHORACIC VASCULAR SURGERY)

## 2020-09-14 PROCEDURE — 77030010507 HC ADH SKN DERMBND J&J -B: Performed by: THORACIC SURGERY (CARDIOTHORACIC VASCULAR SURGERY)

## 2020-09-14 PROCEDURE — 77030021678 HC GLIDESCP STAT DISP VERT -B: Performed by: ANESTHESIOLOGY

## 2020-09-14 PROCEDURE — 77030018673: Performed by: THORACIC SURGERY (CARDIOTHORACIC VASCULAR SURGERY)

## 2020-09-14 PROCEDURE — 82962 GLUCOSE BLOOD TEST: CPT

## 2020-09-14 PROCEDURE — 77030032523 HC RELD STPL PK ENDORS INTU -C: Performed by: THORACIC SURGERY (CARDIOTHORACIC VASCULAR SURGERY)

## 2020-09-14 PROCEDURE — 77030013079 HC BLNKT BAIR HGGR 3M -A: Performed by: ANESTHESIOLOGY

## 2020-09-14 PROCEDURE — 76010000876 HC OR TIME 2 TO 2.5HR INTENSV - TIER 2: Performed by: THORACIC SURGERY (CARDIOTHORACIC VASCULAR SURGERY)

## 2020-09-14 PROCEDURE — 77030032522 HC SHT STPL PK ENDOWR INTU -B: Performed by: THORACIC SURGERY (CARDIOTHORACIC VASCULAR SURGERY)

## 2020-09-14 PROCEDURE — 77030012390 HC DRN CHST BTL GTNG -B: Performed by: THORACIC SURGERY (CARDIOTHORACIC VASCULAR SURGERY)

## 2020-09-14 PROCEDURE — 77030035277 HC OBTRTR BLDELSS DISP INTU -B: Performed by: THORACIC SURGERY (CARDIOTHORACIC VASCULAR SURGERY)

## 2020-09-14 PROCEDURE — 77030008671 HC TU ENDO/BRNC CUF COVD -B: Performed by: ANESTHESIOLOGY

## 2020-09-14 PROCEDURE — 74011250636 HC RX REV CODE- 250/636: Performed by: THORACIC SURGERY (CARDIOTHORACIC VASCULAR SURGERY)

## 2020-09-14 PROCEDURE — 77030020263 HC SOL INJ SOD CL0.9% LFCR 1000ML: Performed by: THORACIC SURGERY (CARDIOTHORACIC VASCULAR SURGERY)

## 2020-09-14 PROCEDURE — 74011250636 HC RX REV CODE- 250/636: Performed by: NURSE ANESTHETIST, CERTIFIED REGISTERED

## 2020-09-14 PROCEDURE — 77030005402 HC CATH RAD ART LN KT TELE -B

## 2020-09-14 PROCEDURE — 77030016151 HC PROTCTR LNS DFOG COVD -B: Performed by: THORACIC SURGERY (CARDIOTHORACIC VASCULAR SURGERY)

## 2020-09-14 PROCEDURE — 0BBF4ZX EXCISION OF RIGHT LOWER LUNG LOBE, PERCUTANEOUS ENDOSCOPIC APPROACH, DIAGNOSTIC: ICD-10-PCS | Performed by: THORACIC SURGERY (CARDIOTHORACIC VASCULAR SURGERY)

## 2020-09-14 PROCEDURE — 77030020703 HC SEAL CANN DISP INTU -B: Performed by: THORACIC SURGERY (CARDIOTHORACIC VASCULAR SURGERY)

## 2020-09-14 PROCEDURE — 76060000036 HC ANESTHESIA 2.5 TO 3 HR: Performed by: THORACIC SURGERY (CARDIOTHORACIC VASCULAR SURGERY)

## 2020-09-14 PROCEDURE — 77030031139 HC SUT VCRL2 J&J -A: Performed by: THORACIC SURGERY (CARDIOTHORACIC VASCULAR SURGERY)

## 2020-09-14 PROCEDURE — 8E0W4CZ ROBOTIC ASSISTED PROCEDURE OF TRUNK REGION, PERCUTANEOUS ENDOSCOPIC APPROACH: ICD-10-PCS | Performed by: THORACIC SURGERY (CARDIOTHORACIC VASCULAR SURGERY)

## 2020-09-14 PROCEDURE — 77030005401 HC CATH RAD ARRO -A: Performed by: ANESTHESIOLOGY

## 2020-09-14 PROCEDURE — 0W9940Z DRAINAGE OF RIGHT PLEURAL CAVITY WITH DRAINAGE DEVICE, PERCUTANEOUS ENDOSCOPIC APPROACH: ICD-10-PCS | Performed by: THORACIC SURGERY (CARDIOTHORACIC VASCULAR SURGERY)

## 2020-09-14 PROCEDURE — 77030003666 HC NDL SPINAL BD -A: Performed by: THORACIC SURGERY (CARDIOTHORACIC VASCULAR SURGERY)

## 2020-09-14 PROCEDURE — 2709999900 HC NON-CHARGEABLE SUPPLY: Performed by: THORACIC SURGERY (CARDIOTHORACIC VASCULAR SURGERY)

## 2020-09-14 PROCEDURE — 77030031492 HC PRT ACC BLNT AIRSEAL CNMD -B: Performed by: THORACIC SURGERY (CARDIOTHORACIC VASCULAR SURGERY)

## 2020-09-14 RX ORDER — HYDROMORPHONE HYDROCHLORIDE 2 MG/ML
INJECTION, SOLUTION INTRAMUSCULAR; INTRAVENOUS; SUBCUTANEOUS AS NEEDED
Status: DISCONTINUED | OUTPATIENT
Start: 2020-09-14 | End: 2020-09-14 | Stop reason: HOSPADM

## 2020-09-14 RX ORDER — FAMOTIDINE 20 MG/1
40 TABLET, FILM COATED ORAL 2 TIMES DAILY
Status: DISCONTINUED | OUTPATIENT
Start: 2020-09-14 | End: 2020-09-16 | Stop reason: HOSPADM

## 2020-09-14 RX ORDER — OXYCODONE AND ACETAMINOPHEN 5; 325 MG/1; MG/1
2 TABLET ORAL
Status: DISCONTINUED | OUTPATIENT
Start: 2020-09-14 | End: 2020-09-14

## 2020-09-14 RX ORDER — HYDROCODONE BITARTRATE AND ACETAMINOPHEN 5; 325 MG/1; MG/1
1-2 TABLET ORAL
Status: DISCONTINUED | OUTPATIENT
Start: 2020-09-14 | End: 2020-09-16 | Stop reason: HOSPADM

## 2020-09-14 RX ORDER — MORPHINE SULFATE 10 MG/ML
2 INJECTION, SOLUTION INTRAMUSCULAR; INTRAVENOUS
Status: DISCONTINUED | OUTPATIENT
Start: 2020-09-14 | End: 2020-09-14 | Stop reason: HOSPADM

## 2020-09-14 RX ORDER — ASPIRIN 81 MG/1
81 TABLET ORAL DAILY
Status: DISCONTINUED | OUTPATIENT
Start: 2020-09-15 | End: 2020-09-16 | Stop reason: HOSPADM

## 2020-09-14 RX ORDER — DEXAMETHASONE SODIUM PHOSPHATE 4 MG/ML
INJECTION, SOLUTION INTRA-ARTICULAR; INTRALESIONAL; INTRAMUSCULAR; INTRAVENOUS; SOFT TISSUE AS NEEDED
Status: DISCONTINUED | OUTPATIENT
Start: 2020-09-14 | End: 2020-09-14 | Stop reason: HOSPADM

## 2020-09-14 RX ORDER — MIDAZOLAM HYDROCHLORIDE 1 MG/ML
1 INJECTION, SOLUTION INTRAMUSCULAR; INTRAVENOUS AS NEEDED
Status: DISCONTINUED | OUTPATIENT
Start: 2020-09-14 | End: 2020-09-14 | Stop reason: HOSPADM

## 2020-09-14 RX ORDER — SODIUM CHLORIDE 0.9 % (FLUSH) 0.9 %
5-40 SYRINGE (ML) INJECTION EVERY 8 HOURS
Status: DISCONTINUED | OUTPATIENT
Start: 2020-09-14 | End: 2020-09-14 | Stop reason: HOSPADM

## 2020-09-14 RX ORDER — SODIUM CHLORIDE, SODIUM LACTATE, POTASSIUM CHLORIDE, CALCIUM CHLORIDE 600; 310; 30; 20 MG/100ML; MG/100ML; MG/100ML; MG/100ML
INJECTION, SOLUTION INTRAVENOUS
Status: DISCONTINUED | OUTPATIENT
Start: 2020-09-14 | End: 2020-09-14 | Stop reason: HOSPADM

## 2020-09-14 RX ORDER — PROPOFOL 10 MG/ML
INJECTION, EMULSION INTRAVENOUS AS NEEDED
Status: DISCONTINUED | OUTPATIENT
Start: 2020-09-14 | End: 2020-09-14 | Stop reason: HOSPADM

## 2020-09-14 RX ORDER — SODIUM CHLORIDE, SODIUM LACTATE, POTASSIUM CHLORIDE, CALCIUM CHLORIDE 600; 310; 30; 20 MG/100ML; MG/100ML; MG/100ML; MG/100ML
125 INJECTION, SOLUTION INTRAVENOUS CONTINUOUS
Status: DISCONTINUED | OUTPATIENT
Start: 2020-09-14 | End: 2020-09-14 | Stop reason: HOSPADM

## 2020-09-14 RX ORDER — SODIUM CHLORIDE 0.9 % (FLUSH) 0.9 %
5-40 SYRINGE (ML) INJECTION AS NEEDED
Status: DISCONTINUED | OUTPATIENT
Start: 2020-09-14 | End: 2020-09-14 | Stop reason: HOSPADM

## 2020-09-14 RX ORDER — MIDAZOLAM HYDROCHLORIDE 1 MG/ML
0.5 INJECTION, SOLUTION INTRAMUSCULAR; INTRAVENOUS
Status: DISCONTINUED | OUTPATIENT
Start: 2020-09-14 | End: 2020-09-14 | Stop reason: HOSPADM

## 2020-09-14 RX ORDER — ROPIVACAINE HYDROCHLORIDE 5 MG/ML
30 INJECTION, SOLUTION EPIDURAL; INFILTRATION; PERINEURAL ONCE
Status: DISCONTINUED | OUTPATIENT
Start: 2020-09-14 | End: 2020-09-14 | Stop reason: HOSPADM

## 2020-09-14 RX ORDER — FENTANYL CITRATE 50 UG/ML
INJECTION, SOLUTION INTRAMUSCULAR; INTRAVENOUS AS NEEDED
Status: DISCONTINUED | OUTPATIENT
Start: 2020-09-14 | End: 2020-09-14 | Stop reason: HOSPADM

## 2020-09-14 RX ORDER — TRIAMCINOLONE ACETONIDE 1 MG/G
OINTMENT TOPICAL
Status: DISCONTINUED | OUTPATIENT
Start: 2020-09-14 | End: 2020-09-16 | Stop reason: HOSPADM

## 2020-09-14 RX ORDER — FENTANYL CITRATE 50 UG/ML
25 INJECTION, SOLUTION INTRAMUSCULAR; INTRAVENOUS
Status: COMPLETED | OUTPATIENT
Start: 2020-09-14 | End: 2020-09-14

## 2020-09-14 RX ORDER — HYDROMORPHONE HYDROCHLORIDE 1 MG/ML
1 INJECTION, SOLUTION INTRAMUSCULAR; INTRAVENOUS; SUBCUTANEOUS
Status: DISCONTINUED | OUTPATIENT
Start: 2020-09-14 | End: 2020-09-16 | Stop reason: HOSPADM

## 2020-09-14 RX ORDER — SODIUM CHLORIDE, SODIUM LACTATE, POTASSIUM CHLORIDE, CALCIUM CHLORIDE 600; 310; 30; 20 MG/100ML; MG/100ML; MG/100ML; MG/100ML
75 INJECTION, SOLUTION INTRAVENOUS CONTINUOUS
Status: DISCONTINUED | OUTPATIENT
Start: 2020-09-14 | End: 2020-09-14 | Stop reason: HOSPADM

## 2020-09-14 RX ORDER — FENTANYL CITRATE 50 UG/ML
50 INJECTION, SOLUTION INTRAMUSCULAR; INTRAVENOUS AS NEEDED
Status: DISCONTINUED | OUTPATIENT
Start: 2020-09-14 | End: 2020-09-14 | Stop reason: HOSPADM

## 2020-09-14 RX ORDER — METOPROLOL TARTRATE 25 MG/1
25 TABLET, FILM COATED ORAL 2 TIMES DAILY
Status: DISCONTINUED | OUTPATIENT
Start: 2020-09-14 | End: 2020-09-16 | Stop reason: HOSPADM

## 2020-09-14 RX ORDER — ROCURONIUM BROMIDE 10 MG/ML
INJECTION, SOLUTION INTRAVENOUS AS NEEDED
Status: DISCONTINUED | OUTPATIENT
Start: 2020-09-14 | End: 2020-09-14 | Stop reason: HOSPADM

## 2020-09-14 RX ORDER — DEXMEDETOMIDINE HYDROCHLORIDE 100 UG/ML
INJECTION, SOLUTION INTRAVENOUS AS NEEDED
Status: DISCONTINUED | OUTPATIENT
Start: 2020-09-14 | End: 2020-09-14 | Stop reason: HOSPADM

## 2020-09-14 RX ORDER — METOPROLOL TARTRATE 5 MG/5ML
12.5 INJECTION INTRAVENOUS ONCE
Status: DISCONTINUED | OUTPATIENT
Start: 2020-09-14 | End: 2020-09-14

## 2020-09-14 RX ORDER — LIDOCAINE HYDROCHLORIDE 10 MG/ML
0.1 INJECTION, SOLUTION EPIDURAL; INFILTRATION; INTRACAUDAL; PERINEURAL AS NEEDED
Status: DISCONTINUED | OUTPATIENT
Start: 2020-09-14 | End: 2020-09-14 | Stop reason: HOSPADM

## 2020-09-14 RX ORDER — DIPHENHYDRAMINE HYDROCHLORIDE 50 MG/ML
12.5 INJECTION, SOLUTION INTRAMUSCULAR; INTRAVENOUS AS NEEDED
Status: DISCONTINUED | OUTPATIENT
Start: 2020-09-14 | End: 2020-09-14 | Stop reason: HOSPADM

## 2020-09-14 RX ORDER — SODIUM CHLORIDE 9 MG/ML
25 INJECTION, SOLUTION INTRAVENOUS CONTINUOUS
Status: DISCONTINUED | OUTPATIENT
Start: 2020-09-14 | End: 2020-09-14 | Stop reason: HOSPADM

## 2020-09-14 RX ORDER — PHENYLEPHRINE HCL IN 0.9% NACL 0.4MG/10ML
SYRINGE (ML) INTRAVENOUS AS NEEDED
Status: DISCONTINUED | OUTPATIENT
Start: 2020-09-14 | End: 2020-09-14 | Stop reason: HOSPADM

## 2020-09-14 RX ORDER — ONDANSETRON 2 MG/ML
4 INJECTION INTRAMUSCULAR; INTRAVENOUS AS NEEDED
Status: DISCONTINUED | OUTPATIENT
Start: 2020-09-14 | End: 2020-09-14 | Stop reason: HOSPADM

## 2020-09-14 RX ORDER — ENOXAPARIN SODIUM 100 MG/ML
40 INJECTION SUBCUTANEOUS EVERY 24 HOURS
Status: DISCONTINUED | OUTPATIENT
Start: 2020-09-15 | End: 2020-09-16 | Stop reason: HOSPADM

## 2020-09-14 RX ORDER — SODIUM CHLORIDE 0.9 % (FLUSH) 0.9 %
5-40 SYRINGE (ML) INJECTION AS NEEDED
Status: DISCONTINUED | OUTPATIENT
Start: 2020-09-14 | End: 2020-09-16 | Stop reason: HOSPADM

## 2020-09-14 RX ORDER — LOSARTAN POTASSIUM 50 MG/1
50 TABLET ORAL EVERY EVENING
Status: DISCONTINUED | OUTPATIENT
Start: 2020-09-14 | End: 2020-09-16 | Stop reason: HOSPADM

## 2020-09-14 RX ORDER — FUROSEMIDE 40 MG/1
40 TABLET ORAL 2 TIMES DAILY
Status: DISCONTINUED | OUTPATIENT
Start: 2020-09-14 | End: 2020-09-16 | Stop reason: HOSPADM

## 2020-09-14 RX ORDER — METOPROLOL TARTRATE 25 MG/1
12.5 TABLET, FILM COATED ORAL ONCE
Status: COMPLETED | OUTPATIENT
Start: 2020-09-14 | End: 2020-09-14

## 2020-09-14 RX ORDER — CEFAZOLIN SODIUM/WATER 2 G/20 ML
2 SYRINGE (ML) INTRAVENOUS ONCE
Status: COMPLETED | OUTPATIENT
Start: 2020-09-14 | End: 2020-09-14

## 2020-09-14 RX ORDER — SUCCINYLCHOLINE CHLORIDE 20 MG/ML
INJECTION INTRAMUSCULAR; INTRAVENOUS AS NEEDED
Status: DISCONTINUED | OUTPATIENT
Start: 2020-09-14 | End: 2020-09-14 | Stop reason: HOSPADM

## 2020-09-14 RX ORDER — HYDROMORPHONE HYDROCHLORIDE 1 MG/ML
0.2 INJECTION, SOLUTION INTRAMUSCULAR; INTRAVENOUS; SUBCUTANEOUS
Status: DISCONTINUED | OUTPATIENT
Start: 2020-09-14 | End: 2020-09-14 | Stop reason: HOSPADM

## 2020-09-14 RX ORDER — ONDANSETRON 2 MG/ML
INJECTION INTRAMUSCULAR; INTRAVENOUS AS NEEDED
Status: DISCONTINUED | OUTPATIENT
Start: 2020-09-14 | End: 2020-09-14 | Stop reason: HOSPADM

## 2020-09-14 RX ORDER — SODIUM CHLORIDE 0.9 % (FLUSH) 0.9 %
5-40 SYRINGE (ML) INJECTION EVERY 8 HOURS
Status: DISCONTINUED | OUTPATIENT
Start: 2020-09-14 | End: 2020-09-16 | Stop reason: HOSPADM

## 2020-09-14 RX ORDER — ACETAMINOPHEN 325 MG/1
650 TABLET ORAL ONCE
Status: COMPLETED | OUTPATIENT
Start: 2020-09-14 | End: 2020-09-14

## 2020-09-14 RX ADMIN — DEXAMETHASONE SODIUM PHOSPHATE 8 MG: 4 INJECTION, SOLUTION INTRAMUSCULAR; INTRAVENOUS at 08:07

## 2020-09-14 RX ADMIN — Medication 80 MCG: at 08:48

## 2020-09-14 RX ADMIN — SODIUM CHLORIDE, POTASSIUM CHLORIDE, SODIUM LACTATE AND CALCIUM CHLORIDE: 600; 310; 30; 20 INJECTION, SOLUTION INTRAVENOUS at 07:27

## 2020-09-14 RX ADMIN — DEXMEDETOMIDINE HYDROCHLORIDE 6 MCG: 100 INJECTION, SOLUTION, CONCENTRATE INTRAVENOUS at 08:15

## 2020-09-14 RX ADMIN — FENTANYL CITRATE 25 MCG: 50 INJECTION, SOLUTION INTRAMUSCULAR; INTRAVENOUS at 10:35

## 2020-09-14 RX ADMIN — METOPROLOL TARTRATE 25 MG: 25 TABLET, FILM COATED ORAL at 17:59

## 2020-09-14 RX ADMIN — ACETAMINOPHEN 650 MG: 325 TABLET ORAL at 06:36

## 2020-09-14 RX ADMIN — HYDROMORPHONE HYDROCHLORIDE 0.2 MG: 1 INJECTION, SOLUTION INTRAMUSCULAR; INTRAVENOUS; SUBCUTANEOUS at 11:49

## 2020-09-14 RX ADMIN — HYDROMORPHONE HYDROCHLORIDE 0.2 MG: 1 INJECTION, SOLUTION INTRAMUSCULAR; INTRAVENOUS; SUBCUTANEOUS at 11:23

## 2020-09-14 RX ADMIN — Medication 80 MCG: at 07:39

## 2020-09-14 RX ADMIN — PROPOFOL 150 MG: 10 INJECTION, EMULSION INTRAVENOUS at 07:39

## 2020-09-14 RX ADMIN — Medication 2 G: at 07:52

## 2020-09-14 RX ADMIN — HUMAN INSULIN 35 UNITS: 100 INJECTION, SOLUTION SUBCUTANEOUS at 18:00

## 2020-09-14 RX ADMIN — ROCURONIUM BROMIDE 5 MG: 10 SOLUTION INTRAVENOUS at 07:39

## 2020-09-14 RX ADMIN — PHENYLEPHRINE HYDROCHLORIDE 40 MCG/MIN: 10 INJECTION INTRAVENOUS at 07:51

## 2020-09-14 RX ADMIN — FENTANYL CITRATE 25 MCG: 50 INJECTION, SOLUTION INTRAMUSCULAR; INTRAVENOUS at 10:04

## 2020-09-14 RX ADMIN — Medication 80 MCG: at 07:42

## 2020-09-14 RX ADMIN — SODIUM CHLORIDE, SODIUM LACTATE, POTASSIUM CHLORIDE, AND CALCIUM CHLORIDE 125 ML/HR: 600; 310; 30; 20 INJECTION, SOLUTION INTRAVENOUS at 06:50

## 2020-09-14 RX ADMIN — FAMOTIDINE 40 MG: 20 TABLET ORAL at 17:58

## 2020-09-14 RX ADMIN — HYDROMORPHONE HYDROCHLORIDE 0.5 MG: 2 INJECTION, SOLUTION INTRAMUSCULAR; INTRAVENOUS; SUBCUTANEOUS at 09:49

## 2020-09-14 RX ADMIN — FENTANYL CITRATE 25 MCG: 50 INJECTION, SOLUTION INTRAMUSCULAR; INTRAVENOUS at 10:13

## 2020-09-14 RX ADMIN — DEXMEDETOMIDINE HYDROCHLORIDE 8 MCG: 100 INJECTION, SOLUTION, CONCENTRATE INTRAVENOUS at 08:27

## 2020-09-14 RX ADMIN — SUGAMMADEX 200 MG: 100 INJECTION, SOLUTION INTRAVENOUS at 09:34

## 2020-09-14 RX ADMIN — PROPOFOL 50 MG: 10 INJECTION, EMULSION INTRAVENOUS at 07:44

## 2020-09-14 RX ADMIN — DEXMEDETOMIDINE HYDROCHLORIDE 4 MCG: 100 INJECTION, SOLUTION, CONCENTRATE INTRAVENOUS at 09:28

## 2020-09-14 RX ADMIN — HYDROMORPHONE HYDROCHLORIDE 1 MG: 1 INJECTION, SOLUTION INTRAMUSCULAR; INTRAVENOUS; SUBCUTANEOUS at 15:47

## 2020-09-14 RX ADMIN — MIDAZOLAM 2 MG: 1 INJECTION INTRAMUSCULAR; INTRAVENOUS at 06:58

## 2020-09-14 RX ADMIN — DEXMEDETOMIDINE HYDROCHLORIDE 6 MCG: 100 INJECTION, SOLUTION, CONCENTRATE INTRAVENOUS at 09:52

## 2020-09-14 RX ADMIN — Medication 10 ML: at 22:17

## 2020-09-14 RX ADMIN — Medication 80 MCG: at 08:51

## 2020-09-14 RX ADMIN — ROCURONIUM BROMIDE 45 MG: 10 SOLUTION INTRAVENOUS at 07:45

## 2020-09-14 RX ADMIN — LOSARTAN POTASSIUM 50 MG: 50 TABLET, FILM COATED ORAL at 17:58

## 2020-09-14 RX ADMIN — FENTANYL CITRATE 100 MCG: 50 INJECTION, SOLUTION INTRAMUSCULAR; INTRAVENOUS at 07:39

## 2020-09-14 RX ADMIN — HUMAN INSULIN 60 UNITS: 100 INJECTION, SUSPENSION SUBCUTANEOUS at 22:17

## 2020-09-14 RX ADMIN — ONDANSETRON HYDROCHLORIDE 4 MG: 2 INJECTION, SOLUTION INTRAMUSCULAR; INTRAVENOUS at 08:07

## 2020-09-14 RX ADMIN — FENTANYL CITRATE 25 MCG: 50 INJECTION, SOLUTION INTRAMUSCULAR; INTRAVENOUS at 10:41

## 2020-09-14 RX ADMIN — DEXMEDETOMIDINE HYDROCHLORIDE 6 MCG: 100 INJECTION, SOLUTION, CONCENTRATE INTRAVENOUS at 09:25

## 2020-09-14 RX ADMIN — MIDAZOLAM 1 MG: 1 INJECTION INTRAMUSCULAR; INTRAVENOUS at 06:58

## 2020-09-14 RX ADMIN — FUROSEMIDE 40 MG: 40 TABLET ORAL at 17:59

## 2020-09-14 RX ADMIN — Medication 10 ML: at 15:54

## 2020-09-14 RX ADMIN — FENTANYL CITRATE 509 MCG: 50 INJECTION, SOLUTION INTRAMUSCULAR; INTRAVENOUS at 06:58

## 2020-09-14 RX ADMIN — SUCCINYLCHOLINE CHLORIDE 200 MG: 20 INJECTION, SOLUTION INTRAMUSCULAR; INTRAVENOUS at 07:39

## 2020-09-14 RX ADMIN — DEXMEDETOMIDINE HYDROCHLORIDE 6 MCG: 100 INJECTION, SOLUTION, CONCENTRATE INTRAVENOUS at 08:13

## 2020-09-14 RX ADMIN — METOPROLOL TARTRATE 12.5 MG: 25 TABLET, FILM COATED ORAL at 23:11

## 2020-09-14 NOTE — ANESTHESIA POSTPROCEDURE EVALUATION
Post-Anesthesia Evaluation and Assessment    Patient: Daisy Beaulieu MRN: [id number]  SSN: [id number]    YOB: 1945  Age: 77 y.o. Sex: female      I have evaluated the patient and they are stable and ready for discharge from the PACU. Cardiovascular Function/Vital Signs  Visit Vitals  BP (!) 165/64   Pulse 81   Temp 36.7 °C (98.1 °F)   Resp 17   Ht 5' 7\" (1.702 m)   Wt 123 kg (271 lb 2.7 oz)   SpO2 96%   BMI 42.47 kg/m²       Patient is status post General anesthesia for Procedure(s):  XI ROBOTIC RIGHT LOWER LOBE WEDGE RESECTION. Nausea/Vomiting: None    Postoperative hydration reviewed and adequate. Pain:  Pain Scale 1: Numeric (0 - 10) (09/14/20 0956)  Pain Intensity 1: 6 (09/14/20 0956)   Managed    Neurological Status:   Neuro (WDL): Within Defined Limits (09/14/20 0956)   At baseline    Mental Status, Level of Consciousness: Alert and  oriented to person, place, and time    Pulmonary Status:   O2 Device: Nasal cannula (09/14/20 0956)   Adequate oxygenation and airway patent    Complications related to anesthesia: None    Post-anesthesia assessment completed. No concerns    Signed By: Hitesh Ching MD     September 14, 2020              Procedure(s):  XI ROBOTIC RIGHT LOWER LOBE WEDGE RESECTION. general    <BSHSIANPOST>    INITIAL Post-op Vital signs:   Vitals Value Taken Time   /67 9/14/2020 11:05 AM   Temp 36.7 °C (98.1 °F) 9/14/2020  9:56 AM   Pulse 80 9/14/2020 11:33 AM   Resp 24 9/14/2020 11:33 AM   SpO2 95 % 9/14/2020 11:33 AM   Vitals shown include unvalidated device data.

## 2020-09-14 NOTE — ANESTHESIA PROCEDURE NOTES
Arterial Line Placement    Start time: 9/14/2020 7:06 AM  End time: 9/14/2020 7:12 AM  Performed by: Karina Mcgovern MD  Authorized by: Karina Mcgovern MD     Pre-Procedure  Indications:  Arterial pressure monitoring and blood sampling  Preanesthetic Checklist: patient identified, risks and benefits discussed, anesthesia consent, site marked, patient being monitored, timeout performed and patient being monitored    Timeout Time: 07:06        Procedure:   Prep:  Chlorhexidine  Seldinger Technique?: Yes    Orientation:  Left  Location:  Radial artery  Catheter size:  20 G  Number of attempts:  1    Assessment:   Post-procedure:  Line secured and sterile dressing applied  Patient Tolerance:  Patient tolerated the procedure well with no immediate complications

## 2020-09-14 NOTE — H&P
Asked to see Mrs Eric Yancey re: RLL nodule     Referred by Dr. Mari Dukes     Mrs Eric Yancey is a pleasant 76 y/o lady with a past medical history significant for obesity, HTN, DMII and breast cancer. She has been followed by Pulmonary medicine for a RLL nodule which has recently enlarged. She denies any respiratory symptoms. She is not active but can walk a block and can climb one flight of stairs. Within the past year she had a cardiac workup including a LHC.            Allergies   Allergen Reactions    Lisinopril Cough    Percocet [Oxycodone-Acetaminophen] Nausea Only    Percodan [Oxycodone-Aspirin] Nausea Only    Prednisone Nausea Only    Sulfa (Sulfonamide Antibiotics) Unknown (comments)        PMHx: HTN, DMII, ovarian Ca, Breast Ca, obesity     PSHx: hysterectomy, right mastectomy,      SocHx: non smoker           Family History   Problem Relation Age of Onset    Diabetes Mother      Hypertension Mother                 Outpatient Medications Marked as Taking for the 8/13/20 encounter (Office Visit) with Vicente Garza MD   Medication Sig Dispense Refill    metoprolol tartrate (LOPRESSOR) 25 mg tablet TAKE 1 TAB BY MOUTH TWO (2) TIMES A DAY. MAKE SURE YOU HAVE STOPPED COREG WHEN TAKING THIS MED. 60 Tab 0    furosemide (Lasix) 40 mg tablet Take 40 mg by mouth two (2) times a day.        famotidine (Pepcid AC) 20 mg tablet Take 40 mg by mouth two (2) times a day.        insulin NPH (NovoLIN N NPH U-100 Insulin) 100 unit/mL injection 60 Units by SubCUTAneous route two (2) times a day. 60u morning; 60u in the evening  Indications: 60 units in morning and 60 units in the evening        insulin regular (NOVOLIN R REGULAR U-100 INSULN) 100 unit/mL injection by SubCUTAneous route two (2) times a day.  30 am, 30 noon, 35 in the evening        losartan (COZAAR) 100 mg tablet Take 50 mg by mouth daily.        triamcinolone acetonide (KENALOG) 0.1 % ointment Apply  to affected area two (2) times a day. use thin layer  Applies on face at bipap allergy site            ROS:     Constitutional- denies weight loss or gain  HEENT- denies dysphagia  Neuro- denies syncope  Optho- no recent changes in vision  Resp- denies hemoptysis, some dyspnea  CV- denies angina or palpitations  GI- denies abd pain  - no complaints  ID- denies recent fevers  Vasc- denies claudication     Blood pressure (!) 203/96, pulse (!) 110, temperature 98.5 °F (36.9 °C), temperature source Oral, resp. rate 20, height 5' 7\" (1.702 m), weight 273 lb (123.8 kg), SpO2 97 %.     On exam she is seated upright   Alert and oriented  Obese  Not tachypneic  Normal speech, normal affect  No cervical or supraclavicular lymphadenopathy  Lungs CTA b/l  No chest wall tenderness  Rrr, no murmurs  Radial pulses palp b/l  abd sntnd, no organomegaly  LE mild edema        ==============================     Cr 0.74        ==============================     I have personally reviewed her chest CT. There is a 1.2cm heterogenous nodule in the superior segment of the RLL. No associated lymphadenopathy. This nodule has enlarged since prior imaging  ==============================     PFTs- FeV1 94%  DLCO 76%     ==============================     TTE  EF 50%     LHC  Non obstructive CAD     =============================     Diagnoses  1: Solitary pulmonary nodule  2: h/o breast cancer  3: morbid obesity  4: HTN     =============================     Mrs. Jesus Alberto Araujo has an enlarging RLL SPN. The nodule is suspicious and given her history of breast cancer she is understandably worried about whether this nodule is malignant. She has discussed CT guided biopsy with her Pulmonologist but would like to consider a Wedge biopsy which will be both diagnostic and therapeutic. Her PFTs are acceptable.     Will plan for a robotic RLL wedge resection.   If the nodule is malignant we will then perform a segmentectomy and LN dissection.     She will need PAT and covid testing.     Moderate surgical risk based on Obesity and HTN.   Recent cardiac workup noted.     Thank you for allowing us to care for Mrs Bibi Parsons

## 2020-09-14 NOTE — PERIOP NOTES
TRANSFER - OUT REPORT:    Verbal report given to Radha (name) on Daisy Beaulieu  being transferred to 441(unit) for routine post - op       Report consisted of patients Situation, Background, Assessment and   Recommendations(SBAR). Time Pre op antibiotic given:0752  Anesthesia Stop time: 2200  Turner Present on Transfer to floor:n  Order for Turner on Chart:n  Discharge Prescriptions with Chart:n    Information from the following report(s) SBAR, OR Summary, Intake/Output, MAR, Accordion and Recent Results was reviewed with the receiving nurse. Opportunity for questions and clarification was provided. Is the patient on 02? YES       L/Min 2       Other     Is the patient on a monitor? YES    Is the nurse transporting with the patient? YES    Surgical Waiting Area notified of patient's transfer from PACU? YES      The following personal items collected during your admission accompanied patient upon transfer:   Dental Appliance: Dental Appliances: None  Vision: Visual Aid: Glasses  Hearing Aid:    Jewelry: Jewelry: None  Clothing: Clothing: At bedside  Other Valuables: Other Valuables:  At bedside, Eyeglasses  Valuables sent to safe:      Glasses and clothes to floor with pt

## 2020-09-14 NOTE — PROGRESS NOTES
TRANSFER - IN REPORT:    Verbal report received from DOMINICK Starr(name) on Noni Damian  being received from [facility]) for routine progression of care      Report consisted of patients Situation, Background, Assessment and   Recommendations(SBAR). Information from the following report(s) SBAR, OR Summary, Procedure Summary, Intake/Output, Accordion, Recent Results and Cardiac Rhythm SR was reviewed with the receiving nurse. Opportunity for questions and clarification was provided. Assessment completed upon patients arrival to unit and care assumed.

## 2020-09-14 NOTE — ANESTHESIA PROCEDURE NOTES
Central Line Placement    Start time: 9/14/2020 6:52 AM  End time: 9/14/2020 7:04 AM  Performed by: Aniya Jensen MD  Authorized by: Aniya Jensen MD     Indications: vascular access  Preanesthetic Checklist: patient identified, risks and benefits discussed, anesthesia consent, site marked, patient being monitored and timeout performed    Timeout Time: 06:52       Pre-procedure: All elements of maximal sterile barrier technique followed?  Yes    2% Chlorhexidine for cutaneous antisepsis, Hand hygiene performed prior to catheter insertion and Ultrasound guidance    Sterile Ultrasound Technique followed?: Yes            Procedure:   Prep:  Chlorhexidine  Location: internal jugular  Orientation:  Right  Patient position:  Trendelenburg  Catheter type:  Quad lumen  Catheter size:  8.5 Fr  Catheter length:  16 cm  Number of attempts:  1  Successful placement: Yes      Assessment:   Post-procedure:  Catheter secured, sterile dressing applied and sterile dressing with CHG applied  Assessment:  Blood return through all ports, placement verified by x-ray, guidewire removal verified and free fluid flow  Insertion:  Uncomplicated  Patient tolerance:  Patient tolerated the procedure well with no immediate complications

## 2020-09-14 NOTE — ANESTHESIA PREPROCEDURE EVALUATION
Relevant Problems   No relevant active problems       Anesthetic History   No history of anesthetic complications            Review of Systems / Medical History  Patient summary reviewed, nursing notes reviewed and pertinent labs reviewed    Pulmonary  Within defined limits                 Neuro/Psych   Within defined limits           Cardiovascular    Hypertension: well controlled                   GI/Hepatic/Renal     GERD: well controlled           Endo/Other    Diabetes: well controlled, type 2    Morbid obesity     Other Findings              Physical Exam    Airway  Mallampati: II  TM Distance: > 6 cm  Neck ROM: normal range of motion   Mouth opening: Normal     Cardiovascular  Regular rate and rhythm,  S1 and S2 normal,  no murmur, click, rub, or gallop             Dental  No notable dental hx       Pulmonary  Breath sounds clear to auscultation               Abdominal  GI exam deferred       Other Findings            Anesthetic Plan    ASA: 3  Anesthesia type: general          Induction: Intravenous  Anesthetic plan and risks discussed with: Patient

## 2020-09-14 NOTE — BRIEF OP NOTE
Brief Postoperative Note    Patient: Daisy Beaulieu  YOB: 1945  MRN: [id number]    Date of Procedure: 9/14/2020     Pre-Op Diagnosis: SOLITARY PULMONARY NODULE    Post-Op Diagnosis: Same as preoperative diagnosis.       Procedure(s):  XI ROBOTIC RIGHT LOWER LOBE WEDGE RESECTION    Surgeon(s):  Jonathan Avila MD    Surgical Assistant: Physician Assistant: MARLEY Hogan    Anesthesia: General     Estimated Blood Loss (mL): Minimal    Complications: None    Specimens:   ID Type Source Tests Collected by Time Destination   1 : right lower lobe lung wedge #1 Fresh Lung Biopsy  Jonathan Avila MD 9/14/2020 0728 Pathology   2 : right lower lobe wedge #2 Fresh Lung Biopsy  Jonathan Avila MD 9/14/2020 7383 Pathology        Implants: * No implants in log *    Drains: * No LDAs found *    Findings: no palpable nodule    Condition: stable    Disposition: to pacu    Electronically Signed by Maria Teresa Lee MD on 9/14/2020 at 9:14 AM

## 2020-09-14 NOTE — PERIOP NOTES
1000ml ns to sterile field  1000ml ns to suction   Attempted to contact Pt family updated re:surgery start  Patient: Amador Severino MRN: [id number]  SSN: [id number]   YOB: 1945  Age: 77 y.o. Sex: female     Patient is status post Procedure(s):  XI ROBOTIC RIGHT LOWER LOBE WEDGE RESECTION. Surgeon(s) and Role:     Kan Vines MD - Primary    Local/Dose/Irrigation:                   Peripheral IV 09/14/20 Left Hand (Active)   Site Assessment Clean, dry, & intact 09/14/20 0649   Phlebitis Assessment 0 09/14/20 0649   Infiltration Assessment 0 09/14/20 0649   Dressing Status Clean, dry, & intact; New 09/14/20 0649   Dressing Type Transparent;Tape 09/14/20 0649   Hub Color/Line Status Blue 09/14/20 0649            Airway - Endotracheal Tube 09/14/20 Oral (Active)                   Dressing/Packing:  Wound Chest Right Port holes x4-Dressing Type: Topical skin adhesive/glue (09/14/20 2290)    Splint/Cast:  ]    Other:       . short term intact/long term intact

## 2020-09-15 ENCOUNTER — APPOINTMENT (OUTPATIENT)
Dept: GENERAL RADIOLOGY | Age: 75
DRG: 167 | End: 2020-09-15
Attending: PHYSICIAN ASSISTANT
Payer: MEDICARE

## 2020-09-15 LAB
GLUCOSE BLD STRIP.AUTO-MCNC: 155 MG/DL (ref 65–100)
GLUCOSE BLD STRIP.AUTO-MCNC: 188 MG/DL (ref 65–100)
GLUCOSE BLD STRIP.AUTO-MCNC: 62 MG/DL (ref 65–100)
SERVICE CMNT-IMP: ABNORMAL

## 2020-09-15 PROCEDURE — 65660000000 HC RM CCU STEPDOWN

## 2020-09-15 PROCEDURE — 71045 X-RAY EXAM CHEST 1 VIEW: CPT

## 2020-09-15 PROCEDURE — 97116 GAIT TRAINING THERAPY: CPT

## 2020-09-15 PROCEDURE — 74011250636 HC RX REV CODE- 250/636: Performed by: THORACIC SURGERY (CARDIOTHORACIC VASCULAR SURGERY)

## 2020-09-15 PROCEDURE — 74011636637 HC RX REV CODE- 636/637: Performed by: THORACIC SURGERY (CARDIOTHORACIC VASCULAR SURGERY)

## 2020-09-15 PROCEDURE — 74011250637 HC RX REV CODE- 250/637: Performed by: THORACIC SURGERY (CARDIOTHORACIC VASCULAR SURGERY)

## 2020-09-15 PROCEDURE — 97161 PT EVAL LOW COMPLEX 20 MIN: CPT

## 2020-09-15 PROCEDURE — 99024 POSTOP FOLLOW-UP VISIT: CPT | Performed by: PHYSICIAN ASSISTANT

## 2020-09-15 PROCEDURE — 82962 GLUCOSE BLOOD TEST: CPT

## 2020-09-15 RX ORDER — BACITRACIN 500 UNIT/G
1 PACKET (EA) TOPICAL AS NEEDED
Status: DISCONTINUED | OUTPATIENT
Start: 2020-09-15 | End: 2020-09-16 | Stop reason: HOSPADM

## 2020-09-15 RX ADMIN — ENOXAPARIN SODIUM 40 MG: 40 INJECTION SUBCUTANEOUS at 08:09

## 2020-09-15 RX ADMIN — HUMAN INSULIN 30 UNITS: 100 INJECTION, SOLUTION SUBCUTANEOUS at 08:08

## 2020-09-15 RX ADMIN — Medication 10 ML: at 06:21

## 2020-09-15 RX ADMIN — FAMOTIDINE 40 MG: 20 TABLET ORAL at 18:15

## 2020-09-15 RX ADMIN — HUMAN INSULIN 60 UNITS: 100 INJECTION, SUSPENSION SUBCUTANEOUS at 19:32

## 2020-09-15 RX ADMIN — HUMAN INSULIN 35 UNITS: 100 INJECTION, SOLUTION SUBCUTANEOUS at 18:14

## 2020-09-15 RX ADMIN — ASPIRIN 81 MG: 81 TABLET, COATED ORAL at 08:07

## 2020-09-15 RX ADMIN — FUROSEMIDE 40 MG: 40 TABLET ORAL at 08:07

## 2020-09-15 RX ADMIN — HUMAN INSULIN 30 UNITS: 100 INJECTION, SOLUTION SUBCUTANEOUS at 12:48

## 2020-09-15 RX ADMIN — METOPROLOL TARTRATE 25 MG: 25 TABLET, FILM COATED ORAL at 06:21

## 2020-09-15 RX ADMIN — LOSARTAN POTASSIUM 50 MG: 50 TABLET, FILM COATED ORAL at 18:15

## 2020-09-15 RX ADMIN — HUMAN INSULIN 60 UNITS: 100 INJECTION, SUSPENSION SUBCUTANEOUS at 08:07

## 2020-09-15 RX ADMIN — FUROSEMIDE 40 MG: 40 TABLET ORAL at 18:15

## 2020-09-15 RX ADMIN — Medication 10 ML: at 21:52

## 2020-09-15 RX ADMIN — FAMOTIDINE 40 MG: 20 TABLET ORAL at 08:07

## 2020-09-15 RX ADMIN — METOPROLOL TARTRATE 25 MG: 25 TABLET, FILM COATED ORAL at 18:15

## 2020-09-15 NOTE — PROGRESS NOTES
PHYSICAL THERAPY EVALUATION/DISCHARGE  Patient: Lidia Lord (20 y.o. female)  Date: 9/15/2020  Primary Diagnosis: Solitary pulmonary nodule [R91.1]  Procedure(s) (LRB):  XI ROBOTIC RIGHT LOWER LOBE WEDGE RESECTION (Right) 1 Day Post-Op   Precautions:     ASSESSMENT  Based on the objective data described below, the patient presents with baseline functional mobility despite being POD 1 from robotic R lower lobe wedge resection for nodule. Patient overall independent with transfers and gait, even ambulating with accelerated pace. Educated on slowing pace for safety and for activity tolerance. Sats 98% per activity on room air and down to 92% with mild SOB noted after gait. Educated to slow pace and perhaps walk shorter distances, as she states she walks quickly to SwypeShield it over with\" secondary to long history of back pain. Patient with seemingly good understanding of education. Cleared to be ambulating halls without assist after RN assists patient with line management if needed for set up. Functional Outcome Measure: The patient scored 24/28 on the Tinetti outcome measure which is indicative of low-moderate fall risk. Other factors to consider for discharge: at baseline mobility     Further skilled acute physical therapy is not indicated at this time. PLAN :  Recommendation for discharge: (in order for the patient to meet his/her long term goals)  No skilled physical therapy/ follow up rehabilitation needs identified at this time. This discharge recommendation:  Has been made in collaboration with the attending provider and/or case management    IF patient discharges home will need the following DME: none       SUBJECTIVE:   Patient stated I walk fast to get it over with. My back hurts. I've had 3 back surgeries.     OBJECTIVE DATA SUMMARY:   HISTORY:    Past Medical History:   Diagnosis Date    Adverse effect of anesthesia     WOKE UP EARLY DURING BACK SURGERY    Breast cancer ([facility] [address] 1993 right breast cancer     Cervical cancer (HCC) 1980's    Chronic pain     L4-L5 discectomy    Congestive heart failure (HCC)     Depression with anxiety     Ectopic pregnancy     GERD (gastroesophageal reflux disease)     Glaucoma     Hypertension     Ill-defined condition     TONSIL STONES - SOMETIMES CAUSE BAD BREATH    Miscarriage     Neuropathy     r/t DM    Sleep apnea 2005    CPAP compliant but not using now because of rash on face from the mask    Type 2 diabetes mellitus ([facility] [address]     on insulin     Past Surgical History:   Procedure Laterality Date    BREAST SURGERY PROCEDURE UNLISTED Right     right mastectomy    HX CARPAL TUNNEL RELEASE Bilateral     HX CATARACT REMOVAL Bilateral 2013    HX COLONOSCOPY      HX HYSTERECTOMY  1980's    cervical cancer    HX KNEE ARTHROSCOPY Bilateral     HX ORTHOPAEDIC  [id number]    back surgery x 2 ( HERNIATED DISC REPAIR, AND ANOTHER SURGERY SHE CANT REMEMBER    HX ORTHOPAEDIC Left 1990    ligament attachment arm    HX PELVIC LAPAROSCOPY  1970s    ectopic pregnancy    HX TUMOR REMOVAL Right     leg       Prior level of function: Independent, driving, lives with , supportive daughter as well, no falls  Personal factors and/or comorbidities impacting plan of care: PMH    Home Situation  Home Environment: Private residence  # Steps to Enter: 3  Rails to Enter: Yes  Hand Rails : Bilateral  One/Two Story Residence: One story  Living Alone: No  Support Systems: Spouse/Significant Other/Partner, Child(nessa)  Patient Expects to be Discharged to[de-identified] Private residence  Current DME Used/Available at Home: Walker, rolling, Shower chair  Tub or Shower Type: Tub/Shower combination    EXAMINATION/PRESENTATION/DECISION MAKING:   Hearing: Auditory  Auditory Impairment: None  Range Of Motion:  AROM: Within functional limits  PROM: Within functional limits  Strength:    Strength:  Within functional limits  Tone & Sensation:   Tone: Normal  Sensation: Intact  Coordination:  Coordination: Within functional limits   Functional Mobility:  Transfers:  Sit to Stand: Independent  Stand to Sit: Independent  Balance:   Sitting: Intact; Without support  Standing: Intact; Without support  Ambulation/Gait Training:  Distance (ft): 250 Feet (ft)  Ambulation - Level of Assistance: Independent  Right Side Weight Bearing: Full  Left Side Weight Bearing: Full  Base of Support: Widened  Speed/Giulia: Accelerated    Functional Measure:  Tinetti test:    Sitting Balance: 1  Arises: 1  Attempts to Rise: 2  Immediate Standing Balance: 2  Standing Balance: 1  Nudged: 2  Eyes Closed: 1  Turn 360 Degrees - Continuous/Discontinuous: 1  Turn 360 Degrees - Steady/Unsteady: 1  Sitting Down: 1  Balance Score: 13 Balance total score  Indication of Gait: 1  R Step Length/Height: 1  L Step Length/Height: 1  R Foot Clearance: 1  L Foot Clearance: 1  Step Symmetry: 1  Step Continuity: 1  Path: 2  Trunk: 2  Walking Time: 0  Gait Score: 11 Gait total score  Total Score: 24/28 Overall total score         Tinetti Tool Score Risk of Falls  <19 = High Fall Risk  19-24 = Moderate Fall Risk  25-28 = Low Fall Risk  Tinetti ME. Performance-Oriented Assessment of Mobility Problems in Elderly Patients. Strickland 67; [id number].  (Scoring Description: PT Bulletin Feb. 10, 1993)    Older adults: Billy Viera et al, 2009; n = 1000 [facility] elderly evaluated with ABC, ROBERTO CARLOS, ADL, and IADL)  · Mean ROBERTO CARLOS score for males aged 69-68 years = 26.21(3.40)  · Mean ROBERTO CARLOS score for females age 69-68 years = 25.16(4.30)  · Mean ROBERTO CARLOS score for males over 80 years = 23.29(6.02)  · Mean ROBERTO CARLOS score for females over 80 years = 17.20(8.32)            Physical Therapy Evaluation Charge Determination   History Examination Presentation Decision-Making   HIGH Complexity :3+ comorbidities / personal factors will impact the outcome/ POC  HIGH Complexity : 4+ Standardized tests and measures addressing body structure, function, activity limitation and / or participation in recreation  LOW Complexity : Stable, uncomplicated  Other outcome measures Tinetti 24/28  LOW       Based on the above components, the patient evaluation is determined to be of the following complexity level: LOW         Activity Tolerance:   Good, SpO2 stable on RA and observed SOB with activity  Please refer to the flowsheet for vital signs taken during this treatment. After treatment patient left in no apparent distress:   Sitting in chair and Call bell within reach    COMMUNICATION/EDUCATION:   The patients plan of care was discussed with: Registered nurse. Fall prevention education was provided and the patient/caregiver indicated understanding., Patient/family have participated as able in goal setting and plan of care. and Patient/family agree to work toward stated goals and plan of care.     Thank you for this referral.  Claud Oppenheim, PT, DPT   Time Calculation: 18 mins

## 2020-09-15 NOTE — PROGRESS NOTES
Thoracic Surgery Simple Progress Note    Admit Date: 2020  POD: 1 Day Post-Op      Procedure:  Procedure(s):  XI ROBOTIC RIGHT LOWER LOBE WEDGE RESECTION      Subjective:     Patient has complaints: Restless overnight. Review of Systems:  CARDIAC: positive for HTN, CHF  RESP: positive for R lung nodule  NEURO:  negative  INCISION: Clean, dry, and intact  EXT: Denies new swelling or pain in the legs or calves. Objective:     Blood pressure (!) 175/52, pulse 80, temperature 98.1 °F (36.7 °C), resp. rate 18, height 5' 7\" (1.702 m), weight 271 lb 9.7 oz (123.2 kg), SpO2 96 %, not currently breastfeeding. Temp (24hrs), Av.9 °F (36.6 °C), Min:97 °F (36.1 °C), Max:98.3 °F (36.8 °C)        Hemodynamics    PAP    CO    CI    No intake/output data recorded.  1901 - 09/15 0700  In: 600 [I.V.:600]  Out: 590 [Urine:360]    EXAM:  GENERAL: VSS, afrible, alert and cooperative  HEART:  regular rate and rhythm  LUNG: diminished breath sounds R base, R CT w 210ml/24hrs output, +airleak. CXR reviewed  NEURO:  normal without focal findings  mental status, speech normal, A&O x3  KALYAN  INCISION: Clean, dry, and intact  EXTREMITIES:No evidence of DVT seen on physical exam.  GI/: Abd soft, nontender. Voiding    Labs:  Recent Results (from the past 24 hour(s))   GLUCOSE, POC    Collection Time: 20 10:04 AM   Result Value Ref Range    Glucose (POC) 185 (H) 65 - 100 mg/dL    Performed by Andres Herr, POC    Collection Time: 20  4:13 PM   Result Value Ref Range    Glucose (POC) 189 (H) 65 - 100 mg/dL    Performed by Roney Le, POC    Collection Time: 20 10:18 PM   Result Value Ref Range    Glucose (POC) 283 (H) 65 - 100 mg/dL    Performed by Janel KHAN    GLUCOSE, POC    Collection Time: 09/15/20  6:40 AM   Result Value Ref Range    Glucose (POC) 155 (H) 65 - 100 mg/dL    Performed by Red Kumar        Assessment:   No evidence of DVT.   Active Problems: Solitary pulmonary nodule (9/14/2020)      PATH: pending  Plan/Recommendations:   Advance diet  OOB, ambulation w assitance  Daily PT  CXR in AM  See orders    MARLEY Gould  9/15/2020

## 2020-09-15 NOTE — PROGRESS NOTES
2030 Dr branch for /67.    2330 /52    2050 Pt ambulating in hallway for 5 mins, pt is steady on her feet and walks very fast. No pain reported. Pt stated she was given dilaudid earlier and \"it didn't sit well with her\" Pt was sweaty, nauseous and doesn't want the dilaudid again. Insulin NPH was not available in [facility] for 1800 dose therefore insulin was given late. Pt expressed she wasn't happy about how she got \"half the dose\"- insulin regular, I explained to her there wasn't enough insulin hence I gave it to her. 0240 Re-emphasized need to call RN when getting out of bed to the bathroom. Pt prefers sleeping with the bed in the chair position and states \"if the side rail is down I can go to the bathroom by myself\". I explained to the pt that the bed was too high for her to attempt to get out of the bed by herself and because of the chest tube and SCDs she was a great fall risk which needed to disconnected before she got up. Pt states she took lasix later than usual yesterday therefore voiding every two hours. Jim attempted but pt wasn't comfortable and would rather go to the bathroom instead. I again informed the pt to call out for assistance to the bathroom. 0310 Pt asked me to check her central line dressing because she had seen some blood on it. The dressing was intact, and it was old dried blood on the blue hub covered by the dressing. Informed pt that we didn't have dressing kits on the unit at that moment but would change it as soon as I got some. Pt's daughter, Mario Weldon, stated \"get some rest mom, she can come back when xray comes\" Pt verbalized understanding and agreed with daughter's statement. 26 Walked in room to change the central line dressing and pt was emotional stating no one was addressing her concerns and she felt ignored.  Pt stated she wanted another nurse to perform the dressing change immediately since I took a while to get to her then turned her face away toward the window. I informed her there was no one currently available at the nurse's station since we were all getting vitals therefore would have to wait some time again till I got the next person available. Pt began to state how everything wasn't going well for her, starting prior to the surgery she had informed the pre-admitting RN that was she couldn't take certain pain medicine but she still got it and made her sick. She was still waiting on the results of the surgery and the doctor had not contacted her  to let him know what was happening. Pt also talked about the lasix being order wrong and that fact that she didn't sleep because she's getting up every hour and how her blood sugar was because she received insulin late. -Pt is anxious and stressed. 5 Vanesa, daughter, then intervened suggested I perform the dressing change since I was already present and willing to do it. Pt finally agreed. Dressing change performed. Chest tube dressing also changed after it was draining post movement from xray. Gown also noticeably soiled but refused to change it. 0610 Pt up, bathed, pt gown and linen changed. Pt states medications are scheduled incorrectly, she usually takes her Famotide prior breakfast then metoprolol after she eats. 0730 Bedside shift change report given to Ruma Crisostomo RN (oncoming nurse) by Jeanmarie Florian RN (offgoing nurse). Report included the following information SBAR, Kardex, MAR, Accordion and Cardiac Rhythm NSR.

## 2020-09-15 NOTE — PROGRESS NOTES
Bedside shift change report given to Yulissa (oncoming nurse) by Briseida Saavedra (offgoing nurse). Report included the following information SBAR, Kardex, ED Summary, OR Summary, Procedure Summary, Intake/Output, MAR, Accordion, Recent Results, Med Rec Status, Cardiac Rhythm SR and Alarm Parameters .

## 2020-09-15 NOTE — PROGRESS NOTES
JAVAD:    RUR 13%    Patient lives with her . He will drive her home at discharge. Care Management Interventions  PCP Verified by CM: Yes  Mode of Transport at Discharge: ( will drive home at discharge.)  MyChart Signup: Yes  Discharge Durable Medical Equipment: No  Physical Therapy Consult: Yes  Current Support Network: Lives with Spouse  Confirm Follow Up Transport: Family  Discharge Location  Discharge Placement: Home with family assistance    Reason for Admission:   XI ROBOTIC RIGHT LOWER LOBE WEDGE RESECTION                   RUR Score:     13%                Plan for utilizing home health:    No orders      PCP: First and Last name:  Soumya Pitts DO   Name of Practice:    Are you a current patient: Yes/No: Yes   Approximate date of last visit: 9/1/2020   Can you participate in a virtual visit with your PCP:                     Current Advanced Directive/Advance Care Plan:            Not on file              Transition of Care Plan:                    CM met with patient to introduce CM role, verify demographics and begin discharge planning. Patient lives at home with her . She is independent at home. She drives herself to appointments as necessary. Patient's  will transport home at discharge. He will also take her to follow up appointments. Patient gets her prescriptions filled at [facility] in [place]. Insurance verified: [place] Medicare A&B and AARP secondary.     Cj Patterson RN/CRM

## 2020-09-15 NOTE — PROGRESS NOTES
Bedside shift change report given to Yulissa RN (oncoming nurse) by Farhana Garcia RN (offgoing nurse). Report included the following information SBAR, Kardex, MAR and Cardiac Rhythm NSR.

## 2020-09-15 NOTE — PROGRESS NOTES
Spiritual Care Assessment/Progress Note  [facility]      NAME: Lidia Lord      MRN: [id number]  AGE: 77 y.o.  SEX: female  Restorationism Affiliation: Jew   Language: English     9/15/2020     Total Time (in minutes): 15     Spiritual Assessment begun in [address] [facility] through conversation with:         [x]Patient        [] Family    [] Friend(s)        Reason for Consult: Initial/Spiritual assessment, patient floor, Request by patient     Spiritual beliefs: (Please include comment if needed)     [x] Identifies with a tristen tradition:  Jew       [] Supported by a tristen community:            [] Claims no spiritual orientation:           [] Seeking spiritual identity:                [] Adheres to an individual form of spirituality:           [] Not able to assess:                           Identified resources for coping:      [x] Prayer                               [] Music                  [] Guided Imagery     [x] Family/friends                 [] Pet visits     [] Devotional reading                         [] Unknown     [] Other:                                               Interventions offered during this visit: (See comments for more details)    Patient Interventions: Affirmation of emotions/emotional suffering, Affirmation of tristen, Catharsis/review of pertinent events in supportive environment, Coping skills reviewed/reinforced, Iconic (affirming the presence of God/Higher Power), Prayer (actual)           Plan of Care:     [] Support spiritual and/or cultural needs    [] Support AMD and/or advance care planning process      [] Support grieving process   [] Coordinate Rites and/or Rituals    [] Coordination with community clergy   [] No spiritual needs identified at this time   [] Detailed Plan of Care below (See Comments)  [] Make referral to Music Therapy  [] Make referral to Pet Therapy     [] Make referral to Addiction services  [] Make referral to [facility]  [] Make referral to Spiritual Care Partner  [] No future visits requested        [x] Follow up visits as needed     Comments:  visit for initial spiritual assessment. Patient requested  visit during this hospitalization. Patient reclining in bed resting and watching television. Much eye contact, friendly. Says she is feeling better today. Provided spiritual presence and listening as she spoke of her present thoughts, feelings, and concerns. Spoke of her health and events leading to this hospitalization. Is awaiting results from lung biopsy and requested prayer. Described active tristen and trust in God and finds strength and comfort in prayer. A prayer was offered. She appeared comforted and encouraged as a result of this visit and prayer and expressed gratitude for this visit. Informed patient of availability of  and pastoral care services. Visited by Rev. Audelia Ashford MDiv, [facility], [address]   paging service: [phone])

## 2020-09-15 NOTE — OP NOTES
[address]  OPERATIVE REPORT    Name:  Cade Che  MR#:  [id number]  :  1945  ACCOUNT #:  [de-identified]  DATE OF SERVICE:  2020      CLINICAL SERVICE:  Thoracic Surgery    ATTENDING SURGEON:  Johnnie Bee MD    OPERATION PERFORMED:  Robotic right lower lobe wedge resection x2 (yudith De La Paz). PREOPERATIVE DIAGNOSES:  1. Solitary pulmonary nodule, right lower lobe. 2.  History of breast cancer. POSTOPERATIVE DIAGNOSES:  1. Solitary pulmonary nodule, right lower lobe. 2.  History of breast cancer. FIRST ASSIST:  MARLEY Ramírez    SPECIMENS SENT:  Right lower lobe wedge resection x2 was sent to anatomic pathology. DRAINS AND TUBES:  One 28-Tajik chest tube was left within the right hemithorax. ANESTHESIA:  General with double-lumen endotracheal intubation. ESTIMATED BLOOD LOSS:  For this case was minimal.    COMPLICATIONS:  No immediate complications. INDICATIONS FOR PROCEDURE:  The patient is a 73-year-old lady with a history of right breast cancer. She has been followed by Pulmonary Medicine for a number of years and she has a heterogeneous right lower lobe solitary pulmonary nodule which has recently increased in size. After being seen in the clinic in consultation with her pulmonary physician, decision was made to proceed with wedge resection. PROCEDURE IN DETAIL:  After informed consent was obtained and placed on the chart, the patient was taken to the operating room and placed supine on the operating table. General anesthesia with double-lumen endotracheal intubation was induced without complication. Preoperative antibiotics were administered. The patient was then placed in the left lateral decubitus position with the right side up. The patient's right chest was prepped and draped in sterile fashion. Time-out was performed. Through the eighth intercostal space as far anterior as possible, a 12-mm robotic trocar was placed.   The chest was insufflated. In the same intercostal space more posteriorly, two 8-mm trocars and an additional 12 were placed. Assist port was placed inferiorly. The robot was then docked without complication. Instruments were inserted under direct visualization. The apical segment of the right lower lobe was easily identified. Using [employer] green load stapler, a very large wedge resection of the apical segment of the right lower lobe was performed. The specimen was extirpated through the assist port via a specimen bag. The specimen was inspected on the back table. No definitive solitary pulmonary nodule was identified. We then reinspected the right lower lobe. We then took an additional large wedge resection of the right lower lobe ensuring that the entire superior segment of the right lower lobe had been resected. This was again extirpated via a specimen bag via the assist port. Hemostasis was ensured. Instruments were withdrawn. Robot was undocked. Approximately 60 mL of 1% lidocaine mixed with 0.25% Marcaine was used as local anesthetic to perform intercostal nerve blocks. A 28-Macedonian chest tube was placed posteriorly. Trocars were withdrawn. Hemostasis was again ensured. The lung was re-inflated under direct visualization. The incisions were then closed in a multilayer fashion and covered with Dermabond. The patient was then reversed from general anesthesia, extubated, taken to PACU in stable condition. All surgical counts were correct x2 at the end of the case. There were no immediate complications identified during this case. Dr. Thania Gonzales was present and scrubbed throughout the entire procedure. PA, Clara Harding, was instrumental in completion of this operation as she assisted with opening and closing of all incisions and was the primary bedside assist for the [employer] portion of this case.       Dina Steen MD      [id number]  D: 09/14/2020 12:32  T:  09/14/2020 22:57  JOB #:  [id number]  CC:  Ellyn Tadeo MD

## 2020-09-16 ENCOUNTER — APPOINTMENT (OUTPATIENT)
Dept: GENERAL RADIOLOGY | Age: 75
DRG: 167 | End: 2020-09-16
Attending: NURSE PRACTITIONER
Payer: MEDICARE

## 2020-09-16 ENCOUNTER — APPOINTMENT (OUTPATIENT)
Dept: GENERAL RADIOLOGY | Age: 75
DRG: 167 | End: 2020-09-16
Attending: PHYSICIAN ASSISTANT
Payer: MEDICARE

## 2020-09-16 VITALS
BODY MASS INDEX: 42.56 KG/M2 | DIASTOLIC BLOOD PRESSURE: 59 MMHG | HEART RATE: 99 BPM | SYSTOLIC BLOOD PRESSURE: 151 MMHG | TEMPERATURE: 97.7 F | HEIGHT: 67 IN | WEIGHT: 271.17 LBS | OXYGEN SATURATION: 92 % | RESPIRATION RATE: 17 BRPM

## 2020-09-16 LAB
GLUCOSE BLD STRIP.AUTO-MCNC: 87 MG/DL (ref 65–100)
SERVICE CMNT-IMP: NORMAL

## 2020-09-16 PROCEDURE — 71045 X-RAY EXAM CHEST 1 VIEW: CPT

## 2020-09-16 PROCEDURE — 74011000250 HC RX REV CODE- 250: Performed by: THORACIC SURGERY (CARDIOTHORACIC VASCULAR SURGERY)

## 2020-09-16 PROCEDURE — 82962 GLUCOSE BLOOD TEST: CPT

## 2020-09-16 PROCEDURE — 74011250637 HC RX REV CODE- 250/637: Performed by: THORACIC SURGERY (CARDIOTHORACIC VASCULAR SURGERY)

## 2020-09-16 PROCEDURE — 99024 POSTOP FOLLOW-UP VISIT: CPT | Performed by: NURSE PRACTITIONER

## 2020-09-16 PROCEDURE — 74011636637 HC RX REV CODE- 636/637: Performed by: THORACIC SURGERY (CARDIOTHORACIC VASCULAR SURGERY)

## 2020-09-16 RX ORDER — HYDROCODONE BITARTRATE AND ACETAMINOPHEN 5; 325 MG/1; MG/1
1-2 TABLET ORAL
Qty: 42 TAB | Refills: 0 | Status: SHIPPED | OUTPATIENT
Start: 2020-09-16 | End: 2020-09-23

## 2020-09-16 RX ADMIN — HUMAN INSULIN 60 UNITS: 100 INJECTION, SUSPENSION SUBCUTANEOUS at 08:40

## 2020-09-16 RX ADMIN — FAMOTIDINE 40 MG: 20 TABLET ORAL at 08:40

## 2020-09-16 RX ADMIN — BACITRACIN 1 PACKET: 500 OINTMENT TOPICAL at 12:27

## 2020-09-16 RX ADMIN — HUMAN INSULIN 30 UNITS: 100 INJECTION, SOLUTION SUBCUTANEOUS at 08:40

## 2020-09-16 RX ADMIN — FUROSEMIDE 40 MG: 40 TABLET ORAL at 08:40

## 2020-09-16 RX ADMIN — ASPIRIN 81 MG: 81 TABLET, COATED ORAL at 08:40

## 2020-09-16 RX ADMIN — Medication 10 ML: at 06:52

## 2020-09-16 NOTE — ROUTINE PROCESS
Attempted to scheduled PCP appt, however, the office was closed and did not have a voicemail option. [facility] does not service the patients home address.

## 2020-09-16 NOTE — DISCHARGE SUMMARY
Physician Discharge Summary     Patient ID:  Manuel Cazares  [id number]  43 y.o.  1945    Admit Date: 9/14/2020    Discharge Date: 9/16/2020    Admission Diagnoses: Solitary pulmonary nodule [R91.1]    Discharge Diagnoses: Active Problems:    Solitary pulmonary nodule (9/14/2020)         Admission Condition: Good    Discharge Condition: Good    Last Procedure: Procedure(s):  XI ROBOTIC RIGHT LOWER LOBE [address] Course:   Normal hospital course for this procedure. Consults: None    Significant Diagnostic Studies: radiology: CXR and cardiac graphics: Telemetry: NSR  PATH: Benign                 Disposition: home    Patient Instructions:   Current Discharge Medication List      START taking these medications    Details   HYDROcodone-acetaminophen (NORCO) 5-325 mg per tablet Take 1-2 Tabs by mouth every four (4) hours as needed for Pain for up to 7 days. Max Daily Amount: 12 Tabs. Indications: pain  Qty: 42 Tab, Refills: 0    Associated Diagnoses: Solitary pulmonary nodule         CONTINUE these medications which have NOT CHANGED    Details   vitamin E (AQUA GEMS) 400 unit capsule Take 400 Units by mouth daily. metoprolol tartrate (LOPRESSOR) 25 mg tablet Take 1 Tab by mouth two (2) times a day. Qty: 180 Tab, Refills: 0      furosemide (Lasix) 40 mg tablet Take 40 mg by mouth two (2) times a day. famotidine (Pepcid AC) 20 mg tablet Take 40 mg by mouth two (2) times a day. insulin NPH (NovoLIN N NPH U-100 Insulin) 100 unit/mL injection 60 Units by SubCUTAneous route two (2) times a day. 60u morning; 60u in the DINNER TIME  Indications: 60 units in morning and 60 units in the evening      insulin regular (NOVOLIN R REGULAR U-100 INSULN) 100 unit/mL injection by SubCUTAneous route three (3) times daily. 30 am, 30 noon, 35 in the evening      losartan (COZAAR) 100 mg tablet Take 50 mg by mouth every evening.       triamcinolone acetonide (KENALOG) 0.1 % ointment Apply  to affected area two (2) times a day. use thin layer  Applies on face at bipap allergy site      aspirin delayed-release 81 mg tablet Take 81 mg by mouth daily. acetaminophen (TylenoL) 325 mg tablet Take 650 mg by mouth every four (4) hours as needed for Pain.            Activity: Activity as tolerated and No lifting, Driving, or Strenuous exercise for 2-3 weeks  Diet: Resume previous diet  Wound Care: As directed in discharge instructions    Follow-up with Sina ROACH on 9/29/2020 at [address] [place] [address]  Follow-up tests/labs CXR    Sign:  Marleen Belcher, NP

## 2020-09-16 NOTE — PROGRESS NOTES
BG 62, Pt asymptomatic and provided with juice and crackers. Will continue to monitor. 0730 Bedside shift change report given to Kamaljit (oncoming nurse) by Nory Ocampo (offgoing nurse). Report included the following information SBAR, Kardex, MAR, Accordion and Cardiac Rhythm NSR.

## 2020-09-16 NOTE — PROGRESS NOTES
Thoracic Surgery Simple Progress Note    Admit Date: 2020  POD: 2 Days Post-Op      Procedure:  Procedure(s):  XI ROBOTIC RIGHT LOWER LOBE WEDGE RESECTION      Subjective:     Patient has complaints: No significant medical complaints    Review of Systems:  CARDIAC: positive for HTN, CHF  RESP: positive for R lung nodule  NEURO:  negative  INCISION: Clean, dry, and intact  EXT: Denies new swelling or pain in the legs or calves. Objective:     Blood pressure (!) 151/59, pulse 99, temperature 97.7 °F (36.5 °C), resp. rate 17, height 5' 7\" (1.702 m), weight 271 lb 2.7 oz (123 kg), SpO2 92 %, not currently breastfeeding. Temp (24hrs), Av.8 °F (37.1 °C), Min:97.7 °F (36.5 °C), Max:99.8 °F (37.7 °C)        Hemodynamics    PAP    CO    CI     0701 -  190  In: -   Out: 50   1901 -  0700  In: -   Out: 532 [Urine:360]    EXAM:  GENERAL: VSS, afrible, alert and cooperative  HEART:  regular rate and rhythm, S1, S2 normal, no murmur, click, rub or gallop  LUNG: clear to auscultation bilaterally, right sided chest tube in place, no air leak, CXR reviewed  NEURO:  mental status, speech normal, alert and oriented x iii  INCISION: Clean, dry, and intact  EXTREMITIES:No evidence of DVT seen on physical exam.  GI/: Abd soft, nonterder with + bowel sounds. Voiding    Labs:  Recent Results (from the past 24 hour(s))   GLUCOSE, POC    Collection Time: 09/15/20 11:11 AM   Result Value Ref Range    Glucose (POC) 188 (H) 65 - 100 mg/dL    Performed by Claudia Lenz, POC    Collection Time: 09/15/20 10:21 PM   Result Value Ref Range    Glucose (POC) 62 (L) 65 - 100 mg/dL    Performed by [facility] Marjorie    GLUCOSE, POC    Collection Time: 20  6:39 AM   Result Value Ref Range    Glucose (POC) 87 65 - 100 mg/dL    Performed by Nestor More        Assessment:   No evidence of DVT.   Active Problems:    Solitary pulmonary nodule (2020)      PATH:  Benign Plan/Recommendations:   Chest tube removed  Post pull CXR at 1100  Possible discharge later today    See orders    Signed By: Ethel RUDOLPH

## 2020-09-17 ENCOUNTER — PATIENT OUTREACH (OUTPATIENT)
Dept: CASE MANAGEMENT | Age: 75
End: 2020-09-17

## 2020-09-17 NOTE — PROGRESS NOTES
Patient contacted regarding recent discharge and COVID-19 risk. Discussed COVID-19 related testing which was not done at this time. Test results were not done. Patient informed of results, if available? no    Care Transition Nurse/ Ambulatory Care Manager/ LPN Care Coordinator contacted the patient by telephone to perform post discharge assessment. Verified name and  with patient as identifiers. Patient has following risk factors of: heart failure and diabetes. CTN/ACM/LPN reviewed discharge instructions, medical action plan and red flags related to discharge diagnosis. Reviewed and educated them on any new and changed medications related to discharge diagnosis. Advised obtaining a 90-day supply of all daily and as-needed medications. Advance Care Planning:   Does patient have an Advance Directive: health care decision makers updated    Education provided regarding infection prevention, and signs and symptoms of COVID-19 and when to seek medical attention with patient who verbalized understanding. Discussed exposure protocols and quarantine from [address] you at higher risk for severe illness  and given an opportunity for questions and concerns. The patient agrees to contact the COVID-19 hotline [phone] or PCP office for questions related to their healthcare. CTN/ACM/LPN provided contact information for future reference. From CDC: Are you at higher risk for severe illness?  Wash your hands often.  Avoid close contact (6 feet, which is about two arm lengths) with people who are sick.  Put distance between yourself and other people if COVID-19 is spreading in your community.  Clean and disinfect frequently touched surfaces.  Avoid all cruise travel and non-essential air travel.  Call your healthcare professional if you have concerns about COVID-19 and your underlying condition or if you are sick.     For more information on steps you can take to protect yourself, see CDC's How to Protect Yourself      Patient/family/caregiver given information for GetWell Loop and agrees to enroll no  Patient's preferred e-mail:  n/a  Patient's preferred phone number: n/a  Based on Loop alert triggers, patient will be contacted by nurse care manager for worsening symptoms. Plan for follow-up call in 7-14 days based on severity of symptoms and risk factors.

## 2020-09-28 DIAGNOSIS — R91.1 LUNG NODULE: Primary | ICD-10-CM

## 2020-09-29 ENCOUNTER — OFFICE VISIT (OUTPATIENT)
Dept: SURGERY | Age: 75
End: 2020-09-29
Payer: MEDICARE

## 2020-09-29 ENCOUNTER — HOSPITAL ENCOUNTER (OUTPATIENT)
Dept: GENERAL RADIOLOGY | Age: 75
Discharge: HOME | End: 2020-09-29
Payer: MEDICARE

## 2020-09-29 VITALS
OXYGEN SATURATION: 94 % | TEMPERATURE: 98.2 F | HEART RATE: 99 BPM | RESPIRATION RATE: 20 BRPM | WEIGHT: 271 LBS | DIASTOLIC BLOOD PRESSURE: 82 MMHG | BODY MASS INDEX: 42.53 KG/M2 | SYSTOLIC BLOOD PRESSURE: 182 MMHG | HEIGHT: 67 IN

## 2020-09-29 DIAGNOSIS — R91.1 LUNG NODULE: ICD-10-CM

## 2020-09-29 DIAGNOSIS — T81.31XA POSTOPERATIVE WOUND DEHISCENCE, INITIAL ENCOUNTER: Primary | ICD-10-CM

## 2020-09-29 PROCEDURE — 71046 X-RAY EXAM CHEST 2 VIEWS: CPT

## 2020-09-29 PROCEDURE — 99024 POSTOP FOLLOW-UP VISIT: CPT | Performed by: PHYSICIAN ASSISTANT

## 2020-09-29 RX ORDER — CEPHALEXIN 250 MG/1
500 CAPSULE ORAL 4 TIMES DAILY
Qty: 40 CAP | Refills: 0 | Status: SHIPPED | OUTPATIENT
Start: 2020-09-29 | End: 2020-10-12 | Stop reason: ALTCHOICE

## 2020-09-29 NOTE — PROGRESS NOTES
Subjective:      Jennifer Martinez is a 77 y.o. female presents for postop care 15 days following Robotic right lower lobe wedge resection x2 (yudith Feng) by Dr. Edis Chapa on 2020 forSolitary pulmonary nodule, right lower lobe. Surgical pathology--   FINAL PATHOLOGIC DIAGNOSIS   1. Right lung, right lower lobe wedge #1, resection:   Benign pulmonary parenchyma with emphysematous changes. 2. Right lung, right lower lobe wedge #2, resection:   Benign pulmonary parenchyma with emphysematous change, respiratory bronchiolitis, and hemorrhage. Concerned that her surgical wounds are weeping. Denies fevers or chills. Appetite is normal. Eating a regular diet without difficulty. Bowel movements are regular. The patient is voiding without difficulty. Pain is controlled with current analgesics. Medication(s) being used: acetaminophen. Ms. Jose R Marrufo has a reminder for a \"due or due soon\" health maintenance. I have asked that she contact her primary care provider for follow-up on this health maintenance. Objective:     CXR (2020) shows:  IMPRESSION:  Probable tiny persistent right pneumothorax. Right basilar opacity favors  atelectasis. Small right pleural effusion     Visit Vitals  BP (!) 182/82 (BP 1 Location: Left arm, BP Patient Position: Sitting)   Pulse 99   Temp 98.2 °F (36.8 °C) (Oral)   Resp 20   Ht 5' 7\" (1.702 m)   Wt 271 lb (122.9 kg)   SpO2 94%   BMI 42.44 kg/m²       General:  alert, cooperative, no distress, appears stated age, morbidly obese   Pulm: Full, clear BS bilat. No resp distress   Incisions:   healing slowly, every incsion spead 2-3mm w visible sutures, mildly drainage, black chest tube sutures removed. Assessment:     Doing well postoperatively. Slow-to-heal surgical infections with wound dehiscence    Plan/Recommendations/Medical Decision Makin. Will refer for Home Health for help with slow-to-heal incisions  2. Keflex RX   3.  RTC 2 weeks for wound check  4. Surgical pathology discussed  5. Per the NCCN guidelines: Jennifer List will be followed routinely with a history and physical and imaging (CT Chest) every 6 months for the first 2 years then yearly. We discussed the importance of proper diet and 30 minutes/day of proper exercise. Mrs. Jose R Marrufo verbalized understanding and questions were answered to the best of my knowledge and ability. Healthy diet educational materials were provided. All questions were personally answered. Thank you for allowing us to participate in the care of your patient.     Aman Cutler, [place]  Thoracic Surgery  9/29/2020

## 2020-09-29 NOTE — PROGRESS NOTES
Follow up for RLL Wedge Resection 9-.        1. Have you been to the ER, urgent care clinic since your last visit? Hospitalized since your last visit? No    2. Have you seen or consulted any other health care providers outside of the [address] [place] [address] since your last visit? Include any pap smears or colon screening.  No

## 2020-09-29 NOTE — PATIENT INSTRUCTIONS

## 2020-10-03 ENCOUNTER — PATIENT OUTREACH (OUTPATIENT)
Dept: CASE MANAGEMENT | Age: 75
End: 2020-10-03

## 2020-10-03 NOTE — PROGRESS NOTES
Patient resolved from Transition of Care episode on 10/3/20  Discussed COVID-19 related testing which was not done at this time. Test results were not done. Patient informed of results, if available? no     Patient/family has been provided the following resources and education related to COVID-19:                         Signs, symptoms and red flags related to COVID-19            CDC exposure and quarantine guidelines            [facility]it exposure contact - [phone]            Contact for their local Department of Health                 Patient currently reports no symptoms    No further outreach scheduled with this CTN/ACM/LPN/HC/ MA. Episode of Care resolved. Patient has this CTN/ACM/LPN/HC/MA contact information if future needs arise.

## 2020-10-09 ENCOUNTER — TELEPHONE (OUTPATIENT)
Dept: SURGERY | Age: 75
End: 2020-10-09

## 2020-10-09 NOTE — TELEPHONE ENCOUNTER
Patient identified with two patient identifiers. Patient calling states she is upset that she has not seen the surgeon since surgery. Per patient her wound assessed at last visit has been draining now for about 1 month. Per patient its a small opening that is draining a pale liquid that at times soaked the guaze. No C/O increased pain, purulent drainage, fever, swelling, or redness. Patient states she was informed at her last visit she would be scheduled for home health to assist with wound care. Patient states she was given a number to call if she has not heard anything when she called [facility] they informed her they don't come out to her area and she would need the office to send the request to another agency local to her. Patient states she was informed she would hear something to schedule and no one ever followed up. Patient states she was satisfied with provider at last visit but she really wants to see the surgeon. Patient did not want to change appointment scheduled 10/12/2020 to surgeon instead of PA as offered she just suggests that Dr. Jose Reynolds comes in while she is there for her visit. Patient offered for me to forward message to provider for possible return call to follow up. Per patient she can wait until visit on Monday. Patient agreed to return call if any other questions or concerns.

## 2020-10-09 NOTE — TELEPHONE ENCOUNTER
Patient called and stated she is having some complications with drainage and would like a call back.

## 2020-10-12 ENCOUNTER — OFFICE VISIT (OUTPATIENT)
Dept: SURGERY | Age: 75
End: 2020-10-12
Payer: MEDICARE

## 2020-10-12 VITALS
OXYGEN SATURATION: 94 % | WEIGHT: 271 LBS | TEMPERATURE: 98.4 F | RESPIRATION RATE: 18 BRPM | DIASTOLIC BLOOD PRESSURE: 75 MMHG | BODY MASS INDEX: 42.53 KG/M2 | HEIGHT: 67 IN | HEART RATE: 85 BPM | SYSTOLIC BLOOD PRESSURE: 193 MMHG

## 2020-10-12 DIAGNOSIS — R91.1 LUNG NODULE: Primary | ICD-10-CM

## 2020-10-12 PROCEDURE — 99024 POSTOP FOLLOW-UP VISIT: CPT | Performed by: PHYSICIAN ASSISTANT

## 2020-10-12 RX ORDER — MUPIROCIN 20 MG/G
OINTMENT TOPICAL
COMMUNITY
Start: 2020-10-02 | End: 2020-10-12

## 2020-10-12 NOTE — PROGRESS NOTES
Subjective:      Theo Carver is a 77 y.o. female presents for postop care 1 month following Robotic right lower lobe wedge resection x2 (yudith Ramírez) by Dr. Sarita Devries on 9/14/2020 forSolitary pulmonary nodule, right lower lobe. Surgical pathology--   FINAL PATHOLOGIC DIAGNOSIS   1. Right lung, right lower lobe wedge #1, resection:   Benign pulmonary parenchyma with emphysematous changes. 2. Right lung, right lower lobe wedge #2, resection:   Benign pulmonary parenchyma with emphysematous change, respiratory bronchiolitis, and hemorrhage. Three posterior 1cm incisions slow-to-heal with minimal weeping. No signs of infection or pain reported. Has been applying Bactroban as prescribed by her PCP who she saw last week for a wound check, as home health has yet to see patient. Appetite is normal. Eating a regular diet without difficulty. Bowel movements are regular. The patient is voiding without difficulty. The patient is not having any pain. Ms. Lydia Dietz has a reminder for a \"due or due soon\" health maintenance. I have asked that she contact her primary care provider for follow-up on this health maintenance. Objective:     Visit Vitals  BP (!) 193/75 (BP 1 Location: Left arm, BP Patient Position: Sitting)   Pulse 85   Temp 98.4 °F (36.9 °C) (Oral)   Resp 18   Ht 5' 7\" (1.702 m)   Wt 271 lb (122.9 kg)   SpO2 94%   BMI 42.44 kg/m²       General:  alert, cooperative, no distress, appears stated age   Pulm: Full, clear BS , No resp distress   Incisions:   healing slowly, minimal drainage, no erythema, no hernia, no seroma, no swelling. 3 posterior chest incisions with slight dehiscence, anterior 2 incisions well approximated     Assessment:     Dr. Joshua Morrow spoke with patient & daughter-in-law at length. All questions & concerns addressed. Doing well postoperatively. Reassured that incisions will eventually heal, and clearly not infected.   Steri-strips loosely applied. Plan/Recommendations/Medical Decision Makin. RTC 3 months post-op with CXR  2. Home Health for wound care starting tomorrow 10/13/2020  Per the NCCN guidelines: Seretha Nations will be followed routinely with a history and physical and imaging (CT Chest) every 6 months for the first 2 years then yearly. We discussed the importance of proper diet and 30 minutes/day of proper exercise. Ms. Minna Zavala & fany verbalized understanding and questions were answered to the best of my knowledge and ability. Healthy diet educational materials were provided. All questions were personally answered. Thank you for allowing us to participate in the care of your patient.     Paz Manuel [place]  Thoracic Surgery  10/12/2020

## 2020-10-12 NOTE — PATIENT INSTRUCTIONS

## 2020-10-12 NOTE — PROGRESS NOTES
Wound check. 1. Have you been to the ER, urgent care clinic since your last visit? Hospitalized since your last visit? No    2. Have you seen or consulted any other health care providers outside of the [address] [place] [address] since your last visit? Include any pap smears or colon screening.  No

## 2020-10-19 ENCOUNTER — OFFICE VISIT (OUTPATIENT)
Dept: CARDIOLOGY CLINIC | Age: 75
End: 2020-10-19
Payer: MEDICARE

## 2020-10-19 VITALS
SYSTOLIC BLOOD PRESSURE: 161 MMHG | HEIGHT: 67 IN | HEART RATE: 73 BPM | BODY MASS INDEX: 41.91 KG/M2 | DIASTOLIC BLOOD PRESSURE: 76 MMHG | WEIGHT: 267 LBS | OXYGEN SATURATION: 97 %

## 2020-10-19 DIAGNOSIS — I50.32 CHRONIC HEART FAILURE WITH PRESERVED EJECTION FRACTION (HCC): ICD-10-CM

## 2020-10-19 DIAGNOSIS — E66.01 MORBID OBESITY (HCC): ICD-10-CM

## 2020-10-19 DIAGNOSIS — I25.10 CORONARY ARTERY DISEASE INVOLVING NATIVE CORONARY ARTERY OF NATIVE HEART WITHOUT ANGINA PECTORIS: ICD-10-CM

## 2020-10-19 DIAGNOSIS — I10 ESSENTIAL HYPERTENSION: Primary | ICD-10-CM

## 2020-10-19 PROCEDURE — G0463 HOSPITAL OUTPT CLINIC VISIT: HCPCS | Performed by: NURSE PRACTITIONER

## 2020-10-19 PROCEDURE — 99214 OFFICE O/P EST MOD 30 MIN: CPT | Performed by: NURSE PRACTITIONER

## 2020-10-19 RX ORDER — LOSARTAN POTASSIUM 50 MG/1
50 TABLET ORAL 2 TIMES DAILY
Qty: 60 TAB | Refills: 1 | Status: SHIPPED | OUTPATIENT
Start: 2020-10-19 | End: 2020-12-03

## 2020-10-19 NOTE — PROGRESS NOTES
Joanne Estevez, RUI  [address] [facility], [address] [place] [address]    Office [phone]  Fax [phone]        NAME: Juan Betts   :  1945  MRM:  [id number]    Date:  10/19/2020         Assessment:     Problem List  Date Reviewed: 10/12/2020          Codes Class Noted    Shortness of breath ICD-10-CM: R06.02  ICD-9-CM: 786.05  3/3/2020        Solitary pulmonary nodule ICD-10-CM: R91.1  ICD-9-CM: 793.11  2020        CHF (congestive heart failure) ([facility] ICD-10-CM: I50.9  ICD-9-CM: 428.0  3/4/2020        Infected sebaceous cyst ICD-10-CM: L72.3, L08.9  ICD-9-CM: 706.2  2018        Abscess ICD-10-CM: L02.91  ICD-9-CM: 682.9  2018        Morbid obesity ([facility] ICD-10-CM: E66.01  ICD-9-CM: 278.01  2018        SIRS (systemic inflammatory response syndrome) ([facility] ICD-10-CM: R65.10  ICD-9-CM: 995.90  2016        Hypertension ICD-10-CM: I10  ICD-9-CM: 401.9  Unknown        Type 2 diabetes mellitus (HCC) ICD-10-CM: E11.9  ICD-9-CM: 250.00  Unknown        Depression with anxiety ICD-10-CM: F41.8  ICD-9-CM: 300.4  Unknown        Glaucoma ICD-10-CM: H40.9  ICD-9-CM: 365.9  Unknown        Breast cancer ([facility] ICD-10-CM: C50.919  ICD-9-CM: 174.9  Unknown               Plan:     HTN, elevated. Intolerant in past to Coreg and HCTZ. Hesitant to take new med. Will retry increased losartan - take 50mg BID.      HFpEF - vol stable on lasix 40mg BID. Leg cramps at night;  Try lasix 40mg/d based on pt request. Advised to increase to BID PRN. Lytes stable on 20. Non-obstructive CAD: per cath 3/2020. Continue medical management with blood pressure control and aspirin 81mg/d. Pt does not want to be on statins.           Lipids - will obtain recent results from Endocrine; would benefit from statin in future if pt ever agreeable given DM / CAD. F/u in 1 month or PRN       Subjective:     Jaun Betts is a 77 y.o. female being seen today for f/u of HTN.        Pt concerned about possible med side effects. Having leg cramps at night for the past few months which she feels is due to lasix. Would like to try daily dose. States leg swelling stable / weight stable. No dyspnea / cough / congestion. No leg cramps during the day or when walking.      Home pressures elevated [id number] systolic. Pt states her BP has always been high. She is hesitant to try new meds. Had CP when on losartan 100mg/d in past.  Also was intolerant to HCTZ and coreg in past.  States she feels 'depressed' after taking metoprolol for several hours.       Patient denies any exertional chest pain, breathing changes, palpitations, syncope, orthopnea, edema, or paroxysmal nocturnal dyspnea. Labs with Endocrine - checks every 3 months. HgbA1c recently around 8. Sees Dr. Chilango Rae.       (Positive findings above)  Constitutional: Negative for fever, chills, and diaphoresis. Respiratory: Negative for cough, hemoptysis, sputum production,  and wheezing. Cardiovascular: Negative for chest pain, leg swelling and PND. Gastrointestinal: Negative for nausea, vomiting, blood in stool and melena. Genitourinary: Negative for dysuria and flank pain. Neurological: Negative for focal weakness, seizures, loss of consciousness  Endo/Heme/Allergies: Negative for abnormal bleeding. Psychiatric/Behavioral: Negative for memory loss.      Exam:     Physical Exam:  Visit Vitals  BP (!) 161/76 (BP 1 Location: Left arm, BP Patient Position: Sitting)   Pulse 73   Ht 5' 7\" (1.702 m)   Wt 267 lb (121.1 kg)   SpO2 97%   BMI 41.82 kg/m²      Repeat /82 (sitting)    BP Readings from Last 3 Encounters:   10/19/20 (!) 161/76   10/12/20 (!) 193/75   09/29/20 (!) 182/82     General appearance - alert, well appearing, and in no distress  Mental status - affect appropriate to mood  Eyes - sclera anicteric, moist mucous membranes  Neck - supple, no significant adenopathy  Chest - clear to auscultation, no wheezes, rales or rhonchi  Heart - normal rate, regular rhythm, normal S1, S2, no murmurs, rubs, clicks or gallops  Abdomen - soft, nontender, nondistended, no masses or organomegaly  Extremities - peripheral pulses normal, no pedal edema  Skin - normal coloration  no rashes    Medications:     Current Outpatient Medications   Medication Sig    furosemide (Lasix) 40 mg tablet Take 40mg (1 tab) per day. Increase to 40mg (1 tab) twice daily if needed for increased swelling, shortness or breath, or weight gain >5lbs.  losartan (COZAAR) 50 mg tablet Take 1 Tab by mouth two (2) times a day.  vitamin E (AQUA GEMS) 400 unit capsule Take 400 Units by mouth daily.  acetaminophen (TylenoL) 325 mg tablet Take 650 mg by mouth every four (4) hours as needed for Pain.  metoprolol tartrate (LOPRESSOR) 25 mg tablet Take 1 Tab by mouth two (2) times a day.  famotidine (Pepcid AC) 20 mg tablet Take 40 mg by mouth two (2) times a day.  insulin NPH (NovoLIN N NPH U-100 Insulin) 100 unit/mL injection 60 Units by SubCUTAneous route two (2) times a day. 60u morning; 60u in the DINNER TIME  Indications: 60 units in morning and 60 units in the evening    insulin regular (NOVOLIN R REGULAR U-100 INSULN) 100 unit/mL injection by SubCUTAneous route three (3) times daily. 30 am, 30 noon, 35 in the evening    triamcinolone acetonide (KENALOG) 0.1 % ointment Apply  to affected area two (2) times a day. use thin layer  Applies on face at bipap allergy site    aspirin delayed-release 81 mg tablet Take 81 mg by mouth daily. No current facility-administered medications for this visit. Diagnostic Data Review:     02/20/20   ECHO ADULT COMPLETE 02/21/2020 2/21/2020    Narrative · Normal cavity size and systolic function (ejection fraction normal). Mild concentric hypertrophy. Estimated left ventricular ejection fraction   is 50 - 55%. Visually measured ejection fraction.   · Mildly dilated RV with mild RV systolic dysfunction. · Image quality for this study was technically difficult. Contrast used:   DEFINITY.         Signed by: Katharina Terrell MD     03/03/20   CARDIAC PROCEDURE 03/04/2020 3/4/2020    Narrative · Dense coroanry calcification without significant obstructive disease  · Elevated lvedp     Findings:   L Main: large caliber, calcified, no critical disease  LAD: large caliber, calcified, 40-50% stenosis at D2 bifurcation, supplies   two diagonals, D1 small caliber and D2 moderate caliber with diffuse   moderate disease  LCx: large caliber, calcified, ostial 30%, supplies a few very small   marginals and continues a large caliber marginal with distal 40%  stenosis  RCA: moderate caliber, diffuse mild to moderate disease, small PDA and   RPLV branch     LVEDP:  25 mmhg       Signed by: Lilian Rose DO     Lab Review:     Lab Results   Component Value Date/Time    Sodium 141 09/04/2020 11:24 AM    Potassium 3.7 09/04/2020 11:24 AM    Chloride 105 09/04/2020 11:24 AM    CO2 31 09/04/2020 11:24 AM    Anion gap 5 09/04/2020 11:24 AM    Glucose 119 (H) 09/04/2020 11:24 AM    BUN 13 09/04/2020 11:24 AM    Creatinine 0.89 09/04/2020 11:24 AM    BUN/Creatinine ratio 15 09/04/2020 11:24 AM    GFR est AA >60 09/04/2020 11:24 AM    GFR est non-AA >60 09/04/2020 11:24 AM    Calcium 9.0 09/04/2020 11:24 AM     Lab Results   Component Value Date/Time    WBC 9.0 09/04/2020 11:24 AM    HGB 14.5 09/04/2020 11:24 AM    HCT 46.1 09/04/2020 11:24 AM    PLATELET 189 35/24/8003 11:24 AM    MCV 85.1 09/04/2020 11:24 AM     Lab Results   Component Value Date/Time    Hemoglobin A1c 9.2 (H) 06/12/2018 05:59 AM     No results found for: CHOL, CHOLPOCT, CHOLX, CHLST, CHOLV, HDL, HDLPOC, HDLP, LDL, LDLCPOC, LDLC, DLDLP, VLDLC, VLDL, TGLX, TRIGL, TRIGP, TGLPOCT, CHHD, CHHDX  No results found for: TSH, TSHEXT, TSH2, TSH3, TSHP, TSHELE, TT3, T3U, T3UP, FRT3, FT3, T3T, FT4, FT4P, T4, T4P, FT4T, TT7, F9TPRKYLU, TSHEXT, TSHEXT          ___________________________________________________    Katia Lat, ANP

## 2020-10-19 NOTE — LETTER
10/20/20 Patient: Ronald Vaughn YOB: 1945 Date of Visit: 10/19/2020 Pari Phan DO 
[facility] [address] [address] [facility] VIA Facsimile: [phone] Dear Pari Phan DO, Thank you for referring Ms. Abel Aranda to [facility] for evaluation. My notes for this consultation are attached. If you have questions, please do not hesitate to call me. I look forward to following your patient along with you.  
 
 
Sincerely, 
 
Henrietta Mcdaniels NP

## 2020-10-19 NOTE — PROGRESS NOTES
Chief Complaint   Patient presents with    CHF    Hypertension    Coronary Artery Disease    Follow-up     3 month     Visit Vitals  BP (!) 161/76 (BP 1 Location: Left arm, BP Patient Position: Sitting)   Pulse 73   Ht 5' 7\" (1.702 m)   Wt 267 lb (121.1 kg)   SpO2 97%   BMI 41.82 kg/m²     Chest pain denied   SOB denied  Swelling in hands/feet denied   Dizziness denied  Recent hospital stays sept 14 surgery nodual removed from lung  Refills denied

## 2020-10-19 NOTE — PATIENT INSTRUCTIONS
Change losartan to 50mg twice daily Try lasix 40mg daily - if you get increase swelling, shortness or breath, or weight gain >3lb in 1 day or >5lbs then change back to lasix twice daily. See Dr. Bernadette Radford again in 1 month. See primary care if leg cramps don't improve.

## 2020-10-20 RX ORDER — FUROSEMIDE 40 MG/1
TABLET ORAL
Qty: 60 TAB | Refills: 0
Start: 2020-10-20 | End: 2021-07-26

## 2020-11-17 RX ORDER — METOPROLOL TARTRATE 25 MG/1
TABLET, FILM COATED ORAL
Qty: 180 TAB | Refills: 0 | Status: SHIPPED | OUTPATIENT
Start: 2020-11-17 | End: 2021-02-12

## 2020-11-20 ENCOUNTER — OFFICE VISIT (OUTPATIENT)
Dept: CARDIOLOGY CLINIC | Age: 75
End: 2020-11-20
Payer: MEDICARE

## 2020-11-20 VITALS
WEIGHT: 274 LBS | OXYGEN SATURATION: 98 % | SYSTOLIC BLOOD PRESSURE: 148 MMHG | HEART RATE: 81 BPM | BODY MASS INDEX: 43 KG/M2 | HEIGHT: 67 IN | RESPIRATION RATE: 16 BRPM | DIASTOLIC BLOOD PRESSURE: 82 MMHG

## 2020-11-20 DIAGNOSIS — I10 ESSENTIAL HYPERTENSION: Primary | ICD-10-CM

## 2020-11-20 DIAGNOSIS — I50.32 CHRONIC DIASTOLIC CONGESTIVE HEART FAILURE (HCC): ICD-10-CM

## 2020-11-20 DIAGNOSIS — I25.10 CORONARY ARTERY DISEASE INVOLVING NATIVE CORONARY ARTERY OF NATIVE HEART WITHOUT ANGINA PECTORIS: ICD-10-CM

## 2020-11-20 PROCEDURE — 3017F COLORECTAL CA SCREEN DOC REV: CPT | Performed by: INTERNAL MEDICINE

## 2020-11-20 PROCEDURE — G8417 CALC BMI ABV UP PARAM F/U: HCPCS | Performed by: INTERNAL MEDICINE

## 2020-11-20 PROCEDURE — G8753 SYS BP > OR = 140: HCPCS | Performed by: INTERNAL MEDICINE

## 2020-11-20 PROCEDURE — 99214 OFFICE O/P EST MOD 30 MIN: CPT | Performed by: INTERNAL MEDICINE

## 2020-11-20 PROCEDURE — G8536 NO DOC ELDER MAL SCRN: HCPCS | Performed by: INTERNAL MEDICINE

## 2020-11-20 PROCEDURE — G8427 DOCREV CUR MEDS BY ELIG CLIN: HCPCS | Performed by: INTERNAL MEDICINE

## 2020-11-20 PROCEDURE — 1090F PRES/ABSN URINE INCON ASSESS: CPT | Performed by: INTERNAL MEDICINE

## 2020-11-20 PROCEDURE — G0463 HOSPITAL OUTPT CLINIC VISIT: HCPCS | Performed by: INTERNAL MEDICINE

## 2020-11-20 PROCEDURE — G9717 DOC PT DX DEP/BP F/U NT REQ: HCPCS | Performed by: INTERNAL MEDICINE

## 2020-11-20 PROCEDURE — 1101F PT FALLS ASSESS-DOCD LE1/YR: CPT | Performed by: INTERNAL MEDICINE

## 2020-11-20 PROCEDURE — G8754 DIAS BP LESS 90: HCPCS | Performed by: INTERNAL MEDICINE

## 2020-11-20 PROCEDURE — G8400 PT W/DXA NO RESULTS DOC: HCPCS | Performed by: INTERNAL MEDICINE

## 2020-11-20 RX ORDER — POTASSIUM CHLORIDE 20 MEQ/1
40 TABLET, EXTENDED RELEASE ORAL DAILY
Qty: 180 TAB | Refills: 3 | Status: SHIPPED | OUTPATIENT
Start: 2020-11-20 | End: 2021-07-26

## 2020-11-20 RX ORDER — LANOLIN ALCOHOL/MO/W.PET/CERES
400 CREAM (GRAM) TOPICAL DAILY
Qty: 90 TAB | Refills: 3 | Status: SHIPPED | OUTPATIENT
Start: 2020-11-20 | End: 2021-07-26

## 2020-11-20 NOTE — PROGRESS NOTES
Tee Truong is a 77 y.o. female    Chief Complaint   Patient presents with    Hypertension    Coronary Artery Disease    Other     CHF w/ PEF       Chest pain : no  SOB : no  Dizziness : no  Edema : no  Refills :     Visit Vitals  BP (!) 148/82 (BP 1 Location: Left arm, BP Patient Position: Sitting)   Pulse 81   Resp 16   Ht 5' 7\" (1.702 m)   Wt 274 lb (124.3 kg)   SpO2 98%   BMI 42.91 kg/m²       1. Have you been to the ER, urgent care clinic since your last visit? Hospitalized since your last visit?no    2. Have you seen or consulted any other health care providers outside of the [address] [place] [address] since your last visit? Include any pap smears or colon screening.  Yes PCP dr Lakshmi Starks

## 2020-11-20 NOTE — PROGRESS NOTES
Chief Complaint   Patient presents with    Hypertension    Coronary Artery Disease    Other     CHF w/ PEF     There were no vitals taken for this visit.   Chest pain denied   SOB denied  Dizziness denied  Swelling in hands/feet denied   Recent hospital stays denied

## 2020-11-20 NOTE — PROGRESS NOTES
All orders entered per verbal orders by Dr. Leonard Pitt:      Start KCL 40mg po daily. Start Magnesium 400mg po daily. See Dr. Leonard Pitt in 1 year.

## 2020-11-20 NOTE — PROGRESS NOTES
Office Follow-up    NAME: Almita Fang   :  1945  MRM:  [id number]    Date:  2020            Assessment:     Problem List  Date Reviewed: 10/12/2020          Codes Class Noted    Shortness of breath ICD-10-CM: R06.02  ICD-9-CM: 786.05  3/3/2020        Solitary pulmonary nodule ICD-10-CM: R91.1  ICD-9-CM: 793.11  2020        CHF (congestive heart failure) ([place] [address].) ICD-10-CM: I50.9  ICD-9-CM: 428.0  3/4/2020        Infected sebaceous cyst ICD-10-CM: L72.3, L08.9  ICD-9-CM: 706.2  2018        Abscess ICD-10-CM: L02.91  ICD-9-CM: 682.9  2018        Morbid obesity ([place] [address].) ICD-10-CM: E66.01  ICD-9-CM: 278.01  2018        SIRS (systemic inflammatory response syndrome) ([place] [address].) ICD-10-CM: R65.10  ICD-9-CM: 995.90  2016        Hypertension ICD-10-CM: I10  ICD-9-CM: 401.9  Unknown        Type 2 diabetes mellitus (HCC) ICD-10-CM: E11.9  ICD-9-CM: 250.00  Unknown        Depression with anxiety ICD-10-CM: F41.8  ICD-9-CM: 300.4  Unknown        Glaucoma ICD-10-CM: H40.9  ICD-9-CM: 365.9  Unknown        Breast cancer ([place] [address].) ICD-10-CM: C50.919  ICD-9-CM: 174.9  Unknown                 Plan:     1. Muscle cramps: Although her potassium is normal she is on Lasix and that could be causing muscle cramps. I will supplement potassium 40 mEq p.o. daily. Also supplement magnesium 400 mg p.o. daily. 2. Accelerated hypertension: Blood pressure is better. Previously could not tolerate Coreg or HCTZ. Currently on losartan 50 mg p.o. twice daily. 3. Diastolic congestive heart failure: She is now euvolemic. There is no significant peripheral edema. Continue Lasix. We are adding potassium and magnesium. Control blood pressure. 4. CAD: Moderate CAD with heavy calcification of the coronary arteries. Continue medical management with blood pressure control. Continue baby aspirin every day. She does not want to be on statins.   5. See Dr. Tejal Gil in 6 months.                        Subjective:      Noni BLEVINS Porter Cm a 77 y. o. female being seen today for f/u of HTN.  She has been following with Katia Warner in our office. Recently Martínez Konstantin had increased the losartan to 50 mg twice a day. In the past she could not tolerate HCTZ and Coreg and those were discontinued. She is concerned about possible medication side effects. She has been having leg cramps at night and she thinks it could be Lasix or losartan related. Also she is having some numbness involving the arms and hands when she is sleeping at night. 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 But she does not think that those are the cause for her leg cramps.               ATTENTION:   This medical record was transcribed using an electronic medical records/speech recognition system. Although proofread, it may and can contain electronic, spelling and other errors. Corrections may be executed at a later time. Please feel free to contact us for any clarifications as needed. Exam:     Physical Exam:  Visit Vitals  BP (!) 148/82 (BP 1 Location: Left arm, BP Patient Position: Sitting)   Pulse 81   Resp 16   Ht 5' 7\" (1.702 m)   Wt 274 lb (124.3 kg)   SpO2 98%   BMI 42.91 kg/m²     General appearance - alert, well appearing, and in no distress  Mental status - affect appropriate to mood  Eyes - sclera anicteric, moist mucous membranes  Neck - supple, no significant adenopathy  Chest - clear to auscultation, no wheezes, rales or rhonchi  Heart - normal rate, regular rhythm, normal S1, S2, no murmurs, rubs, clicks or gallops  Abdomen - soft, nontender, nondistended, no masses or organomegaly  Extremities - peripheral pulses normal, no pedal edema  Skin - normal coloration  no rashes    Medications:     Current Outpatient Medications   Medication Sig    metoprolol tartrate (LOPRESSOR) 25 mg tablet TAKE 1 TABLET BY MOUTH TWICE A DAY    furosemide (Lasix) 40 mg tablet Take 40mg (1 tab) per day.   Increase to 40mg (1 tab) twice daily if needed for increased swelling, shortness or breath, or weight gain >5lbs.  losartan (COZAAR) 50 mg tablet Take 1 Tab by mouth two (2) times a day.  vitamin E (AQUA GEMS) 400 unit capsule Take 400 Units by mouth daily.  acetaminophen (TylenoL) 325 mg tablet Take 650 mg by mouth every four (4) hours as needed for Pain.  famotidine (Pepcid AC) 20 mg tablet Take 40 mg by mouth two (2) times a day.  insulin NPH (NovoLIN N NPH U-100 Insulin) 100 unit/mL injection 60 Units by SubCUTAneous route two (2) times a day. 60u morning; 60u in the DINNER TIME  Indications: 60 units in morning and 60 units in the evening    insulin regular (NOVOLIN R REGULAR U-100 INSULN) 100 unit/mL injection by SubCUTAneous route three (3) times daily. 30 am, 30 noon, 35 in the evening    triamcinolone acetonide (KENALOG) 0.1 % ointment Apply  to affected area two (2) times a day. use thin layer  Applies on face at bipap allergy site    aspirin delayed-release 81 mg tablet Take 81 mg by mouth daily. No current facility-administered medications for this visit.        Diagnostic Data Review:         [facility] on 3/4/2020:    L Main: large caliber, calcified, no critical disease  LAD: large caliber, calcified, 40-50% stenosis at D2 bifurcation, supplies two diagonals, D1 small caliber and D2 moderate caliber with diffuse moderate disease  LCx: large caliber, calcified, ostial 30%, supplies a few very small marginals and continues a large caliber marginal with distal 40%  stenosis  RCA: moderate caliber, diffuse mild to moderate disease, small PDA and RPLV branch      Lab Review:   No results found for: CHOL, CHOLX, CHLST, CHOLV, 404423, HDL, HDLP, LDL, LDLC, DLDLP, TGLX, TRIGL, TRIGP, CHHD, CHHDX  Lab Results   Component Value Date/Time    Creatinine (POC) 0.4 (L) 04/17/2019 01:57 PM    Creatinine 0.89 09/04/2020 11:24 AM     Lab Results   Component Value Date/Time    BUN 13 09/04/2020 11:24 AM     Lab Results   Component Value Date/Time Potassium 3.7 09/04/2020 11:24 AM     Lab Results   Component Value Date/Time    Hemoglobin A1c 9.2 (H) 06/12/2018 05:59 AM     Lab Results   Component Value Date/Time    HGB 14.5 09/04/2020 11:24 AM     Lab Results   Component Value Date/Time    PLATELET 049 42/89/2017 11:24 AM     No results for input(s): CPK, CKMB, TROIQ in the last 72 hours. No lab exists for component: CKQMB, CPKMB             ___________________________________________________    Art Spindle.  Joao Bridges MD, [facility]

## 2020-12-02 NOTE — TELEPHONE ENCOUNTER
Per VO of Dr. Marisol Nogueira: 11/20/2020    Future Appointments   Date Time Provider Carla Juanita   12/22/2020 10:00 AM MARLEY Schmitz BS AMB   5/21/2021  2:00 PM Ismael Samuels MD [facility] BS AMB       Requested Prescriptions     Pending Prescriptions Disp Refills    losartan (COZAAR) 50 mg tablet [Pharmacy Med Name: LOSARTAN POTASSIUM 50 MG TAB] 60 Tab 1     Sig: TAKE 1 TABLET BY MOUTH TWICE A DAY

## 2020-12-03 RX ORDER — LOSARTAN POTASSIUM 50 MG/1
TABLET ORAL
Qty: 180 TAB | Refills: 1 | Status: SHIPPED | OUTPATIENT
Start: 2020-12-03 | End: 2021-05-17

## 2020-12-21 DIAGNOSIS — R91.1 NODULE OF RIGHT LUNG: Primary | ICD-10-CM

## 2020-12-22 ENCOUNTER — OFFICE VISIT (OUTPATIENT)
Dept: SURGERY | Age: 75
End: 2020-12-22
Payer: MEDICARE

## 2020-12-22 ENCOUNTER — HOSPITAL ENCOUNTER (OUTPATIENT)
Dept: GENERAL RADIOLOGY | Age: 75
Discharge: HOME | End: 2020-12-22
Payer: MEDICARE

## 2020-12-22 VITALS
BODY MASS INDEX: 43 KG/M2 | WEIGHT: 274 LBS | HEIGHT: 67 IN | TEMPERATURE: 98.3 F | DIASTOLIC BLOOD PRESSURE: 98 MMHG | HEART RATE: 92 BPM | OXYGEN SATURATION: 97 % | SYSTOLIC BLOOD PRESSURE: 187 MMHG | RESPIRATION RATE: 22 BRPM

## 2020-12-22 DIAGNOSIS — R91.1 NODULE OF RIGHT LUNG: ICD-10-CM

## 2020-12-22 DIAGNOSIS — Z08 ENCOUNTER FOR FOLLOW-UP SURVEILLANCE OF LUNG CANCER: Primary | ICD-10-CM

## 2020-12-22 DIAGNOSIS — Z85.118 ENCOUNTER FOR FOLLOW-UP SURVEILLANCE OF LUNG CANCER: Primary | ICD-10-CM

## 2020-12-22 PROCEDURE — G8427 DOCREV CUR MEDS BY ELIG CLIN: HCPCS | Performed by: PHYSICIAN ASSISTANT

## 2020-12-22 PROCEDURE — 99213 OFFICE O/P EST LOW 20 MIN: CPT | Performed by: PHYSICIAN ASSISTANT

## 2020-12-22 PROCEDURE — G8417 CALC BMI ABV UP PARAM F/U: HCPCS | Performed by: PHYSICIAN ASSISTANT

## 2020-12-22 PROCEDURE — G8400 PT W/DXA NO RESULTS DOC: HCPCS | Performed by: PHYSICIAN ASSISTANT

## 2020-12-22 PROCEDURE — G8753 SYS BP > OR = 140: HCPCS | Performed by: PHYSICIAN ASSISTANT

## 2020-12-22 PROCEDURE — 1101F PT FALLS ASSESS-DOCD LE1/YR: CPT | Performed by: PHYSICIAN ASSISTANT

## 2020-12-22 PROCEDURE — 1090F PRES/ABSN URINE INCON ASSESS: CPT | Performed by: PHYSICIAN ASSISTANT

## 2020-12-22 PROCEDURE — G9717 DOC PT DX DEP/BP F/U NT REQ: HCPCS | Performed by: PHYSICIAN ASSISTANT

## 2020-12-22 PROCEDURE — 3017F COLORECTAL CA SCREEN DOC REV: CPT | Performed by: PHYSICIAN ASSISTANT

## 2020-12-22 PROCEDURE — 71046 X-RAY EXAM CHEST 2 VIEWS: CPT

## 2020-12-22 PROCEDURE — G8755 DIAS BP > OR = 90: HCPCS | Performed by: PHYSICIAN ASSISTANT

## 2020-12-22 PROCEDURE — G8536 NO DOC ELDER MAL SCRN: HCPCS | Performed by: PHYSICIAN ASSISTANT

## 2020-12-22 NOTE — PROGRESS NOTES
Subjective:      True Gibson is a 77 y.o. female presents for postop care 3 months following Robotic right lower lobe wedge resection x2 (Flor Chaney on 2020 forSolitary pulmonary nodule, right lower lobe.   Surgical pathology--   FINAL PATHOLOGIC DIAGNOSIS   1. Right lung, right lower lobe wedge #1, resection:   Benign pulmonary parenchyma with emphysematous changes. 2. Right lung, right lower lobe wedge #2, resection:   Benign pulmonary parenchyma with emphysematous change, respiratory bronchiolitis, and hemorrhage.     She reports resolving patches of decreased sensation. Home health provided wound care x3, then discontinued per PCP request.  States her incisions are fully healed now. Appetite is normal. Eating a regular diet without difficulty. Bowel movements are regular. The patient is voiding without difficulty. Pain is controlled with OTC Tylenol PRN. Ms. Nickolas Mendez has a reminder for a \"due or due soon\" health maintenance. I have asked that she contact her primary care provider for follow-up on this health maintenance. Objective:   CXR (2020) shows:  IMPRESSION:  The right lower lobe part solid nodule on prior CT is not well seen  radiographically. No acute process. Visit Vitals  Pulse 92   Temp 98.3 °F (36.8 °C) (Oral)   Resp 22   Ht 5' 7\" (1.702 m)   Wt 274 lb (124.3 kg)   SpO2 97%   BMI 42.91 kg/m²       General:  alert, cooperative, no distress, appears stated age   Pulm: Full, clear BS bilat. No resp distress. CXR reviewed. Incisions:   healed well, no drainage, no erythema, no hernia, no dehiscence, incisions well approximated. Assessment:     Doing well postoperatively. Plan/Recommendations/Medical Decision Makin. Continue activities as tolerated  2. Regular IS use  3.  RTC in 3 months after 6 month post-op CT    Per the NCCN guidelines: True Gibson will be followed routinely with a history and physical and imaging (CT Chest) every 6 months for the first 2 years then yearly. We discussed the importance of proper diet and 30 minutes/day of proper exercise. Ms. Karyn Roman  verbalized understanding and questions were answered to the best of my knowledge and ability. Covid educational materials were provided. All questions were personally answered. Thank you for allowing us to participate in the care of your patient.     Nora Parekh, [place]  Thoracic Surgery  12/22/2020

## 2020-12-22 NOTE — PATIENT INSTRUCTIONS
Learning About Coronavirus [phone])  Coronavirus [phone]): Overview  What is coronavirus (COVID-19)? The coronavirus disease (COVID-19) is caused by a virus. It is an illness that was first found in December 2019. It has since spread worldwide. The virus can cause fever, cough, and trouble breathing. In severe cases, it can cause pneumonia and make it hard to breathe without help. It can cause death. This virus spreads person-to-person through droplets from coughing and sneezing. It can also spread when you are close to someone who is infected. And it can spread when you touch something that has the virus on it, such as a doorknob or a tabletop. Coronaviruses are a large group of viruses. They cause the common cold. They also cause more serious illnesses like [place] respiratory syndrome (MERS) and severe acute respiratory syndrome (SARS). COVID-19 is caused by a novel coronavirus. That means it's a new type that has not been seen in people before. How is COVID-19 treated? Mild illness can be treated at home, but more serious illness needs to be treated in the hospital. Treatment may include medicines to reduce symptoms, plus breathing support such as oxygen therapy or a ventilator. Other treatments, such as antiviral medicines, may help people who have COVID-19. What can you do to protect yourself from COVID-19? The best way to protect yourself from getting sick is to:  · Avoid areas where there is an outbreak. · Avoid contact with people who may be infected. · Avoid crowds and try to stay at least 6 feet away from other people. · Wash your hands often, especially after you cough or sneeze. Use soap and water, and scrub for at least 20 seconds. If soap and water aren't available, use an alcohol-based hand . · Avoid touching your mouth, nose, and eyes. What can you do to avoid spreading the virus to others?   To help avoid spreading the virus to others:  · Wash your hands often with soap or alcohol-based hand sanitizers. · Cover your mouth with a tissue when you cough or sneeze. Then throw the tissue in the trash. · Use a disinfectant to clean things that you touch often. These include doorknobs, remote controls, phones, and handles on your refrigerator and microwave. And don't forget countertops, tabletops, bathrooms, and computer keyboards. · Wear a cloth face cover if you have to go to public areas. If you know or suspect that you have COVID-19:  · Stay home. Don't go to school, work, or public areas. And don't use public transportation, ride-shares, or taxis unless you have no choice. · Leave your home only if you need to get medical care or testing. But call the doctor's office first so they know you're coming. And wear a face cover. · Limit contact with people in your home. If possible, stay in a separate bedroom and use a separate bathroom. · Wear a face cover whenever you're around other people. It can help stop the spread of the virus when you cough or sneeze. · Clean and disinfect your home every day. Use household  and disinfectant wipes or sprays. Take special care to clean things that you grab with your hands. · Self-isolate until it's safe to be around others again. ? If you have symptoms, it's safe when you haven't had a fever for 3 days and your symptoms have improved and it's been at least 10 days since your symptoms started. ? If you were exposed to the virus but don't have symptoms, it's safe to be around others 14 days after exposure. ? Talk to your doctor about whether you also need testing, especially if you have a weakened immune system. When to call for help  Call 911 anytime you think you may need emergency care. For example, call if:  · You have severe trouble breathing. (You can't talk at all.)  · You have constant chest pain or pressure. · You are severely dizzy or lightheaded. · You are confused or can't think clearly.   · Your face and lips have a blue color. · You passed out (lost consciousness) or are very hard to wake up. Call your doctor now if you develop symptoms such as:  · Shortness of breath. · Fever. · Cough. If you need to get care, call ahead to the doctor's office for instructions before you go. Make sure you wear a face cover to prevent exposing other people to the virus. Where can you get the latest information? The following health organizations are tracking and studying this virus. Their websites contain the most up-to-date information. Joeniranjan Gely also learn what to do if you think you may have been exposed to the virus. · U.S. Centers for Disease Control and Prevention (CDC): The CDC provides updated news about the disease and travel advice. The website also tells you how to prevent the spread of infection. www.cdc.gov  · World Health Organization [facility]): WHO offers information about the virus outbreaks. WHO also has travel advice. www.who.int  Current as of: July 10, 2020               Content Version: 12.6  © 2006-2020 [employer]. Care instructions adapted under license by [employer] (which disclaims liability or warranty for this information). If you have questions about a medical condition or this instruction, always ask your healthcare professional. [address] any warranty or liability for your use of this information.

## 2020-12-22 NOTE — PROGRESS NOTES
1. Have you been to the ER, urgent care clinic since your last visit? Hospitalized since your last visit? No    2. Have you seen or consulted any other health care providers outside of the [address] [place] [address] since your last visit? Include any pap smears or colon screening.  No

## 2021-02-12 RX ORDER — METOPROLOL TARTRATE 25 MG/1
TABLET, FILM COATED ORAL
Qty: 180 TAB | Refills: 0 | Status: SHIPPED | OUTPATIENT
Start: 2021-02-12 | End: 2021-05-10

## 2021-03-19 ENCOUNTER — HOSPITAL ENCOUNTER (OUTPATIENT)
Dept: CT IMAGING | Age: 76
Discharge: HOME | End: 2021-03-19
Attending: PHYSICIAN ASSISTANT
Payer: MEDICARE

## 2021-03-19 DIAGNOSIS — Z85.118 ENCOUNTER FOR FOLLOW-UP SURVEILLANCE OF LUNG CANCER: ICD-10-CM

## 2021-03-19 DIAGNOSIS — Z08 ENCOUNTER FOR FOLLOW-UP SURVEILLANCE OF LUNG CANCER: ICD-10-CM

## 2021-03-19 LAB — CREAT BLD-MCNC: 0.9 MG/DL (ref 0.6–1.3)

## 2021-03-19 PROCEDURE — 74011000636 HC RX REV CODE- 636: Performed by: RADIOLOGY

## 2021-03-19 PROCEDURE — 82565 ASSAY OF CREATININE: CPT

## 2021-03-19 PROCEDURE — 71260 CT THORAX DX C+: CPT

## 2021-03-19 RX ADMIN — IOPAMIDOL 100 ML: 612 INJECTION, SOLUTION INTRAVENOUS at 08:19

## 2021-03-22 ENCOUNTER — OFFICE VISIT (OUTPATIENT)
Dept: SURGERY | Age: 76
End: 2021-03-22
Payer: MEDICARE

## 2021-03-22 VITALS
BODY MASS INDEX: 43 KG/M2 | DIASTOLIC BLOOD PRESSURE: 81 MMHG | HEIGHT: 67 IN | SYSTOLIC BLOOD PRESSURE: 188 MMHG | WEIGHT: 274 LBS | TEMPERATURE: 98.3 F | HEART RATE: 80 BPM | RESPIRATION RATE: 18 BRPM | OXYGEN SATURATION: 96 %

## 2021-03-22 DIAGNOSIS — Z08 ENCOUNTER FOR FOLLOW-UP SURVEILLANCE OF LUNG CANCER: Primary | ICD-10-CM

## 2021-03-22 DIAGNOSIS — Z85.118 ENCOUNTER FOR FOLLOW-UP SURVEILLANCE OF LUNG CANCER: Primary | ICD-10-CM

## 2021-03-22 PROCEDURE — 99214 OFFICE O/P EST MOD 30 MIN: CPT | Performed by: PHYSICIAN ASSISTANT

## 2021-03-22 PROCEDURE — G9717 DOC PT DX DEP/BP F/U NT REQ: HCPCS | Performed by: PHYSICIAN ASSISTANT

## 2021-03-22 PROCEDURE — G8536 NO DOC ELDER MAL SCRN: HCPCS | Performed by: PHYSICIAN ASSISTANT

## 2021-03-22 PROCEDURE — 1090F PRES/ABSN URINE INCON ASSESS: CPT | Performed by: PHYSICIAN ASSISTANT

## 2021-03-22 PROCEDURE — G8427 DOCREV CUR MEDS BY ELIG CLIN: HCPCS | Performed by: PHYSICIAN ASSISTANT

## 2021-03-22 PROCEDURE — G8417 CALC BMI ABV UP PARAM F/U: HCPCS | Performed by: PHYSICIAN ASSISTANT

## 2021-03-22 PROCEDURE — 3017F COLORECTAL CA SCREEN DOC REV: CPT | Performed by: PHYSICIAN ASSISTANT

## 2021-03-22 PROCEDURE — G8400 PT W/DXA NO RESULTS DOC: HCPCS | Performed by: PHYSICIAN ASSISTANT

## 2021-03-22 PROCEDURE — 1101F PT FALLS ASSESS-DOCD LE1/YR: CPT | Performed by: PHYSICIAN ASSISTANT

## 2021-03-22 PROCEDURE — G8753 SYS BP > OR = 140: HCPCS | Performed by: PHYSICIAN ASSISTANT

## 2021-03-22 PROCEDURE — G8754 DIAS BP LESS 90: HCPCS | Performed by: PHYSICIAN ASSISTANT

## 2021-03-22 NOTE — PATIENT INSTRUCTIONS

## 2021-03-22 NOTE — PROGRESS NOTES
Discuss CT results. 1. Have you been to the ER, urgent care clinic since your last visit? Hospitalized since your last visit? No    2. Have you seen or consulted any other health care providers outside of the [address] [place] [address] since your last visit? Include any pap smears or colon screening.  No

## 2021-03-22 NOTE — PROGRESS NOTES
Subjective:      Madisyn Leyva is a 77 y.o. female presents for postop care 6 months following Robotic right lower lobe wedge resection x2 (Aleena Colón on 9/14/2020 forSolitary pulmonary nodule, right lower lobe.   Surgical pathology--   FINAL PATHOLOGIC DIAGNOSIS   1. Right lung, right lower lobe wedge #1, resection:   Benign pulmonary parenchyma with emphysematous changes. 2. Right lung, right lower lobe wedge #2, resection:   Benign pulmonary parenchyma with emphysematous change, respiratory bronchiolitis, and hemorrhage.      Ms. Brennan Enriquez says she has been doing well. No marked fatigue or increased shortness of breath. Has not taken her diuretic/BP meds this AM ed to the long drive to get there. Appetite is normal. Eating a regular diet without difficulty. Bowel movements are regular. The patient is voiding without difficulty. The patient is not having any pain. Patient has an advanced directive: NO  Ms. Brennan Enriquez has a reminder for a \"due or due soon\" health maintenance. I have asked that she contact her primary care provider for follow-up on this health maintenance. Objective:   Chest CT (3/19/2021) shows:  IMPRESSION  Resolved groundglass opacity in the apical segment of the right lower lobe  New curvilinear bands in the more inferior aspect of the right lower lobe for  which follow-up is recommended in light of the nodular component. Left thyroid nodule for which ultrasound is recommended. Incidental coronary artery disease. Visit Vitals  BP (!) 188/81 (BP 1 Location: Left upper arm, BP Patient Position: Sitting, BP Cuff Size: Adult long) Comment: patient had not taken BP medication yet today   Pulse 80   Temp 98.3 °F (36.8 °C) (Oral)   Resp 18   Ht 5' 7\" (1.702 m)   Wt 274 lb (124.3 kg)   SpO2 96%   BMI 42.91 kg/m²       General:  alert, cooperative, no distress, appears stated age, obese   Pulm: Full, clear BS bilat.  No resp distress   Incision:   healing well, no drainage, no erythema, no hernia, no swelling, no dehiscence, incision well approximated     Assessment:     Doing well postoperatively. Recent CT results discussed & given copy of report per patient request.    Plan/Recommendations/Medical Decision Makin. Continue activities, nelida walking, as tolerated  2. Will repeat Chest CT in 3 months with OV to follow    Per the NCCN guidelines: Chiquita Pond will be followed routinely with a history and physical and imaging (CT Chest) every 6 months for the first 2 years then yearly. We discussed the importance of proper diet and 30 minutes/day of proper exercise. Ms. Ania Hill verbalized understanding and questions were answered to the best of my knowledge and ability. Diabetic diet educational materials were provided. All questions were personally answered. Thank you for allowing us to participate in the care of your patient.     > 30 minutes were spent with patient with greater than 50% of that time spent face to face counseling    Zachariah Ash [place]  Thoracic Surgery  3/22/2021

## 2021-05-10 RX ORDER — METOPROLOL TARTRATE 25 MG/1
TABLET, FILM COATED ORAL
Qty: 180 TAB | Refills: 0 | Status: SHIPPED | OUTPATIENT
Start: 2021-05-10 | End: 2021-07-30

## 2021-05-10 NOTE — TELEPHONE ENCOUNTER
Per VO of Dr. Nicolle Jama: 7/16/2020    Future Appointments   Date Time Provider Carla Grant   5/21/2021  2:00 PM Dominga Samuels MD [facility] BS AMB   6[facility]18[facility]2021  [facility]   6/28/2021  1:30 PM Ceci Grace, PA [facility] BS AMB       Requested Prescriptions     Pending Prescriptions Disp Refills    metoprolol tartrate (LOPRESSOR) 25 mg tablet [Pharmacy Med Name: METOPROLOL TARTRATE 25 MG TAB] 180 Tab 0     Sig: TAKE 1 TABLET BY MOUTH TWICE A DAY

## 2021-05-17 RX ORDER — LOSARTAN POTASSIUM 50 MG/1
TABLET ORAL
Qty: 180 TAB | Refills: 1 | Status: SHIPPED | OUTPATIENT
Start: 2021-05-17 | End: 2021-05-21 | Stop reason: SDUPTHER

## 2021-05-21 ENCOUNTER — OFFICE VISIT (OUTPATIENT)
Dept: CARDIOLOGY CLINIC | Age: 76
End: 2021-05-21
Payer: MEDICARE

## 2021-05-21 VITALS
HEART RATE: 90 BPM | HEIGHT: 67 IN | BODY MASS INDEX: 43.47 KG/M2 | SYSTOLIC BLOOD PRESSURE: 170 MMHG | WEIGHT: 277 LBS | OXYGEN SATURATION: 99 % | DIASTOLIC BLOOD PRESSURE: 90 MMHG

## 2021-05-21 DIAGNOSIS — I50.32 CHRONIC DIASTOLIC CONGESTIVE HEART FAILURE (HCC): ICD-10-CM

## 2021-05-21 DIAGNOSIS — I10 ESSENTIAL HYPERTENSION: Primary | ICD-10-CM

## 2021-05-21 DIAGNOSIS — E66.01 MORBID OBESITY (HCC): ICD-10-CM

## 2021-05-21 PROCEDURE — 3017F COLORECTAL CA SCREEN DOC REV: CPT | Performed by: INTERNAL MEDICINE

## 2021-05-21 PROCEDURE — G8536 NO DOC ELDER MAL SCRN: HCPCS | Performed by: INTERNAL MEDICINE

## 2021-05-21 PROCEDURE — 1101F PT FALLS ASSESS-DOCD LE1/YR: CPT | Performed by: INTERNAL MEDICINE

## 2021-05-21 PROCEDURE — G9717 DOC PT DX DEP/BP F/U NT REQ: HCPCS | Performed by: INTERNAL MEDICINE

## 2021-05-21 PROCEDURE — G8400 PT W/DXA NO RESULTS DOC: HCPCS | Performed by: INTERNAL MEDICINE

## 2021-05-21 PROCEDURE — G8755 DIAS BP > OR = 90: HCPCS | Performed by: INTERNAL MEDICINE

## 2021-05-21 PROCEDURE — G0463 HOSPITAL OUTPT CLINIC VISIT: HCPCS | Performed by: INTERNAL MEDICINE

## 2021-05-21 PROCEDURE — G8417 CALC BMI ABV UP PARAM F/U: HCPCS | Performed by: INTERNAL MEDICINE

## 2021-05-21 PROCEDURE — 99214 OFFICE O/P EST MOD 30 MIN: CPT | Performed by: INTERNAL MEDICINE

## 2021-05-21 PROCEDURE — 1090F PRES/ABSN URINE INCON ASSESS: CPT | Performed by: INTERNAL MEDICINE

## 2021-05-21 PROCEDURE — G8428 CUR MEDS NOT DOCUMENT: HCPCS | Performed by: INTERNAL MEDICINE

## 2021-05-21 PROCEDURE — G8753 SYS BP > OR = 140: HCPCS | Performed by: INTERNAL MEDICINE

## 2021-05-21 RX ORDER — LOSARTAN POTASSIUM 50 MG/1
TABLET ORAL
Qty: 180 TABLET | Refills: 3 | Status: SHIPPED | OUTPATIENT
Start: 2021-05-21 | End: 2021-08-23 | Stop reason: SDUPTHER

## 2021-05-21 NOTE — PROGRESS NOTES
Office Follow-up    NAME: Turner Lemons   :  1945  MRM:  [id number]    Date:  2021            Assessment:     Problem List  Date Reviewed: 3/22/2021        Codes Class Noted    Shortness of breath ICD-10-CM: R06.02  ICD-9-CM: 786.05  3/3/2020        Solitary pulmonary nodule ICD-10-CM: R91.1  ICD-9-CM: 793.11  2020        CHF (congestive heart failure) ([facility] ICD-10-CM: I50.9  ICD-9-CM: 428.0  3/4/2020        Infected sebaceous cyst ICD-10-CM: L72.3, L08.9  ICD-9-CM: 706.2  2018        Abscess ICD-10-CM: L02.91  ICD-9-CM: 682.9  2018        Morbid obesity ([place] [address].) ICD-10-CM: E66.01  ICD-9-CM: 278.01  2018        SIRS (systemic inflammatory response syndrome) ([facility] ICD-10-CM: R65.10  ICD-9-CM: 995.90  2016        Hypertension ICD-10-CM: I10  ICD-9-CM: 401.9  Unknown        Type 2 diabetes mellitus (HCC) ICD-10-CM: E11.9  ICD-9-CM: 250.00  Unknown        Depression with anxiety ICD-10-CM: F41.8  ICD-9-CM: 300.4  Unknown        Glaucoma ICD-10-CM: H40.9  ICD-9-CM: 365.9  Unknown        Breast cancer ([place] [address].) ICD-10-CM: C50.919  ICD-9-CM: 174.9  Unknown                 Plan:     1. Muscle cramps: These improved after addition of Kcl and Magnesium. 2. Accelerated hypertension: Blood pressure is better controlled. Home BP readings are in 130 range. Office readings today were elevated as patient was very nervous and anxious as she had lost her way to office and was running late. BP improved after she settled down. Continue losartan 50 mg p.o. twice daily. 3. Diastolic congestive heart failure: She is now euvolemic. There is no significant peripheral edema. Continue Lasix, potassium and magnesium. Control blood pressure. 4. CAD: Moderate CAD with heavy calcification of the coronary arteries. Continue medical management with blood pressure control. Continue baby aspirin every day. She does not want to be on statins.   5. See Tate Carey in 6 months.                      Subjective:      Noni Rachel is a 77 y. o. female being seen today for f/u of HTN.  She has been following with Delma Marshall in our office. Recently Ben Ovalle had increased the losartan to 50 mg twice a day. In the past she could not tolerate HCTZ and Coreg and those were discontinued. She is concerned about possible medication side effects. She has been having leg cramps at night and she thinks it could be Lasix or losartan related. Also she is having some numbness involving the arms and hands when she is sleeping at night. 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 But she does not think that those are the cause for her leg cramps. ATTENTION:   This medical record was transcribed using an electronic medical records/speech recognition system. Although proofread, it may and can contain electronic, spelling and other errors. Corrections may be executed at a later time. Please feel free to contact us for any clarifications as needed.         Exam:     Physical Exam:  Visit Vitals  BP (!) 200/117 (BP 1 Location: Left upper arm, BP Patient Position: Sitting, BP Cuff Size: Adult)   Pulse 90   Ht 5' 7\" (1.702 m)   Wt 277 lb (125.6 kg)   SpO2 99%   BMI 43.38 kg/m²     General appearance - alert, well appearing, and in no distress  Mental status - affect appropriate to mood  Eyes - sclera anicteric, moist mucous membranes  Neck - supple, no significant adenopathy  Chest - clear to auscultation, no wheezes, rales or rhonchi  Heart - normal rate, regular rhythm, normal S1, S2, no murmurs, rubs, clicks or gallops  Abdomen - soft, nontender, nondistended, no masses or organomegaly  Extremities - peripheral pulses normal, no pedal edema  Skin - normal coloration  no rashes    Medications:     Current Outpatient Medications   Medication Sig    losartan (COZAAR) 50 mg tablet TAKE 1 TABLET BY MOUTH TWICE A DAY    metoprolol tartrate (LOPRESSOR) 25 mg tablet TAKE 1 TABLET BY MOUTH TWICE A DAY  OneTouch Ultra Blue Test Strip strip USE STRIP TO CHECK GLUCOSE TWICE DAILY    potassium chloride (K-DUR, KLOR-CON) 20 mEq tablet Take 2 Tabs by mouth daily.  magnesium oxide (MAG-OX) 400 mg tablet Take 1 Tab by mouth daily.  furosemide (Lasix) 40 mg tablet Take 40mg (1 tab) per day. Increase to 40mg (1 tab) twice daily if needed for increased swelling, shortness or breath, or weight gain >5lbs.  vitamin E (AQUA GEMS) 400 unit capsule Take 400 Units by mouth daily.  acetaminophen (TylenoL) 325 mg tablet Take 650 mg by mouth every four (4) hours as needed for Pain.  famotidine (Pepcid AC) 20 mg tablet Take 40 mg by mouth two (2) times a day.  insulin NPH (NovoLIN N NPH U-100 Insulin) 100 unit/mL injection 60 Units by SubCUTAneous route two (2) times a day. 60u morning; 60u in the DINNER TIME  Indications: 60 units in morning and 60 units in the evening    insulin regular (NOVOLIN R REGULAR U-100 INSULN) 100 unit/mL injection by SubCUTAneous route three (3) times daily. 30 am, 30 noon, 35 in the evening    triamcinolone acetonide (KENALOG) 0.1 % ointment Apply  to affected area two (2) times a day. use thin layer  Applies on face at bipap allergy site    aspirin delayed-release 81 mg tablet Take 81 mg by mouth daily. No current facility-administered medications for this visit.       Diagnostic Data Review:         [facility] on 3/4/2020:    L Main: large caliber, calcified, no critical disease  LAD: large caliber, calcified, 40-50% stenosis at D2 bifurcation, supplies two diagonals, D1 small caliber and D2 moderate caliber with diffuse moderate disease  LCx: large caliber, calcified, ostial 30%, supplies a few very small marginals and continues a large caliber marginal with distal 40%  stenosis  RCA: moderate caliber, diffuse mild to moderate disease, small PDA and RPLV branch      Lab Review:   No results found for: CHOL, CHOLX, CHLST, CHOLV, [id number], HDL, HDLP, LDL, LDLC, DLDLP, TGLX, TRIGL, Bret Krishnamurthy, [facility]  Lab Results   Component Value Date/Time    Creatinine (POC) 0.9 03/19/2021 08:21 AM    Creatinine 0.89 09/04/2020 11:24 AM     Lab Results   Component Value Date/Time    BUN 13 09/04/2020 11:24 AM     Lab Results   Component Value Date/Time    Potassium 3.7 09/04/2020 11:24 AM     Lab Results   Component Value Date/Time    Hemoglobin A1c 9.2 (H) 06/12/2018 05:59 AM     Lab Results   Component Value Date/Time    HGB 14.5 09/04/2020 11:24 AM     Lab Results   Component Value Date/Time    PLATELET 485 98/37/3300 11:24 AM     No results for input(s): CPK, CKMB, TROIQ in the last 72 hours. No lab exists for component: CKQMB, CPKMB             ___________________________________________________    Chantell Pulling.  June Spain MD, [facility]

## 2021-05-21 NOTE — PROGRESS NOTES
During the rooming process the pt's B/P was 200/117 she was crying and diaporetic. Eloisa Gaitan reported this to me. Danielle Ambrose and I went to the room the pt was laying on her left side on the table. I placed a cool cloth across her forehead and assessed her for other symptoms. Pt denied:  Dizziness  Palp  SOB  CP  Danielle Ambrose performed a bedside glucose= 163  Pt then stated that she was upset because she was \"Lost\" for a half an hour, and had drove past the hospital a couple times. She had never done that before and it scared her. I asked if there was someone she wanted me to call. She stated that her daughter was downstairs and that only if we wanted her to come up then to call her, but that she ( the pt) felt better now. A repeat B/P was 160/90. Wishing to get up I assisted the pt to the chair, and Eloisa Gaitan resumed his rooming. I reported the incident to Dr Jose Enrique Escobar.

## 2021-05-21 NOTE — PROGRESS NOTES
Rosita Sepulveda is a 77 y.o. female    Visit Vitals  BP (!) 170/90 (BP 1 Location: Left arm, BP Patient Position: Lying, BP Cuff Size: Adult)   Pulse 90   Ht 5' 7\" (1.702 m)   Wt 277 lb (125.6 kg)   SpO2 99%   BMI 43.38 kg/m²       Chief Complaint   Patient presents with    CHF    Coronary Artery Disease    Hypertension       Chest pain no  SOB no  Dizziness no  Swelling no  Recent hospital visit no  Refills LOSARTAN    Vitals:    05/21/21 1433 05/21/21 1449   BP: (!) 200/117 (!) 170/90   BP 1 Location: Left upper arm Left arm   BP Patient Position: Sitting Lying   BP Cuff Size: Adult Adult   Pulse: 90    SpO2: 99%    Weight: 277 lb (125.6 kg)    Height: 5' 7\" (1.702 m)

## 2021-05-21 NOTE — PROGRESS NOTES
Per VO of Dr. Markus Maxwell: 11/20/2020    Future Appointments   Date Time Provider Carla Grant   6/18/2021  9:00 AM [facility] 2 [facility]   6/28/2021  1:30 PM MARLEY Dyson BS AMB   11/29/2021 11:20 AM LINDA Baldwin BS AMB       Requested Prescriptions     Pending Prescriptions Disp Refills    losartan (COZAAR) 50 mg tablet 180 Tablet 3     Sig: TAKE 1 TABLET BY MOUTH TWICE A DAY

## 2021-06-13 ENCOUNTER — APPOINTMENT (OUTPATIENT)
Dept: CT IMAGING | Age: 76
End: 2021-06-13
Attending: EMERGENCY MEDICINE
Payer: MEDICARE

## 2021-06-13 ENCOUNTER — HOSPITAL ENCOUNTER (EMERGENCY)
Age: 76
Discharge: HOME | End: 2021-06-13
Attending: EMERGENCY MEDICINE
Payer: MEDICARE

## 2021-06-13 VITALS
DIASTOLIC BLOOD PRESSURE: 74 MMHG | TEMPERATURE: 97.9 F | SYSTOLIC BLOOD PRESSURE: 175 MMHG | WEIGHT: 291 LBS | HEIGHT: 67 IN | OXYGEN SATURATION: 98 % | BODY MASS INDEX: 45.67 KG/M2 | HEART RATE: 99 BPM | RESPIRATION RATE: 20 BRPM

## 2021-06-13 DIAGNOSIS — G89.29 CHRONIC THORACIC BACK PAIN, UNSPECIFIED BACK PAIN LATERALITY: Primary | ICD-10-CM

## 2021-06-13 DIAGNOSIS — M54.6 CHRONIC THORACIC BACK PAIN, UNSPECIFIED BACK PAIN LATERALITY: Primary | ICD-10-CM

## 2021-06-13 LAB
ALBUMIN SERPL-MCNC: 3.5 G/DL (ref 3.5–5)
ALBUMIN/GLOB SERPL: 0.7 {RATIO} (ref 1.1–2.2)
ALP SERPL-CCNC: 95 U/L (ref 45–117)
ALT SERPL-CCNC: 16 U/L (ref 12–78)
ANION GAP SERPL CALC-SCNC: 3 MMOL/L (ref 5–15)
APPEARANCE UR: CLEAR
AST SERPL-CCNC: 13 U/L (ref 15–37)
BACTERIA URNS QL MICRO: NEGATIVE /HPF
BASOPHILS # BLD: 0.1 K/UL (ref 0–0.1)
BASOPHILS NFR BLD: 1 % (ref 0–1)
BILIRUB DIRECT SERPL-MCNC: 0.1 MG/DL (ref 0–0.2)
BILIRUB SERPL-MCNC: 0.3 MG/DL (ref 0.2–1)
BILIRUB UR QL: NEGATIVE
BUN SERPL-MCNC: 11 MG/DL (ref 6–20)
BUN/CREAT SERPL: 14 (ref 12–20)
CALCIUM SERPL-MCNC: 8.6 MG/DL (ref 8.5–10.1)
CHLORIDE SERPL-SCNC: 107 MMOL/L (ref 97–108)
CO2 SERPL-SCNC: 28 MMOL/L (ref 21–32)
COLOR UR: ABNORMAL
COMMENT, HOLDF: NORMAL
CREAT SERPL-MCNC: 0.8 MG/DL (ref 0.55–1.02)
DIFFERENTIAL METHOD BLD: ABNORMAL
EOSINOPHIL # BLD: 0.2 K/UL (ref 0–0.4)
EOSINOPHIL NFR BLD: 2 % (ref 0–7)
EPITH CASTS URNS QL MICRO: ABNORMAL /LPF
ERYTHROCYTE [DISTWIDTH] IN BLOOD BY AUTOMATED COUNT: 16.8 % (ref 11.5–14.5)
GLOBULIN SER CALC-MCNC: 4.8 G/DL (ref 2–4)
GLUCOSE BLD STRIP.AUTO-MCNC: 82 MG/DL (ref 65–117)
GLUCOSE SERPL-MCNC: 111 MG/DL (ref 65–100)
GLUCOSE UR STRIP.AUTO-MCNC: NEGATIVE MG/DL
HCT VFR BLD AUTO: 48.3 % (ref 35–47)
HGB BLD-MCNC: 15.2 G/DL (ref 11.5–16)
HGB UR QL STRIP: ABNORMAL
IMM GRANULOCYTES # BLD AUTO: 0 K/UL (ref 0–0.04)
IMM GRANULOCYTES NFR BLD AUTO: 0 % (ref 0–0.5)
KETONES UR QL STRIP.AUTO: NEGATIVE MG/DL
LACTATE SERPL-SCNC: 1.2 MMOL/L (ref 0.4–2)
LEUKOCYTE ESTERASE UR QL STRIP.AUTO: ABNORMAL
LIPASE SERPL-CCNC: 28 U/L (ref 73–393)
LYMPHOCYTES # BLD: 2 K/UL (ref 0.8–3.5)
LYMPHOCYTES NFR BLD: 18 % (ref 12–49)
MCH RBC QN AUTO: 26.4 PG (ref 26–34)
MCHC RBC AUTO-ENTMCNC: 31.5 G/DL (ref 30–36.5)
MCV RBC AUTO: 83.9 FL (ref 80–99)
MONOCYTES # BLD: 0.6 K/UL (ref 0–1)
MONOCYTES NFR BLD: 5 % (ref 5–13)
NEUTS SEG # BLD: 8 K/UL (ref 1.8–8)
NEUTS SEG NFR BLD: 74 % (ref 32–75)
NITRITE UR QL STRIP.AUTO: NEGATIVE
NRBC # BLD: 0 K/UL (ref 0–0.01)
NRBC BLD-RTO: 0 PER 100 WBC
PH UR STRIP: 6.5 [PH] (ref 5–8)
PLATELET # BLD AUTO: 284 K/UL (ref 150–400)
PMV BLD AUTO: 10.7 FL (ref 8.9–12.9)
POTASSIUM SERPL-SCNC: 3.9 MMOL/L (ref 3.5–5.1)
PROT SERPL-MCNC: 8.3 G/DL (ref 6.4–8.2)
PROT UR STRIP-MCNC: ABNORMAL MG/DL
RBC # BLD AUTO: 5.76 M/UL (ref 3.8–5.2)
RBC #/AREA URNS HPF: ABNORMAL /HPF (ref 0–5)
SAMPLES BEING HELD,HOLD: NORMAL
SERVICE CMNT-IMP: NORMAL
SODIUM SERPL-SCNC: 138 MMOL/L (ref 136–145)
SP GR UR REFRACTOMETRY: 1.01 (ref 1–1.03)
UR CULT HOLD, URHOLD: NORMAL
UROBILINOGEN UR QL STRIP.AUTO: 0.2 EU/DL (ref 0.2–1)
WBC # BLD AUTO: 10.9 K/UL (ref 3.6–11)
WBC URNS QL MICRO: ABNORMAL /HPF (ref 0–4)

## 2021-06-13 PROCEDURE — 96374 THER/PROPH/DIAG INJ IV PUSH: CPT

## 2021-06-13 PROCEDURE — 74011250636 HC RX REV CODE- 250/636: Performed by: EMERGENCY MEDICINE

## 2021-06-13 PROCEDURE — 83690 ASSAY OF LIPASE: CPT

## 2021-06-13 PROCEDURE — 82962 GLUCOSE BLOOD TEST: CPT

## 2021-06-13 PROCEDURE — 80048 BASIC METABOLIC PNL TOTAL CA: CPT

## 2021-06-13 PROCEDURE — 93005 ELECTROCARDIOGRAM TRACING: CPT

## 2021-06-13 PROCEDURE — 96375 TX/PRO/DX INJ NEW DRUG ADDON: CPT

## 2021-06-13 PROCEDURE — 81001 URINALYSIS AUTO W/SCOPE: CPT

## 2021-06-13 PROCEDURE — 36415 COLL VENOUS BLD VENIPUNCTURE: CPT

## 2021-06-13 PROCEDURE — 74176 CT ABD & PELVIS W/O CONTRAST: CPT

## 2021-06-13 PROCEDURE — 96372 THER/PROPH/DIAG INJ SC/IM: CPT

## 2021-06-13 PROCEDURE — 80076 HEPATIC FUNCTION PANEL: CPT

## 2021-06-13 PROCEDURE — 99285 EMERGENCY DEPT VISIT HI MDM: CPT

## 2021-06-13 PROCEDURE — 83605 ASSAY OF LACTIC ACID: CPT

## 2021-06-13 PROCEDURE — 85025 COMPLETE CBC W/AUTO DIFF WBC: CPT

## 2021-06-13 RX ORDER — HYDROMORPHONE HYDROCHLORIDE 1 MG/ML
0.5 INJECTION, SOLUTION INTRAMUSCULAR; INTRAVENOUS; SUBCUTANEOUS ONCE
Status: COMPLETED | OUTPATIENT
Start: 2021-06-13 | End: 2021-06-13

## 2021-06-13 RX ORDER — KETOROLAC TROMETHAMINE 10 MG/1
10 TABLET, FILM COATED ORAL
Qty: 7 TABLET | Refills: 0 | Status: SHIPPED | OUTPATIENT
Start: 2021-06-13 | End: 2021-07-26

## 2021-06-13 RX ORDER — KETOROLAC TROMETHAMINE 30 MG/ML
15 INJECTION, SOLUTION INTRAMUSCULAR; INTRAVENOUS
Status: COMPLETED | OUTPATIENT
Start: 2021-06-13 | End: 2021-06-13

## 2021-06-13 RX ORDER — DICYCLOMINE HYDROCHLORIDE 10 MG/ML
10 INJECTION INTRAMUSCULAR
Status: COMPLETED | OUTPATIENT
Start: 2021-06-13 | End: 2021-06-13

## 2021-06-13 RX ORDER — DICYCLOMINE HYDROCHLORIDE 10 MG/ML
20 INJECTION INTRAMUSCULAR
Status: DISCONTINUED | OUTPATIENT
Start: 2021-06-13 | End: 2021-06-13

## 2021-06-13 RX ORDER — ONDANSETRON 2 MG/ML
4 INJECTION INTRAMUSCULAR; INTRAVENOUS
Status: COMPLETED | OUTPATIENT
Start: 2021-06-13 | End: 2021-06-13

## 2021-06-13 RX ADMIN — HYDROMORPHONE HYDROCHLORIDE 0.5 MG: 1 INJECTION, SOLUTION INTRAMUSCULAR; INTRAVENOUS; SUBCUTANEOUS at 08:37

## 2021-06-13 RX ADMIN — ONDANSETRON 4 MG: 2 INJECTION INTRAMUSCULAR; INTRAVENOUS at 08:36

## 2021-06-13 RX ADMIN — KETOROLAC TROMETHAMINE 15 MG: 30 INJECTION, SOLUTION INTRAMUSCULAR; INTRAVENOUS at 10:31

## 2021-06-13 RX ADMIN — DICYCLOMINE HYDROCHLORIDE 10 MG: 20 INJECTION, SOLUTION INTRAMUSCULAR at 10:31

## 2021-06-13 RX ADMIN — SODIUM CHLORIDE 1000 ML: 9 INJECTION, SOLUTION INTRAVENOUS at 08:37

## 2021-06-13 NOTE — ED NOTES
Pt stated that she wanted something to drink and eat prior to discharge because her sugars were low. BG checked and found to be 82. Pt instructed to get something to eat for lunch after discharge.

## 2021-06-13 NOTE — ED PROVIDER NOTES
Date of Service:  6/13/2021    Patient:  Crystal Alvarez    Chief Complaint:  Abdominal Pain and Back Pain       HPI:  Crystal Alvarez is a 77 y.o.  female who presents for evaluation of abdominal pain and back pain. Patient states that she has generalized abdominal pain with feeling of fullness in her right side. Patient describes the pain is a 10 out of 10 discomfort. Is been on and off for years but acutely worse over the last several days. Nausea with p.o. intake therefore decreased appetite. Patient has not had a normal bowel movement in several days but she denies that she was able to pass some stool yesterday. No chest pain or shortness of breath. She also complains of some left lower back pain. No numbness or tingling in her lower extremities. Patient denies any headaches upper thoracic discomfort or other acute complaints.            Past Medical History:   Diagnosis Date    Adverse effect of anesthesia     WOKE UP EARLY DURING BACK SURGERY    Breast cancer [facility]) 1993    right breast cancer     Cervical cancer ([facility] 1980's    Chronic pain     L4-L5 discectomy    Congestive heart failure (HCC)     Depression with anxiety     Ectopic pregnancy     GERD (gastroesophageal reflux disease)     Glaucoma     Hypertension     Ill-defined condition     TONSIL STONES - SOMETIMES CAUSE BAD BREATH    Miscarriage     Neuropathy     r/t DM    Sleep apnea 2005    CPAP compliant but not using now because of rash on face from the mask    Type 2 diabetes mellitus ([address]     on insulin       Past Surgical History:   Procedure Laterality Date    HX CARPAL TUNNEL RELEASE Bilateral     HX CATARACT REMOVAL Bilateral 2013    HX COLONOSCOPY      HX HYSTERECTOMY  1980's    cervical cancer    HX KNEE ARTHROSCOPY Bilateral     HX ORTHOPAEDIC  [id number]    back surgery x 2 ( HERNIATED DISC REPAIR, AND ANOTHER SURGERY SHE CANT REMEMBER    HX ORTHOPAEDIC Left 1990    ligament attachment arm    HX PELVIC LAPAROSCOPY 1970s    ectopic pregnancy    HX TUMOR REMOVAL Right     leg    [place] BREAST SURGERY PROCEDURE UNLISTED Right     right mastectomy         Family History:   Problem Relation Age of Onset    Diabetes Mother     Hypertension Mother     Diabetes Sister     Cancer Brother         COLON CANCER    Anesth Problems Neg Hx        Social History     Socioeconomic History    Marital status:      Spouse name: Not on file    Number of children: Not on file    Years of education: Not on file    Highest education level: Not on file   Occupational History    Not on file   Tobacco Use    Smoking status: Never Smoker    Smokeless tobacco: Never Used   Substance and Sexual Activity    Alcohol use: No    Drug use: No    Sexual activity: Not on file   Other Topics Concern    Not on file   Social History Narrative    Not on file     Social Determinants of Health     Financial Resource Strain:     Difficulty of Paying Living Expenses:    Food Insecurity:     Worried About Running Out of Food in the Last Year:     Ran Out of Food in the Last Year:    Transportation Needs:     Lack of Transportation (Medical):  Lack of Transportation (Non-Medical):    Physical Activity:     Days of Exercise per Week:     Minutes of Exercise per Session:    Stress:     Feeling of Stress :    Social Connections:     Frequency of Communication with Friends and Family:     Frequency of Social Gatherings with Friends and Family:     Attends Orthodox Services:     Active Member of Clubs or Organizations:     Attends Club or Organization Meetings:     Marital Status:    Intimate Partner Violence:     Fear of Current or Ex-Partner:     Emotionally Abused:     Physically Abused:     Sexually Abused:           ALLERGIES: Contrast dye [iodine], Lisinopril, Percocet [oxycodone-acetaminophen], Percodan [oxycodone-aspirin], Prednisone, and Sulfa (sulfonamide antibiotics)    Review of Systems   Constitutional: Negative for fever.   HENT: Negative for hearing loss. Eyes: Negative for visual disturbance. Respiratory: Negative for shortness of breath. Cardiovascular: Negative for chest pain. Gastrointestinal: Positive for abdominal pain, nausea and vomiting. Genitourinary: Negative for flank pain. Musculoskeletal: Positive for back pain. Skin: Negative for rash. Neurological: Negative for dizziness and light-headedness. Psychiatric/Behavioral: Negative for confusion. Vitals:    06/13/21 0815   BP: (!) 216/89   Pulse: (!) 105   Resp: 19   Temp: 98.3 °F (36.8 °C)   SpO2: 99%   Weight: 132 kg (291 lb)   Height: 5' 7\" (1.702 m)            Physical Exam  Vitals and nursing note reviewed. Constitutional:       Appearance: She is well-developed. HENT:      Head: Normocephalic and atraumatic. Eyes:      General: No scleral icterus. Cardiovascular:      Rate and Rhythm: Tachycardia present. Pulses: Normal pulses. Pulmonary:      Effort: Pulmonary effort is normal. No respiratory distress. Abdominal:      General: Abdomen is protuberant. There is no distension. Palpations: Abdomen is soft. Tenderness: There is abdominal tenderness in the right upper quadrant and right lower quadrant. There is no guarding or rebound. Negative signs include Meza's sign and McBurney's sign. Musculoskeletal:         General: No tenderness. Right lower leg: Edema present. Left lower leg: Edema present. Skin:     General: Skin is warm. Neurological:      Mental Status: She is alert and oriented to person, place, and time. Cranial Nerves: No cranial nerve deficit. Motor: No weakness.    Psychiatric:         Mood and Affect: Mood normal.          MDM  Number of Diagnoses or Management Options    ED Course as of Jun 13 1508   Sun Jun 13, 2021   0825 EKG 0823  NSR 99  Normal Axis and intervals  No Stemi    [GG]      ED Course User Index  [GG] Nata Pereira DO     VITAL SIGNS:  Patient Vitals for the past 4 hrs:   Temp Pulse Resp BP SpO2   06/13/21 1245 97.9 °F (36.6 °C) 99 20 (!) 175/74 98 %         LABS:  Recent Results (from the past 6 hour(s))   URINALYSIS W/ RFLX MICROSCOPIC    Collection Time: 06/13/21 10:33 AM   Result Value Ref Range    Color YELLOW/STRAW      Appearance CLEAR CLEAR      Specific gravity 1.013 1.003 - 1.030      pH (UA) 6.5 5.0 - 8.0      Protein TRACE (A) NEG mg/dL    Glucose Negative NEG mg/dL    Ketone Negative NEG mg/dL    Bilirubin Negative NEG      Blood SMALL (A) NEG      Urobilinogen 0.2 0.2 - 1.0 EU/dL    Nitrites Negative NEG      Leukocyte Esterase SMALL (A) NEG      WBC 5-10 0 - 4 /hpf    RBC 0-5 0 - 5 /hpf    Epithelial cells MODERATE (A) FEW /lpf    Bacteria Negative NEG /hpf   URINE CULTURE HOLD SAMPLE    Collection Time: 06/13/21 10:33 AM    Specimen: Serum   Result Value Ref Range    Urine culture hold        Urine on hold in Microbiology dept for 2 days. If unpreserved urine is submitted, it cannot be used for addtional testing after 24 hours, recollection will be required. GLUCOSE, POC    Collection Time: 06/13/21 12:49 PM   Result Value Ref Range    Glucose (POC) 82 65 - 117 mg/dL    Performed by Clif Queen         IMAGING:  CT ABD PELV WO CONT   Final Result   1. No acute abdominal or pelvic abnormality. 2. Incidental and postoperative changes. Medications During Visit:  Medications   sodium chloride 0.9 % bolus infusion 1,000 mL (0 mL IntraVENous IV Completed 6/13/21 1039)   ondansetron (ZOFRAN) injection 4 mg (4 mg IntraVENous Given 6/13/21 0836)   HYDROmorphone (DILAUDID) syringe 0.5 mg (0.5 mg IntraVENous Given 6/13/21 0837)   dicyclomine (BENTYL) 10 mg/mL injection 10 mg (10 mg IntraMUSCular Given 6/13/21 1031)   ketorolac (TORADOL) injection 15 mg (15 mg IntraVENous Given 6/13/21 1031)         DECISION MAKING:  Eryn Cortez is a 77 y.o. female who comes in as above. Here, patient appears well. Improved with Toradol. Cause of her discomfort is unknown. She states it is all about her abdomen however when I told her this is abnormal now she only complains about her chronic back discomfort. Patient given a short course of Toradol at home for breakthrough pain and asked to use Tylenol otherwise. Follow-up as below. IMPRESSION:  1. Chronic thoracic back pain, unspecified back pain laterality        DISPOSITION:  Discharged      Discharge Medication List as of 6/13/2021 12:31 PM      START taking these medications    Details   ketorolac (TORADOL) 10 mg tablet Take 1 Tablet by mouth every six (6) hours as needed for Pain., Normal, Disp-7 Tablet, R-0         CONTINUE these medications which have NOT CHANGED    Details   losartan (COZAAR) 50 mg tablet TAKE 1 TABLET BY MOUTH TWICE A DAY, Normal, Disp-180 Tablet, R-3      metoprolol tartrate (LOPRESSOR) 25 mg tablet TAKE 1 TABLET BY MOUTH TWICE A DAY, Normal, Disp-180 Tab, R-0      OneTouch Ultra Blue Test Strip strip USE STRIP TO CHECK GLUCOSE TWICE DAILY, Historical Med, ULYSSES      potassium chloride (K-DUR, KLOR-CON) 20 mEq tablet Take 2 Tabs by mouth daily. , Normal, Disp-180 Tab,R-3NEW MEDICATION EFFECTIVE 11/20/2020      magnesium oxide (MAG-OX) 400 mg tablet Take 1 Tab by mouth daily. , Normal, Disp-90 Tab,R-3NEW MEDICATION EFFECTIVE 11/20/2020      furosemide (Lasix) 40 mg tablet Take 40mg (1 tab) per day. Increase to 40mg (1 tab) twice daily if needed for increased swelling, shortness or breath, or weight gain >5lbs., No Print, Disp-60 Tab,R-0      vitamin E (AQUA GEMS) 400 unit capsule Take 400 Units by mouth daily. , Historical Med      acetaminophen (TylenoL) 325 mg tablet Take 650 mg by mouth every four (4) hours as needed for Pain., Historical Med      famotidine (Pepcid AC) 20 mg tablet Take 40 mg by mouth two (2) times a day., Historical Med      insulin NPH (NovoLIN N NPH U-100 Insulin) 100 unit/mL injection 60 Units by SubCUTAneous route two (2) times a day.  60u morning; 60u in the DINNER TIME  Indications: 60 units in morning and 60 units in the evening, Historical Med      insulin regular (NOVOLIN R REGULAR U-100 INSULN) 100 unit/mL injection by SubCUTAneous route three (3) times daily. 30 am, 30 noon, 35 in the evening, Historical Med      triamcinolone acetonide (KENALOG) 0.1 % ointment Apply  to affected area two (2) times a day. use thin layer  Applies on face at bipap allergy site, Historical Med      aspirin delayed-release 81 mg tablet Take 81 mg by mouth daily. , Historical Med              Follow-up Information     Follow up With Specialties Details Why Contact Info    Loraine Scheuermann, DO Family Medicine Schedule an appointment as soon as possible for a visit   [address]  [address] [facility]  [phone]      Lisa Rain MD Orthopedic Surgery Schedule an appointment as soon as possible for a visit   [address] [facility] [address]  [facility] [id number]  [facility]  Schedule an appointment as soon as possible for a visit   [address]  [address] [place] [id number]            The patient is asked to follow-up with their primary care provider in the next several days. They are to call tomorrow for an appointment. The patient is asked to return promptly for any increased concerns or worsening of symptoms. They can return to this emergency department or any other emergency department.     Procedures

## 2021-06-13 NOTE — ED NOTES
Patient discharged from ED by provider. Discharge instructions reviewed with patient and all questions answered. Patient instructed on follow-up care/who to follow up with. Patient wheeled from ED in NAD.

## 2021-06-15 ENCOUNTER — TELEPHONE (OUTPATIENT)
Dept: SURGERY | Age: 76
End: 2021-06-15

## 2021-06-15 NOTE — TELEPHONE ENCOUNTER
Called pt to rs appt fpr 06/28 due to [employer] being off that day.     No answer - LVM to return call

## 2021-06-17 ENCOUNTER — TRANSCRIBE ORDER (OUTPATIENT)
Dept: SCHEDULING | Age: 76
End: 2021-06-17

## 2021-06-17 DIAGNOSIS — M51.36 DEGENERATION OF LUMBAR INTERVERTEBRAL DISC: Primary | ICD-10-CM

## 2021-06-18 ENCOUNTER — HOSPITAL ENCOUNTER (OUTPATIENT)
Dept: CT IMAGING | Age: 76
Discharge: HOME | End: 2021-06-18
Attending: PHYSICIAN ASSISTANT
Payer: MEDICARE

## 2021-06-18 DIAGNOSIS — Z85.118 ENCOUNTER FOR FOLLOW-UP SURVEILLANCE OF LUNG CANCER: ICD-10-CM

## 2021-06-18 DIAGNOSIS — Z08 ENCOUNTER FOR FOLLOW-UP SURVEILLANCE OF LUNG CANCER: ICD-10-CM

## 2021-06-18 LAB
ATRIAL RATE: 99 BPM
CALCULATED P AXIS, ECG09: 47 DEGREES
CALCULATED R AXIS, ECG10: -18 DEGREES
CALCULATED T AXIS, ECG11: 63 DEGREES
DIAGNOSIS, 93000: NORMAL
P-R INTERVAL, ECG05: 158 MS
Q-T INTERVAL, ECG07: 366 MS
QRS DURATION, ECG06: 90 MS
QTC CALCULATION (BEZET), ECG08: 469 MS
VENTRICULAR RATE, ECG03: 99 BPM

## 2021-06-18 PROCEDURE — 71250 CT THORAX DX C-: CPT

## 2021-06-24 ENCOUNTER — HOSPITAL ENCOUNTER (OUTPATIENT)
Dept: MRI IMAGING | Age: 76
Discharge: HOME | End: 2021-06-24
Attending: FAMILY MEDICINE
Payer: MEDICARE

## 2021-06-24 DIAGNOSIS — M51.36 DEGENERATION OF LUMBAR INTERVERTEBRAL DISC: ICD-10-CM

## 2021-06-24 PROCEDURE — 72148 MRI LUMBAR SPINE W/O DYE: CPT

## 2021-07-09 ENCOUNTER — TELEPHONE (OUTPATIENT)
Dept: SURGERY | Age: 76
End: 2021-07-09

## 2021-07-09 NOTE — TELEPHONE ENCOUNTER
Spoke to patient and advised them that I was running their [employer] for their upcoming appt on Monday. The insurance is not verifying and coming back with another policy holders name. I provided Medicare # or patient to call and make sure that fraudulent activity has not occurred. Patient will call back with update.

## 2021-07-12 ENCOUNTER — VIRTUAL VISIT (OUTPATIENT)
Dept: SURGERY | Age: 76
End: 2021-07-12
Payer: MEDICARE

## 2021-07-12 DIAGNOSIS — R91.1 SOLITARY PULMONARY NODULE: Primary | ICD-10-CM

## 2021-07-12 PROCEDURE — 99443 PR PHYS/QHP TELEPHONE EVALUATION 21-30 MIN: CPT | Performed by: PHYSICIAN ASSISTANT

## 2021-07-12 RX ORDER — GABAPENTIN 300 MG/1
CAPSULE ORAL
COMMUNITY
Start: 2021-06-15 | End: 2021-07-26

## 2021-07-12 NOTE — PATIENT INSTRUCTIONS

## 2021-07-12 NOTE — PROGRESS NOTES
I was at home & I was in the office while conducting this encounter. Consent:  She and/or her healthcare decision maker is aware that this patient-initiated Telehealth encounter is a billable service, with coverage as determined by her insurance carrier. She is aware that she may receive a bill and has provided verbal consent to proceed: Yes    This virtual visit was conducted telephone encounter only. -  I affirm this is a Patient Initiated Episode with an Established Patient who has not had a related appointment within my department in the past 7 days or scheduled within the next 24 hours. Note: this encounter is not billable if this call serves to triage the patient into an appointment for the relevant concern. Total Time: minutes: 11-20 minutes. Subjective:      Marcela King is a 77 y.o. female presents for virtual postop care 9 months following Robotic right lower lobe wedge resection x2 (Wse Alcantar on 9/14/2020 forSolitary pulmonary nodule, right lower lobe.   Surgical pathology--   FINAL PATHOLOGIC DIAGNOSIS   1. Right lung, right lower lobe wedge #1, resection:   Benign pulmonary parenchyma with emphysematous changes. 2. Right lung, right lower lobe wedge #2, resection:   Benign pulmonary parenchyma with emphysematous change, respiratory bronchiolitis, and hemorrhage.        Ms. Brando Kaur denies shortness of breath of chest pain. Reports back pain and abdominal pain. States she always has pain somewhere. Appetite is normal. Eating a regular diet without difficulty. Bowel movements are regular. The patient is voiding without difficulty. Patient has an advanced directive: NO  Ms. Brando Kaur has a reminder for a \"due or due soon\" health maintenance. I have asked that she contact her primary care provider for follow-up on this health maintenance. Objective:   Chest CT (6/18/2021): IMPRESSION  1. Stable postoperative changes in the right lower lobe.   2. No suspicious pulmonary nodule or mass. No new airspace disease. 3. Stable incidental findings as detailed above. There were no vitals taken for this visit. General:  alert, cooperative, no distress   Pulm: No audible wheezes or resp distress   Incisions:   healed well, no drainage, no dehiscence, incisions well approximated     Assessment:     Doing well postoperatively. Recent CT scan results discussed in detail. Plan/Recommendations/Medical Decision Makin. Advised surgical pathology benign & follow-up imaging shows no suspicous nodules. 2. Encouraged to follow-up with PCP and pulmonologist as needed  3. RTC PRN    We discussed the importance of proper diet and 30 minutes/day of proper exercise. Ms. Franz Serve verbalized understanding and questions were answered to the best of my knowledge and ability. Chronic pain educational materials were provided. All questions were personally answered. Thank you for allowing us to participate in the care of your patient.     >25 minutes were spent with patient with greater than 50% of that time spent face to face counseling    [facility]  Thoracic Surgery  2021

## 2021-07-12 NOTE — PROGRESS NOTES
1. Have you been to the ER, urgent care clinic since your last visit? Hospitalized since your last visit? No     2. Have you seen or consulted any other health care providers outside of the [facility] [place] [facility]"" since your last visit? Include any pap smears or colon screening.   No

## 2021-07-26 ENCOUNTER — HOSPITAL ENCOUNTER (INPATIENT)
Age: 76
LOS: 2 days | Discharge: HOME | DRG: 305 | End: 2021-07-30
Attending: STUDENT IN AN ORGANIZED HEALTH CARE EDUCATION/TRAINING PROGRAM | Admitting: HOSPITALIST
Payer: MEDICARE

## 2021-07-26 DIAGNOSIS — G89.29 CHRONIC LOW BACK PAIN WITHOUT SCIATICA, UNSPECIFIED BACK PAIN LATERALITY: ICD-10-CM

## 2021-07-26 DIAGNOSIS — I10 SEVERE HYPERTENSION: Primary | ICD-10-CM

## 2021-07-26 DIAGNOSIS — M54.50 CHRONIC LOW BACK PAIN WITHOUT SCIATICA, UNSPECIFIED BACK PAIN LATERALITY: ICD-10-CM

## 2021-07-26 PROBLEM — I16.0 HYPERTENSIVE URGENCY: Status: ACTIVE | Noted: 2021-07-26

## 2021-07-26 PROBLEM — E11.9 DM (DIABETES MELLITUS), TYPE 2 (HCC): Status: ACTIVE | Noted: 2021-07-26

## 2021-07-26 LAB
ALBUMIN SERPL-MCNC: 3.4 G/DL (ref 3.5–5)
ALBUMIN/GLOB SERPL: 0.6 {RATIO} (ref 1.1–2.2)
ALP SERPL-CCNC: 114 U/L (ref 45–117)
ALT SERPL-CCNC: 18 U/L (ref 12–78)
ANION GAP SERPL CALC-SCNC: 4 MMOL/L (ref 5–15)
APPEARANCE UR: CLEAR
AST SERPL-CCNC: 7 U/L (ref 15–37)
BACTERIA URNS QL MICRO: NEGATIVE /HPF
BASOPHILS # BLD: 0.1 K/UL (ref 0–0.1)
BASOPHILS NFR BLD: 1 % (ref 0–1)
BILIRUB SERPL-MCNC: 0.4 MG/DL (ref 0.2–1)
BILIRUB UR QL: NEGATIVE
BUN SERPL-MCNC: 11 MG/DL (ref 6–20)
BUN/CREAT SERPL: 13 (ref 12–20)
CALCIUM SERPL-MCNC: 8.5 MG/DL (ref 8.5–10.1)
CHLORIDE SERPL-SCNC: 105 MMOL/L (ref 97–108)
CO2 SERPL-SCNC: 32 MMOL/L (ref 21–32)
COLOR UR: ABNORMAL
COMMENT, HOLDF: NORMAL
CREAT SERPL-MCNC: 0.87 MG/DL (ref 0.55–1.02)
DIFFERENTIAL METHOD BLD: ABNORMAL
EOSINOPHIL # BLD: 0.2 K/UL (ref 0–0.4)
EOSINOPHIL NFR BLD: 2 % (ref 0–7)
EPITH CASTS URNS QL MICRO: ABNORMAL /LPF
ERYTHROCYTE [DISTWIDTH] IN BLOOD BY AUTOMATED COUNT: 15.9 % (ref 11.5–14.5)
GLOBULIN SER CALC-MCNC: 5.3 G/DL (ref 2–4)
GLUCOSE BLD STRIP.AUTO-MCNC: 94 MG/DL (ref 65–117)
GLUCOSE BLD STRIP.AUTO-MCNC: 98 MG/DL (ref 65–117)
GLUCOSE SERPL-MCNC: 164 MG/DL (ref 65–100)
GLUCOSE UR STRIP.AUTO-MCNC: NEGATIVE MG/DL
HCT VFR BLD AUTO: 49.5 % (ref 35–47)
HGB BLD-MCNC: 15.3 G/DL (ref 11.5–16)
HGB UR QL STRIP: NEGATIVE
IMM GRANULOCYTES # BLD AUTO: 0 K/UL (ref 0–0.04)
IMM GRANULOCYTES NFR BLD AUTO: 0 % (ref 0–0.5)
KETONES UR QL STRIP.AUTO: NEGATIVE MG/DL
LEUKOCYTE ESTERASE UR QL STRIP.AUTO: ABNORMAL
LYMPHOCYTES # BLD: 1.7 K/UL (ref 0.8–3.5)
LYMPHOCYTES NFR BLD: 17 % (ref 12–49)
MCH RBC QN AUTO: 26.2 PG (ref 26–34)
MCHC RBC AUTO-ENTMCNC: 30.9 G/DL (ref 30–36.5)
MCV RBC AUTO: 84.6 FL (ref 80–99)
MONOCYTES # BLD: 0.6 K/UL (ref 0–1)
MONOCYTES NFR BLD: 5 % (ref 5–13)
NEUTS SEG # BLD: 7.6 K/UL (ref 1.8–8)
NEUTS SEG NFR BLD: 75 % (ref 32–75)
NITRITE UR QL STRIP.AUTO: NEGATIVE
NRBC # BLD: 0 K/UL (ref 0–0.01)
NRBC BLD-RTO: 0 PER 100 WBC
PH UR STRIP: 6 [PH] (ref 5–8)
PLATELET # BLD AUTO: 299 K/UL (ref 150–400)
PMV BLD AUTO: 11.1 FL (ref 8.9–12.9)
POTASSIUM SERPL-SCNC: 3.8 MMOL/L (ref 3.5–5.1)
PROT SERPL-MCNC: 8.7 G/DL (ref 6.4–8.2)
PROT UR STRIP-MCNC: 30 MG/DL
RBC # BLD AUTO: 5.85 M/UL (ref 3.8–5.2)
RBC #/AREA URNS HPF: ABNORMAL /HPF (ref 0–5)
SAMPLES BEING HELD,HOLD: NORMAL
SERVICE CMNT-IMP: NORMAL
SERVICE CMNT-IMP: NORMAL
SODIUM SERPL-SCNC: 141 MMOL/L (ref 136–145)
SP GR UR REFRACTOMETRY: 1.02 (ref 1–1.03)
TROPONIN I SERPL-MCNC: <0.05 NG/ML
UR CULT HOLD, URHOLD: NORMAL
UROBILINOGEN UR QL STRIP.AUTO: 1 EU/DL (ref 0.2–1)
WBC # BLD AUTO: 10.2 K/UL (ref 3.6–11)
WBC URNS QL MICRO: ABNORMAL /HPF (ref 0–4)

## 2021-07-26 PROCEDURE — 94761 N-INVAS EAR/PLS OXIMETRY MLT: CPT

## 2021-07-26 PROCEDURE — 96376 TX/PRO/DX INJ SAME DRUG ADON: CPT

## 2021-07-26 PROCEDURE — 99285 EMERGENCY DEPT VISIT HI MDM: CPT

## 2021-07-26 PROCEDURE — 85025 COMPLETE CBC W/AUTO DIFF WBC: CPT

## 2021-07-26 PROCEDURE — 82962 GLUCOSE BLOOD TEST: CPT

## 2021-07-26 PROCEDURE — 99218 HC RM OBSERVATION: CPT

## 2021-07-26 PROCEDURE — 74011250637 HC RX REV CODE- 250/637: Performed by: STUDENT IN AN ORGANIZED HEALTH CARE EDUCATION/TRAINING PROGRAM

## 2021-07-26 PROCEDURE — 96375 TX/PRO/DX INJ NEW DRUG ADDON: CPT

## 2021-07-26 PROCEDURE — 74011250636 HC RX REV CODE- 250/636: Performed by: PHYSICIAN ASSISTANT

## 2021-07-26 PROCEDURE — 93005 ELECTROCARDIOGRAM TRACING: CPT

## 2021-07-26 PROCEDURE — 80053 COMPREHEN METABOLIC PANEL: CPT

## 2021-07-26 PROCEDURE — 36415 COLL VENOUS BLD VENIPUNCTURE: CPT

## 2021-07-26 PROCEDURE — 84484 ASSAY OF TROPONIN QUANT: CPT

## 2021-07-26 PROCEDURE — 74011250636 HC RX REV CODE- 250/636: Performed by: STUDENT IN AN ORGANIZED HEALTH CARE EDUCATION/TRAINING PROGRAM

## 2021-07-26 PROCEDURE — 74011250636 HC RX REV CODE- 250/636: Performed by: HOSPITALIST

## 2021-07-26 PROCEDURE — 96374 THER/PROPH/DIAG INJ IV PUSH: CPT

## 2021-07-26 PROCEDURE — 83036 HEMOGLOBIN GLYCOSYLATED A1C: CPT

## 2021-07-26 PROCEDURE — 81001 URINALYSIS AUTO W/SCOPE: CPT

## 2021-07-26 PROCEDURE — 2709999900 HC NON-CHARGEABLE SUPPLY

## 2021-07-26 RX ORDER — MAG HYDROX/ALUMINUM HYD/SIMETH 200-200-20
30 SUSPENSION, ORAL (FINAL DOSE FORM) ORAL
Status: DISCONTINUED | OUTPATIENT
Start: 2021-07-26 | End: 2021-07-30 | Stop reason: HOSPADM

## 2021-07-26 RX ORDER — DEXTROSE 50 % IN WATER (D50W) INTRAVENOUS SYRINGE
25-50 AS NEEDED
Status: DISCONTINUED | OUTPATIENT
Start: 2021-07-26 | End: 2021-07-30 | Stop reason: HOSPADM

## 2021-07-26 RX ORDER — FACIAL-BODY WIPES
10 EACH TOPICAL DAILY PRN
Status: DISCONTINUED | OUTPATIENT
Start: 2021-07-26 | End: 2021-07-30 | Stop reason: HOSPADM

## 2021-07-26 RX ORDER — MAGNESIUM SULFATE 100 %
4 CRYSTALS MISCELLANEOUS AS NEEDED
Status: DISCONTINUED | OUTPATIENT
Start: 2021-07-26 | End: 2021-07-30 | Stop reason: HOSPADM

## 2021-07-26 RX ORDER — FUROSEMIDE 40 MG/1
40 TABLET ORAL
Status: ON HOLD | COMMUNITY
End: 2021-07-30 | Stop reason: SDUPTHER

## 2021-07-26 RX ORDER — OXYCODONE AND ACETAMINOPHEN 5; 325 MG/1; MG/1
1 TABLET ORAL
Status: DISCONTINUED | OUTPATIENT
Start: 2021-07-26 | End: 2021-07-26

## 2021-07-26 RX ORDER — ENOXAPARIN SODIUM 100 MG/ML
40 INJECTION SUBCUTANEOUS DAILY
Status: DISCONTINUED | OUTPATIENT
Start: 2021-07-27 | End: 2021-07-26

## 2021-07-26 RX ORDER — ACETAMINOPHEN 325 MG/1
650 TABLET ORAL
Status: DISCONTINUED | OUTPATIENT
Start: 2021-07-26 | End: 2021-07-27

## 2021-07-26 RX ORDER — METOPROLOL TARTRATE 25 MG/1
25 TABLET, FILM COATED ORAL ONCE
Status: COMPLETED | OUTPATIENT
Start: 2021-07-26 | End: 2021-07-26

## 2021-07-26 RX ORDER — MORPHINE SULFATE 2 MG/ML
2 INJECTION, SOLUTION INTRAMUSCULAR; INTRAVENOUS ONCE
Status: COMPLETED | OUTPATIENT
Start: 2021-07-26 | End: 2021-07-26

## 2021-07-26 RX ORDER — LORAZEPAM 2 MG/ML
0.5 INJECTION INTRAMUSCULAR
Status: DISCONTINUED | OUTPATIENT
Start: 2021-07-26 | End: 2021-07-30 | Stop reason: HOSPADM

## 2021-07-26 RX ORDER — ONDANSETRON 2 MG/ML
4 INJECTION INTRAMUSCULAR; INTRAVENOUS
Status: DISCONTINUED | OUTPATIENT
Start: 2021-07-26 | End: 2021-07-30 | Stop reason: HOSPADM

## 2021-07-26 RX ORDER — POLYETHYLENE GLYCOL 3350 17 G/17G
17 POWDER, FOR SOLUTION ORAL DAILY PRN
Status: DISCONTINUED | OUTPATIENT
Start: 2021-07-26 | End: 2021-07-30 | Stop reason: HOSPADM

## 2021-07-26 RX ORDER — ACETAMINOPHEN 650 MG/1
650 SUPPOSITORY RECTAL
Status: DISCONTINUED | OUTPATIENT
Start: 2021-07-26 | End: 2021-07-27

## 2021-07-26 RX ORDER — CALCIUM CARB/MAGNESIUM CARB 311-232MG
5 TABLET ORAL
Status: DISCONTINUED | OUTPATIENT
Start: 2021-07-26 | End: 2021-07-30 | Stop reason: HOSPADM

## 2021-07-26 RX ORDER — HYDRALAZINE HYDROCHLORIDE 20 MG/ML
30 INJECTION INTRAMUSCULAR; INTRAVENOUS
Status: DISCONTINUED | OUTPATIENT
Start: 2021-07-26 | End: 2021-07-27

## 2021-07-26 RX ORDER — DIPHENHYDRAMINE HCL 25 MG
25 CAPSULE ORAL
Status: DISCONTINUED | OUTPATIENT
Start: 2021-07-26 | End: 2021-07-30 | Stop reason: HOSPADM

## 2021-07-26 RX ORDER — ONDANSETRON 2 MG/ML
4 INJECTION INTRAMUSCULAR; INTRAVENOUS
Status: COMPLETED | OUTPATIENT
Start: 2021-07-26 | End: 2021-07-26

## 2021-07-26 RX ORDER — SIMETHICONE 80 MG
80 TABLET,CHEWABLE ORAL
Status: DISCONTINUED | OUTPATIENT
Start: 2021-07-26 | End: 2021-07-30 | Stop reason: HOSPADM

## 2021-07-26 RX ORDER — HYDRALAZINE HYDROCHLORIDE 20 MG/ML
20 INJECTION INTRAMUSCULAR; INTRAVENOUS
Status: COMPLETED | OUTPATIENT
Start: 2021-07-26 | End: 2021-07-26

## 2021-07-26 RX ORDER — HYDRALAZINE HYDROCHLORIDE 20 MG/ML
10 INJECTION INTRAMUSCULAR; INTRAVENOUS ONCE
Status: COMPLETED | OUTPATIENT
Start: 2021-07-26 | End: 2021-07-26

## 2021-07-26 RX ORDER — DIPHENHYDRAMINE HYDROCHLORIDE 50 MG/ML
25 INJECTION, SOLUTION INTRAMUSCULAR; INTRAVENOUS
Status: DISCONTINUED | OUTPATIENT
Start: 2021-07-26 | End: 2021-07-30 | Stop reason: HOSPADM

## 2021-07-26 RX ORDER — INSULIN LISPRO 100 [IU]/ML
INJECTION, SOLUTION INTRAVENOUS; SUBCUTANEOUS
Status: DISCONTINUED | OUTPATIENT
Start: 2021-07-26 | End: 2021-07-29

## 2021-07-26 RX ORDER — HYDROMORPHONE HYDROCHLORIDE 1 MG/ML
0.5 INJECTION, SOLUTION INTRAMUSCULAR; INTRAVENOUS; SUBCUTANEOUS
Status: DISCONTINUED | OUTPATIENT
Start: 2021-07-26 | End: 2021-07-26

## 2021-07-26 RX ORDER — SODIUM CHLORIDE 0.9 % (FLUSH) 0.9 %
5-40 SYRINGE (ML) INJECTION AS NEEDED
Status: DISCONTINUED | OUTPATIENT
Start: 2021-07-26 | End: 2021-07-30 | Stop reason: HOSPADM

## 2021-07-26 RX ORDER — KETOROLAC TROMETHAMINE 30 MG/ML
15 INJECTION, SOLUTION INTRAMUSCULAR; INTRAVENOUS EVERY 6 HOURS
Status: DISCONTINUED | OUTPATIENT
Start: 2021-07-27 | End: 2021-07-27

## 2021-07-26 RX ORDER — SODIUM CHLORIDE 0.9 % (FLUSH) 0.9 %
5-40 SYRINGE (ML) INJECTION EVERY 8 HOURS
Status: DISCONTINUED | OUTPATIENT
Start: 2021-07-26 | End: 2021-07-30 | Stop reason: HOSPADM

## 2021-07-26 RX ORDER — KETOROLAC TROMETHAMINE 30 MG/ML
15 INJECTION, SOLUTION INTRAMUSCULAR; INTRAVENOUS
Status: COMPLETED | OUTPATIENT
Start: 2021-07-26 | End: 2021-07-26

## 2021-07-26 RX ORDER — MORPHINE SULFATE 4 MG/ML
4 INJECTION INTRAVENOUS
Status: DISCONTINUED | OUTPATIENT
Start: 2021-07-26 | End: 2021-07-28

## 2021-07-26 RX ADMIN — HYDRALAZINE HYDROCHLORIDE 10 MG: 20 INJECTION INTRAMUSCULAR; INTRAVENOUS at 21:24

## 2021-07-26 RX ADMIN — KETOROLAC TROMETHAMINE 15 MG: 30 INJECTION, SOLUTION INTRAMUSCULAR; INTRAVENOUS at 18:13

## 2021-07-26 RX ADMIN — ONDANSETRON 4 MG: 2 INJECTION INTRAMUSCULAR; INTRAVENOUS at 18:13

## 2021-07-26 RX ADMIN — KETOROLAC TROMETHAMINE 15 MG: 30 INJECTION, SOLUTION INTRAMUSCULAR; INTRAVENOUS at 23:59

## 2021-07-26 RX ADMIN — HYDRALAZINE HYDROCHLORIDE 20 MG: 20 INJECTION INTRAMUSCULAR; INTRAVENOUS at 23:59

## 2021-07-26 RX ADMIN — MORPHINE SULFATE 2 MG: 2 INJECTION, SOLUTION INTRAMUSCULAR; INTRAVENOUS at 23:59

## 2021-07-26 RX ADMIN — METOPROLOL TARTRATE 25 MG: 25 TABLET, FILM COATED ORAL at 19:09

## 2021-07-26 NOTE — ED PROVIDER NOTES
Chief Complaint   Patient presents with    Back Pain    Hypertension     This is a 50-year-old female with a history of hypertension, congestive heart failure, obstructive sleep apnea, insulin-dependent diabetes, chronic lower back pain with a history of previous L4-L5 discectomy, also severe spinal stenosis, referred here from her orthopedic surgeon's office Nanda Wiggins) for severe hypertension. She was actually scheduled to have been epidural steroid injection today with the plan being to stave off potential surgery in the future, but was not able to go through with the procedure as her systolic blood pressure was in the 200s. She takes losartan and metoprolol, took both medications this morning and reports a pattern of good compliance but has not yet had her evening dose of metoprolol. On arrival her systolic pressures were in the 230s. She denies any chest pain, shortness of breath, continues to have lower back pain that has worsened over the last 2 months, she denies any antecedent falls or trauma, difficulty urinating or urinary incontinence, or difficulty having a bowel movement. She has chronic weakness in the left lower extremity which has also been progressive over the last several months, no numbness or paresthesias in the lower extremities or the groin. She has no other systemic complaints. Symptoms are severe in nature without any alleviating factors.       Past Medical History:   Diagnosis Date    Adverse effect of anesthesia     WOKE UP EARLY DURING BACK SURGERY    Breast cancer [facility]) 1993    right breast cancer     Cervical cancer ([facility] [address] 1980's    Chronic pain     L4-L5 discectomy    Congestive heart failure (HCC)     Depression with anxiety     Ectopic pregnancy     GERD (gastroesophageal reflux disease)     Glaucoma     Hypertension     Ill-defined condition     TONSIL STONES - SOMETIMES CAUSE BAD BREATH    Miscarriage     Neuropathy     r/t DM    Sleep apnea 2005    CPAP compliant but not using now because of rash on face from the mask    Type 2 diabetes mellitus (HCC)     on insulin       Past Surgical History:   Procedure Laterality Date    HX CARPAL TUNNEL RELEASE Bilateral     HX CATARACT REMOVAL Bilateral 2013    HX COLONOSCOPY      HX HYSTERECTOMY  1980's    cervical cancer    HX KNEE ARTHROSCOPY Bilateral     HX ORTHOPAEDIC  8878,8152    back surgery x 2 ( HERNIATED DISC REPAIR, AND ANOTHER SURGERY SHE CANT REMEMBER    HX ORTHOPAEDIC Left 1990    ligament attachment arm    HX PELVIC LAPAROSCOPY  1970s    ectopic pregnancy    HX TUMOR REMOVAL Right     leg    IN BREAST SURGERY PROCEDURE UNLISTED Right     right mastectomy         Family History:   Problem Relation Age of Onset    Diabetes Mother     Hypertension Mother     Diabetes Sister     Cancer Brother         COLON CANCER    Anesth Problems Neg Hx        Social History     Socioeconomic History    Marital status:      Spouse name: Not on file    Number of children: Not on file    Years of education: Not on file    Highest education level: Not on file   Occupational History    Not on file   Tobacco Use    Smoking status: Never Smoker    Smokeless tobacco: Never Used   Substance and Sexual Activity    Alcohol use: No    Drug use: No    Sexual activity: Not on file   Other Topics Concern    Not on file   Social History Narrative    Not on file     Social Determinants of Health     Financial Resource Strain:     Difficulty of Paying Living Expenses:    Food Insecurity:     Worried About Running Out of Food in the Last Year:     Ran Out of Food in the Last Year:    Transportation Needs:     Lack of Transportation (Medical):      Lack of Transportation (Non-Medical):    Physical Activity:     Days of Exercise per Week:     Minutes of Exercise per Session:    Stress:     Feeling of Stress :    Social Connections:     Frequency of Communication with Friends and Family:     Frequency of Social Gatherings with Friends and Family:     Attends Anabaptism Services:     Active Member of Clubs or Organizations:     Attends Club or Organization Meetings:     Marital Status:    Intimate Partner Violence:     Fear of Current or Ex-Partner:     Emotionally Abused:     Physically Abused:     Sexually Abused: ALLERGIES: Contrast dye [iodine], Lisinopril, Percocet [oxycodone-acetaminophen], Percodan [oxycodone-aspirin], Prednisone, and Sulfa (sulfonamide antibiotics)    Review of Systems   Constitutional: Negative for fever. HENT: Negative for facial swelling. Eyes: Negative for redness. Respiratory: Negative for shortness of breath. Cardiovascular: Negative for chest pain. Gastrointestinal: Negative for abdominal pain and vomiting. Genitourinary: Negative for difficulty urinating. Musculoskeletal: Positive for back pain. Neurological: Negative for headaches. Psychiatric/Behavioral: Negative for confusion.        Vitals:    07/26/21 1311   BP: (!) 235/99   Pulse: 100   Resp: 16   Temp: 98.1 °F (36.7 °C)   SpO2: 98%   Weight: 122 kg (269 lb)   Height: 5' 7\" (1.702 m)            Physical Exam  General:  Awake and alert, NAD  HEENT:  NC/AT, equal pupils, moist mucous membranes  Neck:   Normal inspection, full range of motion  Cardiac:  RRR, no murmurs  Respiratory:  Clear bilaterally, no wheezes, rales, rhonchi  Abdomen:  Soft and nontender, nondistended  Back:   Decreased ROM secondary to pain, +healed surgical scars, +tender across the paraspinal musculature in the lumbar region  Extremities: Warm and well perfused, no peripheral edema, strong DP pulses bilaterally  Neuro:  Moving all extremities symmetrically without gross motor deficit  Skin:   No rashes or pallor    RESULTS  Recent Results (from the past 12 hour(s))   URINALYSIS W/MICROSCOPIC    Collection Time: 07/26/21  1:56 PM   Result Value Ref Range    Color YELLOW/STRAW      Appearance CLEAR CLEAR      Specific gravity 1.019 1.003 - 1.030      pH (UA) 6.0 5.0 - 8.0      Protein 30 (A) NEG mg/dL    Glucose Negative NEG mg/dL    Ketone Negative NEG mg/dL    Bilirubin Negative NEG      Blood Negative NEG      Urobilinogen 1.0 0.2 - 1.0 EU/dL    Nitrites Negative NEG      Leukocyte Esterase MODERATE (A) NEG      WBC 0-4 0 - 4 /hpf    RBC 0-5 0 - 5 /hpf    Epithelial cells FEW FEW /lpf    Bacteria Negative NEG /hpf   URINE CULTURE HOLD SAMPLE    Collection Time: 07/26/21  1:56 PM    Specimen: Serum; Urine   Result Value Ref Range    Urine culture hold        Urine on hold in Microbiology dept for 2 days. If unpreserved urine is submitted, it cannot be used for addtional testing after 24 hours, recollection will be required. SAMPLES BEING HELD    Collection Time: 07/26/21  2:10 PM   Result Value Ref Range    SAMPLES BEING HELD 1SST,1RED     COMMENT        Add-on orders for these samples will be processed based on acceptable specimen integrity and analyte stability, which may vary by analyte. CBC WITH AUTOMATED DIFF    Collection Time: 07/26/21  2:10 PM   Result Value Ref Range    WBC 10.2 3.6 - 11.0 K/uL    RBC 5.85 (H) 3.80 - 5.20 M/uL    HGB 15.3 11.5 - 16.0 g/dL    HCT 49.5 (H) 35.0 - 47.0 %    MCV 84.6 80.0 - 99.0 FL    MCH 26.2 26.0 - 34.0 PG    MCHC 30.9 30.0 - 36.5 g/dL    RDW 15.9 (H) 11.5 - 14.5 %    PLATELET 275 211 - 221 K/uL    MPV 11.1 8.9 - 12.9 FL    NRBC 0.0 0  WBC    ABSOLUTE NRBC 0.00 0.00 - 0.01 K/uL    NEUTROPHILS 75 32 - 75 %    LYMPHOCYTES 17 12 - 49 %    MONOCYTES 5 5 - 13 %    EOSINOPHILS 2 0 - 7 %    BASOPHILS 1 0 - 1 %    IMMATURE GRANULOCYTES 0 0.0 - 0.5 %    ABS. NEUTROPHILS 7.6 1.8 - 8.0 K/UL    ABS. LYMPHOCYTES 1.7 0.8 - 3.5 K/UL    ABS. MONOCYTES 0.6 0.0 - 1.0 K/UL    ABS. EOSINOPHILS 0.2 0.0 - 0.4 K/UL    ABS. BASOPHILS 0.1 0.0 - 0.1 K/UL    ABS. IMM.  GRANS. 0.0 0.00 - 0.04 K/UL    DF AUTOMATED     METABOLIC PANEL, COMPREHENSIVE    Collection Time: 07/26/21  2:10 PM   Result Value Ref Range Sodium 141 136 - 145 mmol/L    Potassium 3.8 3.5 - 5.1 mmol/L    Chloride 105 97 - 108 mmol/L    CO2 32 21 - 32 mmol/L    Anion gap 4 (L) 5 - 15 mmol/L    Glucose 164 (H) 65 - 100 mg/dL    BUN 11 6 - 20 MG/DL    Creatinine 0.87 0.55 - 1.02 MG/DL    BUN/Creatinine ratio 13 12 - 20      GFR est AA >60 >60 ml/min/1.73m2    GFR est non-AA >60 >60 ml/min/1.73m2    Calcium 8.5 8.5 - 10.1 MG/DL    Bilirubin, total 0.4 0.2 - 1.0 MG/DL    ALT (SGPT) 18 12 - 78 U/L    AST (SGOT) 7 (L) 15 - 37 U/L    Alk. phosphatase 114 45 - 117 U/L    Protein, total 8.7 (H) 6.4 - 8.2 g/dL    Albumin 3.4 (L) 3.5 - 5.0 g/dL    Globulin 5.3 (H) 2.0 - 4.0 g/dL    A-G Ratio 0.6 (L) 1.1 - 2.2     TROPONIN I    Collection Time: 07/26/21  2:10 PM   Result Value Ref Range    Troponin-I, Qt. <0.05 <0.05 ng/mL   GLUCOSE, POC    Collection Time: 07/26/21  6:05 PM   Result Value Ref Range    Glucose (POC) 98 65 - 117 mg/dL    Performed by [employer]  No results found. Procedures - none unless documented below  EKG as interpreted by me:  Sinus tachycardia rate of 104, normal axis and intervals, grossly no ST or T wave changes suggesting acute ischemia. ED course: Labs, EKG and imaging reviewed. Referred here from orthopedist's office for hypertension, systolics 898'Y on arrival.  After receiving IV Toradol reported improvement in her pain and her evening dose of metoprolol was given here but she continues to be in the 200's/100's on my exam.  I've ordered IV hydralazine for continued severe hypertension, will admit for continued management. Discussed with the hospitalist.    [address] [facility] for Admission  8:48 PM    ED Room Number:   CV37/19  Patient Name and age: Tato Hassan 77 y.o.  female  Working Diagnosis:     1. Severe hypertension    2.  Chronic low back pain without sciatica, unspecified back pain laterality      COVID suspicion:   no  Code Status:    Full Code  Readmission:    no  Isolation Requirements:  no  Recommended Level of Care: telemetry  Department:    [facility] ED - [phone]  Other:     Referred here from orthopedist's office for hypertension, systolics 288'T on arrival.  After receiving IV Toradol reported improvement in her pain and her evening dose of metoprolol was given here but she continues to be in the 200's/100's on my exam.  I've ordered IV hydralazine, she cannot have the steroid injection unless her blood pressure is adequately treated.

## 2021-07-26 NOTE — ED NOTES
1:53 PM  Seen in ED in June for same, pain in low back is worsening. Had MRI and seen by ortho spine Dr. Erica Carlton on Friday, recommended ALYSA or would need surgery if no improvement. Went to get [facility] [place] [facility] today but it was not done due to high blood pressure reading. States she took her prescribed BP meds this morning. I have evaluated the patient as the Provider in Triage. I have reviewed Her vital signs and the triage nurse assessment. I have talked with the patient and any available family and advised that I am the provider in triage and have ordered the appropriate study to initiate their work up based on the clinical presentation during my assessment. I have advised that the patient will be accommodated in the Main ED as soon as possible. I have also requested to contact the triage nurse or myself immediately if the patient experiences any changes in their condition during this brief waiting period.   Blossom Michelle PA-C

## 2021-07-26 NOTE — PROGRESS NOTES
CARE MANAGEMENT NOTE      CM received orders for [facility] PT. CM met with Pt and her daughter. CM discussed [facility] services. Pt states that MD has already recommended outpatient therapy. CM encouraged Pt to continue outpatient PT if able. Pt and daughter voiced understanding and agreement. CM urged Pt to reach out to MD if she is unable to continue outpatient services (due to pain or transportation) to discuss possibility of [facility]. Pt voiced understanding.  CM updated provider.  _____________________________________  Farrell Gowers, BSW - Care Management  7/26/2021   6:56 PM

## 2021-07-26 NOTE — ED TRIAGE NOTES
Pt arrives with the c.c. of left sided back pain that has been going on for the last two months, has been to the ED for same problem, pt was referred to pain specialist, pt went today to get a \"shot\" and they were unable to do because of patient blood pressure. No new injury or trauma, no loss of control of bowel or bladder.

## 2021-07-27 ENCOUNTER — APPOINTMENT (OUTPATIENT)
Dept: GENERAL RADIOLOGY | Age: 76
DRG: 305 | End: 2021-07-27
Attending: NURSE PRACTITIONER
Payer: MEDICARE

## 2021-07-27 LAB
ALBUMIN SERPL-MCNC: 3.2 G/DL (ref 3.5–5)
ALBUMIN/GLOB SERPL: 0.7 {RATIO} (ref 1.1–2.2)
ALP SERPL-CCNC: 101 U/L (ref 45–117)
ALT SERPL-CCNC: 18 U/L (ref 12–78)
ANION GAP SERPL CALC-SCNC: 3 MMOL/L (ref 5–15)
AST SERPL-CCNC: 14 U/L (ref 15–37)
ATRIAL RATE: 104 BPM
BASOPHILS # BLD: 0.1 K/UL (ref 0–0.1)
BASOPHILS NFR BLD: 0 % (ref 0–1)
BILIRUB SERPL-MCNC: 0.6 MG/DL (ref 0.2–1)
BNP SERPL-MCNC: 613 PG/ML
BUN SERPL-MCNC: 16 MG/DL (ref 6–20)
BUN/CREAT SERPL: 19 (ref 12–20)
CALCIUM SERPL-MCNC: 8.2 MG/DL (ref 8.5–10.1)
CALCULATED P AXIS, ECG09: 47 DEGREES
CALCULATED R AXIS, ECG10: 7 DEGREES
CALCULATED T AXIS, ECG11: 56 DEGREES
CHLORIDE SERPL-SCNC: 106 MMOL/L (ref 97–108)
CO2 SERPL-SCNC: 28 MMOL/L (ref 21–32)
CREAT SERPL-MCNC: 0.84 MG/DL (ref 0.55–1.02)
D DIMER PPP FEU-MCNC: <0.19 MG/L FEU (ref 0–0.65)
DIAGNOSIS, 93000: NORMAL
DIFFERENTIAL METHOD BLD: ABNORMAL
EOSINOPHIL # BLD: 0.2 K/UL (ref 0–0.4)
EOSINOPHIL NFR BLD: 1 % (ref 0–7)
ERYTHROCYTE [DISTWIDTH] IN BLOOD BY AUTOMATED COUNT: 16.6 % (ref 11.5–14.5)
EST. AVERAGE GLUCOSE BLD GHB EST-MCNC: 171 MG/DL
GLOBULIN SER CALC-MCNC: 4.9 G/DL (ref 2–4)
GLUCOSE BLD STRIP.AUTO-MCNC: 112 MG/DL (ref 65–117)
GLUCOSE BLD STRIP.AUTO-MCNC: 183 MG/DL (ref 65–117)
GLUCOSE BLD STRIP.AUTO-MCNC: 198 MG/DL (ref 65–117)
GLUCOSE BLD STRIP.AUTO-MCNC: 202 MG/DL (ref 65–117)
GLUCOSE BLD STRIP.AUTO-MCNC: 225 MG/DL (ref 65–117)
GLUCOSE BLD STRIP.AUTO-MCNC: 273 MG/DL (ref 65–117)
GLUCOSE SERPL-MCNC: 218 MG/DL (ref 65–100)
HBA1C MFR BLD: 7.6 % (ref 4–5.6)
HCT VFR BLD AUTO: 48 % (ref 35–47)
HGB BLD-MCNC: 14.9 G/DL (ref 11.5–16)
IMM GRANULOCYTES # BLD AUTO: 0.1 K/UL (ref 0–0.04)
IMM GRANULOCYTES NFR BLD AUTO: 1 % (ref 0–0.5)
LYMPHOCYTES # BLD: 1.8 K/UL (ref 0.8–3.5)
LYMPHOCYTES NFR BLD: 14 % (ref 12–49)
MCH RBC QN AUTO: 26 PG (ref 26–34)
MCHC RBC AUTO-ENTMCNC: 31 G/DL (ref 30–36.5)
MCV RBC AUTO: 83.9 FL (ref 80–99)
MONOCYTES # BLD: 0.7 K/UL (ref 0–1)
MONOCYTES NFR BLD: 6 % (ref 5–13)
NEUTS SEG # BLD: 9.8 K/UL (ref 1.8–8)
NEUTS SEG NFR BLD: 78 % (ref 32–75)
NRBC # BLD: 0 K/UL (ref 0–0.01)
NRBC BLD-RTO: 0 PER 100 WBC
P-R INTERVAL, ECG05: 164 MS
PLATELET # BLD AUTO: 320 K/UL (ref 150–400)
PMV BLD AUTO: 11.3 FL (ref 8.9–12.9)
POTASSIUM SERPL-SCNC: 4.2 MMOL/L (ref 3.5–5.1)
PROT SERPL-MCNC: 8.1 G/DL (ref 6.4–8.2)
Q-T INTERVAL, ECG07: 344 MS
QRS DURATION, ECG06: 76 MS
QTC CALCULATION (BEZET), ECG08: 452 MS
RBC # BLD AUTO: 5.72 M/UL (ref 3.8–5.2)
SERVICE CMNT-IMP: ABNORMAL
SERVICE CMNT-IMP: NORMAL
SODIUM SERPL-SCNC: 137 MMOL/L (ref 136–145)
TROPONIN I SERPL-MCNC: 0.1 NG/ML
TROPONIN I SERPL-MCNC: 0.1 NG/ML
VENTRICULAR RATE, ECG03: 104 BPM
WBC # BLD AUTO: 12.6 K/UL (ref 3.6–11)

## 2021-07-27 PROCEDURE — 74011636637 HC RX REV CODE- 636/637: Performed by: HOSPITALIST

## 2021-07-27 PROCEDURE — 80053 COMPREHEN METABOLIC PANEL: CPT

## 2021-07-27 PROCEDURE — 74011636637 HC RX REV CODE- 636/637: Performed by: INTERNAL MEDICINE

## 2021-07-27 PROCEDURE — 99218 HC RM OBSERVATION: CPT

## 2021-07-27 PROCEDURE — 71045 X-RAY EXAM CHEST 1 VIEW: CPT

## 2021-07-27 PROCEDURE — 83880 ASSAY OF NATRIURETIC PEPTIDE: CPT

## 2021-07-27 PROCEDURE — 74011250637 HC RX REV CODE- 250/637: Performed by: HOSPITALIST

## 2021-07-27 PROCEDURE — 96374 THER/PROPH/DIAG INJ IV PUSH: CPT

## 2021-07-27 PROCEDURE — 74011250636 HC RX REV CODE- 250/636: Performed by: INTERNAL MEDICINE

## 2021-07-27 PROCEDURE — 36415 COLL VENOUS BLD VENIPUNCTURE: CPT

## 2021-07-27 PROCEDURE — 74011250636 HC RX REV CODE- 250/636: Performed by: HOSPITALIST

## 2021-07-27 PROCEDURE — 85379 FIBRIN DEGRADATION QUANT: CPT

## 2021-07-27 PROCEDURE — 97116 GAIT TRAINING THERAPY: CPT

## 2021-07-27 PROCEDURE — 74011250637 HC RX REV CODE- 250/637: Performed by: INTERNAL MEDICINE

## 2021-07-27 PROCEDURE — 96375 TX/PRO/DX INJ NEW DRUG ADDON: CPT

## 2021-07-27 PROCEDURE — 97530 THERAPEUTIC ACTIVITIES: CPT

## 2021-07-27 PROCEDURE — 97161 PT EVAL LOW COMPLEX 20 MIN: CPT

## 2021-07-27 PROCEDURE — 96376 TX/PRO/DX INJ SAME DRUG ADON: CPT

## 2021-07-27 PROCEDURE — 93005 ELECTROCARDIOGRAM TRACING: CPT

## 2021-07-27 PROCEDURE — 84484 ASSAY OF TROPONIN QUANT: CPT

## 2021-07-27 PROCEDURE — 82962 GLUCOSE BLOOD TEST: CPT

## 2021-07-27 PROCEDURE — 85025 COMPLETE CBC W/AUTO DIFF WBC: CPT

## 2021-07-27 RX ORDER — LOSARTAN POTASSIUM 50 MG/1
50 TABLET ORAL 2 TIMES DAILY
Status: DISCONTINUED | OUTPATIENT
Start: 2021-07-27 | End: 2021-07-27

## 2021-07-27 RX ORDER — METOPROLOL TARTRATE 25 MG/1
25 TABLET, FILM COATED ORAL ONCE
Status: COMPLETED | OUTPATIENT
Start: 2021-07-27 | End: 2021-07-27

## 2021-07-27 RX ORDER — OXYCODONE HYDROCHLORIDE 5 MG/1
5 TABLET ORAL
Status: DISCONTINUED | OUTPATIENT
Start: 2021-07-27 | End: 2021-07-28

## 2021-07-27 RX ORDER — GUAIFENESIN 100 MG/5ML
81 LIQUID (ML) ORAL DAILY
Status: DISCONTINUED | OUTPATIENT
Start: 2021-07-27 | End: 2021-07-30 | Stop reason: HOSPADM

## 2021-07-27 RX ORDER — METOPROLOL TARTRATE 25 MG/1
25 TABLET, FILM COATED ORAL 2 TIMES DAILY
Status: DISCONTINUED | OUTPATIENT
Start: 2021-07-27 | End: 2021-07-27

## 2021-07-27 RX ORDER — METOPROLOL TARTRATE 50 MG/1
50 TABLET ORAL 2 TIMES DAILY
Status: DISCONTINUED | OUTPATIENT
Start: 2021-07-27 | End: 2021-07-28

## 2021-07-27 RX ORDER — LOSARTAN POTASSIUM 50 MG/1
50 TABLET ORAL DAILY
Status: DISCONTINUED | OUTPATIENT
Start: 2021-07-28 | End: 2021-07-27

## 2021-07-27 RX ORDER — HYDRALAZINE HYDROCHLORIDE 20 MG/ML
20 INJECTION INTRAMUSCULAR; INTRAVENOUS EVERY 6 HOURS
Status: DISCONTINUED | OUTPATIENT
Start: 2021-07-27 | End: 2021-07-27

## 2021-07-27 RX ORDER — ONDANSETRON 4 MG/1
4 TABLET, ORALLY DISINTEGRATING ORAL
Status: COMPLETED | OUTPATIENT
Start: 2021-07-27 | End: 2021-07-27

## 2021-07-27 RX ORDER — ALPRAZOLAM 0.25 MG/1
0.25 TABLET ORAL
Status: DISCONTINUED | OUTPATIENT
Start: 2021-07-27 | End: 2021-07-30 | Stop reason: HOSPADM

## 2021-07-27 RX ORDER — TRIAMCINOLONE ACETONIDE 1 MG/G
OINTMENT TOPICAL 2 TIMES DAILY
Status: DISCONTINUED | OUTPATIENT
Start: 2021-07-27 | End: 2021-07-30 | Stop reason: HOSPADM

## 2021-07-27 RX ORDER — ACETAMINOPHEN 325 MG/1
650 TABLET ORAL EVERY 6 HOURS
Status: DISCONTINUED | OUTPATIENT
Start: 2021-07-27 | End: 2021-07-30 | Stop reason: HOSPADM

## 2021-07-27 RX ORDER — HYDRALAZINE HYDROCHLORIDE 20 MG/ML
20 INJECTION INTRAMUSCULAR; INTRAVENOUS
Status: DISCONTINUED | OUTPATIENT
Start: 2021-07-27 | End: 2021-07-30 | Stop reason: HOSPADM

## 2021-07-27 RX ORDER — LOSARTAN POTASSIUM 50 MG/1
50 TABLET ORAL 2 TIMES DAILY
Status: DISCONTINUED | OUTPATIENT
Start: 2021-07-27 | End: 2021-07-28

## 2021-07-27 RX ORDER — FAMOTIDINE 20 MG/1
40 TABLET, FILM COATED ORAL 2 TIMES DAILY
Status: DISCONTINUED | OUTPATIENT
Start: 2021-07-27 | End: 2021-07-30 | Stop reason: HOSPADM

## 2021-07-27 RX ORDER — HYDROCHLOROTHIAZIDE 25 MG/1
12.5 TABLET ORAL DAILY
Status: DISCONTINUED | OUTPATIENT
Start: 2021-07-27 | End: 2021-07-28

## 2021-07-27 RX ADMIN — HYDRALAZINE HYDROCHLORIDE 20 MG: 20 INJECTION INTRAMUSCULAR; INTRAVENOUS at 07:44

## 2021-07-27 RX ADMIN — ACETAMINOPHEN 650 MG: 325 TABLET ORAL at 18:57

## 2021-07-27 RX ADMIN — ACETAMINOPHEN 650 MG: 325 TABLET ORAL at 23:27

## 2021-07-27 RX ADMIN — ONDANSETRON 4 MG: 4 TABLET, ORALLY DISINTEGRATING ORAL at 00:18

## 2021-07-27 RX ADMIN — LOSARTAN POTASSIUM 50 MG: 50 TABLET, FILM COATED ORAL at 18:57

## 2021-07-27 RX ADMIN — KETOROLAC TROMETHAMINE 15 MG: 30 INJECTION, SOLUTION INTRAMUSCULAR; INTRAVENOUS at 07:07

## 2021-07-27 RX ADMIN — METOPROLOL TARTRATE 25 MG: 25 TABLET, FILM COATED ORAL at 11:34

## 2021-07-27 RX ADMIN — LORAZEPAM 0.5 MG: 2 INJECTION INTRAMUSCULAR; INTRAVENOUS at 10:45

## 2021-07-27 RX ADMIN — INSULIN LISPRO 2 UNITS: 100 INJECTION, SOLUTION INTRAVENOUS; SUBCUTANEOUS at 09:06

## 2021-07-27 RX ADMIN — Medication 10 ML: at 07:08

## 2021-07-27 RX ADMIN — Medication 10 ML: at 00:00

## 2021-07-27 RX ADMIN — ACETAMINOPHEN 650 MG: 325 TABLET ORAL at 07:45

## 2021-07-27 RX ADMIN — Medication 5 MG: at 23:27

## 2021-07-27 RX ADMIN — FAMOTIDINE 40 MG: 20 TABLET ORAL at 18:57

## 2021-07-27 RX ADMIN — ASPIRIN 81 MG: 81 TABLET, CHEWABLE ORAL at 16:39

## 2021-07-27 RX ADMIN — METOPROLOL TARTRATE 50 MG: 50 TABLET, FILM COATED ORAL at 18:57

## 2021-07-27 RX ADMIN — METOPROLOL TARTRATE 25 MG: 25 TABLET, FILM COATED ORAL at 09:06

## 2021-07-27 RX ADMIN — INSULIN LISPRO 3 UNITS: 100 INJECTION, SOLUTION INTRAVENOUS; SUBCUTANEOUS at 16:39

## 2021-07-27 RX ADMIN — FAMOTIDINE 40 MG: 20 TABLET ORAL at 09:06

## 2021-07-27 RX ADMIN — Medication 10 ML: at 21:13

## 2021-07-27 RX ADMIN — HYDROCHLOROTHIAZIDE 12.5 MG: 25 TABLET ORAL at 16:39

## 2021-07-27 RX ADMIN — INSULIN HUMAN 30 UNITS: 100 INJECTION, SUSPENSION SUBCUTANEOUS at 18:57

## 2021-07-27 RX ADMIN — Medication 10 ML: at 18:56

## 2021-07-27 NOTE — PROGRESS NOTES
Anupam Eid [facility]  [address] [facility], [id number] [place] [address]  [phone]      Medical Progress Note      NAME: Bennie Pacheco   :  1945  MRM:  [id number]    Date/Time: 2021  4:02 PM         Subjective:     Chief Complaint:  \"I feel like I can't take a breath\"     Pt seen and examined. Called to the bedside by nursing as pt reports that she felt like she \"couldn't take a breath as it felt like her chest was tight\". BP and HR subsequently elevated. EKG obtained. By the time I arrived symptoms had improved. Serial troponins ordered. ROS:  (bold if positive, if negative)      Chest pain/tightness          Objective:       Vitals:          Last 24hrs VS reviewed since prior progress note.  Most recent are:    Visit Vitals  BP (!) 161/79 (BP 1 Location: Left lower arm, BP Patient Position: Semi fowlers)   Pulse 85   Temp 98.6 °F (37 °C)   Resp 20   Ht 5' 7\" (1.702 m)   Wt 122 kg (269 lb)   SpO2 97%   BMI 42.13 kg/m²     SpO2 Readings from Last 6 Encounters:   21 97%   21 98%   21 99%   21 96%   20 97%   20 98%            Intake/Output Summary (Last 24 hours) at 2021 1602  Last data filed at 2021 0945  Gross per 24 hour   Intake 0 ml   Output    Net 0 ml          Exam:     Physical Exam:    Gen: Morbidly obese female   HEENT:  Pink conjunctivae, PERRL, hearing intact to voice, moist mucous membranes  Neck:  Supple, without masses, thyroid non-tender  Resp:  No accessory muscle use, clear breath sounds without wheezes rales or rhonchi  Card:  No murmurs, normal S1, S2 without thrills, bruits or peripheral edema  Abd:  Soft, non-tender, non-distended, normoactive bowel sounds are present  Musc:  No cyanosis or clubbing  Skin:  No rashes or ulcers, skin turgor is good  Neuro:  Cranial nerves 3-12 are grossly intact,  strength is 5/5 bilaterally and dorsi / plantarflexion is 5/5 bilaterally, follows commands appropriately  Psych: Good insight, oriented to person, place and time, alert      Medications Reviewed: (see below)    Lab Data Reviewed: (see below)    ______________________________________________________________________    Medications:     Current Facility-Administered Medications   Medication Dose Route Frequency    hydrALAZINE (APRESOLINE) 20 mg/mL injection 20 mg  20 mg IntraVENous Q6H PRN    acetaminophen (TYLENOL) tablet 650 mg  650 mg Oral Q6H    famotidine (PEPCID) tablet 40 mg  40 mg Oral BID    triamcinolone acetonide (KENALOG) 0.1 % ointment   Topical BID    oxyCODONE IR (ROXICODONE) tablet 5 mg  5 mg Oral Q4H PRN    metoprolol tartrate (LOPRESSOR) tablet 50 mg  50 mg Oral BID    ALPRAZolam (XANAX) tablet 0.25 mg  0.25 mg Oral QID PRN    losartan (COZAAR) tablet 50 mg  50 mg Oral BID    insulin NPH (NOVOLIN N, HUMULIN N) injection 30 Units  30 Units SubCUTAneous BID    aspirin chewable tablet 81 mg  81 mg Oral DAILY    morphine injection 4 mg  4 mg IntraVENous Q30MIN PRN    sodium chloride (NS) flush 5-40 mL  5-40 mL IntraVENous Q8H    sodium chloride (NS) flush 5-40 mL  5-40 mL IntraVENous PRN    polyethylene glycol (MIRALAX) packet 17 g  17 g Oral DAILY PRN    bisacodyL (DULCOLAX) suppository 10 mg  10 mg Rectal DAILY PRN    ondansetron (ZOFRAN) injection 4 mg  4 mg IntraVENous Q6H PRN    glucose chewable tablet 16 g  4 Tablet Oral PRN    dextrose (D50W) injection syrg 12.5-25 g  25-50 mL IntraVENous PRN    glucagon (GLUCAGEN) injection 1 mg  1 mg IntraMUSCular PRN    insulin lispro (HUMALOG) injection   SubCUTAneous AC&HS    melatonin (rapid dissolve) tablet 5 mg  5 mg Oral QHS PRN    alum-mag hydroxide-simeth (MYLANTA) oral suspension 30 mL  30 mL Oral Q4H PRN    LORazepam (ATIVAN) injection 0.5 mg  0.5 mg IntraVENous Q6H PRN    diphenhydrAMINE (BENADRYL) injection 25 mg  25 mg IntraVENous Q6H PRN    diphenhydrAMINE (BENADRYL) capsule 25 mg  25 mg Oral Q6H PRN    simethicone (MYLICON) tablet 80 mg  80 mg Oral QID PRN     Current Outpatient Medications   Medication Sig    furosemide (Lasix) 40 mg tablet Take 40 mg by mouth two (2) times daily as needed for Other (increased swelling, shortness of breath, or weight gain >5 pounds).  losartan (COZAAR) 50 mg tablet TAKE 1 TABLET BY MOUTH TWICE A DAY    metoprolol tartrate (LOPRESSOR) 25 mg tablet TAKE 1 TABLET BY MOUTH TWICE A DAY    vitamin E (AQUA GEMS) 400 unit capsule Take 400 Units by mouth daily.  acetaminophen (TylenoL) 325 mg tablet Take 650 mg by mouth every four (4) hours as needed for Pain.  famotidine (Pepcid AC) 20 mg tablet Take 40 mg by mouth two (2) times a day.  insulin NPH (NovoLIN N NPH U-100 Insulin) 100 unit/mL injection 60 Units by SubCUTAneous route two (2) times a day. 60u morning; 60u in the DINNER TIME  Indications: 60 units in morning and 60 units in the evening    insulin regular (NovoLIN R Regular U-100 Insuln) 100 unit/mL injection by SubCUTAneous route three (3) times daily. 30 am, 30 noon, 35 in the evening    triamcinolone acetonide (KENALOG) 0.1 % ointment Apply  to affected area two (2) times a day. use thin layer  Applies on face at bipap allergy site    aspirin delayed-release 81 mg tablet Take 81 mg by mouth daily. Lab Review:     Recent Labs     07/27/21  0500 07/26/21  1410   WBC 12.6* 10.2   HGB 14.9 15.3   HCT 48.0* 49.5*    299     Recent Labs     07/27/21  0500 07/26/21  1410    141   K 4.2 3.8    105   CO2 28 32   * 164*   BUN 16 11   CREA 0.84 0.87   CA 8.2* 8.5   ALB 3.2* 3.4*   ALT 18 18     No components found for: GLPOC         Assessment / Plan:   Hypertensive urgency - continue to optimize BP   -on losartan 50mg BID   -on metoprolol 25mg BID; increase to 50mg BID   -likely would tolerate a low dose of HCTZ; start this afternoon   -PRN hydralazine     Elevated troponin - occurred after a sudden onset of \"chest tightness/difficultly breathing\".  Certainly troponin elevation could be demand from HTN but pt with MULTIPLE risk factors for CAD   -continue to trend CE's; if uptrends will need therapeutic anticoagulation   -check TTE to eval for [facility]   -cardiology eval as suspect will need at the very least a stress or if CE's uptrend, cardiac cath   -on ASA  -check lipid panel       HTN (hypertension) (7/26/2021)  -see above       Chronic low back pain (7/26/2021) - was supposed to see Dr. Kathrin Leyden for ALYSA.  Spoke with ortho spine PA and ok with Dr. Kathrin Leyden to have ALYSA done while in house if clinically stable   -IR consulted for [facility]   -continue scheduled tylenol  -PRN oxycodone   -PT/OT eval while in house       DM (diabetes mellitus), type 2 ([address] (7/26/2021) - A1c 7.6   -ISS   -BG checks AC TID and qHS   -on NPH BID     Morbid obesity - at risk for death due to this and multiple other co-morbidities   -weight loss advised     Total time spent with patient: [address] discussed with: Patient, Family, Nursing Staff and >50% of time spent in counseling and coordination of care    Discussed:  Care Plan    Prophylaxis:  S/p Lovenox; hold for possible ALYSA in the AM if clinically stable     Disposition:  Home w/Family           ___________________________________________________    Attending Physician: Mayo Feliciano MD

## 2021-07-27 NOTE — PROGRESS NOTES
Occupational therapy note:  Orders received, chart reviewed. Patient received in ED room on stretcher, sleeping, two family members present in room. Patient aroused to therapist voice. She declines activity at this time but is agreeable to therapist follow up tomorrow for OT evaluation as she is to remain in hospital.  Alee Browning MS OTR/L

## 2021-07-27 NOTE — PROGRESS NOTES
1800 TRANSFER - IN REPORT:    Verbal report received from Jeaneth TAN (name) on Kayla Copper  being received from ED (unit) for routine progression of care      Report consisted of patients Situation, Background, Assessment and   Recommendations(SBAR). Information from the following report(s) SBAR and Kardex was reviewed with the receiving nurse. Opportunity for questions and clarification was provided. Assessment completed upon patients arrival to unit and care assumed. 1846 Patient arrived to floor, up to bathroom weak on left, also noted swelling to left eye.    1930 Bedside and Verbal shift change report given to Winfred Riedel RN (oncoming nurse) by Yasmeen Mohamud RN (offgoing nurse). Report included the following information SBAR and Kardex.

## 2021-07-27 NOTE — ED NOTES
Bedside and Verbal shift change report given to John RN (oncoming nurse) by Ellyn Preston RN (offgoing nurse). Report included the following information SBAR, Kardex, ED Summary, [facility] and Recent Results.

## 2021-07-27 NOTE — PROGRESS NOTES
1015: pt stating that she can not breathe. HR, BP and RR elevated. Pt is currently have IV placed. Placed pt on O2 and coached her to slow breathing. O2 saturation remained normal throughout. Pt was able to calm down quickly. RR normal. HR and BP tracking down, but remain elevated. Pt reports she does not have any CP. States she does not have any anxiety regularly, but did have an episode like this once before in the hospital. EKG done and attending notified and will come assess pt. MD at bedside. Orders to administer ativan and will place additional orders. 1145; Bedside shift change report given to Antonina Gaston RN (oncoming nurse) by Fransisco Gibson RN (offgoing nurse). Report included the following information SBAR, Kardex, Intake/Output, MAR, Recent Results and Cardiac Rhythm NSR.

## 2021-07-27 NOTE — PROGRESS NOTES
Spoke with Dr. Cheryl Mehta regarding IR consult of epidural steroid injection. Per Dr. Cheryl Mehta, please stabilize patient and once discharged have the patient follow up with Dr. Luz Dumont office for scheduled outpatient ALYSA. Any questions please call [phone]. Updated primary RN of plan of care from IR standpoint.

## 2021-07-27 NOTE — PROGRESS NOTES
Problem: Mobility Impaired (Adult and Pediatric)  Goal: *Acute Goals and Plan of Care (Insert Text)  Description: FUNCTIONAL STATUS PRIOR TO ADMISSION: Patient was modified independent using a crutches for functional mobility. Reports progressive back pain and LLE weakness over the past few months. HOME SUPPORT PRIOR TO ADMISSION: The patient lived with , drives and usually able to handle ADLs without assistance. Physical Therapy Goals  Initiated 7/27/2021  1. Patient will move from supine to sit and sit to supine , scoot up and down, and roll side to side in bed with independence within 7 day(s). 2.  Patient will transfer from bed to chair and chair to bed with modified independence using the least restrictive device within 7 day(s). 3.  Patient will perform sit to stand with modified independence within 7 day(s). 4.  Patient will ambulate with modified independence for 100 feet with the least restrictive device within 7 day(s). 5.  Patient will ascend/descend 4 stairs with 1 handrail(s) with minimal assistance/contact guard assist within 7 day(s). Outcome: Progressing Towards Goal   PHYSICAL THERAPY EVALUATION  Patient: Dolly Lainez (49 y.o. female)  Date: 7/27/2021  Primary Diagnosis: Hypertensive urgency [I16.0]  Chronic low back pain [M54.5, G89.29]  HTN (hypertension) [I10]  DM (diabetes mellitus), type 2 ([facility] [address] [E11.9]        Precautions:   Fall    ASSESSMENT  Based on the objective data described below, the patient presents with back pain, LLE weakness, HTN, high fall risk ,impaired standing balance/tolerance and functional mobility below baseline following admission for HTN. Pt was due to receive ALYSA for back pain but found to have elevated BP and sent to the ED. ALYSA not completed. Pt requires encouragement to attempt mobility with RW as she states she almost fell because of the walker earlier this week.  Pt displays LLE weakness grossly 3/5 and unable to DF ankle to neutral(can extend toes). Gait antalgic with no foot clearance on the LLE. Assisted to bathroom via w/c and performs commode transfers with CGA. Ambulates short distance x 8ft several times during session. Completed full bath with assistance for dressing, back and LE management. Recommend continued use of RW over crutches and family assistance at discharge. HHPT is warranted over OPPT d/t safety concerns with mobility. Current Level of Function Impacting Discharge (mobility/balance): Min A for mobility    Functional Outcome Measure: The patient scored 6/28 on the Tinetti outcome measure which is indicative of high fall risk. Other factors to consider for discharge: good family support, follow up OP with Dr Olya Hicks     Patient will benefit from skilled therapy intervention to address the above noted impairments. PLAN :  Recommendations and Planned Interventions: bed mobility training, transfer training, gait training, therapeutic exercises, neuromuscular re-education, patient and family training/education, and therapeutic activities      Frequency/Duration: Patient will be followed by physical therapy:  5 times a week to address goals. Recommendation for discharge: (in order for the patient to meet his/her long term goals)  Physical therapy at least 2 days/week in the home     This discharge recommendation:  Has been made in collaboration with the attending provider and/or case management    IF patient discharges home will need the following DME: patient owns DME required for discharge         SUBJECTIVE:   Patient stated I don't like the walker. I will use one or two crutches depending on the day.     OBJECTIVE DATA SUMMARY:   HISTORY:    Past Medical History:   Diagnosis Date    Adverse effect of anesthesia     WOKE UP EARLY DURING BACK SURGERY    Breast cancer [facility]) 1993    right breast cancer     Cervical cancer ([facility] [address] 1980's    Chronic pain     L4-L5 discectomy    Congestive heart failure ([facility] [address]     Depression with anxiety     Ectopic pregnancy     GERD (gastroesophageal reflux disease)     Glaucoma     Hypertension     Ill-defined condition     TONSIL STONES - SOMETIMES CAUSE BAD BREATH    Miscarriage     Neuropathy     r/t DM    Sleep apnea 2005    CPAP compliant but not using now because of rash on face from the mask    Type 2 diabetes mellitus ([facility] [address]     on insulin     Past Surgical History:   Procedure Laterality Date    HX CARPAL TUNNEL RELEASE Bilateral     HX CATARACT REMOVAL Bilateral 2013    HX COLONOSCOPY      HX HYSTERECTOMY  1980's    cervical cancer    HX KNEE ARTHROSCOPY Bilateral     HX ORTHOPAEDIC  [id number]    back surgery x 2 ( HERNIATED DISC REPAIR, AND ANOTHER SURGERY SHE CANT REMEMBER    HX ORTHOPAEDIC Left 1990    ligament attachment arm    HX PELVIC LAPAROSCOPY  1970s    ectopic pregnancy    HX TUMOR REMOVAL Right     leg    [facility] BREAST SURGERY PROCEDURE UNLISTED Right     right mastectomy       Personal factors and/or comorbidities impacting plan of care: obesity, back pain, HTN, cancer, diabetes    Home Situation  Home Environment: Private residence  # Steps to Enter: 2  One/Two Story Residence: One story  Living Alone: No  Support Systems: Spouse/Significant Other/Partner  Patient Expects to be Discharged to[de-identified] [employer]  Current DME Used/Available at Home: Shower chair, Walker, rolling, Crutches  Tub or Shower Type: Tub/Shower combination    EXAMINATION/PRESENTATION/DECISION MAKING:   Critical Behavior:  Neurologic State: Alert  Orientation Level: Oriented X4  Cognition: Follows commands     Hearing:   Auditory  Auditory Impairment: None  Skin:    Edema:   Range Of Motion:  AROM: Generally decreased, functional           PROM: Generally decreased, functional           Strength:    Strength: Generally decreased, functional (LLE grossly 3/5)                    Tone & Sensation:   Tone: Normal                              Coordination:  Coordination: Generally decreased, functional  Vision:      Functional Mobility:  Bed Mobility:  Rolling: Contact guard assistance  Supine to Sit: Contact guard assistance  Sit to Supine: Minimum assistance (assist to bring LLE to bed)     Transfers:  Sit to Stand: Minimum assistance  Stand to Sit: Contact guard assistance  Stand Pivot Transfers: Minimum assistance     Bed to Chair: Minimum assistance              Balance:   Sitting: Intact  Standing: Impaired  Standing - Static: Fair  Standing - Dynamic : Fair;Constant support  Ambulation/Gait Training:  Distance (ft): 8 Feet (ft) (x 3)  Assistive Device: Gait belt;Walker, rolling  Ambulation - Level of Assistance: Minimal assistance;Assist x1        Gait Abnormalities: Antalgic;Decreased step clearance; Foot drop; Step to gait        Base of Support: Widened;Shift to right  Stance: Left decreased  Speed/Giulia: Pace decreased (<100 feet/min); Slow  Step Length: Right shortened;Left shortened  Swing Pattern: Left asymmetrical                  Stairs: Therapeutic Exercises:       Functional Measure:  Tinetti test:    Sitting Balance: 1  Arises: 0  Attempts to Rise: 0  Immediate Standing Balance: 1  Standing Balance: 1  Nudged: 0  Eyes Closed: 0  Turn 360 Degrees - Continuous/Discontinuous: 0  Turn 360 Degrees - Steady/Unsteady: 0  Sitting Down: 1  Balance Score: 4 Balance total score  Indication of Gait: 0  R Step Length/Height: 1  L Step Length/Height: 0  R Foot Clearance: 1  L Foot Clearance: 0  Step Symmetry: 0  Step Continuity: 0  Path: 0  Trunk: 0  Walking Time: 0  Gait Score: 2 Gait total score  Total Score: 6/28 Overall total score         Tinetti Tool Score Risk of Falls  <19 = High Fall Risk  19-24 = Moderate Fall Risk  25-28 = Low Fall Risk  Tinetti ME. Performance-Oriented Assessment of Mobility Problems in Elderly Patients. Strickland 66; [id number].  (Scoring Description: PT Bulletin Feb. 10, 1993)    Older adults: Angel Bustos et al, 2009; n = [address] elderly evaluated with ABC, ROBERTO CARLOS, ADL, and IADL)  · Mean ROBERTO CARLOS score for males aged 69-68 years = 26.21(3.40)  · Mean ROBERTO CARLOS score for females age 69-68 years = 25.16(4.30)  · Mean ROBERTO CARLOS score for males over 80 years = 23.29(6.02)  · Mean ROBERTO CARLOS score for females over [de-identified] years = 17.20(8.32)            Physical Therapy Evaluation Charge Determination   History Examination Presentation Decision-Making   MEDIUM  Complexity : 1-2 comorbidities / personal factors will impact the outcome/ POC  MEDIUM Complexity : 3 Standardized tests and measures addressing body structure, function, activity limitation and / or participation in recreation  LOW Complexity : Stable, uncomplicated  Other outcome measures Tinetti  LOW       Based on the above components, the patient evaluation is determined to be of the following complexity level: LOW     Pain Ratin/10 in back    Activity Tolerance:   Good    After treatment patient left in no apparent distress:   Supine in bed, Call bell within reach, Caregiver / family present, and Side rails x 3    COMMUNICATION/EDUCATION:   The patients plan of care was discussed with: Registered nurse. Fall prevention education was provided and the patient/caregiver indicated understanding., Patient/family have participated as able in goal setting and plan of care. , and Patient/family agree to work toward stated goals and plan of care.     Thank you for this referral.  Lyndon Herrera, PT   Time Calculation: 53 mins

## 2021-07-27 NOTE — PROGRESS NOTES
Bedside shift change report given to Lovely Roa (oncoming nurse) by April (offgoing nurse). Report included the following information SBAR, Kardex, ED Summary, Intake/Output and Recent Results. Bedside shift change report given to April RN (oncoming nurse) by Lovely Roa (offgoing nurse). Report included the following information SBAR, Kardex, ED Summary, Intake/Output and Recent Results.

## 2021-07-27 NOTE — PROGRESS NOTES
Chart reviewed     Assessment   Elevated troponin  Hypertensive urgency  CAD, non obstructive   DMII  Obesity     plan  Patient here hypertensive urgency/SOB  2nd troponin 0.10  Trend CE- agree will need anticoagulation if troponin continue to uptrend. Cardiac cath 3/2020 showing heavy calfication but non obstructive disease. [place] Genin, not sure why is not on a statin, will start.   Echo pending   NPO at midnight  Stress tomorrow pending troponins are flat/echo okay   Cardiac cath if troponin continue to trend up  Chest xray

## 2021-07-27 NOTE — PROGRESS NOTES
VAT Note:  Called by ER for ultrasound guided PIV placement. Unable to use right arm 2 nd mastectomy patient. Endurance placed in left Cephalic with positive blood return, flushed with 10 cc NS.

## 2021-07-27 NOTE — PROGRESS NOTES
Admission Medication Reconciliation:    Comments/Recommendations:  -Medication history obtained in ER14  -Confirmed Novolin N 60 units in the morning and 60 units in the evening and Novolin R 30 units in the morning, 30 units midday, and 35 units in the evening (see below)    Medications added: None  Medications removed: Gabapentin, magnesium, potassium, ketorolac  Medications changed: Adjusted furosemide based on directions    Information obtained from: Patient, RxQuery, chart review    Significant PMH/Disease States:   Past Medical History:   Diagnosis Date    Adverse effect of anesthesia     WOKE UP EARLY DURING BACK SURGERY    Breast cancer ([address] 1993    right breast cancer     Cervical cancer ([address] 1980's    Chronic pain     L4-L5 discectomy    Congestive heart failure (HCC)     Depression with anxiety     Ectopic pregnancy     GERD (gastroesophageal reflux disease)     Glaucoma     Hypertension     Ill-defined condition     TONSIL STONES - SOMETIMES CAUSE BAD BREATH    Miscarriage     Neuropathy     r/t DM    Sleep apnea 2005    CPAP compliant but not using now because of rash on face from the mask    Type 2 diabetes mellitus ([facility] [address]     on insulin       Chief Complaint for this Admission:    Chief Complaint   Patient presents with    Back Pain    Hypertension       Allergies:  Contrast dye [iodine], Lisinopril, Percocet [oxycodone-acetaminophen], Percodan [oxycodone-aspirin], Prednisone, and Sulfa (sulfonamide antibiotics)    Prior to Admission Medications:   Prior to Admission Medications   Prescriptions Last Dose Informant Patient Reported? Taking?   acetaminophen (TylenoL) 325 mg tablet 7/25/2021 at PM  Yes Yes   Sig: Take 650 mg by mouth every four (4) hours as needed for Pain. aspirin delayed-release 81 mg tablet 7/25/2021 at AM Self Yes Yes   Sig: Take 81 mg by mouth daily. famotidine (Pepcid AC) 20 mg tablet 7/25/2021 at PM  Yes Yes   Sig: Take 40 mg by mouth two (2) times a day.    furosemide (Lasix) 40 mg tablet 2021 at Unknown time  Yes Yes   Sig: Take 40 mg by mouth two (2) times daily as needed for Other (increased swelling, shortness of breath, or weight gain >5 pounds). insulin NPH (NovoLIN N NPH U-100 Insulin) 100 unit/mL injection 2021 at AM  Yes Yes   Si Units by SubCUTAneous route two (2) times a day. 60u morning; 60u in the DINNER TIME  Indications: 60 units in morning and 60 units in the evening   insulin regular (NovoLIN R Regular U-100 Insuln) 100 unit/mL injection 2021 at AM Self Yes Yes   Sig: by SubCUTAneous route three (3) times daily. 30 am, 30 noon, 35 in the evening   losartan (COZAAR) 50 mg tablet 2021 at PM  No Yes   Sig: TAKE 1 TABLET BY MOUTH TWICE A DAY   metoprolol tartrate (LOPRESSOR) 25 mg tablet 2021 at AM  No Yes   Sig: TAKE 1 TABLET BY MOUTH TWICE A DAY   triamcinolone acetonide (KENALOG) 0.1 % ointment 2021 at PM Self Yes Yes   Sig: Apply  to affected area two (2) times a day. use thin layer  Applies on face at bipap allergy site   vitamin E (AQUA GEMS) 400 unit capsule 2021 at AM  Yes Yes   Sig: Take 400 Units by mouth daily.       Facility-Administered Medications: None       Thank you,  Radha Kruger, PHARMD

## 2021-07-27 NOTE — PROGRESS NOTES
7/27/2021  9:58 AM  CM completed assessment w/ pt in person, CM wore mask at all times. Charted demographics verified, pt lives w/ spouse in 1 story home, there are 2 entry steps. Pt denies falls, at baseline pt is ambulatory uses RW PRN, iADLs, drives. Reason for Admission:  Emergent OBS  for Hypertensive Urgency   Pt is a 78 yo AAF presents to [facility] ED from  ortho Dr Curtis Milian office for hypertension   PMHx: T2DM w/ neuropathy, chronic pain, HTN,GERD, sleep apnea                RUR Score:     N/A pt is OBS                  Plan for utilizing home health:   PT, OT evals pending, pt has been attending outpatient PT prior to admission     DME: RW, crutches, shower chair   Rx: [address], pt uses [facility], fully covered  COVID Vaccine Hx: [place], completed April 2021  PCP: First and Last name:  Pedrito Seals DO     Name of Practice:    Are you a current patient: Yes/No: Yes   Approximate date of last visit: 30 days   Can you participate in a virtual visit with your PCP: yes                    Current Advanced Directive/Advance Care Plan: Full Code  HCDM NOK spouse Jaelyn Ocampo [phone]    Healthcare Decision Maker:   Click here to complete [address] including selection of the Healthcare Decision Maker Relationship (ie \"Primary\")                             Transition of Care Plan:                    RUR N/A pt is OBS  1. Hospital admission for medical management  2. PT, NIGHAT neff  3. CM to follow through for treatment/response  4. CM educated pt on OBS status, State and [place] OBS letters given, signed, copies to be scanned into EMR  5. DC when stable to home w/ family assistance and HH vs None  6. Outpatient f/u PCP, ortho  7. Family will transport   Care Management Interventions  PCP Verified by CM:  Yes (Tushar Ruiz DO; last visit > 30 days)  Palliative Care Criteria Met (RRAT>21 & CHF Dx)?: No  Mode of Transport at Discharge: Self (Family)  Physical Therapy Consult: Yes  Occupational Therapy Consult: Yes  Speech Therapy Consult: No  Current Support Network: Own Home, Lives with Spouse (pt lives w/ spouse in pvt residence, at [facility] pt is ambulatory, iADLs, drives, family can assist)  Confirm Follow Up Transport: Family  Discharge Location  Discharge Placement: Home with home health  CORINNA Kim

## 2021-07-27 NOTE — H&P
[address]  [address] [facility] 19  [phone]     Hospitalist Admission Note      NAME: Tierney Skinner   :  1945   MRN:  [id number]     Date/Time:  2021 9:49 PM    Patient PCP: Tegan Mccain DO    Emergency Contact:    Extended Emergency Contact Information  Primary Emergency Contact: Francisco Serna Phone: [phone]  Relation: Daughter  Secondary Emergency Contact: Franklyn Rachel  Address: [address] [place] [address] Phone: [phone]  Mobile Phone: [phone]  Relation: Spouse      Code: Full Code     Isolation Precautions: There are currently no Active Isolations        Subjective:     CHIEF COMPLAINT: Severe hypertension    HISTORY OF PRESENT ILLNESS:     Ms. Neil George is a 77 y.o. female with history that includes DM 2 with neuropathy, chronic back pain with stenosis, and HTN was sent from Ortho office Dr. Lizz Ferris who was intending on ALYSA the hopes of avoiding surgery but instead found that the patient was extremely hypertensive and was sent to the ER. She reports that she had been busy since 9 AM and this has exacerbated her back pain. Her systolic blood pressure was in the 230s. Patient reports taking her she reports taking her losartan and metoprolol this morning and good compliance with her BP meds. Patient was not symptomatic with relations to her elevated BP. Denies chest pain shortness of breath. In ER she was given 10 mg of IV hydralazine which helped slightly with BP but remains elevated. She was also given Toradol for her back pain. Son Willy Turner was at bedside.       Allergies   Allergen Reactions    Contrast Dye [Iodine] Nausea and Vomiting    Lisinopril Cough    Percocet [Oxycodone-Acetaminophen] Nausea Only    Percodan [Oxycodone-Aspirin] Nausea Only    Prednisone Nausea Only    Sulfa (Sulfonamide Antibiotics) Unknown (comments) Prior to Admission medications    Medication Sig Start Date End Date Taking? Authorizing Provider   furosemide (Lasix) 40 mg tablet Take 40 mg by mouth two (2) times daily as needed for Other (increased swelling, shortness of breath, or weight gain >5 pounds). Yes Provider, Historical   losartan (COZAAR) 50 mg tablet TAKE 1 TABLET BY MOUTH TWICE A DAY 5/21/21  Yes Nathaniel Samuels MD   metoprolol tartrate (LOPRESSOR) 25 mg tablet TAKE 1 TABLET BY MOUTH TWICE A DAY 5/10/21  Yes Anna Samuels MD   vitamin E (AQUA GEMS) 400 unit capsule Take 400 Units by mouth daily. Yes Provider, Historical   acetaminophen (TylenoL) 325 mg tablet Take 650 mg by mouth every four (4) hours as needed for Pain. Yes Provider, Historical   famotidine (Pepcid AC) 20 mg tablet Take 40 mg by mouth two (2) times a day. Yes Provider, Historical   insulin NPH (NovoLIN N NPH U-100 Insulin) 100 unit/mL injection 60 Units by SubCUTAneous route two (2) times a day. 60u morning; 60u in the DINNER TIME  Indications: 60 units in morning and 60 units in the evening   Yes Provider, Historical   insulin regular (NovoLIN R Regular U-100 Insuln) 100 unit/mL injection by SubCUTAneous route three (3) times daily. 30 am, 30 noon, 35 in the evening   Yes Provider, Historical   triamcinolone acetonide (KENALOG) 0.1 % ointment Apply  to affected area two (2) times a day. use thin layer  Applies on face at bipap allergy site   Yes Provider, Historical   aspirin delayed-release 81 mg tablet Take 81 mg by mouth daily.    Yes Provider, Historical       Past Medical History:   Diagnosis Date    Adverse effect of anesthesia     WOKE UP EARLY DURING BACK SURGERY    Breast cancer [facility]) 1993    right breast cancer     Cervical cancer ([facility] [address] 1980's    Chronic pain     L4-L5 discectomy    Congestive heart failure (HCC)     Depression with anxiety     Ectopic pregnancy     GERD (gastroesophageal reflux disease)     Glaucoma     Hypertension     Ill-defined condition     TONSIL STONES - SOMETIMES CAUSE BAD BREATH    Miscarriage     Neuropathy     r/t DM    Sleep apnea 2005    CPAP compliant but not using now because of rash on face from the mask    Type 2 diabetes mellitus ([facility] [address]     on insulin        Past Surgical History:   Procedure Laterality Date    HX CARPAL TUNNEL RELEASE Bilateral     HX CATARACT REMOVAL Bilateral 2013    HX COLONOSCOPY      HX HYSTERECTOMY  1980's    cervical cancer    HX KNEE ARTHROSCOPY Bilateral     HX ORTHOPAEDIC  [id number]    back surgery x 2 ( HERNIATED DISC REPAIR, AND ANOTHER SURGERY SHE CANT REMEMBER    HX ORTHOPAEDIC Left 1990    ligament attachment arm    HX PELVIC LAPAROSCOPY  1970s    ectopic pregnancy    HX TUMOR REMOVAL Right     leg    [place] BREAST SURGERY PROCEDURE UNLISTED Right     right mastectomy       Social History     Tobacco Use    Smoking status: Never Smoker    Smokeless tobacco: Never Used   Substance Use Topics    Alcohol use: No        Family History   Problem Relation Age of Onset    Diabetes Mother     Hypertension Mother     Diabetes Sister     Cancer Brother         COLON CANCER    Anesth Problems Neg Hx         Review of Systems (14 point ROS):    Gen:   Negative  Eyes:  negative  ENT:    negative  Resp:  negative  CVS:   negative  GI:       negative  :     negative  Skin:   negative  Hem:   negative  MS:     back pain  Sunshine:    negative   Psy:    negative  Endo:  negative  ALL:   negative         Objective:      Visit Vitals  BP (!) 211/107   Pulse 83   Temp 98.1 °F (36.7 °C)   Resp 15   Ht 5' 7\" (1.702 m)   Wt 122 kg (269 lb)   SpO2 97%   BMI 42.13 kg/m²       Exam:     Physical Exam:    General: alert, well appearing and no distress  Head: Normocephalic, without obvious abnormality, atraumatic  Eyes: PERRL, EOMI, anicteric sclerae and conjuntiva clear  ENT: Lips, mucosa, and tongue normal.   Neck: normal, supple and no tenderness  Lungs: clear to auscultation with good breath sounds and normal respiratory effort  Heart: S1, S2 normal, regular rate and regular rhythm  Abd: not distended, soft, nontender, BS present and normactive  Ext: no cyanosis and no edema  Pulses: present 1+ and symmetric  Skin: normal skin color, no rashes and texture normal  Neuro:  alert, oriented x 3, no defects noted in general exam.  Psych: not anxious, cooperative, appropriate affect    Labs:    Recent Labs     07/26/21  1410   WBC 10.2   HGB 15.3   HCT 49.5*        Recent Labs     07/26/21  1410      K 3.8      CO2 32   *   BUN 11   CREA 0.87   CA 8.5   ALB 3.4*   TBILI 0.4   ALT 18         Radiology:    No results found. I personally reviewed results/interpretation of lumbar MRI done last month. .    The chart, ER course, the above PMSFH, lab work, and radiological studies was reviewed by me on: July 26, 2021         Assessment/Plan:       Hypertensive urgency / HTN: Patient will be admitted for management of his hypertension. BP was very high despite taking medications. Will give additional IV hydralazine as needed. Will consider additional medications to include Lasix, beta-blocker, and/or clonidine. Close monitoring of vitals and monitor for complications. No IV fluids at this time to avoid exacerbating HTN. Chronic low back pain:  Will need follow-up with Dr. Patria Vásquez. Consider inpatient consult if back pain gets worse. DM (diabetes mellitus), type 2:  Monitor blood glucose levels with insulin sliding scale coverage. No basal coverage for now. Monitor for complications. A1C. Diabetic diet. Body mass index is 42.13 kg/m².:  40 or above Morbid obesity Weight loss advised for her chronic back pain, HTN, and overall wellbeing.       Risk of deterioration: high      Discussed:  Code Status and Care Plan discussed with: Patient, Family at bedsude, ED Provider and RN     Prophylaxis:  SCD's    Probable disposition:  Home and outpt PT as per Dr Patria Vásquez    Total time: 79 Minutes **I personally saw and examined the patient during this time period**    Date of service:    7/26/2021                ___________________________________________________    Admitting Physician: Jeyson Reis MD

## 2021-07-28 ENCOUNTER — APPOINTMENT (OUTPATIENT)
Dept: NON INVASIVE DIAGNOSTICS | Age: 76
DRG: 305 | End: 2021-07-28
Attending: NURSE PRACTITIONER
Payer: MEDICARE

## 2021-07-28 ENCOUNTER — APPOINTMENT (OUTPATIENT)
Dept: NUCLEAR MEDICINE | Age: 76
DRG: 305 | End: 2021-07-28
Attending: NURSE PRACTITIONER
Payer: MEDICARE

## 2021-07-28 ENCOUNTER — APPOINTMENT (OUTPATIENT)
Dept: NON INVASIVE DIAGNOSTICS | Age: 76
DRG: 305 | End: 2021-07-28
Attending: INTERNAL MEDICINE
Payer: MEDICARE

## 2021-07-28 LAB
ANION GAP SERPL CALC-SCNC: 3 MMOL/L (ref 5–15)
ATRIAL RATE: 107 BPM
BASOPHILS # BLD: 0 K/UL (ref 0–0.1)
BASOPHILS NFR BLD: 0 % (ref 0–1)
BUN SERPL-MCNC: 20 MG/DL (ref 6–20)
BUN/CREAT SERPL: 23 (ref 12–20)
CALCIUM SERPL-MCNC: 8.4 MG/DL (ref 8.5–10.1)
CALCULATED P AXIS, ECG09: 57 DEGREES
CALCULATED R AXIS, ECG10: 5 DEGREES
CALCULATED T AXIS, ECG11: 62 DEGREES
CHLORIDE SERPL-SCNC: 106 MMOL/L (ref 97–108)
CHOLEST SERPL-MCNC: 178 MG/DL
CO2 SERPL-SCNC: 29 MMOL/L (ref 21–32)
CREAT SERPL-MCNC: 0.87 MG/DL (ref 0.55–1.02)
DIAGNOSIS, 93000: NORMAL
DIFFERENTIAL METHOD BLD: ABNORMAL
ECHO AO ASC DIAM: 2.98 CM
ECHO AO ROOT DIAM: 2.83 CM
ECHO IVC PROX: 1.2 CM
ECHO LA MAJOR AXIS: 2.97 CM
ECHO LA MINOR AXIS: 1.3 CM
ECHO LV INTERNAL DIMENSION DIASTOLIC: 5.17 CM (ref 3.9–5.3)
ECHO LV INTERNAL DIMENSION SYSTOLIC: 3.7 CM
ECHO LV IVSD: 1.41 CM (ref 0.6–0.9)
ECHO LV MASS 2D: 313.2 G (ref 67–162)
ECHO LV MASS INDEX 2D: 137.4 G/M2 (ref 43–95)
ECHO LV POSTERIOR WALL DIASTOLIC: 1.43 CM (ref 0.6–0.9)
ECHO LVOT DIAM: 2.07 CM
ECHO PV MAX VELOCITY: 63.5 CM/S
ECHO PV PEAK INSTANTANEOUS GRADIENT SYSTOLIC: 1.61 MMHG
ECHO TV REGURGITANT MAX VELOCITY: 265.14 CM/S
ECHO TV REGURGITANT PEAK GRADIENT: 28.12 MMHG
EOSINOPHIL # BLD: 0.4 K/UL (ref 0–0.4)
EOSINOPHIL NFR BLD: 4 % (ref 0–7)
ERYTHROCYTE [DISTWIDTH] IN BLOOD BY AUTOMATED COUNT: 15.6 % (ref 11.5–14.5)
GLUCOSE BLD STRIP.AUTO-MCNC: 186 MG/DL (ref 65–117)
GLUCOSE BLD STRIP.AUTO-MCNC: 188 MG/DL (ref 65–117)
GLUCOSE BLD STRIP.AUTO-MCNC: 211 MG/DL (ref 65–117)
GLUCOSE BLD STRIP.AUTO-MCNC: 236 MG/DL (ref 65–117)
GLUCOSE SERPL-MCNC: 213 MG/DL (ref 65–100)
HCT VFR BLD AUTO: 45.6 % (ref 35–47)
HDLC SERPL-MCNC: 26 MG/DL
HDLC SERPL: 6.8 {RATIO} (ref 0–5)
HGB BLD-MCNC: 13.9 G/DL (ref 11.5–16)
IMM GRANULOCYTES # BLD AUTO: 0.1 K/UL (ref 0–0.04)
IMM GRANULOCYTES NFR BLD AUTO: 1 % (ref 0–0.5)
LDLC SERPL CALC-MCNC: 124.6 MG/DL (ref 0–100)
LYMPHOCYTES # BLD: 1.8 K/UL (ref 0.8–3.5)
LYMPHOCYTES NFR BLD: 17 % (ref 12–49)
MCH RBC QN AUTO: 26.1 PG (ref 26–34)
MCHC RBC AUTO-ENTMCNC: 30.5 G/DL (ref 30–36.5)
MCV RBC AUTO: 85.6 FL (ref 80–99)
MONOCYTES # BLD: 0.7 K/UL (ref 0–1)
MONOCYTES NFR BLD: 6 % (ref 5–13)
NEUTS SEG # BLD: 7.7 K/UL (ref 1.8–8)
NEUTS SEG NFR BLD: 72 % (ref 32–75)
NRBC # BLD: 0 K/UL (ref 0–0.01)
NRBC BLD-RTO: 0 PER 100 WBC
P-R INTERVAL, ECG05: 148 MS
PLATELET # BLD AUTO: 289 K/UL (ref 150–400)
PMV BLD AUTO: 10.7 FL (ref 8.9–12.9)
POTASSIUM SERPL-SCNC: 4 MMOL/L (ref 3.5–5.1)
Q-T INTERVAL, ECG07: 378 MS
QRS DURATION, ECG06: 84 MS
QTC CALCULATION (BEZET), ECG08: 504 MS
RBC # BLD AUTO: 5.33 M/UL (ref 3.8–5.2)
SERVICE CMNT-IMP: ABNORMAL
SODIUM SERPL-SCNC: 138 MMOL/L (ref 136–145)
STRESS BASELINE DIAS BP: 70 MMHG
STRESS BASELINE HR: 79 BPM
STRESS BASELINE SYS BP: 190 MMHG
STRESS ESTIMATED WORKLOAD: 1 METS
STRESS EXERCISE DUR MIN: NORMAL
STRESS PEAK DIAS BP: 72 MMHG
STRESS PEAK SYS BP: 193 MMHG
STRESS PERCENT HR ACHIEVED: 66 %
STRESS POST PEAK HR: 96 BPM
STRESS RATE PRESSURE PRODUCT: NORMAL BPM*MMHG
STRESS ST DEPRESSION: 0 MM
STRESS ST ELEVATION: 0 MM
STRESS TARGET HR: 145 BPM
TRIGL SERPL-MCNC: 137 MG/DL (ref ?–150)
VENTRICULAR RATE, ECG03: 107 BPM
VLDLC SERPL CALC-MCNC: 27.4 MG/DL
WBC # BLD AUTO: 10.6 K/UL (ref 3.6–11)

## 2021-07-28 PROCEDURE — 65660000000 HC RM CCU STEPDOWN

## 2021-07-28 PROCEDURE — 74011636637 HC RX REV CODE- 636/637: Performed by: INTERNAL MEDICINE

## 2021-07-28 PROCEDURE — 99218 HC RM OBSERVATION: CPT

## 2021-07-28 PROCEDURE — 99223 1ST HOSP IP/OBS HIGH 75: CPT | Performed by: INTERNAL MEDICINE

## 2021-07-28 PROCEDURE — 74011250636 HC RX REV CODE- 250/636: Performed by: INTERNAL MEDICINE

## 2021-07-28 PROCEDURE — 36415 COLL VENOUS BLD VENIPUNCTURE: CPT

## 2021-07-28 PROCEDURE — 93306 TTE W/DOPPLER COMPLETE: CPT | Performed by: STUDENT IN AN ORGANIZED HEALTH CARE EDUCATION/TRAINING PROGRAM

## 2021-07-28 PROCEDURE — 74011250636 HC RX REV CODE- 250/636: Performed by: STUDENT IN AN ORGANIZED HEALTH CARE EDUCATION/TRAINING PROGRAM

## 2021-07-28 PROCEDURE — 82962 GLUCOSE BLOOD TEST: CPT

## 2021-07-28 PROCEDURE — 74011250637 HC RX REV CODE- 250/637: Performed by: INTERNAL MEDICINE

## 2021-07-28 PROCEDURE — 74011250637 HC RX REV CODE- 250/637: Performed by: NURSE PRACTITIONER

## 2021-07-28 PROCEDURE — 85025 COMPLETE CBC W/AUTO DIFF WBC: CPT

## 2021-07-28 PROCEDURE — 93306 TTE W/DOPPLER COMPLETE: CPT

## 2021-07-28 PROCEDURE — 78451 HT MUSCLE IMAGE SPECT SING: CPT | Performed by: INTERNAL MEDICINE

## 2021-07-28 PROCEDURE — 80048 BASIC METABOLIC PNL TOTAL CA: CPT

## 2021-07-28 PROCEDURE — 80061 LIPID PANEL: CPT

## 2021-07-28 PROCEDURE — 78451 HT MUSCLE IMAGE SPECT SING: CPT

## 2021-07-28 PROCEDURE — 74011636637 HC RX REV CODE- 636/637: Performed by: HOSPITALIST

## 2021-07-28 PROCEDURE — 94760 N-INVAS EAR/PLS OXIMETRY 1: CPT

## 2021-07-28 PROCEDURE — 74011250637 HC RX REV CODE- 250/637: Performed by: HOSPITALIST

## 2021-07-28 PROCEDURE — A9500 TC99M SESTAMIBI: HCPCS

## 2021-07-28 RX ORDER — OXYCODONE HYDROCHLORIDE 5 MG/1
10 TABLET ORAL
Status: DISCONTINUED | OUTPATIENT
Start: 2021-07-28 | End: 2021-07-30 | Stop reason: HOSPADM

## 2021-07-28 RX ORDER — CARVEDILOL 6.25 MG/1
6.25 TABLET ORAL 2 TIMES DAILY WITH MEALS
Status: DISCONTINUED | OUTPATIENT
Start: 2021-07-28 | End: 2021-07-29

## 2021-07-28 RX ORDER — FUROSEMIDE 20 MG/1
20 TABLET ORAL DAILY
Status: DISCONTINUED | OUTPATIENT
Start: 2021-07-29 | End: 2021-07-30 | Stop reason: HOSPADM

## 2021-07-28 RX ORDER — MORPHINE SULFATE 4 MG/ML
4 INJECTION INTRAVENOUS
Status: DISCONTINUED | OUTPATIENT
Start: 2021-07-28 | End: 2021-07-30 | Stop reason: HOSPADM

## 2021-07-28 RX ORDER — CARVEDILOL 12.5 MG/1
12.5 TABLET ORAL 2 TIMES DAILY WITH MEALS
Status: DISCONTINUED | OUTPATIENT
Start: 2021-07-28 | End: 2021-07-28

## 2021-07-28 RX ORDER — LOSARTAN POTASSIUM 50 MG/1
100 TABLET ORAL DAILY
Status: DISCONTINUED | OUTPATIENT
Start: 2021-07-28 | End: 2021-07-30 | Stop reason: HOSPADM

## 2021-07-28 RX ORDER — TETRAKIS(2-METHOXYISOBUTYLISOCYANIDE)COPPER(I) TETRAFLUOROBORATE 1 MG/ML
30 INJECTION, POWDER, LYOPHILIZED, FOR SOLUTION INTRAVENOUS
Status: COMPLETED | OUTPATIENT
Start: 2021-07-28 | End: 2021-07-28

## 2021-07-28 RX ORDER — ATORVASTATIN CALCIUM 20 MG/1
40 TABLET, FILM COATED ORAL DAILY
Status: DISCONTINUED | OUTPATIENT
Start: 2021-07-28 | End: 2021-07-30 | Stop reason: HOSPADM

## 2021-07-28 RX ORDER — HYDROCHLOROTHIAZIDE 25 MG/1
25 TABLET ORAL DAILY
Status: DISCONTINUED | OUTPATIENT
Start: 2021-07-28 | End: 2021-07-28

## 2021-07-28 RX ADMIN — METOPROLOL TARTRATE 50 MG: 50 TABLET, FILM COATED ORAL at 08:57

## 2021-07-28 RX ADMIN — MORPHINE SULFATE 4 MG: 4 INJECTION INTRAVENOUS at 11:18

## 2021-07-28 RX ADMIN — ACETAMINOPHEN 650 MG: 325 TABLET ORAL at 11:17

## 2021-07-28 RX ADMIN — INSULIN LISPRO 8 UNITS: 100 INJECTION, SOLUTION INTRAVENOUS; SUBCUTANEOUS at 09:02

## 2021-07-28 RX ADMIN — HYDROCHLOROTHIAZIDE 25 MG: 25 TABLET ORAL at 08:57

## 2021-07-28 RX ADMIN — Medication 10 ML: at 21:37

## 2021-07-28 RX ADMIN — Medication 5 MG: at 23:32

## 2021-07-28 RX ADMIN — CARVEDILOL 6.25 MG: 6.25 TABLET, FILM COATED ORAL at 11:16

## 2021-07-28 RX ADMIN — ACETAMINOPHEN 650 MG: 325 TABLET ORAL at 23:32

## 2021-07-28 RX ADMIN — ACETAMINOPHEN 650 MG: 325 TABLET ORAL at 17:17

## 2021-07-28 RX ADMIN — ACETAMINOPHEN 650 MG: 325 TABLET ORAL at 05:49

## 2021-07-28 RX ADMIN — INSULIN HUMAN 30 UNITS: 100 INJECTION, SUSPENSION SUBCUTANEOUS at 17:17

## 2021-07-28 RX ADMIN — REGADENOSON 0.4 MG: 0.08 INJECTION, SOLUTION INTRAVENOUS at 09:19

## 2021-07-28 RX ADMIN — Medication 10 ML: at 17:18

## 2021-07-28 RX ADMIN — INSULIN HUMAN 15 UNITS: 100 INJECTION, SUSPENSION SUBCUTANEOUS at 11:18

## 2021-07-28 RX ADMIN — HYDRALAZINE HYDROCHLORIDE 20 MG: 20 INJECTION INTRAMUSCULAR; INTRAVENOUS at 21:36

## 2021-07-28 RX ADMIN — INSULIN LISPRO 2 UNITS: 100 INJECTION, SOLUTION INTRAVENOUS; SUBCUTANEOUS at 11:17

## 2021-07-28 RX ADMIN — FAMOTIDINE 40 MG: 20 TABLET ORAL at 08:57

## 2021-07-28 RX ADMIN — CARVEDILOL 6.25 MG: 6.25 TABLET, FILM COATED ORAL at 17:17

## 2021-07-28 RX ADMIN — TETRAKIS(2-METHOXYISOBUTYLISOCYANIDE)COPPER(I) TETRAFLUOROBORATE 30 MILLICURIE: 1 INJECTION, POWDER, LYOPHILIZED, FOR SOLUTION INTRAVENOUS at 09:40

## 2021-07-28 RX ADMIN — FAMOTIDINE 40 MG: 20 TABLET ORAL at 17:17

## 2021-07-28 RX ADMIN — ATORVASTATIN CALCIUM 40 MG: 20 TABLET, FILM COATED ORAL at 08:56

## 2021-07-28 RX ADMIN — ASPIRIN 81 MG: 81 TABLET, CHEWABLE ORAL at 08:56

## 2021-07-28 RX ADMIN — INSULIN LISPRO 2 UNITS: 100 INJECTION, SOLUTION INTRAVENOUS; SUBCUTANEOUS at 17:17

## 2021-07-28 RX ADMIN — Medication 10 ML: at 05:49

## 2021-07-28 RX ADMIN — LOSARTAN POTASSIUM 100 MG: 50 TABLET, FILM COATED ORAL at 08:57

## 2021-07-28 NOTE — PROGRESS NOTES
Occupational Therapy Note:  Chart reviewed and spoke with nursing. Patient is currently off the floor for a stress testing and needs additional testing (ECHO) once available. Will continue to follow.   Kendrick Sanchez OTR/L

## 2021-07-28 NOTE — CONSULTS
Laz Nichole MD., [facility]    [address] [place], [address] [facility]    Office [phone],OKN [phone]           7/28/2021     Admit Date: 7/26/2021      Vasquez Schofield is a 77 y.o. female admitted for Hypertensive urgency [I16.0]; Chronic low back pain [M54.5, G89.29];HTN (hypertension) [I10];DM (diabetes mellitus), type 2 ([address] [E11.9]. Consult requested by Yamilet Rueda MD       Assessment/Plan:    HTN urgency  CAD - 3/2020 - non obstructive cad/ calcified cors  Chronic back pain  DM  RAHEL  Obesity    Plan:  BP meds uptitrated/change metoprolol to coreg. Pt states that she is on lasix 40mg PTA and has been off HCTZ previously. Will DC HCTZ /Lasix 20mg daily  Sheyla nuc study today  ECHO             Please do not hesitate to contact us with questions or concerns. See note below for details. Laz Nichole MD      Cardiac Testing/Procedures: A. Cardiac Cath/PCI:[facility] on 3/4/2020:     L Main: large caliber, calcified, no critical disease  LAD: large caliber, calcified, 40-50% stenosis at D2 bifurcation, supplies two diagonals, D1 small caliber and D2 moderate caliber with diffuse moderate disease  LCx: large caliber, calcified, ostial 30%, supplies a few very small marginals and continues a large caliber marginal with distal 40%  stenosis  RCA: moderate caliber, diffuse mild to moderate disease, small PDA and RPLV branch    B.ECHO/SUSIE: 2/2020 - EF 50-55%    C. StressNuclear/Stress ECHO/Stress test: 2/2020 - Possible mild stress induced ischemia of the small area of the mid anterior and anterolateral wall on stress images which recovers on rest. The SDS is 5. There is no associated wall motion abnormality. Mild LV systolic dysfunction. LVEF 37%. · No EKG changes of ischemia. Breast attenuation is present. Prone imaging was performed.     D.Vascular:    E. EP:    F. Miscellaneous:    History:     Patient  is a 77 y.o. female admitted for hypertensive urgency Has chronic back pain and was being seen in ortho office and was found to hypertensive. SBP in 230's on admn. Pt has been compliant with meds. No dyspnea. Also had an episode of CP here when BP was high. Trop 0.1 times 2.  No CP currently      PMH/PSH/FH/Soc Hx:     Past Medical History:   Diagnosis Date    Adverse effect of anesthesia     WOKE UP EARLY DURING BACK SURGERY    Breast cancer ([facility] [address] 1993    right breast cancer     Cervical cancer ([facility] [address] 1980's    Chronic pain     L4-L5 discectomy    Congestive heart failure (HCC)     Depression with anxiety     Ectopic pregnancy     GERD (gastroesophageal reflux disease)     Glaucoma     Hypertension     Ill-defined condition     TONSIL STONES - SOMETIMES CAUSE BAD BREATH    Miscarriage     Neuropathy     r/t DM    Sleep apnea 2005    CPAP compliant but not using now because of rash on face from the mask    Type 2 diabetes mellitus (HCC)     on insulin      Past Surgical History:   Procedure Laterality Date    HX CARPAL TUNNEL RELEASE Bilateral     HX CATARACT REMOVAL Bilateral 2013    HX COLONOSCOPY      HX HYSTERECTOMY  1980's    cervical cancer    HX KNEE ARTHROSCOPY Bilateral     HX ORTHOPAEDIC  [id number]    back surgery x 2 ( HERNIATED DISC REPAIR, AND ANOTHER SURGERY SHE CANT REMEMBER    HX ORTHOPAEDIC Left 1990    ligament attachment arm    HX PELVIC LAPAROSCOPY  1970s    ectopic pregnancy    HX TUMOR REMOVAL Right     leg    [place] BREAST SURGERY PROCEDURE UNLISTED Right     right mastectomy     Allergies   Allergen Reactions    Contrast Dye [Iodine] Nausea and Vomiting    Lisinopril Cough    Percocet [Oxycodone-Acetaminophen] Nausea Only    Percodan [Oxycodone-Aspirin] Nausea Only    Prednisone Nausea Only    Sulfa (Sulfonamide Antibiotics) Unknown (comments)     Family History   Problem Relation Age of Onset    Diabetes Mother     Hypertension Mother     Diabetes Sister     Cancer Brother         COLON CANCER    Anesth Problems Neg Hx Social History     Tobacco Use    Smoking status: Never Smoker    Smokeless tobacco: Never Used   Substance Use Topics    Alcohol use: No    Drug use: No           Medications:       Current Facility-Administered Medications   Medication Dose Route Frequency    atorvastatin (LIPITOR) tablet 40 mg  40 mg Oral DAILY    losartan (COZAAR) tablet 100 mg  100 mg Oral DAILY    hydroCHLOROthiazide (HYDRODIURIL) tablet 25 mg  25 mg Oral DAILY    hydrALAZINE (APRESOLINE) 20 mg/mL injection 20 mg  20 mg IntraVENous Q6H PRN    acetaminophen (TYLENOL) tablet 650 mg  650 mg Oral Q6H    famotidine (PEPCID) tablet 40 mg  40 mg Oral BID    triamcinolone acetonide (KENALOG) 0.1 % ointment   Topical BID    oxyCODONE IR (ROXICODONE) tablet 5 mg  5 mg Oral Q4H PRN    metoprolol tartrate (LOPRESSOR) tablet 50 mg  50 mg Oral BID    ALPRAZolam (XANAX) tablet 0.25 mg  0.25 mg Oral QID PRN    insulin NPH (NOVOLIN N, HUMULIN N) injection 30 Units  30 Units SubCUTAneous BID    aspirin chewable tablet 81 mg  81 mg Oral DAILY    morphine injection 4 mg  4 mg IntraVENous Q30MIN PRN    sodium chloride (NS) flush 5-40 mL  5-40 mL IntraVENous Q8H    sodium chloride (NS) flush 5-40 mL  5-40 mL IntraVENous PRN    polyethylene glycol (MIRALAX) packet 17 g  17 g Oral DAILY PRN    bisacodyL (DULCOLAX) suppository 10 mg  10 mg Rectal DAILY PRN    ondansetron (ZOFRAN) injection 4 mg  4 mg IntraVENous Q6H PRN    glucose chewable tablet 16 g  4 Tablet Oral PRN    dextrose (D50W) injection syrg 12.5-25 g  25-50 mL IntraVENous PRN    glucagon (GLUCAGEN) injection 1 mg  1 mg IntraMUSCular PRN    insulin lispro (HUMALOG) injection   SubCUTAneous AC&HS    melatonin (rapid dissolve) tablet 5 mg  5 mg Oral QHS PRN    alum-mag hydroxide-simeth (MYLANTA) oral suspension 30 mL  30 mL Oral Q4H PRN    LORazepam (ATIVAN) injection 0.5 mg  0.5 mg IntraVENous Q6H PRN    diphenhydrAMINE (BENADRYL) injection 25 mg  25 mg IntraVENous Q6H PRN    diphenhydrAMINE (BENADRYL) capsule 25 mg  25 mg Oral Q6H PRN    simethicone (MYLICON) tablet 80 mg  80 mg Oral QID PRN       Review of Systems:     As in HPI - all other 10 point ROS negative      Physical Exam:       Visit Vitals  BP (!) 190/70   Pulse 72   Temp 98 °F (36.7 °C)   Resp 18   Ht 5' 7\" (1.702 m)   Wt 264 lb 12.8 oz (120.1 kg)   SpO2 98%   BMI 41.47 kg/m²         Telemetry: normal sinus rhythm    Gen: Well-developed, well-nourished, in no acute distress, obese  Neck: Supple,No JVD, No Carotid Bruit,   Resp: No accessory muscle use, Clear breath sounds, No rales or rhonchi  Card: Regular Rate,Rythm,Normal S1, S2, No murmurs, rubs or gallop. No thrills.    Abd:  Soft, BS+,   MSK: No cyanosis  Skin: No rashes    Neuro: moving all four extremities , follows commands appropriately  Psych:  Good insight, oriented to person, place , alert, Nml Affect  LE: Trace edema    EKG:   Results for orders placed or performed during the hospital encounter of 07/26/21   EKG, 12 LEAD, INITIAL   Result Value Ref Range    Ventricular Rate 104 BPM    Atrial Rate 104 BPM    P-R Interval 164 ms    QRS Duration 76 ms    Q-T Interval 344 ms    QTC Calculation (Bezet) 452 ms    Calculated P Axis 47 degrees    Calculated R Axis 7 degrees    Calculated T Axis 56 degrees    Diagnosis       Sinus tachycardia  Possible Left atrial enlargement  Possible Inferior infarct (cited on or before 04-SEP-2020)  Abnormal ECG  When compared with ECG of 13-JUN-2021 08:23,  No significant change was found  Confirmed by Vlad Asencio ([id number]) on 7/27/2021 11:29:52 PM             Cxray: Reviewed     LABS: Reviewed      Care Plan discussed with: Patient and Nursing Staff      Total time:      mins     Nori Cadena MD

## 2021-07-28 NOTE — PROGRESS NOTES
07/26/21    NUCLEAR CARDIAC STRESS TEST 07/28/2021 7/28/2021    Interpretation Summary  · Baseline ECG: Normal sinus rhythm. · Stress test: Negative stress test.  · SPECT: Left ventricular function post-stress was normal. Calculated ejection fraction is 46%.   · SPECT: Left ventricular perfusion is normal. Myocardial perfusion imaging supports a low risk stress test.    Signed by: Traci Rai MD on 7/28/2021  3:13 PM  '

## 2021-07-28 NOTE — PROGRESS NOTES
JAVAD:   1) Home with OP PT/OT (needs script)   2) Home with f.u lappets   3) Pt will need 2ND IM letter at time of discharge   4) Risk of readmission- 18% LOW     Hospital Day 2:   CM Consult Noted-   3:30 PM- CM spoke with pt, pt's  and their niece at bedside regarding [facility] services. Pt states she would rather do outpatient PT/OT at the local therapy place ([address] [phone]). Pt states her  and family can drive her as needed. Pt requests to make her own PCP f.u appt and understands she can get [facility] services through PCP should she change her mind. AVS updated with information- attending aware to get script. Pt and family state no questions or concerns at this time- anticipated d/c date 24-48 hours pending stress test. Floor CM updated and will continue to follow and assist as needed.      Naseem Bush, MSW, [address]

## 2021-07-28 NOTE — PROGRESS NOTES
[facility]  [address] [facility], [place], [address] [place] [address]  [phone]      Medical Progress Note      NAME: Nayana Lopez   :  1945  MRM:  [id number]    Date/Time: 2021  4:02 PM         Subjective:     Chief Complaint:  \"I am okay\"     Pt seen and examined. Currently denies SOB. Lower back pain with radiation down her L leg is rated at 8/10 after stress test this AM. Spoke with IR, will await results of stress prior to [facility]. Also optimizing BP.     ROS:  (bold if positive, if negative)      Chest pain/tightness resolved           Objective:       Vitals:          Last 24hrs VS reviewed since prior progress note.  Most recent are:    Visit Vitals  BP (!) 163/82 (BP 1 Location: Left lower arm, BP Patient Position: At rest)   Pulse 70   Temp 98 °F (36.7 °C)   Resp 18   Ht 5' 7\" (1.702 m)   Wt 120.1 kg (264 lb 12.8 oz)   SpO2 98%   BMI 41.47 kg/m²     SpO2 Readings from Last 6 Encounters:   21 98%   21 98%   21 99%   21 96%   20 97%   20 98%            Intake/Output Summary (Last 24 hours) at 2021 1208  Last data filed at 2021 0428  Gross per 24 hour   Intake 0 ml   Output    Net 0 ml          Exam:     Physical Exam:    Gen: Morbidly obese female   HEENT:  Pink conjunctivae, PERRL, hearing intact to voice, moist mucous membranes  Neck:  Supple, without masses, thyroid non-tender  Resp:  No accessory muscle use, clear breath sounds without wheezes rales or rhonchi  Card:  No murmurs, normal S1, S2 without thrills, bruits or peripheral edema  Abd:  Soft, non-tender, non-distended, normoactive bowel sounds are present  Musc:  No cyanosis or clubbing  Skin:  No rashes or ulcers, skin turgor is good  Neuro:  Cranial nerves 3-12 are grossly intact,  strength is 5/5 bilaterally and dorsi / plantarflexion is 5/5 bilaterally, follows commands appropriately  Psych:  Good insight, oriented to person, place and time, alert      Medications Reviewed: (see below)    Lab Data Reviewed: (see below)    ______________________________________________________________________    Medications:     Current Facility-Administered Medications   Medication Dose Route Frequency    atorvastatin (LIPITOR) tablet 40 mg  40 mg Oral DAILY    losartan (COZAAR) tablet 100 mg  100 mg Oral DAILY    carvediloL (COREG) tablet 6.25 mg  6.25 mg Oral BID WITH MEALS    [START ON 7/29/2021] furosemide (LASIX) tablet 20 mg  20 mg Oral DAILY    hydrALAZINE (APRESOLINE) 20 mg/mL injection 20 mg  20 mg IntraVENous Q6H PRN    acetaminophen (TYLENOL) tablet 650 mg  650 mg Oral Q6H    famotidine (PEPCID) tablet 40 mg  40 mg Oral BID    triamcinolone acetonide (KENALOG) 0.1 % ointment   Topical BID    oxyCODONE IR (ROXICODONE) tablet 5 mg  5 mg Oral Q4H PRN    ALPRAZolam (XANAX) tablet 0.25 mg  0.25 mg Oral QID PRN    insulin NPH (NOVOLIN N, HUMULIN N) injection 30 Units  30 Units SubCUTAneous BID    [Held by provider] aspirin chewable tablet 81 mg  81 mg Oral DAILY    morphine injection 4 mg  4 mg IntraVENous Q30MIN PRN    sodium chloride (NS) flush 5-40 mL  5-40 mL IntraVENous Q8H    sodium chloride (NS) flush 5-40 mL  5-40 mL IntraVENous PRN    polyethylene glycol (MIRALAX) packet 17 g  17 g Oral DAILY PRN    bisacodyL (DULCOLAX) suppository 10 mg  10 mg Rectal DAILY PRN    ondansetron (ZOFRAN) injection 4 mg  4 mg IntraVENous Q6H PRN    glucose chewable tablet 16 g  4 Tablet Oral PRN    dextrose (D50W) injection syrg 12.5-25 g  25-50 mL IntraVENous PRN    glucagon (GLUCAGEN) injection 1 mg  1 mg IntraMUSCular PRN    insulin lispro (HUMALOG) injection   SubCUTAneous AC&HS    melatonin (rapid dissolve) tablet 5 mg  5 mg Oral QHS PRN    alum-mag hydroxide-simeth (MYLANTA) oral suspension 30 mL  30 mL Oral Q4H PRN    LORazepam (ATIVAN) injection 0.5 mg  0.5 mg IntraVENous Q6H PRN    diphenhydrAMINE (BENADRYL) injection 25 mg  25 mg IntraVENous Q6H PRN    diphenhydrAMINE (BENADRYL) capsule 25 mg  25 mg Oral Q6H PRN    simethicone (MYLICON) tablet 80 mg  80 mg Oral QID PRN            Lab Review:     Recent Labs     07/28/21  0347 07/27/21  0500 07/26/21  1410   WBC 10.6 12.6* 10.2   HGB 13.9 14.9 15.3   HCT 45.6 48.0* 49.5*    320 299     Recent Labs     07/28/21  0347 07/27/21  0500 07/26/21  1410    137 141   K 4.0 4.2 3.8    106 105   CO2 29 28 32   * 218* 164*   BUN 20 16 11   CREA 0.87 0.84 0.87   CA 8.4* 8.2* 8.5   ALB  --  3.2* 3.4*   ALT  --  18 18     No components found for: GLPOC         Assessment / Plan:   Hypertensive urgency - continue to optimize BP   -on losartan 50mg BID   -metoprolol changed to coreg by cardiology  -on lasix   -hydralazine PRN     Elevated troponin - occurred after a sudden onset of \"chest tightness/difficultly breathing\". Certainly troponin elevation could be demand from HTN but pt with MULTIPLE risk factors for CAD   -CE flat at 0.1   -TTE pending   -stress pending   -on ASA  -LDL elevated; on statin       HTN (hypertension) (7/26/2021)  -see above       Chronic low back pain (7/26/2021) - was supposed to see Dr. Aleks Jiménez for ALYSA.  Spoke with ortho spine PA and ok with Dr. Aleks Jiménez to have ALYSA done while in house if clinically stable   -IR on board; plans for [facility] if stress negative and once BP better controlled   -continue scheduled tylenol  -PRN oxycodone   -PT/OT; pt prefers outpt PT       DM (diabetes mellitus), type 2 ([facility] [address] (7/26/2021) - A1c 7.6   -ISS   -BG checks AC TID and qHS   -on NPH BID     Morbid obesity - at risk for death due to this and multiple other co-morbidities   -weight loss advised     Total time spent with patient: [address] discussed with: Patient, Family, Nursing Staff and >50% of time spent in counseling and coordination of care    Discussed:  Care Plan    Prophylaxis:  S/p Lovenox; hold for possible ALYSA once stable     Disposition:  Home w/Family           ___________________________________________________    Attending Physician: Edith Kauffman MD

## 2021-07-28 NOTE — PROGRESS NOTES
Physical Therapy Note    Chart reviewed in prep for PT treatment. Pt currently unavailable for PT session- off the floor for stress test and has ECHO scheduled later this afternoon. Will follow up for PT treatment when able and appropriate. Thank you.     Yesy Wells PT, DPT

## 2021-07-28 NOTE — PROGRESS NOTES
Stress test completed. Pt. Do not need to come back tomorrow for a resting part per Dr. Marielos Jordan.

## 2021-07-28 NOTE — PROGRESS NOTES
0730 Bedside and Verbal shift change report given to Yasmeen Guallpa RN (oncoming nurse) by Bill Willingham RN (offgoing nurse). Report included the following information SBAR and Kardex. This patient was assisted with Intentional Toileting every 2 hours during this shift as appropriate. Documentation of ambulation and output reflected on Flowsheet as appropriate. Purposeful hourly rounding was completed using AIDET and 5Ps. Outcomes of PHR documented as they occurred. Bed alarm in use as appropriate. Dual Suction and ambubag in place. 1930 Bedside and Verbal shift change report given to Bill Willingham RN (oncoming nurse) by Yasmeen Mohamud RN (offgoing nurse). Report included the following information SBAR and Kardex.

## 2021-07-29 ENCOUNTER — HOSPITAL ENCOUNTER (OUTPATIENT)
Dept: NUCLEAR MEDICINE | Age: 76
Discharge: HOME | End: 2021-07-29
Attending: NURSE PRACTITIONER
Payer: MEDICARE

## 2021-07-29 ENCOUNTER — HOSPITAL ENCOUNTER (INPATIENT)
Dept: INTERVENTIONAL RADIOLOGY/VASCULAR | Age: 76
Discharge: HOME | DRG: 305 | End: 2021-07-29
Attending: INTERNAL MEDICINE
Payer: MEDICARE

## 2021-07-29 LAB
ANION GAP SERPL CALC-SCNC: 2 MMOL/L (ref 5–15)
BASOPHILS # BLD: 0.1 K/UL (ref 0–0.1)
BASOPHILS NFR BLD: 1 % (ref 0–1)
BUN SERPL-MCNC: 18 MG/DL (ref 6–20)
BUN/CREAT SERPL: 23 (ref 12–20)
CALCIUM SERPL-MCNC: 8.3 MG/DL (ref 8.5–10.1)
CHLORIDE SERPL-SCNC: 106 MMOL/L (ref 97–108)
CO2 SERPL-SCNC: 29 MMOL/L (ref 21–32)
CREAT SERPL-MCNC: 0.77 MG/DL (ref 0.55–1.02)
DIFFERENTIAL METHOD BLD: ABNORMAL
EOSINOPHIL # BLD: 0.5 K/UL (ref 0–0.4)
EOSINOPHIL NFR BLD: 5 % (ref 0–7)
ERYTHROCYTE [DISTWIDTH] IN BLOOD BY AUTOMATED COUNT: 15.8 % (ref 11.5–14.5)
GLUCOSE BLD STRIP.AUTO-MCNC: 155 MG/DL (ref 65–117)
GLUCOSE BLD STRIP.AUTO-MCNC: 197 MG/DL (ref 65–117)
GLUCOSE BLD STRIP.AUTO-MCNC: 215 MG/DL (ref 65–117)
GLUCOSE BLD STRIP.AUTO-MCNC: 229 MG/DL (ref 65–117)
GLUCOSE SERPL-MCNC: 217 MG/DL (ref 65–100)
HCT VFR BLD AUTO: 43.8 % (ref 35–47)
HGB BLD-MCNC: 13.7 G/DL (ref 11.5–16)
IMM GRANULOCYTES # BLD AUTO: 0 K/UL (ref 0–0.04)
IMM GRANULOCYTES NFR BLD AUTO: 0 % (ref 0–0.5)
LYMPHOCYTES # BLD: 1.6 K/UL (ref 0.8–3.5)
LYMPHOCYTES NFR BLD: 15 % (ref 12–49)
MAGNESIUM SERPL-MCNC: 2 MG/DL (ref 1.6–2.4)
MCH RBC QN AUTO: 26.5 PG (ref 26–34)
MCHC RBC AUTO-ENTMCNC: 31.3 G/DL (ref 30–36.5)
MCV RBC AUTO: 84.7 FL (ref 80–99)
MONOCYTES # BLD: 0.8 K/UL (ref 0–1)
MONOCYTES NFR BLD: 7 % (ref 5–13)
NEUTS SEG # BLD: 8.1 K/UL (ref 1.8–8)
NEUTS SEG NFR BLD: 72 % (ref 32–75)
NRBC # BLD: 0 K/UL (ref 0–0.01)
NRBC BLD-RTO: 0 PER 100 WBC
PLATELET # BLD AUTO: 289 K/UL (ref 150–400)
PMV BLD AUTO: 10.8 FL (ref 8.9–12.9)
POTASSIUM SERPL-SCNC: 3.7 MMOL/L (ref 3.5–5.1)
RBC # BLD AUTO: 5.17 M/UL (ref 3.8–5.2)
SERVICE CMNT-IMP: ABNORMAL
SODIUM SERPL-SCNC: 137 MMOL/L (ref 136–145)
WBC # BLD AUTO: 11.1 K/UL (ref 3.6–11)

## 2021-07-29 PROCEDURE — 74011250637 HC RX REV CODE- 250/637: Performed by: INTERNAL MEDICINE

## 2021-07-29 PROCEDURE — 97165 OT EVAL LOW COMPLEX 30 MIN: CPT

## 2021-07-29 PROCEDURE — 80048 BASIC METABOLIC PNL TOTAL CA: CPT

## 2021-07-29 PROCEDURE — 74011636637 HC RX REV CODE- 636/637: Performed by: HOSPITALIST

## 2021-07-29 PROCEDURE — 82962 GLUCOSE BLOOD TEST: CPT

## 2021-07-29 PROCEDURE — 97530 THERAPEUTIC ACTIVITIES: CPT

## 2021-07-29 PROCEDURE — 64483 NJX AA&/STRD TFRM EPI L/S 1: CPT

## 2021-07-29 PROCEDURE — 74011250636 HC RX REV CODE- 250/636: Performed by: INTERNAL MEDICINE

## 2021-07-29 PROCEDURE — 74011636637 HC RX REV CODE- 636/637: Performed by: INTERNAL MEDICINE

## 2021-07-29 PROCEDURE — 94760 N-INVAS EAR/PLS OXIMETRY 1: CPT

## 2021-07-29 PROCEDURE — 74011000250 HC RX REV CODE- 250: Performed by: STUDENT IN AN ORGANIZED HEALTH CARE EDUCATION/TRAINING PROGRAM

## 2021-07-29 PROCEDURE — 65660000000 HC RM CCU STEPDOWN

## 2021-07-29 PROCEDURE — 74011250636 HC RX REV CODE- 250/636: Performed by: HOSPITALIST

## 2021-07-29 PROCEDURE — A9577 INJ MULTIHANCE: HCPCS | Performed by: STUDENT IN AN ORGANIZED HEALTH CARE EDUCATION/TRAINING PROGRAM

## 2021-07-29 PROCEDURE — 83735 ASSAY OF MAGNESIUM: CPT

## 2021-07-29 PROCEDURE — 3E0S33Z INTRODUCTION OF ANTI-INFLAMMATORY INTO EPIDURAL SPACE, PERCUTANEOUS APPROACH: ICD-10-PCS | Performed by: STUDENT IN AN ORGANIZED HEALTH CARE EDUCATION/TRAINING PROGRAM

## 2021-07-29 PROCEDURE — 97116 GAIT TRAINING THERAPY: CPT

## 2021-07-29 PROCEDURE — 36415 COLL VENOUS BLD VENIPUNCTURE: CPT

## 2021-07-29 PROCEDURE — 74011250637 HC RX REV CODE- 250/637: Performed by: NURSE PRACTITIONER

## 2021-07-29 PROCEDURE — 77030003666 HC NDL SPINAL BD -A

## 2021-07-29 PROCEDURE — 85025 COMPLETE CBC W/AUTO DIFF WBC: CPT

## 2021-07-29 PROCEDURE — 74011250636 HC RX REV CODE- 250/636: Performed by: STUDENT IN AN ORGANIZED HEALTH CARE EDUCATION/TRAINING PROGRAM

## 2021-07-29 RX ORDER — AMLODIPINE BESYLATE 5 MG/1
5 TABLET ORAL DAILY
Status: DISCONTINUED | OUTPATIENT
Start: 2021-07-29 | End: 2021-07-30

## 2021-07-29 RX ORDER — ENOXAPARIN SODIUM 100 MG/ML
40 INJECTION SUBCUTANEOUS EVERY 12 HOURS
Status: DISCONTINUED | OUTPATIENT
Start: 2021-07-30 | End: 2021-07-30 | Stop reason: HOSPADM

## 2021-07-29 RX ORDER — CARVEDILOL 6.25 MG/1
6.25 TABLET ORAL ONCE
Status: COMPLETED | OUTPATIENT
Start: 2021-07-29 | End: 2021-07-29

## 2021-07-29 RX ORDER — LIDOCAINE HYDROCHLORIDE 10 MG/ML
8 INJECTION, SOLUTION EPIDURAL; INFILTRATION; INTRACAUDAL; PERINEURAL
Status: DISCONTINUED | OUTPATIENT
Start: 2021-07-29 | End: 2021-08-02 | Stop reason: HOSPADM

## 2021-07-29 RX ORDER — SODIUM CHLORIDE 9 MG/ML
3 INJECTION INTRAMUSCULAR; INTRAVENOUS; SUBCUTANEOUS ONCE
Status: DISCONTINUED | OUTPATIENT
Start: 2021-07-29 | End: 2021-07-30 | Stop reason: HOSPADM

## 2021-07-29 RX ORDER — DEXAMETHASONE SODIUM PHOSPHATE 10 MG/ML
10 INJECTION INTRAMUSCULAR; INTRAVENOUS ONCE
Status: CANCELLED | OUTPATIENT
Start: 2021-07-29 | End: 2021-07-29

## 2021-07-29 RX ORDER — ENOXAPARIN SODIUM 100 MG/ML
40 INJECTION SUBCUTANEOUS EVERY 24 HOURS
Status: DISCONTINUED | OUTPATIENT
Start: 2021-07-30 | End: 2021-07-29

## 2021-07-29 RX ORDER — DEXAMETHASONE SODIUM PHOSPHATE 10 MG/ML
10 INJECTION INTRAMUSCULAR; INTRAVENOUS ONCE
Status: COMPLETED | OUTPATIENT
Start: 2021-07-29 | End: 2021-07-29

## 2021-07-29 RX ORDER — LIDOCAINE HYDROCHLORIDE 10 MG/ML
5 INJECTION, SOLUTION EPIDURAL; INFILTRATION; INTRACAUDAL; PERINEURAL
Status: CANCELLED | OUTPATIENT
Start: 2021-07-29

## 2021-07-29 RX ORDER — INSULIN LISPRO 100 [IU]/ML
INJECTION, SOLUTION INTRAVENOUS; SUBCUTANEOUS
Status: DISCONTINUED | OUTPATIENT
Start: 2021-07-29 | End: 2021-07-30 | Stop reason: HOSPADM

## 2021-07-29 RX ORDER — CARVEDILOL 12.5 MG/1
12.5 TABLET ORAL 2 TIMES DAILY WITH MEALS
Status: DISCONTINUED | OUTPATIENT
Start: 2021-07-29 | End: 2021-07-30 | Stop reason: HOSPADM

## 2021-07-29 RX ORDER — SODIUM CHLORIDE 9 MG/ML
3 INJECTION INTRAMUSCULAR; INTRAVENOUS; SUBCUTANEOUS ONCE
Status: CANCELLED | OUTPATIENT
Start: 2021-07-29 | End: 2021-07-29

## 2021-07-29 RX ORDER — LIDOCAINE HYDROCHLORIDE 10 MG/ML
8 INJECTION, SOLUTION EPIDURAL; INFILTRATION; INTRACAUDAL; PERINEURAL
Status: CANCELLED | OUTPATIENT
Start: 2021-07-29

## 2021-07-29 RX ADMIN — DEXAMETHASONE SODIUM PHOSPHATE 15 MG: 10 INJECTION, SOLUTION INTRAMUSCULAR; INTRAVENOUS at 14:57

## 2021-07-29 RX ADMIN — GADOBENATE DIMEGLUMINE 5 ML: 529 INJECTION, SOLUTION INTRAVENOUS at 14:57

## 2021-07-29 RX ADMIN — FAMOTIDINE 40 MG: 20 TABLET ORAL at 17:34

## 2021-07-29 RX ADMIN — Medication 10 ML: at 05:01

## 2021-07-29 RX ADMIN — ACETAMINOPHEN 650 MG: 325 TABLET ORAL at 23:00

## 2021-07-29 RX ADMIN — Medication 10 ML: at 13:45

## 2021-07-29 RX ADMIN — INSULIN LISPRO 3 UNITS: 100 INJECTION, SOLUTION INTRAVENOUS; SUBCUTANEOUS at 16:20

## 2021-07-29 RX ADMIN — ACETAMINOPHEN 650 MG: 325 TABLET ORAL at 17:34

## 2021-07-29 RX ADMIN — ACETAMINOPHEN 650 MG: 325 TABLET ORAL at 05:01

## 2021-07-29 RX ADMIN — LIDOCAINE HYDROCHLORIDE 5 ML: 10 INJECTION, SOLUTION EPIDURAL; INFILTRATION; INTRACAUDAL; PERINEURAL at 14:44

## 2021-07-29 RX ADMIN — LOSARTAN POTASSIUM 100 MG: 50 TABLET, FILM COATED ORAL at 08:18

## 2021-07-29 RX ADMIN — MORPHINE SULFATE 4 MG: 4 INJECTION, SOLUTION INTRAMUSCULAR; INTRAVENOUS at 11:59

## 2021-07-29 RX ADMIN — CARVEDILOL 12.5 MG: 12.5 TABLET, FILM COATED ORAL at 16:20

## 2021-07-29 RX ADMIN — ONDANSETRON 4 MG: 2 INJECTION INTRAMUSCULAR; INTRAVENOUS at 04:42

## 2021-07-29 RX ADMIN — INSULIN LISPRO 2 UNITS: 100 INJECTION, SOLUTION INTRAVENOUS; SUBCUTANEOUS at 11:48

## 2021-07-29 RX ADMIN — ATORVASTATIN CALCIUM 40 MG: 20 TABLET, FILM COATED ORAL at 08:18

## 2021-07-29 RX ADMIN — FAMOTIDINE 40 MG: 20 TABLET ORAL at 08:18

## 2021-07-29 RX ADMIN — INSULIN HUMAN 30 UNITS: 100 INJECTION, SUSPENSION SUBCUTANEOUS at 08:18

## 2021-07-29 RX ADMIN — INSULIN HUMAN 45 UNITS: 100 INJECTION, SUSPENSION SUBCUTANEOUS at 17:34

## 2021-07-29 RX ADMIN — CARVEDILOL 6.25 MG: 6.25 TABLET, FILM COATED ORAL at 11:59

## 2021-07-29 RX ADMIN — INSULIN LISPRO 2 UNITS: 100 INJECTION, SOLUTION INTRAVENOUS; SUBCUTANEOUS at 22:42

## 2021-07-29 RX ADMIN — AMLODIPINE BESYLATE 5 MG: 5 TABLET ORAL at 08:18

## 2021-07-29 RX ADMIN — HYDRALAZINE HYDROCHLORIDE 20 MG: 20 INJECTION INTRAMUSCULAR; INTRAVENOUS at 09:12

## 2021-07-29 RX ADMIN — Medication 10 ML: at 22:42

## 2021-07-29 RX ADMIN — FUROSEMIDE 20 MG: 20 TABLET ORAL at 08:18

## 2021-07-29 RX ADMIN — ACETAMINOPHEN 650 MG: 325 TABLET ORAL at 11:47

## 2021-07-29 RX ADMIN — CARVEDILOL 6.25 MG: 6.25 TABLET, FILM COATED ORAL at 08:18

## 2021-07-29 NOTE — PROGRESS NOTES
Problem: Mobility Impaired (Adult and Pediatric)  Goal: *Acute Goals and Plan of Care (Insert Text)  Description: FUNCTIONAL STATUS PRIOR TO ADMISSION: Patient was modified independent using a crutches for functional mobility. Reports progressive back pain and LLE weakness over the past few months. HOME SUPPORT PRIOR TO ADMISSION: The patient lived with , drives and usually able to handle ADLs without assistance. Physical Therapy Goals  Initiated 7/27/2021  1. Patient will move from supine to sit and sit to supine , scoot up and down, and roll side to side in bed with independence within 7 day(s). 2.  Patient will transfer from bed to chair and chair to bed with modified independence using the least restrictive device within 7 day(s). 3.  Patient will perform sit to stand with modified independence within 7 day(s). 4.  Patient will ambulate with modified independence for 100 feet with the least restrictive device within 7 day(s). 5.  Patient will ascend/descend 4 stairs with 1 handrail(s) with minimal assistance/contact guard assist within 7 day(s). Outcome: Progressing Towards Goal  Note:   PHYSICAL THERAPY TREATMENT  Patient: Dolly Lainez (77 y.o. female)  Date: 7/29/2021  Diagnosis: Hypertensive urgency [I16.0]  Chronic low back pain [M54.5, G89.29]  HTN (hypertension) [I10]  DM (diabetes mellitus), type 2 ([facility] [E11.9] <principal problem not specified>       Precautions: Fall  Chart, physical therapy assessment, plan of care and goals were reviewed. ASSESSMENT  Patient continues with skilled PT services and progressing towards goals. Increased difficultly, increased pain need for assistance with mobility. Decreased tolerance for WB on LLE with pt dragging LLE behind her with attempt with gait training using RW with Mod A x 2. One posterior LOB   Requiring Max A x 2 for steadying.  Pt with decreased insight into deficits, has developed unsafe compensatory strategies and resistant to safer technique. At current level of function Pt would benefit from RW for SPT to  w/c for safe mobility to maximize independence. IR arrived to take pt for ALYSA. Recommend SNF. Current Level of Function Impacting Discharge (mobility/balance): assist x 2    Other factors to consider for discharge:          PLAN :  Patient continues to benefit from skilled intervention to address the above impairments. Continue treatment per established plan of care. to address goals. Recommendation for discharge: (in order for the patient to meet his/her long term goals)  Therapy up to 5 days/week in SNF setting    This discharge recommendation:  Has not yet been discussed the attending provider and/or case management    IF patient discharges home will need the following DME: rolling walker and wheelchair       SUBJECTIVE:   Patient stated my back is hurting.     OBJECTIVE DATA SUMMARY:   Critical Behavior:  Neurologic State: Alert, Appropriate for age  Orientation Level: Oriented X4  Cognition: Appropriate decision making, Appropriate for age attention/concentration, Appropriate safety awareness, Follows commands     Functional Mobility Training:  Bed Mobility:     Supine to Sit: Moderate assistance  Sit to Supine: Moderate assistance           Transfers:  Sit to Stand: Assist x2;Minimum assistance  Stand to Sit: Minimum assistance;Assist x2; Moderate assistance; Additional time        Bed to Chair: Assist x2;Minimum assistance                    Balance:  Sitting: High guard; Intact  Standing: Impaired; With support  Standing - Static: Constant support; Fair  Standing - Dynamic : Poor  Ambulation/Gait Training:  Distance (ft): 6 Feet (ft)  Assistive Device: Walker, rolling;Gait belt  Ambulation - Level of Assistance: Moderate assistance;Maximum assistance;Assist x2        Gait Abnormalities: Antalgic;Decreased step clearance; Foot drop; Step to gait        Base of Support: Widened;Shift to right     Speed/Giulia: Shuffled;Fluctuations                       Stairs: Therapeutic Exercises:     Pain Ratin/10    Activity Tolerance:   Fair and Poor    After treatment patient left in no apparent distress:   Supine in bed and Call bell within reach    COMMUNICATION/COLLABORATION:   The patients plan of care was discussed with: Occupational therapist and Registered nurse.      Navarro Mariscal   Time Calculation: 32 mins

## 2021-07-29 NOTE — PROGRESS NOTES
Anupam Bennettelsen Mihaela [place] [address]  [address] [facility], [address] [place] [address]  [phone]      Medical Progress Note      NAME: Cody West   :  1945  MRM:  [id number]    Date/Time: 2021        Assessment / Plan:     78 yo F w/ hx of HTN, DM, chronic back pain referred to ED for severely elevated BP from ortho clinic. Hospital course complicated by problems as listed below:    Hypertensive urgency: BP better but still room for improvement. Add Norvasc, increase Coreg. Cont losartan. On Lasix. IV hydralazine PRN     Elevated troponin: minimal, remaining flat. Likely demand ischemia, not ACS. Negative nuc stress test. Cont ASA, statin. Evaluated by cardiology     Chronic low back pain / spinal stenosis: was supposed to see Dr. Liam Becerril for ALYSA. Dr. Salazar Tomlin had spoken w/ ortho spine PA. I discussed the case w/ Dr. Salazar Tomlin and the plan was to have ALYSA done inpatient, appreciate radiology team. PT/OT, who had recommend SNF placement. CM consult for placement if pt is amenable     DM (diabetes mellitus), type 2: fairly well controlled at baseline w/ A1c 7.6%. Will increase NPH from 30 units BID to 45 units BID, slowly titrate up to home dose once eating better. Increase SSI    Morbid obesity: Body mass index is 40.72 kg/m². Would benefit from weight loss and dietary / lifestyle modifications       Total time spent:  35 minutes  Time spent in the care of this patient including reviewing records, discussing with nursing and/or other providers on the treatment team, obtaining history and examining the patient, and discussing treatment plans. Care Plan discussed with: Patient, Nursing Staff and >50% of time spent in counseling and coordination of care    Discussed:  Care Plan and D/C Planning    Prophylaxis:  Lovenox    Disposition:  SNF/LTC         Subjective:     Chief Complaint:  Follow up back pain    Chart/notes/labs/studies reviewed, patient examined.     Still having low back pain which radiates down to leg              Objective:       Vitals:        Last 24hrs VS reviewed since prior progress note. Most recent are:    Visit Vitals  BP (!) 162/78   Pulse 96   Temp 98 °F (36.7 °C)   Resp 18   Ht 5' 7\" (1.702 m)   Wt 117.9 kg (260 lb)   SpO2 97%   BMI 40.72 kg/m²     SpO2 Readings from Last 6 Encounters:   07/29/21 97%   06/13/21 98%   05/21/21 99%   03/22/21 96%   12/22/20 97%   11/20/20 98%            Intake/Output Summary (Last 24 hours) at 7/29/2021 1440  Last data filed at 7/29/2021 1345  Gross per 24 hour   Intake 1230 ml   Output 1200 ml   Net 30 ml          Exam:     Physical Exam:    Gen:  Obese, chronically ill-appearing. NAD. Pleasant   HEENT:  Atraumatic, normocephalic. Sclerae nonicteric, hearing intact to voice  Neck:  Supple, without apparent masses. Resp:  No accessory muscle use, CTAB without wheezes, rales, or rhonchi  Card: RRR, without m/r/g. No LE edema  Abd:  +bowel sounds, soft, NTTP, nondistended. No HSM  Neuro: Face symmetric, speech fluent, follows commands appropriately, no focal weakness or numbness  Psych:  Alert, oriented x 3.  Good insight     Medications Reviewed: (see below)    Lab Data Reviewed: (see below)    ______________________________________________________________________    Medications:     Current Facility-Administered Medications   Medication Dose Route Frequency    amLODIPine (NORVASC) tablet 5 mg  5 mg Oral DAILY    carvediloL (COREG) tablet 12.5 mg  12.5 mg Oral BID WITH MEALS    insulin NPH (NOVOLIN N, HUMULIN N) injection 60 Units  60 Units SubCUTAneous BID    insulin lispro (HUMALOG) injection   SubCUTAneous AC&HS    atorvastatin (LIPITOR) tablet 40 mg  40 mg Oral DAILY    losartan (COZAAR) tablet 100 mg  100 mg Oral DAILY    furosemide (LASIX) tablet 20 mg  20 mg Oral DAILY    oxyCODONE IR (ROXICODONE) tablet 10 mg  10 mg Oral Q4H PRN    morphine injection 4 mg  4 mg IntraVENous Q4H PRN    hydrALAZINE (APRESOLINE) 20 mg/mL injection 20 mg  20 mg IntraVENous Q6H PRN    acetaminophen (TYLENOL) tablet 650 mg  650 mg Oral Q6H    famotidine (PEPCID) tablet 40 mg  40 mg Oral BID    triamcinolone acetonide (KENALOG) 0.1 % ointment   Topical BID    ALPRAZolam (XANAX) tablet 0.25 mg  0.25 mg Oral QID PRN    [Held by provider] aspirin chewable tablet 81 mg  81 mg Oral DAILY    sodium chloride (NS) flush 5-40 mL  5-40 mL IntraVENous Q8H    sodium chloride (NS) flush 5-40 mL  5-40 mL IntraVENous PRN    polyethylene glycol (MIRALAX) packet 17 g  17 g Oral DAILY PRN    bisacodyL (DULCOLAX) suppository 10 mg  10 mg Rectal DAILY PRN    ondansetron (ZOFRAN) injection 4 mg  4 mg IntraVENous Q6H PRN    glucose chewable tablet 16 g  4 Tablet Oral PRN    dextrose (D50W) injection syrg 12.5-25 g  25-50 mL IntraVENous PRN    glucagon (GLUCAGEN) injection 1 mg  1 mg IntraMUSCular PRN    melatonin (rapid dissolve) tablet 5 mg  5 mg Oral QHS PRN    alum-mag hydroxide-simeth (MYLANTA) oral suspension 30 mL  30 mL Oral Q4H PRN    LORazepam (ATIVAN) injection 0.5 mg  0.5 mg IntraVENous Q6H PRN    diphenhydrAMINE (BENADRYL) injection 25 mg  25 mg IntraVENous Q6H PRN    diphenhydrAMINE (BENADRYL) capsule 25 mg  25 mg Oral Q6H PRN    simethicone (MYLICON) tablet 80 mg  80 mg Oral QID PRN            Lab Review:     Recent Labs     07/29/21 0448 07/28/21 0347 07/27/21  0500   WBC 11.1* 10.6 12.6*   HGB 13.7 13.9 14.9   HCT 43.8 45.6 48.0*    289 320     Recent Labs     07/29/21 0448 07/28/21 0347 07/27/21  0500    138 137   K 3.7 4.0 4.2    106 106   CO2 29 29 28   * 213* 218*   BUN 18 20 16   CREA 0.77 0.87 0.84   CA 8.3* 8.4* 8.2*   MG 2.0  --   --    ALB  --   --  3.2*   ALT  --   --  18     No components found for: GLPOC  No results for input(s): PH, PCO2, PO2, HCO3, FIO2 in the last 72 hours. No results for input(s): INR, INREXT in the last 72 hours.   No results found for: SDES  Lab Results   Component Value Date/Time    Culture result: FEW PROTEUS MIRABILIS (A) 04/03/2019 04:14 PM    Culture result: MODERATE STAPHYLOCOCCUS AUREUS (A) 06/12/2018 12:57 AM    Culture result: MRSA NOT PRESENT 06/11/2018 11:10 PM    Culture result:  06/11/2018 11:10 PM         Screening of patient nares for MRSA is for surveillance purposes and, if positive, to facilitate isolation considerations in high risk settings.  It is not intended for automatic decolonization interventions per se as regimens are not sufficiently effective to warrant routine use.              ___________________________________________________    Attending Physician: Harsh Yung MD

## 2021-07-29 NOTE — PROGRESS NOTES
Problem: Self Care Deficits Care Plan (Adult)  Goal: *Acute Goals and Plan of Care (Insert Text)  Description: FUNCTIONAL STATUS PRIOR TO ADMISSION: Patient was modified independent using a crutches for functional mobility. Reports progressive back pain and LLE weakness over the past few months, which has resulted in need for increased assistance for ADLs. HOME SUPPORT PRIOR TO ADMISSION: The patient lived with , drives and usually able to handle ADLs without assistance. Occupational Therapy Goals  Initiated 7/29/2021  1. Patient will perform grooming, seated EOB, with supervision/set-up within 7 day(s). 2.  Patient will perform upper body dressing and bathing with supervision/set-up within 7 day(s). 3.  Patient will perform lower body dressing with minimal assistance/contact guard assist using AE/adaptive strategies PRN within 7 day(s). 4.  Patient will perform toilet transfers with supervision/set-up within 7 day(s). 5.  Patient will perform all aspects of toileting with supervision/set-up within 7 day(s). 6.  Patient will participate in upper extremity therapeutic exercise/activities with supervision/set-up for 10 minutes within 7 day(s). Outcome: Progressing Towards Goal     OCCUPATIONAL THERAPY EVALUATION  Patient: Tierney Skinner (70 y.o. female)  Date: 7/29/2021  Primary Diagnosis: Hypertensive urgency [I16.0]  Chronic low back pain [M54.5, G89.29]  HTN (hypertension) [I10]  DM (diabetes mellitus), type 2 ([facility] [E11.9]        Precautions:  Fall    ASSESSMENT  Based on the objective data described below, the patient presents with decreased strength (L LE grossly weak), impaired balance, decreased safety awareness, poor insight to deficits, high fall risk, and poor activity tolerance 2/2 pain following admission. Pt was scheduled for ALYSA of spine but was sent to ED instead for HTN. She is received with PTA present, preparing for OOB mobility.  She requires increased assistance for distal LB dressing tasks at EOB and will benefit from ADL re-training with AE education. During OOB mobility towards bathroom, pt requires up to max A x 2 due to LOB to prevent fall. Noted L LE dragging behind and she requires near constant verbal cueing for improved technique and safety and physical assistance for RW mgmt. She is returned to supine in prep for t/f KEREN for ALYSA at end of session. At this time, pt is not safe to return home and will require SNF at discharge. If she is d/c'd home, she will require w/c, BSC, and 24 hour support and physical assistance for all mobility and ADLs. Current Level of Function Impacting Discharge (ADLs/self-care): up to max A for LB ADLs; min to max A x 2 for ADL transfers and mobility    Functional Outcome Measure: The patient scored Total: 45/100 on the Barthel Index outcome measure which is indicative of 55% impaired ability to care for basic self needs/dependency on others; inferred 90% dependency on others for instrumental ADLs. Other factors to consider for discharge: poor safety awareness; high fall risk; A x 2 for mobility     Patient will benefit from skilled therapy intervention to address the above noted impairments. PLAN :  Recommendations and Planned Interventions: self care training, functional mobility training, therapeutic exercise, balance training, therapeutic activities, endurance activities, patient education, home safety training and family training/education    Frequency/Duration: Patient will be followed by occupational therapy 5 times a week to address goals.     Recommendation for discharge: (in order for the patient to meet his/her long term goals)  Therapy up to 5 days/week in SNF setting    This discharge recommendation:  A follow-up discussion with the attending provider and/or case management is planned    IF patient discharges home will need the following DME: w/c, BSC, physical assistance for all ADLs; transport home for assistance into home (steps to enter - no ramp)       SUBJECTIVE:   Patient stated My back hurts so bad.     OBJECTIVE DATA SUMMARY:   HISTORY:   Past Medical History:   Diagnosis Date    Adverse effect of anesthesia     WOKE UP EARLY DURING BACK SURGERY    Breast cancer ([facility] 1993    right breast cancer     Cervical cancer ([address] 1980's    Chronic pain     L4-L5 discectomy    Congestive heart failure (HCC)     Depression with anxiety     Ectopic pregnancy     GERD (gastroesophageal reflux disease)     Glaucoma     Hypertension     Ill-defined condition     TONSIL STONES - SOMETIMES CAUSE BAD BREATH    Miscarriage     Neuropathy     r/t DM    Sleep apnea 2005    CPAP compliant but not using now because of rash on face from the mask    Type 2 diabetes mellitus ([address]     on insulin     Past Surgical History:   Procedure Laterality Date    HX CARPAL TUNNEL RELEASE Bilateral     HX CATARACT REMOVAL Bilateral 2013    HX COLONOSCOPY      HX HYSTERECTOMY  1980's    cervical cancer    HX KNEE ARTHROSCOPY Bilateral     HX ORTHOPAEDIC  [id number]    back surgery x 2 ( HERNIATED DISC REPAIR, AND ANOTHER SURGERY SHE CANT REMEMBER    HX ORTHOPAEDIC Left 1990    ligament attachment arm    HX PELVIC LAPAROSCOPY  1970s    ectopic pregnancy    HX TUMOR REMOVAL Right     leg    IA BREAST SURGERY PROCEDURE UNLISTED Right     right mastectomy       Expanded or extensive additional review of patient history:     Home Situation  Home Environment: Private residence  # Steps to Enter: 2  One/Two Story Residence: One story  Living Alone: No  Support Systems: Spouse/Significant Other/Partner  Patient Expects to be Discharged to[de-identified] [employer]  Current DME Used/Available at Home: Shower chair, Walker, rolling, Crutches  Tub or Shower Type: Tub/Shower combination    Hand dominance: Right    EXAMINATION OF PERFORMANCE DEFICITS:  Cognitive/Behavioral Status:  Neurologic State: Alert; Appropriate for age  Orientation Level: Oriented X4  Cognition: Appropriate decision making; Appropriate for age attention/concentration; Appropriate safety awareness; Follows commands  Perception: Cues to attend to left side of body; Tactile;Verbal;Visual (L LE during transfers/mobility)  Perseveration: No perseveration noted  Safety/Judgement: Decreased awareness of environment;Decreased awareness of need for assistance;Decreased awareness of need for safety;Decreased insight into deficits    Hearing: Auditory  Auditory Impairment: None    Range of Motion:  AROM: Generally decreased, functional  PROM: Generally decreased, functional    Strength:  Strength: Generally decreased, functional (grossly decreased in L LE)    Coordination:  Coordination: Generally decreased, functional  Fine Motor Skills-Upper: Left Intact; Right Intact    Gross Motor Skills-Upper: Left Intact; Right Intact    Tone:  Tone: Normal    Balance:  Sitting: High guard; Intact  Standing: Impaired; With support  Standing - Static: Constant support; Fair  Standing - Dynamic : Poor    Functional Mobility and Transfers for ADLs:  Bed Mobility:  Supine to Sit: Moderate assistance  Sit to Supine: Moderate assistance    Transfers:  Sit to Stand: Assist x2;Minimum assistance  Stand to Sit: Minimum assistance;Assist x2; Moderate assistance; Additional time  Bed to Chair: Assist x2;Minimum assistance  Toilet Transfer : Minimum assistance;Assist x2 (infer for SPT to [facility] based on observations)    ADL Assessment:  Feeding: Independent    Oral Facial Hygiene/Grooming: Setup;Supervision (in supported sitting)    Bathing: Moderate assistance    Upper Body Dressing: Setup    Lower Body Dressing: Maximum assistance    Toileting:  Moderate assistance    ADL Intervention and task modifications:    Lower Body Dressing Assistance  Socks: Maximum assistance  Position Performed: Seated edge of bed  Cues: Don;Physical assistance    Cognitive Retraining  Safety/Judgement: Decreased awareness of environment;Decreased awareness of need for assistance;Decreased awareness of need for safety;Decreased insight into deficits    Functional Measure:  Barthel Index:    Bathin  Bladder: 10  Bowels: 10  Groomin  Dressin  Feeding: 10  Mobility: 0  Stairs: 0  Toilet Use: 5  Transfer (Bed to Chair and Back): 5  Total: 45/100        The Barthel ADL Index: Guidelines  1. The index should be used as a record of what a patient does, not as a record of what a patient could do. 2. The main aim is to establish degree of independence from any help, physical or verbal, however minor and for whatever reason. 3. The need for supervision renders the patient not independent. 4. A patient's performance should be established using the best available evidence. Asking the patient, friends/relatives and nurses are the usual sources, but direct observation and common sense are also important. However direct testing is not needed. 5. Usually the patient's performance over the preceding 24-48 hours is important, but occasionally longer periods will be relevant. 6. Middle categories imply that the patient supplies over 50 per cent of the effort. 7. Use of aids to be independent is allowed. Sudeep Maria., Barthel, D.W. (9042). Functional evaluation: the Barthel Index. [address] [facility] (14)2. Kayla Jackson savita JOANNA Messina, Gareth Mccray., Gage Warren., [place], [address] [place] [address] (). Measuring the change indisability after inpatient rehabilitation; comparison of the responsiveness of the Barthel Index and Functional [place] Measure. Journal of Neurology, Neurosurgery, and Psychiatry, 66(4), 219-202. Bassam Wisdom, N.J.A, DHIRAJ Chase, & Timothy Barry MTiaA. (2004.) Assessment of post-stroke quality of life in cost-effectiveness studies: The usefulness of the Barthel Index and the EuroQoL-5D.  Quality of Life Research, 15, 308-71     Occupational Therapy Evaluation Charge Determination   History Examination Decision-Making   LOW Complexity : Brief history review  HIGH Complexity : 5 or more performance deficits relating to physical, cognitive , or psychosocial skils that result in activity limitations and / or participation restrictions MEDIUM Complexity : Patient may present with comorbidities that affect occupational performnce. Miniml to moderate modification of tasks or assistance (eg, physical or verbal ) with assesment(s) is necessary to enable patient to complete evaluation       Based on the above components, the patient evaluation is determined to be of the following complexity level: LOW   Pain Rating:  Pt reporting increased back paint throughout all activity    Activity Tolerance:   Poor    After treatment patient left in no apparent distress:    Supine in bed and with transport    COMMUNICATION/EDUCATION:   The patients plan of care was discussed with: Physical therapist, Physical therapy assistant and Registered nurse. Home safety education was provided and the patient/caregiver indicated understanding., Patient/family have participated as able in goal setting and plan of care. and Patient/family agree to work toward stated goals and plan of care. This patients plan of care is appropriate for delegation to DOTTIE.     Thank you for this referral.  Robb Montaño OT  Time Calculation: 24 mins

## 2021-07-29 NOTE — PROGRESS NOTES
Care Management follow up    Patient admitted for HTN urgency. History of: diabetes, neuropathy, chronic back pain, stenosis. RUR 18 (Score %) low   Is This a Readmission NO  Is this a Bundle YES    Current status  Patient continues to require medical management. Epidural spinal injection today. Transition of Care Plan  1. Monitor patient status and response to treatment. 2. Patient continues to require medical management. 3. Home with outpatient PT and family assist..    4. Home per family at [place]. 5. CM to monitor progress and recommendations.     Leslye Philip RN, MSN/Care manager

## 2021-07-29 NOTE — PROGRESS NOTES
Occupational Therapy:  07/29/21    Chart reviewed in prep for OT evaluation. Pt received in bed, declining to participate with OT due to feeling \"unwell\", attributing it to varying method of delivering insulin in hospital versus at home. Offered modified activity at EOB but pt continued to decline. D/w RN. Will follow up this afternoon. 1411: Orders received, chart reviewed and patient evaluated by occupational therapy. Pending progression with skilled acute occupational therapy, recommend:  Therapy up to 5 days/week in rehab setting     Recommend with nursing ADLs with supervision/setup, OOB to chair 3x/day and toileting via beside commode with 2 assist and walker. Thank you for completing as able in order to maintain patient strength, endurance and independence. Full evaluation to follow.      Thank you,  Curly Oneal, OTR/L

## 2021-07-29 NOTE — PROGRESS NOTES
0700 Bedside and Verbal shift change report given to Gretel Aquino (oncoming nurse) by Mert Alvarez RN (offgoing nurse). Report included the following information SBAR, Kardex, ED Summary, Intake/Output, MAR, Recent Results and Cardiac Rhythm NSR. This patient was assisted with Intentional Toileting every 2 hours during this shift as appropriate. Documentation of ambulation and output reflected on Flowsheet as appropriate. Purposeful hourly rounding was completed using AIDET and 5Ps. Outcomes of PHR documented as they occurred. Bed alarm in use as appropriate. Dual Suction and ambubag in place. 0732 Pt /84. Will give morning BP Medications and recheck within the hour. 0910 /83 will give PRN Hydralazine. 0912 PRN hydralazine given for /83, will recheck BP within the hour. 1020 /61, Pt resting comfortably in bed. 1127 Pt /76 notified Dr. Karen Kruger, orders received for a one time dose of Coreg. [address] given, Pt is stating that she is having pain/discomfort in her back 7/10. Will administer PRN Morphine. 12 Notified Dr. Karen Kruger in regards to IR wanting to do Epidural outpatient. Awaiting response. 1249 /78 and HR 95. No further orders at this time. 1415 Pt off floor to IR.    1520 Pt back on floor from IR resting comfortably in bed. 1930 Bedside and Verbal shift change report given to [address] (oncoming nurse) by Isabel James RN (offgoing nurse). Report included the following information SBAR, Kardex, ED Summary, Intake/Output, MAR, Recent Results and Cardiac Rhythm NSR.

## 2021-07-29 NOTE — PROGRESS NOTES
Bedside shift change report given to Jordyn(oncoming nurse) by April(offgoing nurse). Report included the following information SBAR, Kardex, ED Summary, Intake/Output and Recent Results. Bedside shift change report given to Kimmie Archer (oncoming nurse) by Amber Mayen (offgoing nurse). Report included the following information SBAR, Kardex, ED Summary, Intake/Output and Recent Results.

## 2021-07-29 NOTE — PROGRESS NOTES
TRANSFER - IN REPORT:    Verbal report received from Floor RN(name) on Noni FELICITY Talitha March  being received from 318(unit) for ordered procedure      Report consisted of patients Situation, Background, Assessment and   Recommendations(SBAR). Information from the following report(s) SBAR was reviewed with the receiving nurse. Opportunity for questions and clarification was provided. Assessment completed upon patients arrival to unit and care assumed.

## 2021-07-29 NOTE — PROGRESS NOTES
Nml stress nuc study  Nml EF on echo  Uptitrate BP meds  Will see prn. Plz call if needed    [facility].  MD, [facility]

## 2021-07-29 NOTE — PROGRESS NOTES
[facility] Dosing Services: 7/29/2021    Consult for Enoxaparin by Dr. Silvestre Gallego made for this 77 y.o. female, for prophylaxis of  DVT. Wt Readings from Last 1 Encounters:   07/29/21 117.9 kg (260 lb)       Ht Readings from Last 1 Encounters:   07/28/21 170.2 cm (67\")       Enoxaparin dose adjusted to 40 mg Q12 hrs for BMI >40 kg/m2    Previous Dose Enoxaparin 40 mg Q24 hours   Creatinine Clearance Estimated Creatinine Clearance: 83.8 mL/min (based on SCr of 0.77 mg/dL). Creatinine Lab Results   Component Value Date/Time    Creatinine 0.77 07/29/2021 04:48 AM    Creatinine (POC) 0.9 03/19/2021 08:21 AM       Platelet Lab Results   Component Value Date/Time    PLATELET 083 80/06/1231 04:48 AM      H/H Lab Results   Component Value Date/Time    HGB 13.7 07/29/2021 04:48 AM        Pharmacist made change to enoxaparin therapy based on:  [ X ] BMI    Pharmacy to automatically make dose adjustment for renal dysfunction (creatinine clearance less than 30 mL/min)  Pharmacy to automatically make dose adjustment for obesity (BMI greater than 40)  Pharmacy to make dose rounding adjustments per [facility] dose adjustment scale. Pharmacy to monitor patients progress. Will make dose adjustment as needed per changing renal function. Will communicate further recommendations regarding patients anticoagulation therapy with prescriber.     Signed Troy Li, [facility] information: [phone]

## 2021-07-29 NOTE — PROGRESS NOTES
TRANSFER - OUT REPORT:    Verbal report given to [facility] RN(name) on Bennie Pacheco being transferred to [facility](unit) for routine progression of care       Report consisted of patient's Situation, Background, Assessment and   Recommendations(SBAR). Information from the following report(s) Procedure Summary was reviewed with the receiving nurse. Opportunity for questions and clarification was provided.

## 2021-07-30 VITALS
OXYGEN SATURATION: 97 % | BODY MASS INDEX: 41.31 KG/M2 | WEIGHT: 263.2 LBS | HEART RATE: 101 BPM | TEMPERATURE: 98.5 F | DIASTOLIC BLOOD PRESSURE: 68 MMHG | SYSTOLIC BLOOD PRESSURE: 148 MMHG | HEIGHT: 67 IN | RESPIRATION RATE: 16 BRPM

## 2021-07-30 LAB
GLUCOSE BLD STRIP.AUTO-MCNC: 206 MG/DL (ref 65–117)
GLUCOSE BLD STRIP.AUTO-MCNC: 255 MG/DL (ref 65–117)
SERVICE CMNT-IMP: ABNORMAL
SERVICE CMNT-IMP: ABNORMAL

## 2021-07-30 PROCEDURE — 82962 GLUCOSE BLOOD TEST: CPT

## 2021-07-30 PROCEDURE — 74011250636 HC RX REV CODE- 250/636: Performed by: INTERNAL MEDICINE

## 2021-07-30 PROCEDURE — 97530 THERAPEUTIC ACTIVITIES: CPT

## 2021-07-30 PROCEDURE — 74011636637 HC RX REV CODE- 636/637: Performed by: INTERNAL MEDICINE

## 2021-07-30 PROCEDURE — 97535 SELF CARE MNGMENT TRAINING: CPT

## 2021-07-30 PROCEDURE — 97116 GAIT TRAINING THERAPY: CPT

## 2021-07-30 PROCEDURE — 74011250637 HC RX REV CODE- 250/637: Performed by: NURSE PRACTITIONER

## 2021-07-30 PROCEDURE — 74011250637 HC RX REV CODE- 250/637: Performed by: INTERNAL MEDICINE

## 2021-07-30 RX ORDER — FUROSEMIDE 20 MG/1
20 TABLET ORAL DAILY
Qty: 30 TABLET | Refills: 0 | Status: ON HOLD | OUTPATIENT
Start: 2021-07-30 | End: 2021-09-30

## 2021-07-30 RX ORDER — CARVEDILOL 12.5 MG/1
12.5 TABLET ORAL 2 TIMES DAILY WITH MEALS
Qty: 60 TABLET | Refills: 0 | Status: SHIPPED | OUTPATIENT
Start: 2021-07-30 | End: 2021-08-23

## 2021-07-30 RX ORDER — ATORVASTATIN CALCIUM 40 MG/1
40 TABLET, FILM COATED ORAL DAILY
Qty: 30 TABLET | Refills: 0 | Status: SHIPPED | OUTPATIENT
Start: 2021-07-31 | End: 2021-08-23

## 2021-07-30 RX ORDER — FUROSEMIDE 20 MG/1
20 TABLET ORAL
Qty: 30 TABLET | Refills: 0 | Status: SHIPPED | OUTPATIENT
Start: 2021-07-30 | End: 2021-07-30 | Stop reason: SDUPTHER

## 2021-07-30 RX ORDER — AMLODIPINE BESYLATE 5 MG/1
10 TABLET ORAL DAILY
Status: DISCONTINUED | OUTPATIENT
Start: 2021-07-30 | End: 2021-07-30 | Stop reason: HOSPADM

## 2021-07-30 RX ORDER — AMLODIPINE BESYLATE 10 MG/1
10 TABLET ORAL DAILY
Qty: 30 TABLET | Refills: 0 | Status: SHIPPED | OUTPATIENT
Start: 2021-07-31 | End: 2021-08-23

## 2021-07-30 RX ADMIN — INSULIN LISPRO 7 UNITS: 100 INJECTION, SOLUTION INTRAVENOUS; SUBCUTANEOUS at 08:31

## 2021-07-30 RX ADMIN — INSULIN LISPRO 4 UNITS: 100 INJECTION, SOLUTION INTRAVENOUS; SUBCUTANEOUS at 12:23

## 2021-07-30 RX ADMIN — ENOXAPARIN SODIUM 40 MG: 40 INJECTION SUBCUTANEOUS at 05:50

## 2021-07-30 RX ADMIN — Medication 10 ML: at 05:50

## 2021-07-30 RX ADMIN — LOSARTAN POTASSIUM 100 MG: 50 TABLET, FILM COATED ORAL at 08:20

## 2021-07-30 RX ADMIN — INSULIN HUMAN 45 UNITS: 100 INJECTION, SUSPENSION SUBCUTANEOUS at 08:20

## 2021-07-30 RX ADMIN — AMLODIPINE BESYLATE 10 MG: 5 TABLET ORAL at 08:20

## 2021-07-30 RX ADMIN — ASPIRIN 81 MG: 81 TABLET, CHEWABLE ORAL at 08:19

## 2021-07-30 RX ADMIN — FUROSEMIDE 20 MG: 20 TABLET ORAL at 08:20

## 2021-07-30 RX ADMIN — ACETAMINOPHEN 650 MG: 325 TABLET ORAL at 05:50

## 2021-07-30 RX ADMIN — ACETAMINOPHEN 650 MG: 325 TABLET ORAL at 12:22

## 2021-07-30 RX ADMIN — FAMOTIDINE 40 MG: 20 TABLET ORAL at 08:20

## 2021-07-30 RX ADMIN — CARVEDILOL 12.5 MG: 12.5 TABLET, FILM COATED ORAL at 08:20

## 2021-07-30 RX ADMIN — Medication 10 ML: at 08:21

## 2021-07-30 RX ADMIN — ATORVASTATIN CALCIUM 40 MG: 20 TABLET, FILM COATED ORAL at 08:20

## 2021-07-30 NOTE — PROGRESS NOTES
Problem: Mobility Impaired (Adult and Pediatric)  Goal: *Acute Goals and Plan of Care (Insert Text)  Description: FUNCTIONAL STATUS PRIOR TO ADMISSION: Patient was modified independent using a crutches for functional mobility. Reports progressive back pain and LLE weakness over the past few months. HOME SUPPORT PRIOR TO ADMISSION: The patient lived with , drives and usually able to handle ADLs without assistance. Physical Therapy Goals  Initiated 7/27/2021  1. Patient will move from supine to sit and sit to supine , scoot up and down, and roll side to side in bed with independence within 7 day(s). 2.  Patient will transfer from bed to chair and chair to bed with modified independence using the least restrictive device within 7 day(s). 3.  Patient will perform sit to stand with modified independence within 7 day(s). 4.  Patient will ambulate with modified independence for 100 feet with the least restrictive device within 7 day(s). 5.  Patient will ascend/descend 4 stairs with 1 handrail(s) with minimal assistance/contact guard assist within 7 day(s). Outcome: Progressing Towards Goal  Note:   PHYSICAL THERAPY TREATMENT  Patient: Yang Gracia (76 y.o. female)  Date: 7/30/2021  Diagnosis: Hypertensive urgency [I16.0]  Chronic low back pain [M54.5, G89.29]  HTN (hypertension) [I10]  DM [facility]di[facility] [E11.9] <principal problem not specified>       Precautions: Fall  Chart, physical therapy assessment, plan of care and goals were reviewed. ASSESSMENT  Patient continues with skilled PT services and is progressing towards goals. Significant improvement in pain and mobility after ALYSA yesterday, Pt denies pain, improved DF on LLE, continued weakness. Somewhat impulsive with mobility tasks with CGA using RW for steadying, some decreased safety awareness with mobility tasks.  Recommend use of RW at this time for steadying, will place order     Current Level of Function Impacting Discharge (mobility/balance): CGA    Other factors to consider for discharge: decreased safety awareness         PLAN :  Patient continues to benefit from skilled intervention to address the above impairments. Continue treatment per established plan of care. to address goals. Recommendation for discharge: (in order for the patient to meet his/her long term goals)  Physical therapy at least 2 days/week in the home AND ensure assist and/or supervision for safety with mobility    This discharge recommendation:  Has been made in collaboration with the attending provider and/or case management    IF patient discharges home will need the following DME: rolling walker       SUBJECTIVE:   Patient stated i am much better.     OBJECTIVE DATA SUMMARY:   Critical Behavior:  Neurologic State: Alert  Orientation Level: Oriented X4  Cognition: Follows commands  Safety/Judgement: Decreased awareness of environment, Decreased awareness of need for assistance, Decreased awareness of need for safety, Decreased insight into deficits  Functional Mobility Training:  Bed Mobility:     Supine to Sit: Contact guard assistance              Transfers:  Sit to Stand: Contact guard assistance  Stand to Sit: Contact guard assistance                             Balance:  Sitting: High guard  Standing: Intact; With support  Standing - Static: Constant support;Good  Standing - Dynamic : Constant support;Good  Ambulation/Gait Training:  Distance (ft): 60 Feet (ft)  Assistive Device: Walker, rolling;Gait belt  Ambulation - Level of Assistance: Contact guard assistance;Stand-by assistance        Gait Abnormalities: Decreased step clearance        Base of Support: Widened     Speed/Giulia: Fluctuations                       Stairs:               Therapeutic Exercises:     Pain Rating:  Denies pain    Activity Tolerance:   Good    After treatment patient left in no apparent distress:   Sitting in chair, Call bell within reach, Bed / chair alarm activated, and Caregiver / family present    COMMUNICATION/COLLABORATION:   The patients plan of care was discussed with: Occupational therapist and Registered nurse.      Becky Walter   Time Calculation: 20 mins

## 2021-07-30 NOTE — DISCHARGE INSTRUCTIONS
HOSPITALIST DISCHARGE INSTRUCTIONS  NAME: Nata Corrales   :  1945   MRN:  [id number]     Date/Time:  2021 11:11 AM    ADMIT DATE: 2021     DISCHARGE DATE: 2021     PRINCIPAL DISCHARGE DIAGNOSES:  Uncontrolled hypertension  Back pain    MEDICATIONS:  · It is important that you take the medication exactly as they are prescribed. Note the changes and additions to your medications. Be sure you understand these changes before you are discharged today. · Keep your medication in the bottles provided by the pharmacist and keep a list of the medication names, dosages, and times to be taken in your wallet. · Do not take other medications without consulting your doctor. Pain Management: per above medications    What to do at Home    Recommended diet:  Cardiac Diet and Diabetic Diet    Recommended activity: PT/OT Eval and Treat    If you experience any of the following symptoms then please call your primary care physician or return to the emergency room if you cannot get hold of your doctor:  Severe back pain, worsening leg weakness, numbness, falling, chest pain, shortness of breath, palpitations, dizziness, confusion, weakness, falling, or other severe concerning symptoms    Follow Up: Follow-up Information     Follow up With Specialties Details Why Contact Info    [facility], [address] In 3 days Please arrange a hospital follow up within 3 days of d/c.  [address] [place] [address]  [phone]      [facility]    Please call and arrange a follow up appt for your physical therapy. Please bring the script that doctors provide you with at time of discharge.   Address: [address] [place] [address]  Hours:   Krunal De Santiago 7:30AM- 4:30 PM   Phone: [phone]    Timoteo Robbins MD Cardiology  As needed [facility]   [address] [facility]  [phone]              Information obtained by :  I understand that if any problems occur once I am at home I am to contact my physician. I understand and acknowledge receipt of the instructions indicated above.                                                                                                                                            Physician's or R.N.'s Signature                                                                  Date/Time                                                                                                                                              Patient or Representative Signature                                                          Date/Time

## 2021-07-30 NOTE — PROGRESS NOTES
Care Management follow up, LOS 4 days.     Patient admitted for HTN urgency. History of: diabetes, neuropathy, chronic back pain, stenosis.     RUR 20 (Score %) low             Is This a Readmission NO  Is this a Bundle YES, BPCI letter given to patient.     Current status  Patient ready for discharge today. Met with patient and her  at bedside, discussed home health PT/OT follow up. Patient declines states she has help at home and she wants to follow up with outpatient PT as before admission. States she has several family members that can assist her with transport. Order noted for walker, referral placed via [employer] to [employer], await response. Shanda Alicia approved from [employer], obtained from inventory closet and delivery ticket signed and attached to [employer] referral.     Transition of Care Plan  1. Monitor patient status and response to treatment. 2. Patient continues to require medical management. 3. Home with outpatient PT and family assist..    4. Home per family at [place]. 5. CM to monitor progress and recommendations. Care Management Interventions  PCP Verified by CM:  Yes (Tye Mcneal DO; last visit > 30 days)  Palliative Care Criteria Met (RRAT>21 & CHF Dx)?: No  Mode of Transport at Discharge: Self (Family)  Physical Therapy Consult: Yes  Occupational Therapy Consult: Yes  Speech Therapy Consult: No  Current Support Network: Own Home, Lives with Spouse (pt lives w/ spouse in hospitals residence, at [facility] pt is ambulatory, iADLs, drives, family can assist)  Confirm Follow Up Transport: Family  Discharge Location  Discharge Placement: Home with family assistance     Gloria Vogel RN, MSN/Care manager

## 2021-07-30 NOTE — PROGRESS NOTES
Problem: Self Care Deficits Care Plan (Adult)  Goal: *Acute Goals and Plan of Care (Insert Text)  Description: FUNCTIONAL STATUS PRIOR TO ADMISSION: Patient was modified independent using a crutches for functional mobility. Reports progressive back pain and LLE weakness over the past few months, which has resulted in need for increased assistance for ADLs. HOME SUPPORT PRIOR TO ADMISSION: The patient lived with , drives and usually able to handle ADLs without assistance. Occupational Therapy Goals  Initiated 7/29/2021  1. Patient will perform grooming, seated EOB, with supervision/set-up within 7 day(s). 2.  Patient will perform upper body dressing and bathing with supervision/set-up within 7 day(s). 3.  Patient will perform lower body dressing with minimal assistance/contact guard assist using AE/adaptive strategies PRN within 7 day(s). 4.  Patient will perform toilet transfers with supervision/set-up within 7 day(s). 5.  Patient will perform all aspects of toileting with supervision/set-up within 7 day(s). 6.  Patient will participate in upper extremity therapeutic exercise/activities with supervision/set-up for 10 minutes within 7 day(s). Outcome: Progressing Towards Goal     OCCUPATIONAL THERAPY TREATMENT  Patient: Marcela King (89 y.o. female)  Date: 7/30/2021  Diagnosis: Hypertensive urgency [I16.0]  Chronic low back pain [M54.5, G89.29]  HTN (hypertension) [I10]  DM (diabetes mellitus), type 2 ([facility] [E11.9] <principal problem not specified>       Precautions: Fall  Chart, occupational therapy assessment, plan of care, and goals were reviewed. ASSESSMENT  Patient continues with skilled OT services and is progressing towards goals. Pt with significant improvement in pain and mobility following ALYSA yesterday. Pt received in bed, denies pain but continues to demonstrate some L LE weakness.  She is able to progress activity to bathroom for toileting with overall CGA with rolling walker and requires cues for safety throughout. She manages toileting and grooming tasks with SBA and is educated extensively on fall prevention and safety in prep for transition home. Recommend follow up [place] but pt reporting she would prefer OP PT and states she will have increased support and assistance at home. Current Level of Function Impacting Discharge (ADLs): CGA to min A for ADLs; SBA/CGA for ADL transfers    Other factors to consider for discharge: decreased safety awareness; lives with ; dtr lives next door to assist as needed         PLAN :  Patient continues to benefit from skilled intervention to address the above impairments. Continue treatment per established plan of care to address goals. Recommend with staff: OOB to chair for meals; mobility to bathroom for toileting; ADLs as able with assistance    Recommend next OT session: LB dressing with AE    Recommendation for discharge: (in order for the patient to meet his/her long term goals)  Occupational therapy at least 2 days/week in the home AND ensure assist and/or supervision for safety with ADLs and mobility    This discharge recommendation:  Has been made in collaboration with the attending provider and/or case management    IF patient discharges home will need the following DME: RW       SUBJECTIVE:   Patient stated I'm fine, I have no pain.     OBJECTIVE DATA SUMMARY:   Cognitive/Behavioral Status:  Neurologic State: Alert  Orientation Level: Oriented X4  Cognition: Follows commands  Perception: Appears intact  Perseveration: No perseveration noted  Safety/Judgement: Decreased awareness of need for safety; Fall prevention    Functional Mobility and Transfers for ADLs:  Bed Mobility:  Supine to Sit: Contact guard assistance    Transfers:  Sit to Stand: Contact guard assistance  Functional Transfers  Toilet Transfer : Stand-by assistance  Cues: Verbal cues provided  Adaptive Equipment: Grab bars; [address] (comment)    Balance:  Sitting: Without support;High guard  Standing: Intact; With support  Standing - Static: Constant support;Good  Standing - Dynamic : Constant support;Good    ADL Intervention:    Grooming  Grooming Assistance: Stand-by assistance;Contact guard assistance  Position Performed: Standing (at sink with rolling awlker)  Washing Hands: Stand-by assistance;Contact guard assistance  Cues: Verbal cues provided    Toileting  Bladder Hygiene: Contact guard assistance (squatting over toilet)  Clothing Management: Minimum assistance  Cues: Verbal cues provided;Visual cues provided  Adaptive Equipment: Grab bars; Walker    Cognitive Retraining  Safety/Judgement: Decreased awareness of need for safety; Fall prevention    Pain:  Pt reporting no pain    Activity Tolerance:   Good and SpO2 stable on RA    After treatment patient left in no apparent distress:   Supine in bed, Call bell within reach, Bed / chair alarm activated, Caregiver / family present and Side rails x 3    COMMUNICATION/COLLABORATION:   The patients plan of care was discussed with: Physical therapist, Physical therapy assistant, Registered nurse and Case management.      Ethel Saldivar OT  Time Calculation: 23 mins

## 2021-07-30 NOTE — PROGRESS NOTES
Unable to obtain lab specimen from pt Endurance catheter line. Pt would like to decline AM labs until AM MD rounds.

## 2021-07-30 NOTE — PROGRESS NOTES
Spiritual Care Assessment/Progress Note  [address]      NAME: Tato Hassan      MRN: [id number]  AGE: 77 y.o. SEX: female  Anglican Affiliation: Lutheran   Language: English     7/30/2021     Total Time (in minutes): 9     Spiritual Assessment begun in [facility] [address] through conversation with:         [x]Patient        [x] Family    [] Friend(s)        Reason for Consult: Initial/Spiritual assessment, patient floor     Spiritual beliefs: (Please include comment if needed)     [x] Identifies with a tristen tradition:  Jennifer Diallo        [] Supported by a tristen community:            [] Claims no spiritual orientation:           [] Seeking spiritual identity:                [] Adheres to an individual form of spirituality:           [] Not able to assess:                           Identified resources for coping:      [x] Prayer                               [] Music                  [] Guided Imagery     [x] Family/friends                 [] Pet visits     [] Devotional reading                         [] Unknown     [] Other:                                           Interventions offered during this visit: (See comments for more details)    Patient Interventions: Affirmation of tristen, Iconic (affirming the presence of God/Higher Power), Prayer (assurance of), Initial/Spiritual assessment, patient floor     Family/Friend(s):  Affirmation of emotions/emotional suffering, Prayer (assurance of), Iconic (affirming the presence of God/Higher Power), Affirmation of tristen     Plan of Care:     [] Support spiritual and/or cultural needs    [] Support AMD and/or advance care planning process      [] Support grieving process   [] Coordinate Rites and/or Rituals    [] Coordination with community clergy   [] No spiritual needs identified at this time   [] Detailed Plan of Care below (See Comments)  [] Make referral to Music Therapy  [] Make referral to Pet Therapy     [] Make referral to Addiction services  [] Make referral to [facility]  [] Make referral to Spiritual Care Partner  [] No future visits requested        [x] Follow up upon further referrals     Comments: Initial spiritual assessment in [facility]. Miss Gissell Cage and her  were in the room. Miss Gissell Cage is excited to being discharged. Provided brief spiritual support as staff prepped for her discharge. They have a Anabaptism belief in God. Provided spiritual presence and assurance of prayer.     Visited by: Redd Starkey MS., [address] ([id number])

## 2021-07-30 NOTE — PROGRESS NOTES
Bedside and Verbal shift change report given to GM RN  (oncoming nurse) by s s rn  (offgoing nurse). Report included the following information with SBAR/ KARDEX. .. This patient was assisted with Intentional Toileting every 2 hours during this shift as appropriate. Documentation of ambulation and output reflected on Flowsheet as appropriate. Purposeful hourly rounding was completed using AIDET and 5Ps. Outcomes of PHR documented as they occurred. Bed alarm in use as appropriate. Dual Suction and ambubag in place. I have reviewed discharge instructions with the patient and spouse. The patient and spouse verbalized understanding. Andrea Puentes Discharged the pt via [employer]. Andrea Puentes

## 2021-07-30 NOTE — PROGRESS NOTES
Family at bedside. Educated on Fall prevention ( call don't fall) and instruted not to get PT up with out staff assistance.

## 2021-07-30 NOTE — DISCHARGE SUMMARY
Physician Discharge Summary     Patient ID:  Kayla Garner  [id number]  77 y.o.  1945    Admit date: 7/26/2021    Discharge date: 7/30/2021    Admission Diagnoses: Hypertensive urgency [I16.0]  Chronic low back pain [M54.5, G89.29]  HTN (hypertension) [I10]  DM (diabetes mellitus), type 2 ([facility] [E11.9]    Principal Discharge Diagnoses:    Back pain  Hypertensive emergency    OTHER PROBLEMS ADDRESSEDS  Principal Diagnosis <principal problem not specified>                                            Active Problems:    HTN (hypertension) (7/26/2021)      Chronic low back pain (7/26/2021)      DM (diabetes mellitus), type 2 ([address] (7/26/2021)      Hypertensive urgency (7/26/2021)       Patient Active Problem List   Diagnosis Code    SIRS [facility]systemic inflammatory response syndrome) ([facility] R65.10    Hypertension I10    Type 2 diabetes mellitus ([facility] E11.9    Depression with anxiety F41.8    Glaucoma H40.9    Breast cancer ([facility] C50.919    Abscess L02.91    Morbid obesity (HCC) E66.01    Infected sebaceous cyst L72.3, L08.9    Shortness of breath R06.02    CHF (congestive heart failure) (HCC) I50.9    Solitary pulmonary nodule R91.1    HTN (hypertension) I10    Chronic low back pain M54.5, G89.29    DM (diabetes mellitus), type 2 ([facility] E11.9    Hypertensive urgency I16.0         Hospital Course:   Ms. Shauna Metzger is a 78 yo F w/ hx of HTN, DM, chronic back pain referred to ED for severely elevated BP from ortho clinic. Hospital course complicated by problems as listed below:     Hypertensive urgency: BP improved. Added Norvasc, metoprolol changed to Coreg which was up-titrated in dose. Cont losartan. On Lasix. Further titration of meds outpatient     Elevated troponin: minimal, remaining flat. Likely demand ischemia, not ACS. Negative nuc stress test. Cont ASA, statin. Evaluated by cardiology     Chronic low back pain / spinal stenosis: underwent ALYSA yesterday, now feeling much better.  Appreciate IR assistance. Mobility much improved as well, after PT/OT re-evaluation. Safe for DC w/ HH     DM (diabetes mellitus), type 2: fairly well controlled at baseline w/ A1c 7.6%. Can resume home NPH however caution with short-acting insulin w/ meals (added holding parameters, discussed w/ pt)    Morbid obesity: Body mass index is 40.72 kg/m². Would benefit from weight loss and dietary / lifestyle modifications       Pt discharged in improved and stable condition. Procedures performed: see above    Imaging studies: see above  IR INJ FORAMIN EPID LUMB ANES/STER SNGL    Result Date: 7/29/2021  Successful fluoroscopic guided steroid injections into the left L2-L3 and L3-L4 neuroforamina. PCP: Renata Philip DO    Consults: Cardiology    Discharge Exam:  Visit Vitals  BP (!) 148/68 (BP 1 Location: Left arm, BP Patient Position: At rest)   Pulse (!) 101   Temp 98.5 °F (36.9 °C)   Resp 16   Ht 5' 7\" (1.702 m)   Wt 119.4 kg (263 lb 3.2 oz)   SpO2 97%   BMI 41.22 kg/m²     GEN: NAD  CV: RRR  RESP: CTAB  ABD: NTTP    Disposition: home    Patient Instructions:   Current Discharge Medication List      START taking these medications    Details   amLODIPine (NORVASC) 10 mg tablet Take 1 Tablet by mouth daily. Qty: 30 Tablet, Refills: 0  Start date: 7/31/2021      atorvastatin (LIPITOR) 40 mg tablet Take 1 Tablet by mouth daily. Qty: 30 Tablet, Refills: 0  Start date: 7/31/2021      carvediloL (COREG) 12.5 mg tablet Take 1 Tablet by mouth two (2) times daily (with meals). Qty: 60 Tablet, Refills: 0  Start date: 7/30/2021         CONTINUE these medications which have CHANGED    Details   insulin regular (NovoLIN R Regular U-100 Insuln) 100 unit/mL injection Do not take if not eating or if blood sugar is less than 120  30 units am, 30 units noon, 35 units in the evening  Qty: 1 Vial, Refills: 0  Start date: 7/30/2021      furosemide (Lasix) 20 mg tablet Take 1 Tablet by mouth daily.   Qty: 30 Tablet, Refills: 0  Start date: 7/30/2021         CONTINUE these medications which have NOT CHANGED    Details   losartan (COZAAR) 50 mg tablet TAKE 1 TABLET BY MOUTH TWICE A DAY  Qty: 180 Tablet, Refills: 3      vitamin E (AQUA GEMS) 400 unit capsule Take 400 Units by mouth daily. acetaminophen (TylenoL) 325 mg tablet Take 650 mg by mouth every four (4) hours as needed for Pain.      famotidine (Pepcid AC) 20 mg tablet Take 40 mg by mouth two (2) times a day. insulin NPH (NovoLIN N NPH U-100 Insulin) 100 unit/mL injection 60 Units by SubCUTAneous route two (2) times a day. 60u morning; 60u in the DINNER TIME  Indications: 60 units in morning and 60 units in the evening      triamcinolone acetonide (KENALOG) 0.1 % ointment Apply  to affected area two (2) times a day. use thin layer  Applies on face at bipap allergy site      aspirin delayed-release 81 mg tablet Take 81 mg by mouth daily. STOP taking these medications       metoprolol tartrate (LOPRESSOR) 25 mg tablet Comments:   Reason for Stopping:               Activity: See discharge instructions  Diet: See discharge instructions  Wound Care: See discharge instructions    Follow-up Information     Follow up With Specialties Details Why Contact Info    Karis Rodriguez, [address] In 3 days Please arrange a hospital follow up within 3 days of d/c.  [address] [place] [address]  [phone]      [facility]    Please call and arrange a follow up appt for your physical therapy. Please bring the script that doctors provide you with at time of discharge. Address: [address] [place] [address]  Hours:   Fabiola Barakat 7:30AM- 4:30 PM   Phone: [phone]    Sam Bales MD Cardiology  As needed [place]   [address] [facility]  [phone]            I spent 35 minutes on this discharge. Signed:  Sherley Miller MD  7/30/2021  11:15 AM    CC: PCP Dr. Cody Dai   .

## 2021-08-02 ENCOUNTER — PATIENT OUTREACH (OUTPATIENT)
Dept: CASE MANAGEMENT | Age: 76
End: 2021-08-02

## 2021-08-03 PROBLEM — I10 HYPERTENSION: Status: RESOLVED | Noted: 2021-08-03 | Resolved: 2021-08-03

## 2021-08-03 PROBLEM — E11.9 TYPE 2 DIABETES MELLITUS (HCC): Status: RESOLVED | Noted: 2021-08-03 | Resolved: 2021-08-03

## 2021-08-03 NOTE — PROGRESS NOTES
Care Transitions Initial Call    Call within 2 business days of discharge: Yes     Patient: Radha Yao Patient : 1945 MRN: [id number]    [facility]       Complaint Diagnosis Description Type Department Provider    21 Back Pain; Hypertension Severe hypertension . .. ED to Hosp-Admission (Discharged) (ADMIT) Dale Salas MD; Nina Shoemaker... Was this an external facility discharge? No     Challenges to be reviewed by the provider   Medication changes:   START taking:  amLODIPine (NORVASC)  Start taking on: 2021  atorvastatin (LIPITOR)  Start taking on: 2021  carvediloL (COREG)  CHANGE how you take:  furosemide (Lasix)  insulin regular (NovoLIN R Regular U-100 Insuln)  STOP taking:  metoprolol tartrate 25 mg tablet (LOPRESSOR)    - patient was asked to keep BP log to bring to her appt        Method of communication with provider : phone    Discussed COVID-19 related testing which was not done at this time. Advance Care Planning:   Does patient have an Advance Directive: not on file; education provided. Inpatient Readmission Risk score: Unplanned Readmit Risk Score: 21    Was this a readmission? no     Patients top risk factors for readmission: lack of knowledge about disease, medical condition-HTN, DM, chronic back pain  and multiple health system providers   Interventions to address risk factors: Scheduled appointment with PCP-, Scheduled appointment with Specialist-patient will call for cardiac appt, Obtained and reviewed discharge summary and/or continuity of care documents and Education of patient/family/caregiver/guardian to support self-management-see goals    Care Transition Nurse (CTN) contacted the patient by telephone to perform post hospital discharge assessment. Verified name and  with patient as identifiers. Provided introduction to self, and explanation of the CTN role.      CTN reviewed discharge instructions, medical action plan and red flags with patient who verbalized understanding. Were discharge instructions available to patient? yes. Reviewed appropriate site of care based on symptoms and resources available to patient including: PCP, Specialist, Urgent Care Clinics and When to call 911. Patient given an opportunity to ask questions and does not have any further questions or concerns at this time. The patient agrees to contact the PCP office for questions related to their healthcare. Medication reconciliation was performed with patient, who verbalizes understanding of administration of home medications. Advised obtaining a 90-day supply of all daily and as-needed medications. Referral to Pharm D needed: no     Home Health/Outpatient orders at discharge: n/a - outpatient PT   Durable Medical Equipment ordered at discharge:no    Covid Risk Education    Educated patient about risk for severe COVID-19 due to risk factors according to CDC guidelines. CTN reviewed discharge instructions, medical action plan and red flag symptoms with the patient who verbalized understanding. Discussed COVID vaccination status: yes. Education provided on COVID-19 vaccination as appropriate. Discussed exposure protocols and quarantine with CDC Guidelines. Patient was given an opportunity to verbalize any questions and concerns and agrees to contact CTN or health care provider for questions related to their healthcare. Was patient discharged with a pulse oximeter? no      Discussed follow-up appointments. If no appointment was previously scheduled, appointment scheduling offered: yes. Is follow up appointment scheduled within 7 days of discharge? yes.    [facility] follow up appointment(s):   Future Appointments   Date Time Provider Carla Grant   11/29/2021 11:20 AM LINDA MotaF [facility]     [facility] follow up appointment(s): PCP 8/2/2021    Plan for follow-up call in 5-7 days based on severity of symptoms and risk factors. Plan for next call: symptom management-assess s/s  and follow up appointment-confirm attendance  CTN provided contact information for future needs. Goals Addressed                 This Visit's Progress     Prevent complications post hospitalization. 08/03/21    - Spoke to patient today. Patient lives with her .   - patient reports that she is feeling much better since returning home. - patient is checking BP daily and advised patient to keep a log, her most recent BP was 140/78, she will contact MD if >160/100.   - patient will see PCP again on 8/18, asked patient to take log of her BP readings to her appt  - patient checks glucose BID, she states it has been 120-130.   - Reviewed red flag s/s with patient, nausea, vomiting, inability to pass urine/stool, SOB, mental status change, chest pain, fever.    - patient will call cardiology for follow up appt   - patient also will call outpatient PT to set up PT, patient states that she hasn't got a chance to call yet since she was discharged on a Friday and offices were closed over the weekend. - patient will take multiple short walks around the home daily   - confirmed medications, patient will take as prescribed. Patient will contact Md/CTN if red flag s/s arise. CTN to f/u ~ 7-10 days. AR               START taking these medications     Details   amLODIPine (NORVASC) 10 mg tablet Take 1 Tablet by mouth daily. Qty: 30 Tablet, Refills: 0  Start date: 7/31/2021       atorvastatin (LIPITOR) 40 mg tablet Take 1 Tablet by mouth daily. Qty: 30 Tablet, Refills: 0  Start date: 7/31/2021       carvediloL (COREG) 12.5 mg tablet Take 1 Tablet by mouth two (2) times daily (with meals).   Qty: 60 Tablet, Refills: 0  Start date: 7/30/2021                 CONTINUE these medications which have CHANGED     Details   insulin regular (NovoLIN R Regular U-100 Insuln) 100 unit/mL injection Do not take if not eating or if blood sugar is less than 120  30 units am, 30 units noon, 35 units in the evening  Qty: 1 Vial, Refills: 0  Start date: 7/30/2021       furosemide (Lasix) 20 mg tablet Take 1 Tablet by mouth daily.   Qty: 30 Tablet, Refills: 0  Start date: 7/30/2021         STOP taking these medications         metoprolol tartrate (LOPRESSOR) 25 mg tablet Comments:   Reason for Stopping:

## 2021-08-13 ENCOUNTER — TELEPHONE (OUTPATIENT)
Dept: CARDIOLOGY CLINIC | Age: 76
End: 2021-08-13

## 2021-08-13 ENCOUNTER — PATIENT OUTREACH (OUTPATIENT)
Dept: CASE MANAGEMENT | Age: 76
End: 2021-08-13

## 2021-08-13 RX ORDER — METOPROLOL TARTRATE 25 MG/1
25 TABLET, FILM COATED ORAL 2 TIMES DAILY
Qty: 20 TABLET | Refills: 0 | Status: SHIPPED | OUTPATIENT
Start: 2021-08-13 | End: 2021-08-23 | Stop reason: SDUPTHER

## 2021-08-13 NOTE — TELEPHONE ENCOUNTER
Prior to calling the pt I consulted Ivan Leavitt I was unsure if the pt was on this medication since it was noted as \"taking\" at her LOV, but not addressed in the notes. Per Ivan Leavitt:   Looks like she was changed to Coreg during her last admission.  Was given coreg 12.5mg BID - metoprolol was dc'd. Returned pt call, ID [id number]. Pt had  called inquiring because she is out of her medication Metoprolol 25 mg twice a day. She says she normally gets a 90 day refill. I explained to the pt that the medication was discontinued at the hospital. She proceeded to go into a long monolog about how that was a mistake, and she isn't taking the Coreg, because it shouldn't have been changed, the metoprolol was working just fine. I could not convince her otherwise and suggested she come in to talk with our Nurse Practitioner to discuss it and get everything straightened out. I agreed that I would call in just enough medication to get her through until the appointment. The pt reluctantly agreed. I was able to schedule her for Monday 8/23 @1120 with Ivan Leavitt. Refill per VO of Leonard Velez NP  Last appt: 5/21/2021  Future Appointments   Date Time Provider Carla Grant   8/23/2021 11:20 AM LINDA Urias AMB   11/29/2021 11:20 AM LINDA Robertson AMB       Requested Prescriptions     Pending Prescriptions Disp Refills    metoprolol tartrate (LOPRESSOR) 25 mg tablet 20 Tablet 0     Sig: Take 1 Tablet by mouth two (2) times a day.

## 2021-08-13 NOTE — TELEPHONE ENCOUNTER
Patient called because she is out of her medication Metoprolol 25 mg twice a day. She says she normally gets a 90 day refill. Pharmacy verified. Please give a call back.

## 2021-08-16 NOTE — PROGRESS NOTES
Goals      Prevent complications post hospitalization. 08/03/21    - Spoke to patient today. Patient lives with her .   - patient reports that she is feeling much better since returning home. - patient is checking BP daily and advised patient to keep a log, her most recent BP was 140/78, she will contact MD if >160/100.   - patient will see PCP again on 8/18, asked patient to take log of her BP readings to her appt  - patient checks glucose BID, she states it has been 120-130.   - Reviewed red flag s/s with patient, nausea, vomiting, inability to pass urine/stool, SOB, mental status change, chest pain, fever.    - patient will call cardiology for follow up appt   - patient also will call outpatient PT to set up PT, patient states that she hasn't got a chance to call yet since she was discharged on a Friday and offices were closed over the weekend. - patient will take multiple short walks around the home daily   - confirmed medications, patient will take as prescribed. Patient will contact Md/CTN if red flag s/s arise. CTN to f/u ~ 7-10 days. AR       08/16/21  - Spoke to patient today. Patient will see PCP on 8/17 at 1300. Patient will see cardiology on 8/23. Patient reports that she switched back to metoprolol b/c she was confused about which med to take despite medication education and rec being done at prevoius [facility] call. She reports highest was 150/79, most recently was 135/70. Patient is mostly worried about switching multiple medications again. Patient will keep log of BP and take to her appt tomorrow and 8/23. Patient reported no red flag s/s. Reminded patient of red flag s/s. Patient will contact Md/CTN if red flag s/s arise. CTN to f/u ~ 7-10 days.  AR

## 2021-08-23 ENCOUNTER — OFFICE VISIT (OUTPATIENT)
Dept: CARDIOLOGY CLINIC | Age: 76
End: 2021-08-23
Payer: MEDICARE

## 2021-08-23 VITALS
OXYGEN SATURATION: 98 % | BODY MASS INDEX: 42.06 KG/M2 | SYSTOLIC BLOOD PRESSURE: 159 MMHG | DIASTOLIC BLOOD PRESSURE: 85 MMHG | HEART RATE: 78 BPM | HEIGHT: 67 IN | WEIGHT: 268 LBS

## 2021-08-23 DIAGNOSIS — I50.32 CHRONIC DIASTOLIC CONGESTIVE HEART FAILURE (HCC): ICD-10-CM

## 2021-08-23 DIAGNOSIS — I25.10 CORONARY ARTERY DISEASE INVOLVING NATIVE CORONARY ARTERY OF NATIVE HEART WITHOUT ANGINA PECTORIS: ICD-10-CM

## 2021-08-23 DIAGNOSIS — E11.65 UNCONTROLLED TYPE 2 DIABETES MELLITUS WITH HYPERGLYCEMIA (HCC): ICD-10-CM

## 2021-08-23 DIAGNOSIS — I10 ESSENTIAL HYPERTENSION: Primary | ICD-10-CM

## 2021-08-23 DIAGNOSIS — E66.01 MORBID OBESITY (HCC): ICD-10-CM

## 2021-08-23 PROCEDURE — G8427 DOCREV CUR MEDS BY ELIG CLIN: HCPCS | Performed by: NURSE PRACTITIONER

## 2021-08-23 PROCEDURE — G9717 DOC PT DX DEP/BP F/U NT REQ: HCPCS | Performed by: NURSE PRACTITIONER

## 2021-08-23 PROCEDURE — G8536 NO DOC ELDER MAL SCRN: HCPCS | Performed by: NURSE PRACTITIONER

## 2021-08-23 PROCEDURE — 1101F PT FALLS ASSESS-DOCD LE1/YR: CPT | Performed by: NURSE PRACTITIONER

## 2021-08-23 PROCEDURE — 2022F DILAT RTA XM EVC RTNOPTHY: CPT | Performed by: NURSE PRACTITIONER

## 2021-08-23 PROCEDURE — 3051F HG A1C>EQUAL 7.0%<8.0%: CPT | Performed by: NURSE PRACTITIONER

## 2021-08-23 PROCEDURE — 1111F DSCHRG MED/CURRENT MED MERGE: CPT | Performed by: NURSE PRACTITIONER

## 2021-08-23 PROCEDURE — 3017F COLORECTAL CA SCREEN DOC REV: CPT | Performed by: NURSE PRACTITIONER

## 2021-08-23 PROCEDURE — G8400 PT W/DXA NO RESULTS DOC: HCPCS | Performed by: NURSE PRACTITIONER

## 2021-08-23 PROCEDURE — 1090F PRES/ABSN URINE INCON ASSESS: CPT | Performed by: NURSE PRACTITIONER

## 2021-08-23 PROCEDURE — G8417 CALC BMI ABV UP PARAM F/U: HCPCS | Performed by: NURSE PRACTITIONER

## 2021-08-23 PROCEDURE — 99214 OFFICE O/P EST MOD 30 MIN: CPT | Performed by: NURSE PRACTITIONER

## 2021-08-23 PROCEDURE — G8754 DIAS BP LESS 90: HCPCS | Performed by: NURSE PRACTITIONER

## 2021-08-23 PROCEDURE — G0463 HOSPITAL OUTPT CLINIC VISIT: HCPCS | Performed by: NURSE PRACTITIONER

## 2021-08-23 PROCEDURE — G8753 SYS BP > OR = 140: HCPCS | Performed by: NURSE PRACTITIONER

## 2021-08-23 RX ORDER — LOSARTAN POTASSIUM 50 MG/1
TABLET ORAL
Qty: 180 TABLET | Refills: 0 | Status: ON HOLD | OUTPATIENT
Start: 2021-08-23 | End: 2021-09-30

## 2021-08-23 RX ORDER — METOPROLOL TARTRATE 25 MG/1
25 TABLET, FILM COATED ORAL 2 TIMES DAILY
Qty: 180 TABLET | Refills: 0 | Status: SHIPPED | OUTPATIENT
Start: 2021-08-23 | End: 2021-10-12 | Stop reason: SDUPTHER

## 2021-08-23 NOTE — PATIENT INSTRUCTIONS
Increase losartan to 50mg twice daily   Keep home blood pressure log 1-2x per day for the next 2-3 weeks    See us back in 2-4 weeks for follow-up.

## 2021-08-23 NOTE — PROGRESS NOTES
RUI Frankel  [address] Khris [place] [facility], [address] [place] [address]    Office [phone]  Fax [phone]          NAME: Catalino Olivares   :  1945  MRM:  [id number]    Date:  2021            Assessment:     Problem List  Date Reviewed: 2021        Codes Class Noted    Shortness of breath ICD-10-CM: R06.02  ICD-9-CM: 786.05  3/3/2020        HTN (hypertension) ICD-10-CM: I10  ICD-9-CM: 401.9  2021        Chronic low back pain ICD-10-CM: M54.5, G89.29  ICD-9-CM: 724.2, 338.29  2021        DM (diabetes mellitus), type 2 ([facility] ICD-10-CM: E11.9  ICD-9-CM: 250.00  2021        Hypertensive urgency ICD-10-CM: I16.0  ICD-9-CM: 401.9  2021        Solitary pulmonary nodule ICD-10-CM: R91.1  ICD-9-CM: 793.11  2020        CHF (congestive heart failure) ([facility] ICD-10-CM: I50.9  ICD-9-CM: 428.0  3/4/2020        Infected sebaceous cyst ICD-10-CM: L72.3, L08.9  ICD-9-CM: 706.2  2018        Abscess ICD-10-CM: L02.91  ICD-9-CM: 682.9  2018        Morbid obesity ([facility] ICD-10-CM: E66.01  ICD-9-CM: 278.01  2018        SIRS (systemic inflammatory response syndrome) ([facility] ICD-10-CM: R65.10  ICD-9-CM: 995.90  2016        Depression with anxiety ICD-10-CM: F41.8  ICD-9-CM: 300.4  Unknown        Glaucoma ICD-10-CM: H40.9  ICD-9-CM: 365.9  Unknown        Breast cancer ([address] ICD-10-CM: C50.919  ICD-9-CM: 174.9  Unknown                 Plan:     hypertension: Pt hesitant to make any med changes. Discussed keeping hm log BID x2-3wks. Agreeable to increase losartan to 50mg BID as previously rx. Will hold off on Coreg and norvasc for now. Cont prev rx metoprolol. Close fu to review log. Discussed at length complications related to poorly controled HTN. Diastolic congestive heart failure: Vol appears controlled. There is no significant peripheral edema. Continue Lasix 20mg/d. Control blood pressure. Echo 2021 with LVEF 60-65%.      CAD: Dense coroanry calcification without significant obstructive disease by cath 3/2020. NM stress 7/2021 was low risk. Continue baby aspirin daily. She does not want to be on statins despite review of benefits. Fu again in 2-4wks or PRN         Subjective:      Noni Rachel is a 77 y. o. female being seen today for f/u of HTN.  She was inpt 7/26/21 to 7/30/21 for HTN urgency as well as aggravated low back pain. Norvasc and atorvastatin were added at [facility]; metoprolol changed to coreg. Pt did not change any meds as advised. Also noted to be taking losartan 50mg per day not BID as prescribed. Pt feels home BP is stable but does not have log to review. She does not want new meds. Very frustrated with med changes and feels like a 'guinea pig every time she's in the hospital.  Feels BP was high b/c of back pain. High today in office d/t her frustration about being here. Not interested in statin drugs. Denies exertional CP or breathing changes. LE edema well controlled with lasix daily (occ BID). Exam:     Physical Exam:  Visit Vitals  BP (!) 159/85 (BP 1 Location: Left upper arm, BP Patient Position: Sitting)   Pulse 78   Ht 5' 7\" (1.702 m)   Wt 268 lb (121.6 kg)   SpO2 98%   BMI 41.97 kg/m²     General - well developed well nourished, morbidly obese, in wheelchair  Neck - neck supple, thick neck, no obvious JVD  Cardiac - RRR, distant heart sounds  Vascular - radials pulses equal bilateral  Lungs - clear to auscultation bilaterals, no rales, wheezing or rhonchi  Abd - soft nontender, non-distended, +BS  Extremities - trace LE edema, slightly cool   Neuro - nonfocal  Psych - normal mood and affect      Medications:     Current Outpatient Medications   Medication Sig    losartan (COZAAR) 50 mg tablet TAKE 1 TABLET BY MOUTH TWICE A DAY    metoprolol tartrate (LOPRESSOR) 25 mg tablet Take 1 Tablet by mouth two (2) times a day.     insulin regular (NovoLIN R Regular U-100 Insuln) 100 unit/mL injection Do not take if not eating or if blood sugar is less than 120  30 units am, 30 units noon, 35 units in the evening    furosemide (Lasix) 20 mg tablet Take 1 Tablet by mouth daily.  vitamin E (AQUA GEMS) 400 unit capsule Take 400 Units by mouth daily.  acetaminophen (TylenoL) 325 mg tablet Take 650 mg by mouth every four (4) hours as needed for Pain.  famotidine (Pepcid AC) 20 mg tablet Take 40 mg by mouth two (2) times a day.  insulin NPH (NovoLIN N NPH U-100 Insulin) 100 unit/mL injection 60 Units by SubCUTAneous route two (2) times a day. 60u morning; 60u in the DINNER TIME  Indications: 60 units in morning and 60 units in the evening    triamcinolone acetonide (KENALOG) 0.1 % ointment Apply  to affected area two (2) times a day. use thin layer  Applies on face at bipap allergy site    aspirin delayed-release 81 mg tablet Take 81 mg by mouth daily. No current facility-administered medications for this visit. Diagnostic Data Review:     07/26/21    ECHO ADULT COMPLETE 07/28/2021 7/28/2021    Interpretation Summary  · Image quality for this study was technically difficult. · LV: Estimated LVEF is 60 - 65%. Normal cavity size and systolic function (ejection fraction normal). Moderate septal wall hypertrophy. Moderate posterior wall hypertrophy. · IVC: Mildly elevated central venous pressure (8 mmHg); IVC diameter is less than 21 mm and collapses less than 50% with respiration. Signed by: Curly Matta DO on 7/28/2021  3:20 PM    07/26/21    NUCLEAR CARDIAC STRESS TEST 07/28/2021 7/28/2021    Interpretation Summary  · Baseline ECG: Normal sinus rhythm. · Stress test: Negative stress test.  · SPECT: Left ventricular function post-stress was normal. Calculated ejection fraction is 46%.   · SPECT: Left ventricular perfusion is normal. Myocardial perfusion imaging supports a low risk stress test.    Signed by: Stefania Shah MD on 7/28/2021  3:13 PM    03/03/20    CARDIAC PROCEDURE 03/04/2020 3/4/2020    Conclusion  · Dense coroanry calcification without significant obstructive disease  · Elevated lvedp    Findings:  L Main: large caliber, calcified, no critical disease  LAD: large caliber, calcified, 40-50% stenosis at D2 bifurcation, supplies two diagonals, D1 small caliber and D2 moderate caliber with diffuse moderate disease  LCx: large caliber, calcified, ostial 30%, supplies a few very small marginals and continues a large caliber marginal with distal 40%  stenosis  RCA: moderate caliber, diffuse mild to moderate disease, small PDA and RPLV branch    LVEDP:  25 mmhg    Signed by: Jennifer Cobb DO on 3/4/2020  5:11 PM          Lab Review:       Lab Results   Component Value Date/Time    Sodium 137 07/29/2021 04:48 AM    Potassium 3.7 07/29/2021 04:48 AM    Chloride 106 07/29/2021 04:48 AM    CO2 29 07/29/2021 04:48 AM    Anion gap 2 (L) 07/29/2021 04:48 AM    Glucose 217 (H) 07/29/2021 04:48 AM    BUN 18 07/29/2021 04:48 AM    Creatinine 0.77 07/29/2021 04:48 AM    BUN/Creatinine ratio 23 (H) 07/29/2021 04:48 AM    GFR est AA >60 07/29/2021 04:48 AM    GFR est non-AA >60 07/29/2021 04:48 AM    Calcium 8.3 (L) 07/29/2021 04:48 AM    Bilirubin, total 0.6 07/27/2021 05:00 AM    Alk.  phosphatase 101 07/27/2021 05:00 AM    Protein, total 8.1 07/27/2021 05:00 AM    Albumin 3.2 (L) 07/27/2021 05:00 AM    Globulin 4.9 (H) 07/27/2021 05:00 AM    A-G Ratio 0.7 (L) 07/27/2021 05:00 AM    ALT (SGPT) 18 07/27/2021 05:00 AM    AST (SGOT) 14 (L) 07/27/2021 05:00 AM     Lab Results   Component Value Date/Time    WBC 11.1 (H) 07/29/2021 04:48 AM    HGB 13.7 07/29/2021 04:48 AM    HCT 43.8 07/29/2021 04:48 AM    PLATELET 065 69/28/2685 04:48 AM    MCV 84.7 07/29/2021 04:48 AM     Lab Results   Component Value Date/Time    Cholesterol, total 178 07/28/2021 03:47 AM    HDL Cholesterol 26 07/28/2021 03:47 AM    LDL, calculated 124.6 (H) 07/28/2021 03:47 AM    VLDL, calculated 27.4 07/28/2021 03:47 AM    Triglyceride 137 07/28/2021 03:47 AM    CHOL/HDL Ratio 6.8 (H) 07/28/2021 03:47 AM     No results found for: TSH, TSHEXT, TSH2, TSH3, TSHP, TSHELE, TT3, T3U, T3UP, FRT3, FT3, T3T, FT4, FT4P, T4, T4P, FT4T, TT7, C6SVFWNGT, TSHEXT, TSHEXT  Lab Results   Component Value Date/Time    Hemoglobin A1c 7.6 (H) 07/26/2021 02:10 PM     Lab Results   Component Value Date/Time    Troponin-I, Qt. 0.10 (H) 07/27/2021 09:08 PM     Wandy Lo, ANP

## 2021-08-23 NOTE — PROGRESS NOTES
Chief Complaint   Patient presents with    CHF    Hypertension     Visit Vitals  BP (!) 159/85 (BP 1 Location: Left upper arm, BP Patient Position: Sitting)   Pulse 78   Ht 5' 7\" (1.702 m)   Wt 268 lb (121.6 kg)   SpO2 98%   BMI 41.97 kg/m²     Chest pain denied   Palpations denied  SOB denied  Dizziness denied  Swelling in hands/feet denied   Recent hospital stays AT THE END OF July HTN

## 2021-08-23 NOTE — LETTER
8/24/2021    Patient: Dolly Lainez   YOB: 1945   Date of Visit: 8/23/2021     Maximo Artis  Via Fax: [phone]    Dear Joy Cardoza DO,      Thank you for referring Ms. Christos Bennett to [facility] for evaluation. My notes for this consultation are attached. If you have questions, please do not hesitate to call me. I look forward to following your patient along with you.       Sincerely,    Sarah Foote NP

## 2021-09-03 ENCOUNTER — PATIENT OUTREACH (OUTPATIENT)
Dept: CASE MANAGEMENT | Age: 76
End: 2021-09-03

## 2021-09-03 NOTE — PROGRESS NOTES
.  Patient has graduated from the Transitions of Care Coordination  program on 09/03/21   . Patient was not referred to the [facility] team for further management. Goals Addressed                 This Visit's Progress     COMPLETED: Prevent complications post hospitalization. 08/03/21    - Spoke to patient today. Patient lives with her .   - patient reports that she is feeling much better since returning home. - patient is checking BP daily and advised patient to keep a log, her most recent BP was 140/78, she will contact MD if >160/100.   - patient will see PCP again on 8/18, asked patient to take log of her BP readings to her appt  - patient checks glucose BID, she states it has been 120-130.   - Reviewed red flag s/s with patient, nausea, vomiting, inability to pass urine/stool, SOB, mental status change, chest pain, fever.    - patient will call cardiology for follow up appt   - patient also will call outpatient PT to set up PT, patient states that she hasn't got a chance to call yet since she was discharged on a Friday and offices were closed over the weekend. - patient will take multiple short walks around the home daily   - confirmed medications, patient will take as prescribed. Patient will contact Md/CTN if red flag s/s arise. CTN to f/u ~ 7-10 days. AR       08/16/21  - Spoke to patient today. Patient will see PCP on 8/17 at 1300. Patient will see cardiology on 8/23. Patient reports that she switched back to metoprolol b/c she was confused about which med to take despite medication education and rec being done at prevoius [facility] call. She reports highest was 150/79, most recently was 135/70. Patient is mostly worried about switching multiple medications again. Patient will keep log of BP and take to her appt tomorrow and 8/23. Patient reported no red flag s/s. Reminded patient of red flag s/s. Patient will contact Md/CTN if red flag s/s arise. CTN to f/u ~ 7-10 days.  AR 09/03/21  Unable to reach patient for final [facility] call. Patient did attend cardiology appt on 8/23. Episode resolved at this time. AR            Patient has Care Transition Nurse's contact information for any further questions, concerns, or needs.   Patients upcoming visits:    Future Appointments   Date Time Provider Carla Grant   9/30/2021  9:40 AM Sera Samuels MD [facility] BS AMB   11/29/2021 11:20 AM LINDA DukeF BS AMB

## 2021-09-29 ENCOUNTER — ANESTHESIA (OUTPATIENT)
Dept: SURGERY | Age: 76
DRG: 872 | End: 2021-09-29
Payer: MEDICARE

## 2021-09-29 ENCOUNTER — ANESTHESIA EVENT (OUTPATIENT)
Dept: SURGERY | Age: 76
DRG: 872 | End: 2021-09-29
Payer: MEDICARE

## 2021-09-29 ENCOUNTER — HOSPITAL ENCOUNTER (INPATIENT)
Age: 76
LOS: 2 days | Discharge: HOME HEALTH | DRG: 872 | End: 2021-10-01
Attending: STUDENT IN AN ORGANIZED HEALTH CARE EDUCATION/TRAINING PROGRAM | Admitting: SURGERY
Payer: MEDICARE

## 2021-09-29 ENCOUNTER — APPOINTMENT (OUTPATIENT)
Dept: CT IMAGING | Age: 76
DRG: 872 | End: 2021-09-29
Attending: PHYSICIAN ASSISTANT
Payer: MEDICARE

## 2021-09-29 DIAGNOSIS — R65.10 SIRS (SYSTEMIC INFLAMMATORY RESPONSE SYNDROME) (HCC): ICD-10-CM

## 2021-09-29 DIAGNOSIS — Z79.4 TYPE 2 DIABETES MELLITUS WITHOUT COMPLICATION, WITH LONG-TERM CURRENT USE OF INSULIN (HCC): ICD-10-CM

## 2021-09-29 DIAGNOSIS — L02.212 ABSCESS OF BACK: Primary | ICD-10-CM

## 2021-09-29 DIAGNOSIS — E11.9 TYPE 2 DIABETES MELLITUS WITHOUT COMPLICATION, WITH LONG-TERM CURRENT USE OF INSULIN (HCC): ICD-10-CM

## 2021-09-29 LAB
ALBUMIN SERPL-MCNC: 2.6 G/DL (ref 3.5–5)
ALBUMIN/GLOB SERPL: 0.5 {RATIO} (ref 1.1–2.2)
ALP SERPL-CCNC: 106 U/L (ref 45–117)
ALT SERPL-CCNC: 18 U/L (ref 12–78)
ANION GAP SERPL CALC-SCNC: 7 MMOL/L (ref 5–15)
AST SERPL-CCNC: 11 U/L (ref 15–37)
BASOPHILS # BLD: 0.1 K/UL (ref 0–0.1)
BASOPHILS NFR BLD: 0 % (ref 0–1)
BILIRUB SERPL-MCNC: 0.3 MG/DL (ref 0.2–1)
BUN SERPL-MCNC: 9 MG/DL (ref 6–20)
BUN/CREAT SERPL: 12 (ref 12–20)
CALCIUM SERPL-MCNC: 8.6 MG/DL (ref 8.5–10.1)
CHLORIDE SERPL-SCNC: 107 MMOL/L (ref 97–108)
CO2 SERPL-SCNC: 27 MMOL/L (ref 21–32)
COMMENT, HOLDF: NORMAL
COVID-19 RAPID TEST, COVR: NOT DETECTED
CREAT SERPL-MCNC: 0.78 MG/DL (ref 0.55–1.02)
DIFFERENTIAL METHOD BLD: ABNORMAL
EOSINOPHIL # BLD: 0.2 K/UL (ref 0–0.4)
EOSINOPHIL NFR BLD: 2 % (ref 0–7)
ERYTHROCYTE [DISTWIDTH] IN BLOOD BY AUTOMATED COUNT: 16.1 % (ref 11.5–14.5)
GLOBULIN SER CALC-MCNC: 5.5 G/DL (ref 2–4)
GLUCOSE BLD STRIP.AUTO-MCNC: 143 MG/DL (ref 65–117)
GLUCOSE BLD STRIP.AUTO-MCNC: 89 MG/DL (ref 65–117)
GLUCOSE SERPL-MCNC: 149 MG/DL (ref 65–100)
HCT VFR BLD AUTO: 40.9 % (ref 35–47)
HGB BLD-MCNC: 12.9 G/DL (ref 11.5–16)
IMM GRANULOCYTES # BLD AUTO: 0.1 K/UL (ref 0–0.04)
IMM GRANULOCYTES NFR BLD AUTO: 0 % (ref 0–0.5)
LACTATE SERPL-SCNC: 1 MMOL/L (ref 0.4–2)
LYMPHOCYTES # BLD: 1.6 K/UL (ref 0.8–3.5)
LYMPHOCYTES NFR BLD: 12 % (ref 12–49)
MCH RBC QN AUTO: 26 PG (ref 26–34)
MCHC RBC AUTO-ENTMCNC: 31.5 G/DL (ref 30–36.5)
MCV RBC AUTO: 82.3 FL (ref 80–99)
MONOCYTES # BLD: 0.8 K/UL (ref 0–1)
MONOCYTES NFR BLD: 6 % (ref 5–13)
NEUTS SEG # BLD: 11 K/UL (ref 1.8–8)
NEUTS SEG NFR BLD: 80 % (ref 32–75)
NRBC # BLD: 0 K/UL (ref 0–0.01)
NRBC BLD-RTO: 0 PER 100 WBC
PLATELET # BLD AUTO: 489 K/UL (ref 150–400)
PMV BLD AUTO: 10.3 FL (ref 8.9–12.9)
POTASSIUM SERPL-SCNC: 4.1 MMOL/L (ref 3.5–5.1)
PROT SERPL-MCNC: 8.1 G/DL (ref 6.4–8.2)
RBC # BLD AUTO: 4.97 M/UL (ref 3.8–5.2)
SAMPLES BEING HELD,HOLD: NORMAL
SERVICE CMNT-IMP: ABNORMAL
SERVICE CMNT-IMP: NORMAL
SODIUM SERPL-SCNC: 141 MMOL/L (ref 136–145)
SOURCE, COVRS: NORMAL
TROPONIN I SERPL-MCNC: <0.05 NG/ML
WBC # BLD AUTO: 13.8 K/UL (ref 3.6–11)

## 2021-09-29 PROCEDURE — 87635 SARS-COV-2 COVID-19 AMP PRB: CPT

## 2021-09-29 PROCEDURE — 74011000250 HC RX REV CODE- 250: Performed by: SURGERY

## 2021-09-29 PROCEDURE — 71250 CT THORAX DX C-: CPT

## 2021-09-29 PROCEDURE — 87205 SMEAR GRAM STAIN: CPT

## 2021-09-29 PROCEDURE — 74011250636 HC RX REV CODE- 250/636: Performed by: ANESTHESIOLOGY

## 2021-09-29 PROCEDURE — 83605 ASSAY OF LACTIC ACID: CPT

## 2021-09-29 PROCEDURE — 76060000031 HC ANESTHESIA FIRST 0.5 HR: Performed by: SURGERY

## 2021-09-29 PROCEDURE — 74011250636 HC RX REV CODE- 250/636: Performed by: NURSE ANESTHETIST, CERTIFIED REGISTERED

## 2021-09-29 PROCEDURE — 74011250637 HC RX REV CODE- 250/637: Performed by: SURGERY

## 2021-09-29 PROCEDURE — 84484 ASSAY OF TROPONIN QUANT: CPT

## 2021-09-29 PROCEDURE — 77030031139 HC SUT VCRL2 J&J -A: Performed by: SURGERY

## 2021-09-29 PROCEDURE — 87186 SC STD MICRODIL/AGAR DIL: CPT

## 2021-09-29 PROCEDURE — 2709999900 HC NON-CHARGEABLE SUPPLY: Performed by: SURGERY

## 2021-09-29 PROCEDURE — 76210000006 HC OR PH I REC 0.5 TO 1 HR: Performed by: SURGERY

## 2021-09-29 PROCEDURE — 76010000154 HC OR TIME FIRST 0.5 HR: Performed by: SURGERY

## 2021-09-29 PROCEDURE — 80053 COMPREHEN METABOLIC PANEL: CPT

## 2021-09-29 PROCEDURE — 87077 CULTURE AEROBIC IDENTIFY: CPT

## 2021-09-29 PROCEDURE — 85025 COMPLETE CBC W/AUTO DIFF WBC: CPT

## 2021-09-29 PROCEDURE — 74011250636 HC RX REV CODE- 250/636: Performed by: PHYSICIAN ASSISTANT

## 2021-09-29 PROCEDURE — 36415 COLL VENOUS BLD VENIPUNCTURE: CPT

## 2021-09-29 PROCEDURE — 65270000029 HC RM PRIVATE

## 2021-09-29 PROCEDURE — 0H96XZZ DRAINAGE OF BACK SKIN, EXTERNAL APPROACH: ICD-10-PCS | Performed by: SURGERY

## 2021-09-29 PROCEDURE — 77030010507 HC ADH SKN DERMBND J&J -B: Performed by: SURGERY

## 2021-09-29 PROCEDURE — 87075 CULTR BACTERIA EXCEPT BLOOD: CPT

## 2021-09-29 PROCEDURE — 99283 EMERGENCY DEPT VISIT LOW MDM: CPT

## 2021-09-29 PROCEDURE — 87040 BLOOD CULTURE FOR BACTERIA: CPT

## 2021-09-29 PROCEDURE — 96374 THER/PROPH/DIAG INJ IV PUSH: CPT

## 2021-09-29 PROCEDURE — 74011250636 HC RX REV CODE- 250/636: Performed by: SURGERY

## 2021-09-29 PROCEDURE — 82962 GLUCOSE BLOOD TEST: CPT

## 2021-09-29 RX ORDER — METOPROLOL TARTRATE 25 MG/1
25 TABLET, FILM COATED ORAL 2 TIMES DAILY
Status: DISCONTINUED | OUTPATIENT
Start: 2021-09-29 | End: 2021-10-01 | Stop reason: HOSPADM

## 2021-09-29 RX ORDER — TRAMADOL HYDROCHLORIDE 50 MG/1
50 TABLET ORAL
Status: DISCONTINUED | OUTPATIENT
Start: 2021-09-29 | End: 2021-10-01 | Stop reason: HOSPADM

## 2021-09-29 RX ORDER — PROPOFOL 10 MG/ML
INJECTION, EMULSION INTRAVENOUS AS NEEDED
Status: DISCONTINUED | OUTPATIENT
Start: 2021-09-29 | End: 2021-09-29 | Stop reason: HOSPADM

## 2021-09-29 RX ORDER — LABETALOL HCL 20 MG/4 ML
10 SYRINGE (ML) INTRAVENOUS
Status: DISCONTINUED | OUTPATIENT
Start: 2021-09-29 | End: 2021-09-29 | Stop reason: HOSPADM

## 2021-09-29 RX ORDER — FUROSEMIDE 20 MG/1
20 TABLET ORAL DAILY
Status: DISCONTINUED | OUTPATIENT
Start: 2021-09-30 | End: 2021-10-01 | Stop reason: HOSPADM

## 2021-09-29 RX ORDER — LOSARTAN POTASSIUM 50 MG/1
50 TABLET ORAL DAILY
Status: DISCONTINUED | OUTPATIENT
Start: 2021-09-30 | End: 2021-10-01 | Stop reason: HOSPADM

## 2021-09-29 RX ORDER — SODIUM CHLORIDE 0.9 % (FLUSH) 0.9 %
5-40 SYRINGE (ML) INJECTION EVERY 8 HOURS
Status: DISCONTINUED | OUTPATIENT
Start: 2021-09-29 | End: 2021-10-01 | Stop reason: HOSPADM

## 2021-09-29 RX ORDER — ONDANSETRON 2 MG/ML
4 INJECTION INTRAMUSCULAR; INTRAVENOUS
Status: DISCONTINUED | OUTPATIENT
Start: 2021-09-29 | End: 2021-09-29

## 2021-09-29 RX ORDER — HYDRALAZINE HYDROCHLORIDE 20 MG/ML
10 INJECTION INTRAMUSCULAR; INTRAVENOUS
Status: DISCONTINUED | OUTPATIENT
Start: 2021-09-29 | End: 2021-09-29 | Stop reason: HOSPADM

## 2021-09-29 RX ORDER — HYDROMORPHONE HYDROCHLORIDE 1 MG/ML
0.5 INJECTION, SOLUTION INTRAMUSCULAR; INTRAVENOUS; SUBCUTANEOUS
Status: ACTIVE | OUTPATIENT
Start: 2021-09-29 | End: 2021-09-30

## 2021-09-29 RX ORDER — VANCOMYCIN/0.9 % SOD CHLORIDE 1.5G/250ML
1500 PLASTIC BAG, INJECTION (ML) INTRAVENOUS EVERY 24 HOURS
Status: DISCONTINUED | OUTPATIENT
Start: 2021-09-30 | End: 2021-10-01 | Stop reason: HOSPADM

## 2021-09-29 RX ORDER — SODIUM CHLORIDE 0.9 % (FLUSH) 0.9 %
5-40 SYRINGE (ML) INJECTION AS NEEDED
Status: DISCONTINUED | OUTPATIENT
Start: 2021-09-29 | End: 2021-10-01 | Stop reason: HOSPADM

## 2021-09-29 RX ORDER — LIDOCAINE HYDROCHLORIDE 10 MG/ML
0.1 INJECTION, SOLUTION EPIDURAL; INFILTRATION; INTRACAUDAL; PERINEURAL AS NEEDED
Status: CANCELLED | OUTPATIENT
Start: 2021-09-29

## 2021-09-29 RX ORDER — ONDANSETRON 4 MG/1
4 TABLET, ORALLY DISINTEGRATING ORAL
Status: ACTIVE | OUTPATIENT
Start: 2021-09-29 | End: 2021-09-30

## 2021-09-29 RX ORDER — MIDAZOLAM HYDROCHLORIDE 1 MG/ML
INJECTION, SOLUTION INTRAMUSCULAR; INTRAVENOUS AS NEEDED
Status: DISCONTINUED | OUTPATIENT
Start: 2021-09-29 | End: 2021-09-29 | Stop reason: HOSPADM

## 2021-09-29 RX ORDER — SODIUM CHLORIDE, SODIUM LACTATE, POTASSIUM CHLORIDE, CALCIUM CHLORIDE 600; 310; 30; 20 MG/100ML; MG/100ML; MG/100ML; MG/100ML
100 INJECTION, SOLUTION INTRAVENOUS CONTINUOUS
Status: DISCONTINUED | OUTPATIENT
Start: 2021-09-29 | End: 2021-09-29 | Stop reason: HOSPADM

## 2021-09-29 RX ORDER — MORPHINE SULFATE 2 MG/ML
2 INJECTION, SOLUTION INTRAMUSCULAR; INTRAVENOUS
Status: DISCONTINUED | OUTPATIENT
Start: 2021-09-29 | End: 2021-10-01 | Stop reason: HOSPADM

## 2021-09-29 RX ORDER — MORPHINE SULFATE 2 MG/ML
2 INJECTION, SOLUTION INTRAMUSCULAR; INTRAVENOUS
Status: DISCONTINUED | OUTPATIENT
Start: 2021-09-29 | End: 2021-09-29

## 2021-09-29 RX ORDER — DIPHENHYDRAMINE HYDROCHLORIDE 50 MG/ML
25 INJECTION, SOLUTION INTRAMUSCULAR; INTRAVENOUS
Status: DISCONTINUED | OUTPATIENT
Start: 2021-09-29 | End: 2021-09-29

## 2021-09-29 RX ORDER — SODIUM CHLORIDE, SODIUM LACTATE, POTASSIUM CHLORIDE, CALCIUM CHLORIDE 600; 310; 30; 20 MG/100ML; MG/100ML; MG/100ML; MG/100ML
INJECTION, SOLUTION INTRAVENOUS
Status: DISCONTINUED | OUTPATIENT
Start: 2021-09-29 | End: 2021-09-29 | Stop reason: HOSPADM

## 2021-09-29 RX ORDER — HYDROMORPHONE HYDROCHLORIDE 1 MG/ML
.25-1 INJECTION, SOLUTION INTRAMUSCULAR; INTRAVENOUS; SUBCUTANEOUS
Status: DISCONTINUED | OUTPATIENT
Start: 2021-09-29 | End: 2021-09-29 | Stop reason: HOSPADM

## 2021-09-29 RX ORDER — LIDOCAINE HYDROCHLORIDE AND EPINEPHRINE 10; 10 MG/ML; UG/ML
INJECTION, SOLUTION INFILTRATION; PERINEURAL AS NEEDED
Status: DISCONTINUED | OUTPATIENT
Start: 2021-09-29 | End: 2021-09-29 | Stop reason: HOSPADM

## 2021-09-29 RX ORDER — SODIUM CHLORIDE 9 MG/ML
75 INJECTION, SOLUTION INTRAVENOUS CONTINUOUS
Status: DISCONTINUED | OUTPATIENT
Start: 2021-09-29 | End: 2021-10-01

## 2021-09-29 RX ORDER — ENOXAPARIN SODIUM 100 MG/ML
40 INJECTION SUBCUTANEOUS EVERY 12 HOURS
Status: DISCONTINUED | OUTPATIENT
Start: 2021-09-30 | End: 2021-10-01 | Stop reason: HOSPADM

## 2021-09-29 RX ORDER — FENTANYL CITRATE 50 UG/ML
INJECTION, SOLUTION INTRAMUSCULAR; INTRAVENOUS AS NEEDED
Status: DISCONTINUED | OUTPATIENT
Start: 2021-09-29 | End: 2021-09-29 | Stop reason: HOSPADM

## 2021-09-29 RX ORDER — FAMOTIDINE 20 MG/1
40 TABLET, FILM COATED ORAL 2 TIMES DAILY
Status: DISCONTINUED | OUTPATIENT
Start: 2021-09-29 | End: 2021-10-01 | Stop reason: HOSPADM

## 2021-09-29 RX ORDER — PROPOFOL 10 MG/ML
INJECTION, EMULSION INTRAVENOUS
Status: DISCONTINUED | OUTPATIENT
Start: 2021-09-29 | End: 2021-09-29 | Stop reason: HOSPADM

## 2021-09-29 RX ORDER — SODIUM CHLORIDE, SODIUM LACTATE, POTASSIUM CHLORIDE, CALCIUM CHLORIDE 600; 310; 30; 20 MG/100ML; MG/100ML; MG/100ML; MG/100ML
100 INJECTION, SOLUTION INTRAVENOUS CONTINUOUS
Status: CANCELLED | OUTPATIENT
Start: 2021-09-29 | End: 2021-09-30

## 2021-09-29 RX ORDER — KETOROLAC TROMETHAMINE 30 MG/ML
15 INJECTION, SOLUTION INTRAMUSCULAR; INTRAVENOUS
Status: DISCONTINUED | OUTPATIENT
Start: 2021-09-29 | End: 2021-09-29

## 2021-09-29 RX ORDER — ACETAMINOPHEN 325 MG/1
650 TABLET ORAL
Status: DISCONTINUED | OUTPATIENT
Start: 2021-09-29 | End: 2021-10-01 | Stop reason: HOSPADM

## 2021-09-29 RX ORDER — ENOXAPARIN SODIUM 100 MG/ML
40 INJECTION SUBCUTANEOUS EVERY 24 HOURS
Status: DISCONTINUED | OUTPATIENT
Start: 2021-09-30 | End: 2021-09-29 | Stop reason: DRUGHIGH

## 2021-09-29 RX ADMIN — ACETAMINOPHEN 650 MG: 325 TABLET ORAL at 23:43

## 2021-09-29 RX ADMIN — VANCOMYCIN HYDROCHLORIDE 2500 MG: 10 INJECTION, POWDER, LYOPHILIZED, FOR SOLUTION INTRAVENOUS at 20:36

## 2021-09-29 RX ADMIN — PROPOFOL 30 MG: 10 INJECTION, EMULSION INTRAVENOUS at 21:59

## 2021-09-29 RX ADMIN — SODIUM CHLORIDE 75 ML/HR: 9 INJECTION, SOLUTION INTRAVENOUS at 23:37

## 2021-09-29 RX ADMIN — METOPROLOL TARTRATE 25 MG: 25 TABLET, FILM COATED ORAL at 23:38

## 2021-09-29 RX ADMIN — PROPOFOL 100 MCG/KG/MIN: 10 INJECTION, EMULSION INTRAVENOUS at 22:00

## 2021-09-29 RX ADMIN — FENTANYL CITRATE 50 MCG: 50 INJECTION, SOLUTION INTRAMUSCULAR; INTRAVENOUS at 21:51

## 2021-09-29 RX ADMIN — Medication 10 ML: at 23:39

## 2021-09-29 RX ADMIN — MIDAZOLAM 2 MG: 1 INJECTION, SOLUTION INTRAMUSCULAR; INTRAVENOUS at 21:51

## 2021-09-29 RX ADMIN — SODIUM CHLORIDE, POTASSIUM CHLORIDE, SODIUM LACTATE AND CALCIUM CHLORIDE: 600; 310; 30; 20 INJECTION, SOLUTION INTRAVENOUS at 21:51

## 2021-09-29 RX ADMIN — FENTANYL CITRATE 50 MCG: 50 INJECTION, SOLUTION INTRAMUSCULAR; INTRAVENOUS at 21:58

## 2021-09-29 RX ADMIN — FAMOTIDINE 40 MG: 20 TABLET ORAL at 23:39

## 2021-09-29 RX ADMIN — LABETALOL HYDROCHLORIDE 10 MG: 5 INJECTION, SOLUTION INTRAVENOUS at 22:54

## 2021-09-29 NOTE — ED TRIAGE NOTES
Pt reports an abscess on her back x 1 month. States she has been on oral abx but it has not gotten better. States she is now having fevers and was reevaluated by her PCP and sent to the ED for further evaluation and treatment. States she took tylenol today.

## 2021-09-29 NOTE — ED PROVIDER NOTES
28-year-old female with PMH of T2DM, HTN, CHF (ef 55-60% 7/2021), hx of cysts on her back requiring surgical removal who presents to the ER for evaluation of progressively worsening mass to the mid back worsening over the past month since symptoms began. She states 1 month ago she noticed a painful and swollen area to her mid back. She was seen by her PCP who prescribed an antibiotic 3 times a day for 10 days and she states the area has only increased in size. She states she had a fever T-max 101 when symptoms first began. She states the pain is more constant. She does have a history of an infected sebaceous cyst causing sepsis in July 2018, excision performed in the OR by Dr. Tony Cazares.             Past Medical History:   Diagnosis Date    Adverse effect of anesthesia     WOKE UP EARLY DURING BACK SURGERY    Breast cancer [facility]) 1993    right breast cancer     Cervical cancer ([facility] [address] 1980's    Chronic pain     L4-L5 discectomy    Congestive heart failure (HCC)     Depression with anxiety     Ectopic pregnancy     GERD (gastroesophageal reflux disease)     Glaucoma     Hypertension     Ill-defined condition     TONSIL STONES - SOMETIMES CAUSE BAD BREATH    Miscarriage     Neuropathy     r/t DM    Sleep apnea 2005    CPAP compliant but not using now because of rash on face from the mask    Type 2 diabetes mellitus ([facility] [address]     on insulin       Past Surgical History:   Procedure Laterality Date    HX CARPAL TUNNEL RELEASE Bilateral     HX CATARACT REMOVAL Bilateral 2013    HX COLONOSCOPY      HX HYSTERECTOMY  1980's    cervical cancer    HX KNEE ARTHROSCOPY Bilateral     HX ORTHOPAEDIC  9439,7811    back surgery x 2 ( HERNIATED DISC REPAIR, AND ANOTHER SURGERY SHE CANT REMEMBER    HX ORTHOPAEDIC Left 1990    ligament attachment arm    HX PELVIC LAPAROSCOPY  1970s    ectopic pregnancy    HX TUMOR REMOVAL Right     leg    IR INJ FORAMIN EPID LUMB ANES/STER SNGL  7/29/2021    MD BREAST SURGERY PROCEDURE UNLISTED Right     right mastectomy         Family History:   Problem Relation Age of Onset    Diabetes Mother     Hypertension Mother     Diabetes Sister     Cancer Brother         COLON CANCER    Anesth Problems Neg Hx        Social History     Socioeconomic History    Marital status:      Spouse name: Not on file    Number of children: Not on file    Years of education: Not on file    Highest education level: Not on file   Occupational History    Not on file   Tobacco Use    Smoking status: Never Smoker    Smokeless tobacco: Never Used   Substance and Sexual Activity    Alcohol use: No    Drug use: No    Sexual activity: Not on file   Other Topics Concern    Not on file   Social History Narrative    Not on file     Social Determinants of Health     Financial Resource Strain:     Difficulty of Paying Living Expenses:    Food Insecurity:     Worried About Running Out of Food in the Last Year:     Ran Out of Food in the Last Year:    Transportation Needs:     Lack of Transportation (Medical):  Lack of Transportation (Non-Medical):    Physical Activity:     Days of Exercise per Week:     Minutes of Exercise per Session:    Stress:     Feeling of Stress :    Social Connections:     Frequency of Communication with Friends and Family:     Frequency of Social Gatherings with Friends and Family:     Attends Scientologist Services:     Active Member of Clubs or Organizations:     Attends Club or Organization Meetings:     Marital Status:    Intimate Partner Violence:     Fear of Current or Ex-Partner:     Emotionally Abused:     Physically Abused:     Sexually Abused: ALLERGIES: Contrast dye [iodine], Lisinopril, Percocet [oxycodone-acetaminophen], Percodan [oxycodone-aspirin], Prednisone, and Sulfa (sulfonamide antibiotics)    Review of Systems   Constitutional: Negative. Negative for activity change, chills, fatigue and unexpected weight change.    HENT: Negative for trouble swallowing. Respiratory: Negative for cough, chest tightness, shortness of breath and wheezing. Cardiovascular: Negative. Negative for chest pain and palpitations. Gastrointestinal: Negative. Negative for abdominal pain, diarrhea, nausea and vomiting. Genitourinary: Negative. Negative for dysuria, flank pain, frequency and hematuria. Musculoskeletal: Positive for back pain. Negative for arthralgias, neck pain and neck stiffness. Skin: Negative. Negative for color change and rash. Neurological: Negative. Negative for dizziness, numbness and headaches. All other systems reviewed and are negative. Vitals:    09/29/21 1646   BP: (!) 215/83   Pulse: (!) 116   Resp: 16   Temp: 98.2 °F (36.8 °C)   SpO2: 97%   Weight: 121.6 kg (268 lb)   Height: 5' 7\" (1.702 m)            Physical Exam  Vitals and nursing note reviewed. Constitutional:       General: She is not in acute distress. Appearance: She is well-developed. She is not toxic-appearing or diaphoretic. Comments: AA female, uncomfortable appearing   HENT:      Head: Normocephalic and atraumatic. Eyes:      General:         Right eye: No discharge. Left eye: No discharge. Conjunctiva/sclera: Conjunctivae normal.      Pupils: Pupils are equal, round, and reactive to light. Neck:      Trachea: No tracheal tenderness. Cardiovascular:      Rate and Rhythm: Normal rate and regular rhythm. Pulses: Normal pulses. Heart sounds: Normal heart sounds. No murmur heard. No friction rub. No gallop. Pulmonary:      Effort: Pulmonary effort is normal. No respiratory distress. Breath sounds: Normal breath sounds. No wheezing or rales. Chest:      Chest wall: No tenderness. Abdominal:      General: Bowel sounds are normal. There is no distension. Palpations: Abdomen is soft. Tenderness: There is no abdominal tenderness. There is no guarding or rebound.    Musculoskeletal: General: No tenderness. Normal range of motion. Cervical back: Full passive range of motion without pain and normal range of motion. Skin:     General: Skin is warm and dry. Capillary Refill: Capillary refill takes less than 2 seconds. Findings: No abrasion, erythema or rash. Neurological:      General: No focal deficit present. Mental Status: She is alert and oriented to person, place, and time. Cranial Nerves: No cranial nerve deficit. Sensory: No sensory deficit. Coordination: Coordination normal.   Psychiatric:         Speech: Speech normal.         Behavior: Behavior normal.          MDM  Number of Diagnoses or Management Options  Diagnosis management comments:   Ddx: Sepsis, Sirs, and infected sebaceous cyst       Amount and/or Complexity of Data Reviewed  Clinical lab tests: ordered and reviewed  Tests in the radiology section of CPT®: ordered and reviewed  Review and summarize past medical records: yes  Discuss the patient with other providers: yes    Patient Progress  Patient progress: stable         Procedures    Presentation, management, and disposition were discussed with the attending physician, Dr. Dk Gee, who is in agreement with plan of care. Patient is over the age of 73 and being admitted to the hospital , and the attending will see the patient. abx delayed due to difficult IV stick. Kayla Dubois PA-C    8:30 PM  Dr. Lyla Deluca, gen surg saw and examined patient and is planning on taking patient to the OR tonight.   Kayla Dubois PA-C      Total critical care time spent exclusive of procedures:  45 minutes

## 2021-09-30 LAB
ANION GAP SERPL CALC-SCNC: 5 MMOL/L (ref 5–15)
BUN SERPL-MCNC: 9 MG/DL (ref 6–20)
BUN/CREAT SERPL: 10 (ref 12–20)
CALCIUM SERPL-MCNC: 7.8 MG/DL (ref 8.5–10.1)
CHLORIDE SERPL-SCNC: 108 MMOL/L (ref 97–108)
CO2 SERPL-SCNC: 26 MMOL/L (ref 21–32)
CREAT SERPL-MCNC: 0.87 MG/DL (ref 0.55–1.02)
ERYTHROCYTE [DISTWIDTH] IN BLOOD BY AUTOMATED COUNT: 16 % (ref 11.5–14.5)
GLUCOSE BLD STRIP.AUTO-MCNC: 102 MG/DL (ref 65–117)
GLUCOSE BLD STRIP.AUTO-MCNC: 144 MG/DL (ref 65–117)
GLUCOSE BLD STRIP.AUTO-MCNC: 153 MG/DL (ref 65–117)
GLUCOSE BLD STRIP.AUTO-MCNC: 162 MG/DL (ref 65–117)
GLUCOSE SERPL-MCNC: 177 MG/DL (ref 65–100)
HCT VFR BLD AUTO: 33.4 % (ref 35–47)
HGB BLD-MCNC: 10.5 G/DL (ref 11.5–16)
MCH RBC QN AUTO: 26.5 PG (ref 26–34)
MCHC RBC AUTO-ENTMCNC: 31.4 G/DL (ref 30–36.5)
MCV RBC AUTO: 84.3 FL (ref 80–99)
NRBC # BLD: 0 K/UL (ref 0–0.01)
NRBC BLD-RTO: 0 PER 100 WBC
PLATELET # BLD AUTO: 431 K/UL (ref 150–400)
PMV BLD AUTO: 10.3 FL (ref 8.9–12.9)
POTASSIUM SERPL-SCNC: 3.8 MMOL/L (ref 3.5–5.1)
RBC # BLD AUTO: 3.96 M/UL (ref 3.8–5.2)
SERVICE CMNT-IMP: ABNORMAL
SERVICE CMNT-IMP: NORMAL
SODIUM SERPL-SCNC: 139 MMOL/L (ref 136–145)
WBC # BLD AUTO: 11.1 K/UL (ref 3.6–11)

## 2021-09-30 PROCEDURE — 65270000029 HC RM PRIVATE

## 2021-09-30 PROCEDURE — 74011250637 HC RX REV CODE- 250/637: Performed by: SURGERY

## 2021-09-30 PROCEDURE — 80048 BASIC METABOLIC PNL TOTAL CA: CPT

## 2021-09-30 PROCEDURE — 99232 SBSQ HOSP IP/OBS MODERATE 35: CPT | Performed by: CLINICAL NURSE SPECIALIST

## 2021-09-30 PROCEDURE — 74011250636 HC RX REV CODE- 250/636: Performed by: SURGERY

## 2021-09-30 PROCEDURE — 87040 BLOOD CULTURE FOR BACTERIA: CPT

## 2021-09-30 PROCEDURE — 74011636637 HC RX REV CODE- 636/637: Performed by: NURSE PRACTITIONER

## 2021-09-30 PROCEDURE — 85027 COMPLETE CBC AUTOMATED: CPT

## 2021-09-30 PROCEDURE — 82962 GLUCOSE BLOOD TEST: CPT

## 2021-09-30 PROCEDURE — 36415 COLL VENOUS BLD VENIPUNCTURE: CPT

## 2021-09-30 RX ORDER — MELATONIN
1000 DAILY
COMMUNITY

## 2021-09-30 RX ORDER — FUROSEMIDE 40 MG/1
40 TABLET ORAL 2 TIMES DAILY
COMMUNITY

## 2021-09-30 RX ORDER — DEXTROSE 50 % IN WATER (D50W) INTRAVENOUS SYRINGE
12.5-25 AS NEEDED
Status: DISCONTINUED | OUTPATIENT
Start: 2021-09-30 | End: 2021-10-01 | Stop reason: HOSPADM

## 2021-09-30 RX ORDER — INSULIN LISPRO 100 [IU]/ML
INJECTION, SOLUTION INTRAVENOUS; SUBCUTANEOUS
Status: DISCONTINUED | OUTPATIENT
Start: 2021-09-30 | End: 2021-10-01 | Stop reason: HOSPADM

## 2021-09-30 RX ORDER — MAGNESIUM SULFATE 100 %
4 CRYSTALS MISCELLANEOUS AS NEEDED
Status: DISCONTINUED | OUTPATIENT
Start: 2021-09-30 | End: 2021-10-01 | Stop reason: HOSPADM

## 2021-09-30 RX ORDER — LOSARTAN POTASSIUM 50 MG/1
50 TABLET ORAL DAILY
COMMUNITY
End: 2021-10-12 | Stop reason: SDUPTHER

## 2021-09-30 RX ADMIN — Medication 10 ML: at 21:30

## 2021-09-30 RX ADMIN — VANCOMYCIN HYDROCHLORIDE 1500 MG: 10 INJECTION, POWDER, LYOPHILIZED, FOR SOLUTION INTRAVENOUS at 21:28

## 2021-09-30 RX ADMIN — METOPROLOL TARTRATE 25 MG: 25 TABLET, FILM COATED ORAL at 17:53

## 2021-09-30 RX ADMIN — ACETAMINOPHEN 650 MG: 325 TABLET ORAL at 13:37

## 2021-09-30 RX ADMIN — FUROSEMIDE 20 MG: 20 TABLET ORAL at 09:18

## 2021-09-30 RX ADMIN — ENOXAPARIN SODIUM 40 MG: 40 INJECTION SUBCUTANEOUS at 09:18

## 2021-09-30 RX ADMIN — INSULIN LISPRO 2 UNITS: 100 INJECTION, SOLUTION INTRAVENOUS; SUBCUTANEOUS at 12:09

## 2021-09-30 RX ADMIN — Medication 10 ML: at 05:40

## 2021-09-30 RX ADMIN — FAMOTIDINE 40 MG: 20 TABLET ORAL at 09:18

## 2021-09-30 RX ADMIN — FAMOTIDINE 40 MG: 20 TABLET ORAL at 17:53

## 2021-09-30 RX ADMIN — ENOXAPARIN SODIUM 40 MG: 40 INJECTION SUBCUTANEOUS at 21:27

## 2021-09-30 RX ADMIN — METOPROLOL TARTRATE 25 MG: 25 TABLET, FILM COATED ORAL at 09:18

## 2021-09-30 RX ADMIN — LOSARTAN POTASSIUM 50 MG: 50 TABLET, FILM COATED ORAL at 09:18

## 2021-09-30 RX ADMIN — SODIUM CHLORIDE 75 ML/HR: 9 INJECTION, SOLUTION INTRAVENOUS at 12:10

## 2021-09-30 RX ADMIN — Medication 10 ML: at 13:38

## 2021-09-30 NOTE — BRIEF OP NOTE
Brief Postoperative Note    Patient: Jason Osei  YOB: 1945  MRN: [id number]    Date of Procedure: 9/29/2021     Pre-Op Diagnosis: ABSCESS    Post-Op Diagnosis: Same as preoperative diagnosis.       Procedure(s):  INCISION AND DRAINAGE BACK    Surgeon(s):  Alex Long MD    Surgical Assistant: Surg Asst-1: Sasha Gentile    Anesthesia: MAC     Estimated Blood Loss (mL): less than 50     Complications: None    Specimens:   ID Type Source Tests Collected by Time Destination   1 : BACK ABSCESS Wound Back CULTURE, ANAEROBIC, CULTURE, WOUND W Harriett De Anda MD 9/29/2021 2213 Microbiology        Implants: * No implants in log *    Drains: * No LDAs found *    Findings: large 10 X 8 cm abscess, large volume purulent fluid evacuated    Electronically Signed by Whit Viera MD on 9/29/2021 at 10:22 PM

## 2021-09-30 NOTE — ROUTINE PROCESS
TRANSFER - IN REPORT:    Verbal report received from DOMINICK Prather(name) on Mable B Bethena Leyden  being received from ER(unit) for routine progression of care      Report consisted of patients Situation, Background, Assessment and   Recommendations(SBAR). Information from the following report(s) SBAR, Kardex, [facility] and Recent Results was reviewed with the receiving nurse. Opportunity for questions and clarification was provided. Assessment completed upon patients arrival to unit and care assumed.

## 2021-09-30 NOTE — PROGRESS NOTES
General Surgery Daily Progress Note    Patient: Hiram Nolen MRN: [id number]  SSN: [id number]    YOB: 1945  Age: 77 y.o. Sex: female      Admit Date: 9/29/2021    POD 1 Day Post-Op    Procedure: Procedure(s):  INCISION AND DRAINAGE BACK    Subjective:   Hiram Nolen is a 77 y.o.  female who presented yesterday with abscess/infected sebaceous cyst on back. She was taken to the OR for an I &D. Today doing well  Tolerating pain  Feeling better than yesterday  Aware that her blood sugars run high and verbalized she will contact her endocrinologist for a follow-up     Current Facility-Administered Medications   Medication Dose Route Frequency    insulin lispro (HUMALOG) injection   SubCUTAneous AC&HS    glucose chewable tablet 16 g  4 Tablet Oral PRN    dextrose (D50W) injection syrg 12.5-25 g  12.5-25 g IntraVENous PRN    glucagon (GLUCAGEN) injection 1 mg  1 mg IntraMUSCular PRN    sodium chloride (NS) flush 5-40 mL  5-40 mL IntraVENous Q8H    sodium chloride (NS) flush 5-40 mL  5-40 mL IntraVENous PRN    0.9% sodium chloride infusion  75 mL/hr IntraVENous CONTINUOUS    morphine injection 2 mg  2 mg IntraVENous Q2H PRN    traMADoL (ULTRAM) tablet 50 mg  50 mg Oral Q6H PRN    acetaminophen (TYLENOL) tablet 650 mg  650 mg Oral Q6H PRN    famotidine (PEPCID) tablet 40 mg  40 mg Oral BID    furosemide (LASIX) tablet 20 mg  20 mg Oral DAILY    losartan (COZAAR) tablet 50 mg  50 mg Oral DAILY    metoprolol tartrate (LOPRESSOR) tablet 25 mg  25 mg Oral BID    vancomycin (VANCOCIN) 1500 mg in  ml infusion  1,500 mg IntraVENous Q24H    enoxaparin (LOVENOX) injection 40 mg  40 mg SubCUTAneous Q12H        Objective:   09/30 0701 - 09/30 1900  In: 300 [P.O.:300]  Out: -   09/28 1901 - 09/30 0700  In: 1050 [P.O.:350;  I.V.:700]  Out: -   Patient Vitals for the past 8 hrs:   BP Temp Pulse Resp SpO2   09/30/21 0729 (!) 162/73 97.9 °F (36.6 °C) 76 18 98 %   09/30/21 0313 (!) 152/71 98.3 °F (36.8 °C) 77 18 99 %       Physical Exam:  General: Alert, cooperative, NAD  Lungs: Unlabored  Back:                Large amount of saturated packing midline. When taken down saturated with sang to serosang output from overnight. Kerlix within I/D site saturated, but without overt draining. Replaced outer dressings of 4x4 tape. No tenderness, no erythema extending beyond incision,  Extremities: Warm, moves all, no edema  Skin:  Warm and dry, no rash    Labs:   Recent Labs     09/30/21  0339   WBC 11.1*   HGB 10.5*   HCT 33.4*   *     Recent Labs     09/30/21  0339 09/29/21  1901 09/29/21  1901      < > 141   K 3.8   < > 4.1      < > 107   CO2 26   < > 27   *   < > 149*   BUN 9   < > 9   CREA 0.87   < > 0.78   CA 7.8*   < > 8.6   ALB  --   --  2.6*   TBILI  --   --  0.3   ALT  --   --  18    < > = values in this interval not displayed.        Assessment / Plan:     POD 1 Day Post-Op    Procedure: Procedure(s):  INCISION AND DRAINAGE BACK    Stable  Pain Controlled  Outer Dressing changed- will change packing tomorrow unless continued saturation today  Diabetic Management consult for uncontrolled blood sugars- on sliding scale  Nutrition consult for diabetic diet education  Will need home health wound care  Poss DC tomorrow    Pt and plan discussed with Dr Swati Baires, NP

## 2021-09-30 NOTE — PROGRESS NOTES
9/30/2021  11:31 AM      Patient/family seen: CM met w/ patient        Informed patient/family of BPCI-A Bundle Program. Also advised of potential outreach by Care Transitions Team.    Bundle Payment Care Improvement Beneficiary Letter Delivered to Beneficiary or Representative: Yes   Date BCPI -A was given:09/30/21    Karla Cockayne, BS

## 2021-09-30 NOTE — H&P
Assessment:   76 yo woman with abscess      Plan:   OR tonight for incision and drainage  IV abx  Will need home health for wound care on discharge      Signed By: Sunil Goss MD  0 [facility]  [phone]    September 29, 2021          General Surgery History and Physical    Subjective:      iFna Maloney is a 77 y.o.  female who presents with a one month history of worsening pain and discomfort in her mid back. She has a history of infected sebaceous cysts in the past. She has seen her PCP and has been on several different antibiotic treatments without improvement. Her PCP directed her to the ER today for further evaluation. She does report fevers and chills. She reports not feeling well overall.       Past Medical History:   Diagnosis Date    Adverse effect of anesthesia     WOKE UP EARLY DURING BACK SURGERY    Breast cancer [facility]) 1993    right breast cancer     Cervical cancer ([facility] [address] 1980's    Chronic pain     L4-L5 discectomy    Congestive heart failure (HCC)     Depression with anxiety     Ectopic pregnancy     GERD (gastroesophageal reflux disease)     Glaucoma     Hypertension     Ill-defined condition     TONSIL STONES - SOMETIMES CAUSE BAD BREATH    Miscarriage     Neuropathy     r/t DM    Sleep apnea 2005    CPAP compliant but not using now because of rash on face from the mask    Type 2 diabetes mellitus ([address]     on insulin     Past Surgical History:   Procedure Laterality Date    HX CARPAL TUNNEL RELEASE Bilateral     HX CATARACT REMOVAL Bilateral 2013    HX COLONOSCOPY      HX HYSTERECTOMY  1980's    cervical cancer    HX KNEE ARTHROSCOPY Bilateral     HX ORTHOPAEDIC  [id number]    back surgery x 2 ( HERNIATED DISC REPAIR, AND ANOTHER SURGERY SHE CANT REMEMBER    HX ORTHOPAEDIC Left 1990    ligament attachment arm    HX PELVIC LAPAROSCOPY  1970s    ectopic pregnancy    HX TUMOR REMOVAL Right     leg    IR INJ FORAMIN EPID LUMB ANES/STER SNGL 7/29/2021    PA BREAST SURGERY PROCEDURE UNLISTED Right     right mastectomy      Family History   Problem Relation Age of Onset    Diabetes Mother     Hypertension Mother     Diabetes Sister     Cancer Brother         COLON CANCER    Anesth Problems Neg Hx      Social History     Socioeconomic History    Marital status:      Spouse name: Not on file    Number of children: Not on file    Years of education: Not on file    Highest education level: Not on file   Tobacco Use    Smoking status: Never Smoker    Smokeless tobacco: Never Used   Substance and Sexual Activity    Alcohol use: No    Drug use: No     Social Determinants of Health     Financial Resource Strain:     Difficulty of Paying Living Expenses:    Food Insecurity:     Worried About Running Out of Food in the Last Year:     Ran Out of Food in the Last Year:    Transportation Needs:     Lack of Transportation (Medical):      Lack of Transportation (Non-Medical):    Physical Activity:     Days of Exercise per Week:     Minutes of Exercise per Session:    Stress:     Feeling of Stress :    Social Connections:     Frequency of Communication with Friends and Family:     Frequency of Social Gatherings with Friends and Family:     Attends L[employer] day Services:     Active Member of Clubs or Organizations:     Attends Club or Organization Meetings:     Marital Status:       Current Facility-Administered Medications   Medication Dose Route Frequency    ondansetron (ZOFRAN ODT) tablet 4 mg  4 mg Oral NOW    HYDROmorphone (DILAUDID) syringe 0.5 mg  0.5 mg IntraMUSCular NOW    vancomycin (VANCOCIN) 2500 mg in  mL infusion  2,500 mg IntraVENous ONCE    piperacillin-tazobactam (ZOSYN) 3.375 g in 0.9% sodium chloride (MBP/ADV) 100 mL MBP  3.375 g IntraVENous NOW    sodium chloride (NS) flush 5-40 mL  5-40 mL IntraVENous Q8H    sodium chloride (NS) flush 5-40 mL  5-40 mL IntraVENous PRN    0.9% sodium chloride infusion  75 mL/hr IntraVENous CONTINUOUS    [START ON 9/30/2021] enoxaparin (LOVENOX) injection 40 mg  40 mg SubCUTAneous Q24H    morphine injection 2 mg  2 mg IntraVENous Q2H PRN    traMADoL (ULTRAM) tablet 50 mg  50 mg Oral Q6H PRN    acetaminophen (TYLENOL) tablet 650 mg  650 mg Oral Q6H PRN    famotidine (PEPCID) tablet 40 mg  40 mg Oral BID    [START ON 9/30/2021] furosemide (LASIX) tablet 20 mg  20 mg Oral DAILY    [START ON 9/30/2021] losartan (COZAAR) tablet 50 mg  50 mg Oral DAILY    metoprolol tartrate (LOPRESSOR) tablet 25 mg  25 mg Oral BID    [START ON 9/30/2021] insulin regular (NOVOLIN R, HUMULIN R) injection   SubCUTAneous ACB&D    insulin NPH (NOVOLIN N, HUMULIN N) injection 60 Units  60 Units SubCUTAneous BID     Current Outpatient Medications   Medication Sig    losartan (COZAAR) 50 mg tablet TAKE 1 TABLET BY MOUTH TWICE A DAY    metoprolol tartrate (LOPRESSOR) 25 mg tablet Take 1 Tablet by mouth two (2) times a day.  insulin regular (NovoLIN R Regular U-100 Insuln) 100 unit/mL injection Do not take if not eating or if blood sugar is less than 120  30 units am, 30 units noon, 35 units in the evening    furosemide (Lasix) 20 mg tablet Take 1 Tablet by mouth daily.  vitamin E (AQUA GEMS) 400 unit capsule Take 400 Units by mouth daily.  acetaminophen (TylenoL) 325 mg tablet Take 650 mg by mouth every four (4) hours as needed for Pain.  famotidine (Pepcid AC) 20 mg tablet Take 40 mg by mouth two (2) times a day.  insulin NPH (NovoLIN N NPH U-100 Insulin) 100 unit/mL injection 60 Units by SubCUTAneous route two (2) times a day. 60u morning; 60u in the DINNER TIME  Indications: 60 units in morning and 60 units in the evening    triamcinolone acetonide (KENALOG) 0.1 % ointment Apply  to affected area two (2) times a day. use thin layer  Applies on face at bipap allergy site    aspirin delayed-release 81 mg tablet Take 81 mg by mouth daily.       Allergies   Allergen Reactions    Contrast Dye [Iodine] Nausea and Vomiting    Lisinopril Cough    Percocet [Oxycodone-Acetaminophen] Nausea Only    Percodan [Oxycodone-Aspirin] Nausea Only    Prednisone Nausea Only    Sulfa (Sulfonamide Antibiotics) Unknown (comments)       Review of Systems:     []     Unable to obtain  ROS due to  []    mental status change  []    sedated   []    intubated   [x]    Total of 12 system negative, unless specified below or in HPI:  Constitutional: negative fever, negative chills, negative weight loss  Eyes:   negative visual changes  ENT:   negative sore throat, tongue or lip swelling  Respiratory:  negative cough, negative dyspnea  Cards:  negative for chest pain, palpitations, lower extremity edema  GI:   negative for nausea, vomiting, diarrhea, and abdominal pain  :  negative for frequency, dysuria  Integument:  negative for rash and pruritus  Heme:  negative for easy bruising and gum/nose bleeding  Musculoskel: negative for myalgias,  back pain and muscle weakness  Neuro:  negative for headaches, dizziness, vertigo  Psych:  negative for feelings of anxiety, depression     Objective:        Patient Vitals for the past 8 hrs:   BP Temp Pulse Resp SpO2 Height Weight   21 2038 (!) 172/105    100 %     21 1646 (!) 215/83 98.2 °F (36.8 °C) (!) 116 16 97 % 5' 7\" (1.702 m) 121.6 kg (268 lb)       Temp (24hrs), Av.2 °F (36.8 °C), Min:98.2 °F (36.8 °C), Max:98.2 °F (36.8 °C)      Physical Exam:  General:  Alert, cooperative, no distress, appears stated age. Eyes:  Conjunctivae clear. PERRL, EOMs intact. Nose: Nares normal. Septum midline. Mucosa normal. No drainage or sinus tenderness. Mouth/Throat: Lips, mucosa, and tongue normal. Teeth and gums normal.   Neck: Supple, symmetrical, trachea midline. Back:   Symmetric, no curvature. ROM normal. No CVA tenderness. Lungs:   Clear to auscultation bilaterally. Heart:  Regular rate and rhythm    Abdomen:   Soft, non-tender.  Bowel sounds normal. No masses,  No organomegaly. Extremities: Extremities normal, atraumatic, no cyanosis or edema.        Skin: Skin color, texture, large area of fluctuance right midback with chronic overlying skin changes mild erythema, tender to palpation         BMP:   Lab Results   Component Value Date/Time     09/29/2021 07:01 PM    K 4.1 09/29/2021 07:01 PM     09/29/2021 07:01 PM    CO2 27 09/29/2021 07:01 PM    AGAP 7 09/29/2021 07:01 PM     (H) 09/29/2021 07:01 PM    BUN 9 09/29/2021 07:01 PM    CREA 0.78 09/29/2021 07:01 PM    GFRAA >60 09/29/2021 07:01 PM    GFRNA >60 09/29/2021 07:01 PM     CMP:   Lab Results   Component Value Date/Time     09/29/2021 07:01 PM    K 4.1 09/29/2021 07:01 PM     09/29/2021 07:01 PM    CO2 27 09/29/2021 07:01 PM    AGAP 7 09/29/2021 07:01 PM     (H) 09/29/2021 07:01 PM    BUN 9 09/29/2021 07:01 PM    CREA 0.78 09/29/2021 07:01 PM    GFRAA >60 09/29/2021 07:01 PM    GFRNA >60 09/29/2021 07:01 PM    CA 8.6 09/29/2021 07:01 PM    ALB 2.6 (L) 09/29/2021 07:01 PM    TP 8.1 09/29/2021 07:01 PM    GLOB 5.5 (H) 09/29/2021 07:01 PM    AGRAT 0.5 (L) 09/29/2021 07:01 PM    ALT 18 09/29/2021 07:01 PM     CBC:   Lab Results   Component Value Date/Time    WBC 13.8 (H) 09/29/2021 07:01 PM    HGB 12.9 09/29/2021 07:01 PM    HCT 40.9 09/29/2021 07:01 PM     (H) 09/29/2021 07:01 PM     All Cardiac Markers in the last 24 hours:   Lab Results   Component Value Date/Time    TROIQ <0.05 09/29/2021 07:01 PM     ABG: No results found for: PH, PHI, PCO2, PCO2I, PO2, PO2I, HCO3, HCO3I, FIO2, FIO2I  COAGS: No results found for: APTT, PTP, INR, INREXT, INREXT  Pancreatic Markers: No results found for: AMYLPOCT, AML, LIPPOCT, LPSE

## 2021-09-30 NOTE — PROGRESS NOTES
Enoxaparin 40 mg SQ Q24H adjusted to 40 mg SQ Q12H d/t BMI > 40 per protocol    Thank you,  Ashwin Rodriguez [facility]

## 2021-09-30 NOTE — PROGRESS NOTES
9/30/2021  Case Management Note    3:05 PM  [facility] can accept but they need a face to face note from the doctor. ASLHEY Caldwell    11:41 AM  Papo Ward RN referral faxed to [facility] (patient choice) at [phone]. Await call back.      ASHLEY Caldwell

## 2021-09-30 NOTE — PROGRESS NOTES
[facility]: MED RECONCILIATION    Comments/Recommendations:   Patient alert and oriented for medication reconciliation. Patient states her BG has been very labile and she has been adjusting her insulin a lot, unclear exactly how much she has been on, she states she is supposed to be on Novolin N 60 units BID + regular insulin with meals (30 with breakfast and lunch, 35 with dinner). Confirmed preferred pharmacy as [facility].     Medications added:     Vitamin D    Medications removed:    none    Medications adjusted:    Furosemide 20mg daily PRN adjusted from daily   Losartan 50mg daily adjusted from BID-per cardiology outpatient notes patient should be on BID but she confirmed she is only on 1 tab daily   Triamcinolone ointment BID PRN adjusted from BID    Information obtained from: patient, Rx query,outpatient notes    Allergies: Contrast dye [iodine], Lisinopril, Percocet [oxycodone-acetaminophen], Percodan [oxycodone-aspirin], Prednisone, and Sulfa (sulfonamide antibiotics)    Prior to Admission Medications:     Medication Documentation Review Audit       Reviewed by Tatum Christina (Pharmacist) on 09/30/21 at 1458      Medication Sig Documenting Provider Last Dose Status Taking?   acetaminophen (TylenoL) 325 mg tablet Take 650 mg by mouth every four (4) hours as needed for Pain. Provider, Historical  Active Yes   aspirin delayed-release 81 mg tablet Take 81 mg by mouth daily. Provider, Historical  Active Yes           Med Note Guadelupe Jefferson Oct 12, 2020  2:12 PM)     cholecalciferol (Vitamin D3) (1000 Units /25 mcg) tablet Take 1,000 Units by mouth daily. Provider, Historical  Active Yes   famotidine (Pepcid AC) 20 mg tablet Take 40 mg by mouth two (2) times a day. Provider, Historical  Active Yes           Med Note (Akin Jomar   Mon Oct 12, 2020  2:12 PM)     furosemide (LASIX) 20 mg tablet Take 20 mg by mouth daily as needed (swelling).  Provider, Historical  Active Yes   insulin NPH (NovoLIN N NPH U-100 Insulin) 100 unit/mL injection 60 Units by SubCUTAneous route two (2) times a day. Indications: 60 units in morning and 60 units in the evening Provider, Historical  Active Yes           Med Note (TATUM ROBERTS Sep 30, 2021  2:57 PM) Patient states this is what she is supposed to be taking but has been adjusting \"a lot lately\" due to very labile BG values   insulin regular (NovoLIN R Regular U-100 Insuln) 100 unit/mL injection Do not take if not eating or if blood sugar is less than 120  30 units am, 30 units noon, 35 units in the evening Ysabel Porras MD  Active Yes   losartan (COZAAR) 50 mg tablet Take 50 mg by mouth daily. Provider, Historical  Active Yes   metoprolol tartrate (LOPRESSOR) 25 mg tablet Take 1 Tablet by mouth two (2) times a day. Oswaldo Watkins, LINDA  Active Yes   triamcinolone acetonide (KENALOG) 0.1 % ointment Apply  to affected area two (2) times daily as needed for Skin Irritation. use thin layer  Applies on face at [facility] allergy site  Provider, Historical  Active Yes   vitamin E (AQUA GEMS) 400 unit capsule Take 400 Units by mouth daily.  Provider, Historical  Active Yes           Med Note Guadelupe Katonah Oct 12, 2020  2:14 PM)                      Thank you,   Tatum Roberts, PharmD, BCPS   Contact: [phone]

## 2021-09-30 NOTE — ROUTINE PROCESS
TRANSFER - OUT REPORT:    Verbal report given to 5th floor DOMINICK Gallegos  on Rudi Franco  being transferred to [facility] (unit) for routine post - op       Report consisted of patients Situation, Background, Assessment and   Recommendations(SBAR). Information from the following report(s) SBAR, OR Summary, MAR and Recent Results was reviewed with the receiving nurse. Lines:   Peripheral IV 09/29/21 Anterior;Distal;Left Forearm (Active)   Site Assessment Clean, dry, & intact 09/29/21 2220   Phlebitis Assessment 0 09/29/21 2220   Infiltration Assessment 0 09/29/21 2220   Dressing Status Clean, dry, & intact 09/29/21 2220   Dressing Type Transparent 09/29/21 2220   Hub Color/Line Status Blue 09/29/21 2220   Action Taken Open ports on tubing capped 09/29/21 2220   Alcohol Cap Used Yes 09/29/21 2220        Opportunity for questions and clarification was provided.       Patient transported with:   Registered Nurse

## 2021-09-30 NOTE — ANESTHESIA PREPROCEDURE EVALUATION
Relevant Problems   RESPIRATORY SYSTEM   (+) Shortness of breath      NEUROLOGY   (+) Depression with anxiety      CARDIOVASCULAR   (+) CHF (congestive heart failure) (HCC)   (+) HTN (hypertension)   (+) Hypertensive urgency      ENDOCRINE   (+) DM (diabetes mellitus), type 2 (HCC)   (+) Morbid obesity (HCC)      PERSONAL HX & FAMILY HX OF CANCER   (+) Breast cancer (HCC)       Anesthetic History   No history of anesthetic complications            Review of Systems / Medical History  Patient summary reviewed, nursing notes reviewed and pertinent labs reviewed    Pulmonary  Within defined limits                 Neuro/Psych   Within defined limits           Cardiovascular    Hypertension: well controlled                   GI/Hepatic/Renal     GERD: well controlled           Endo/Other    Diabetes: well controlled, type 2, using insulin    Morbid obesity     Other Findings              Physical Exam    Airway  Mallampati: II  TM Distance: > 6 cm  Neck ROM: normal range of motion   Mouth opening: Normal     Cardiovascular  Regular rate and rhythm,  S1 and S2 normal,  no murmur, click, rub, or gallop             Dental  No notable dental hx       Pulmonary  Breath sounds clear to auscultation               Abdominal  GI exam deferred       Other Findings            Anesthetic Plan    ASA: 3  Anesthesia type: MAC          Induction: Intravenous  Anesthetic plan and risks discussed with: Patient

## 2021-09-30 NOTE — DIABETES MGMT
[address] [facility]    CLINICAL NURSE SPECIALIST CONSULT     Initial Presentation   Raj Hood is a 77 y.o. female who presented to the ED 9/29/21 with a 1 month history of a back abscess with failed oral antibiotic therapy. HX:   Past Medical History:   Diagnosis Date    Adverse effect of anesthesia     WOKE UP EARLY DURING BACK SURGERY    Breast cancer ([address] 1993    right breast cancer     Cervical cancer ([address] 1980's    Chronic pain     L4-L5 discectomy    Congestive heart failure (HCC)     Depression with anxiety     Ectopic pregnancy     GERD (gastroesophageal reflux disease)     Glaucoma     Hypertension     Ill-defined condition     TONSIL STONES - SOMETIMES CAUSE BAD BREATH    Miscarriage     Neuropathy     r/t DM    Sleep apnea 2005    CPAP compliant but not using now because of rash on face from the mask    Type 2 diabetes mellitus (HCC)     on insulin        INITIAL DX: Back Abscess     Current Treatment     TX: I&D, IV antibiotics     Consulted by Denan Butterfield MD for advanced diabetes nursing assessment and care for:   [x] Home management assessment      Hospital Course   Clinical progress has been uncomplicated.    9/29: Abscess s/p I&D  Diabetes History   Type 2 Diabetes      Diabetes-related Medical History  Acute complications  None  Neurological complications  Peripheral neuropathy  Microvascular disease  None  Macrovascular disease  None  Other associated conditions     Depression    Diabetes Medication History  Drug class Currently in use Discontinued Never used   Biguanide      DDP-4 inhibitor       Sulfonylurea      Thiazolidinedione      GLP-1 RA      SGLT-2 inhibitors      Basal insulin NPH 60 units BID     Bolus insulin Novolin R  30 units at breakfast and lunch   35 units at dinner     Fixed Dose  Combinations        Subjective   A1C 7.6% July 2021, no current A1C available   Admitting   GFR over 60    BG pattern here:   Fasting 144  Pre-lunch: 153  On correctional humalog only  Objective   Please note that this is a virtual consult. I did not see this patient in person. Information is gathered from chart review. Vital Signs   Visit Vitals  BP (!) 174/67 (BP 1 Location: Left upper arm, BP Patient Position: At rest)   Pulse 80   Temp 97.8 °F (36.6 °C)   Resp 20   Ht 5' 7\" (1.702 m)   Wt 121.6 kg (268 lb)   SpO2 98%   BMI 41.97 kg/m²     Laboratory      CBC W/O DIFF    Collection Time: 09/30/21  3:39 AM   Result Value Ref Range    WBC 11.1 (H) 3.6 - 11.0 K/uL    RBC 3.96 3.80 - 5.20 M/uL    HGB 10.5 (L) 11.5 - 16.0 g/dL    HCT 33.4 (L) 35.0 - 47.0 %    MCV 84.3 80.0 - 99.0 FL    MCH 26.5 26.0 - 34.0 PG    MCHC 31.4 30.0 - 36.5 g/dL    RDW 16.0 (H) 11.5 - 14.5 %    PLATELET 641 (H) 423 - 400 K/uL    MPV 10.3 8.9 - 12.9 FL    NRBC 0.0 0  WBC    ABSOLUTE NRBC 0.00 0.00 - 0.01 K/uL     BMP:   Lab Results   Component Value Date/Time     09/30/2021 03:39 AM    K 3.8 09/30/2021 03:39 AM     09/30/2021 03:39 AM    CO2 26 09/30/2021 03:39 AM    AGAP 5 09/30/2021 03:39 AM     (H) 09/30/2021 03:39 AM    BUN 9 09/30/2021 03:39 AM    CREA 0.87 09/30/2021 03:39 AM    GFRAA >60 09/30/2021 03:39 AM    GFRNA >60 09/30/2021 03:39 AM          Blood glucose pattern        Assessment and Plan   Nursing Diagnosis Risk for unstable blood glucose pattern   Nursing Intervention Domain 5260 Decision-making Support   Nursing Interventions Examined current inpatient diabetes control   Explored factors facilitating and impeding inpatient management  Identified self-management practices impeding diabetes control  Explored corrective strategies with patient and responsible inpatient provider   Informed patient of rational for insulin strategy while hospitalized     Evaluation   Rina Mcintyre is a 77year old female, with Type 2 Diabetes, who was admitted with an infected sebaceous cyst now s/p I&D.   She was likely nearing diabetes control prior to admission, as evidenced by A1c of 7.2% earlier this summer and admission BG of 143. During this hospitalization, the patient has achieved inpatient blood glucose target of 100-180mg/dl with minimal correctional humalog. Several factors have played a role in blood glucose management including:  [x] Critical nature of illness state  [x]  Changing nutritive sources & needs     Please continue with this therapy and can resume low dose NPH basal insulin if blood glucose trends over goal.  Recommendations   1. A1C add-on  2. Continue POC glucose ACHS  3. Consistent carbohydrate diet (60 grams CHO/meal)  4. Continue correctional humalog at normal sensitivity ACHS  5. If BG trends over 180, start low dose NPH: 0.2units/kg/day= 12 units NPH at breakfast and dinner    Thank you for including us in Ms Rachel's care  Billing Code(s)   [x] 54906    Before making these care recommendations, I personally reviewed the hosptialization record, including laboratory and diagnostic data, medications and examined the patient at bedside (circumstances permitting).   Total minutes: 25    MARYBETH Toro  Diabetes Clinical Nurse Specialist  [facility]  Access via [employer]

## 2021-09-30 NOTE — ROUTINE PROCESS
Bedside and Verbal shift change report given to DOMINICK Brownlee (oncoming nurse) by Cristopher Ibarra (offgoing nurse). Report included the following information SBAR and Kardex.

## 2021-09-30 NOTE — CONSULTS
Nutrition Education    Verbally reviewed information with Patient, family member  Educated on diabetic diet choices  Written educational materials provided. Contact name and number provided. Refer to Patient Education activity for more details.     Electronically signed by Melba Mcmahon RD on 1/07/1940  Contact Number: [phone] or via [employer]

## 2021-09-30 NOTE — PROGRESS NOTES
Bedside and Verbal shift change report given to Rosy (oncoming nurse) by [employer] (offgoing nurse). Report included the following information SBAR, Kardex and MAR.

## 2021-09-30 NOTE — PROGRESS NOTES
Problem: Falls - Risk of  Goal: *Absence of Falls  Outcome: Progressing Towards Goal  Note: Fall Risk Interventions:  Mobility Interventions: Communicate number of staff needed for ambulation/transfer, Patient to call before getting OOB, PT Consult for mobility concerns         Medication Interventions: Patient to call before getting OOB, Teach patient to arise slowly    Elimination Interventions: Call light in reach              Problem: Patient Education: Go to Patient Education Activity  Goal: Patient/Family Education  Outcome: Progressing Towards Goal     Problem: Pain  Goal: *Control of Pain  Outcome: Progressing Towards Goal     Problem: Pain  Goal: *Control of Pain  Outcome: Progressing Towards Goal     Problem: Patient Education: Go to Patient Education Activity  Goal: Patient/Family Education  Outcome: Progressing Towards Goal

## 2021-09-30 NOTE — ANESTHESIA POSTPROCEDURE EVALUATION
Procedure(s):  INCISION AND DRAINAGE BACK. MAC    Anesthesia Post Evaluation      Multimodal analgesia: multimodal analgesia not used between 6 hours prior to anesthesia start to PACU discharge  Patient location during evaluation: PACU  Patient participation: complete - patient participated  Level of consciousness: awake  Pain management: adequate  Airway patency: patent  Anesthetic complications: no  Cardiovascular status: acceptable, blood pressure returned to baseline and hemodynamically stable  Respiratory status: acceptable  Hydration status: acceptable  Post anesthesia nausea and vomiting:  controlled      INITIAL Post-op Vital signs:   Vitals Value Taken Time   /61 09/29/21 2230   Temp 36.7 °C (98 °F) 09/29/21 2228   Pulse 106 09/29/21 2235   Resp 18 09/29/21 2235   SpO2 100 % 09/29/21 2235   Vitals shown include unvalidated device data.

## 2021-09-30 NOTE — PROGRESS NOTES
9/30/2021  10:15 AM  CM completed assessment w/ pt in person, CM wore mask and goggles at all times  Charted demographics verified, pt lives w/ spouse Jeanmarie Chun in 1 story home, there are 2 entry steps, pt denies falls, pt reports to be ambulatory, independent w/ her ADLs, and drives, has family nearby who can assist  Reason for Admission:   Emergent for Abscess                    RUR Score:     20 % Moderate Risk of Readmission/Yellow             PCP: First and Last name:   Ivan Stoll,      Name of Practice:    Are you a current patient: Yes/No: yes    Approximate date of last visit: 9/29/21   Can you participate in a virtual visit if needed: YES    Do you (patient/family) have any concerns for transition/discharge? Pt has supportive family who can assist her at home               Plan for utilizing home health:   Order for [facility] for wound care, pt agrees, prefers [facility]  DME: None  Rx: [employer], pt [facility], covered   COVID Vax Hx: Mauricio Sites April 2021  Current Advanced Directive/Advance Care Plan:  Full Code  HCDM daughter Shanda Shannon [phone]    Healthcare Decision Maker:   Click here to complete [address] including selection of the Healthcare Decision Maker Relationship (ie \"Primary\")              Transition of Care Plan:        RUR 20%  LOS 1 Day  Cardiac Bundle  1. Hospital admission for medical management   2. Surgery consult, POD # 1 I&D  3. CM to follow through for treatment/response  4. ACP planning, pt declined to complete on this admission  5. DC when stable to home w/ [facility] and family assistanvce  6. Outpatient f/u PCP, surgery  7. Pt resides outside of [facility]  8. Family will transport at [address] [place] [address] Management Interventions  PCP Verified by CM:  Yes Tr July, [place], )  Last Visit to PCP: 09/29/21  Palliative Care Criteria Met (RRAT>21 & CHF Dx)?: No  Mode of Transport at Discharge: Self (Family)  Physical Therapy Consult: No  Occupational Therapy Consult: No  Speech Therapy Consult: No  Support Systems: Spouse/Significant Other (pt lives w/ spouse in pvt residence, at baseline pt is ambulatory, IADLs, drives, family can assist)  Confirm Follow Up Transport: Family  The Plan for Transition of Care is Related to the Following Treatment Goals : Home Health SN  The Patient and/or Patient Representative was Provided with a Choice of Provider and Agrees with the Discharge Plan?: Yes  Name of the Patient Representative Who was Provided with a Choice of Provider and Agrees with the Discharge Plan: patient Sujatha Tian  [place] of Choice List was Provided with Basic Dialogue that Supports the Patient's Individualized Plan of Care/Goals, Treatment Preferences and Shares the Quality Data Associated with the Providers?: (S) Yes  Discharge Location  Discharge Placement: Home with home health  CORINNA Almanza

## 2021-09-30 NOTE — OP NOTES
[facility]up[facility] E[facility] [place] 80  OPERATIVE REPORT    Name:  Leonel Brown  MR#:  [id number]  :  1945  ACCOUNT #:  [de-identified]  DATE OF SERVICE:  2021    CLINICAL SERVICE:  General Surgery    PREOPERATIVE DIAGNOSIS:  Abscess. POSTOPERATIVE DIAGNOSIS:  Abscess. PROCEDURE PERFORMED:  Incision and drainage of abscess. SURGEON:  Hamzah Engel MD    ASSISTANT:  See brief op note    ANESTHESIA:  MAC and local    COMPLICATIONS:  None. SPECIMENS REMOVED:  Wound culture. IMPLANTS:  none    ESTIMATED BLOOD LOSS:  minimal    DRAINS/TUBES:  None. INDICATION:  The patient is a 71-year-old woman who reports that she had an infected sebaceous cyst on her back approximately one month ago. She has been seen at the primary care office on several occasions and has been placed on different regimens and antibiotics in an attempt to improve the abscess. She reports that the abscess has only gotten worse over this time. She was directed to the emergency department today by her primary care for further evaluation. On evaluation in the emergency room, she was noted to have a large subcutaneous abscess and she presents to the OR at this time for drainage. PROCEDURE:  After obtaining informed consent, the patient was brought to the operating room where she was laid in the lateral decubitus position on a stretcher. After adequate sedation was achieved, the area was prepped and draped in the standard fashion. A surgical time-out was conducted and IV vancomycin was already running at the time of incision. The area over the abscess was infiltrated with 1% lidocaine with epinephrine and a 10-blade scalpel was used to make an incision through the skin down to the abscess cavity. Several hundred mL of purulent fluid was evacuated from the abscess cavity. The abscess cavity was approximately 8 cm x 10 cm.   Once the abscess was completely drained, using a combination of Yankauer suction as well as blunt dissection with my finger, all loculations were broken up. Normal saline was then used to irrigate the wound. Once this was adequately irrigated, a 2-inch Kerlix soaked in Dakin's solution was packed within the wound. The incision was then covered with 4x4s, ABDs and tape. The patient tolerated the procedure well without complications. All instruments, needles, and sponge counts were correct at the end of the case.       Hudson Lopez MD [id number]  D:  09/29/2021 22:27  T:  09/30/2021 8:02  JOB #:  [id number]

## 2021-09-30 NOTE — PROGRESS NOTES
[facility] Dosing Services: Antimicrobial Stewardship Daily Doc    Consult for antibiotic dosing of Vancomycin by Dr. Giovanna Garcia  Indication: SSTI --> Back abscess failing 10 day course of Keflex in 76 yo female; PMH of T2DM, HTN, CHF, and cysts on back requiring surgical removal.  SCr =  0.78; WBC = 13.8; Tmax = 101 (at home)  Day of Therapy: 1     Ht Readings from Last 1 Encounters:   09/29/21 170.2 cm (67\")        Wt Readings from Last 1 Encounters:   09/29/21 121.6 kg (268 lb)        Vancomycin therapy:  Loading dose: 2500mg IV x 1 9/29 @ 2036  Maintenance dose: Vancomycin 1500 mg IV every 24 hours   Last level: New start   Dose calculated to approximate a           a. Target AUC/PRISCILLA of <500          b. Trough of 10-15 mcg/mL     Plan: Continue current dose and draw initial level within 24-48 hours of first maintenance dose. Dose administration notes:      Date Dose & Interval Measured (mcg/mL) Extrapolated (mcg/mL)   ? ? ? ?   ? ? ? ?   ? ? ? ? Non-Kinetic Antimicrobial Dosing Regimen:   Current Regimen:  Zosyn 3.375g IV x 1 (not yet given); Per Dr. Giovanna Garcia, not continuing Zosyn. Recommendation:   Dose administration notes: Other Antimicrobial   (not dosed by pharmacist) None    Cultures 9/29 Blood - pending     Serum Creatinine Lab Results   Component Value Date/Time    Creatinine 0.78 09/29/2021 07:01 PM    Creatinine (POC) 0.9 03/19/2021 08:21 AM         Creatinine Clearance Estimated Creatinine Clearance: 84.2 mL/min (based on SCr of 0.78 mg/dL). Temp Temp: 98.2 °F (36.8 °C)       WBC Lab Results   Component Value Date/Time    WBC 13.8 (H) 09/29/2021 07:01 PM        Procalcitonin No results found for: PCT     For Antifungals, Metronidazole and Nafcillin: Lab Results   Component Value Date/Time    ALT (SGPT) 18 09/29/2021 07:01 PM    AST (SGOT) 11 (L) 09/29/2021 07:01 PM    Alk.  phosphatase 106 09/29/2021 07:01 PM    Bilirubin, total 0.3 09/29/2021 07:01 PM        Pharmacist Sheng Colvin Edi Beck. D.

## 2021-10-01 VITALS
RESPIRATION RATE: 17 BRPM | HEART RATE: 85 BPM | BODY MASS INDEX: 42.06 KG/M2 | SYSTOLIC BLOOD PRESSURE: 170 MMHG | OXYGEN SATURATION: 99 % | TEMPERATURE: 98.5 F | HEIGHT: 67 IN | WEIGHT: 268 LBS | DIASTOLIC BLOOD PRESSURE: 73 MMHG

## 2021-10-01 LAB
COMMENT, HOLDF: NORMAL
GLUCOSE BLD STRIP.AUTO-MCNC: 133 MG/DL (ref 65–117)
GLUCOSE BLD STRIP.AUTO-MCNC: 179 MG/DL (ref 65–117)
SAMPLES BEING HELD,HOLD: NORMAL
SERVICE CMNT-IMP: ABNORMAL
SERVICE CMNT-IMP: ABNORMAL
VANCOMYCIN SERPL-MCNC: 17.3 UG/ML

## 2021-10-01 PROCEDURE — 77030027138 HC INCENT SPIROMETER -A

## 2021-10-01 PROCEDURE — 80202 ASSAY OF VANCOMYCIN: CPT

## 2021-10-01 PROCEDURE — 36415 COLL VENOUS BLD VENIPUNCTURE: CPT

## 2021-10-01 PROCEDURE — 74011250637 HC RX REV CODE- 250/637: Performed by: SURGERY

## 2021-10-01 PROCEDURE — 74011636637 HC RX REV CODE- 636/637: Performed by: NURSE PRACTITIONER

## 2021-10-01 PROCEDURE — 77030011256 HC DRSG MEPILEX <16IN NO BORD MOLN -A

## 2021-10-01 PROCEDURE — 82962 GLUCOSE BLOOD TEST: CPT

## 2021-10-01 PROCEDURE — 77030037878 HC DRSG MEPILEX >48IN BORD MOLN -B

## 2021-10-01 PROCEDURE — 74011250636 HC RX REV CODE- 250/636: Performed by: SURGERY

## 2021-10-01 RX ORDER — AMOXICILLIN AND CLAVULANATE POTASSIUM 875; 125 MG/1; MG/1
1 TABLET, FILM COATED ORAL EVERY 12 HOURS
Qty: 16 TABLET | Refills: 0 | Status: SHIPPED | OUTPATIENT
Start: 2021-10-01 | End: 2022-04-21

## 2021-10-01 RX ADMIN — METOPROLOL TARTRATE 25 MG: 25 TABLET, FILM COATED ORAL at 08:58

## 2021-10-01 RX ADMIN — Medication 10 ML: at 04:41

## 2021-10-01 RX ADMIN — SODIUM CHLORIDE 75 ML/HR: 9 INJECTION, SOLUTION INTRAVENOUS at 04:40

## 2021-10-01 RX ADMIN — FAMOTIDINE 40 MG: 20 TABLET ORAL at 08:58

## 2021-10-01 RX ADMIN — INSULIN LISPRO 2 UNITS: 100 INJECTION, SOLUTION INTRAVENOUS; SUBCUTANEOUS at 11:45

## 2021-10-01 RX ADMIN — FUROSEMIDE 20 MG: 20 TABLET ORAL at 08:58

## 2021-10-01 RX ADMIN — ENOXAPARIN SODIUM 40 MG: 40 INJECTION SUBCUTANEOUS at 08:58

## 2021-10-01 RX ADMIN — LOSARTAN POTASSIUM 50 MG: 50 TABLET, FILM COATED ORAL at 08:58

## 2021-10-01 RX ADMIN — ACETAMINOPHEN 650 MG: 325 TABLET ORAL at 06:28

## 2021-10-01 NOTE — PROGRESS NOTES
[facility] Dosing Services: Antimicrobial Stewardship Daily Doc    Consult for antibiotic dosing of vancomycin by Dr. Colleen Franco  Indication: SSTI/back abscess (failed multiple outpatient abx)  Day of Therapy: 3  S/p I&D on 9/29 by general surgery     Ht Readings from Last 1 Encounters:   09/29/21 170.2 cm (67\")        Wt Readings from Last 1 Encounters:   09/29/21 121.6 kg (268 lb)        Vancomycin therapy:  Current maintenance dose: vancomycin 1500 mg IV every 24 hours   Last level: 17.3 at 10/1 0240  Dose calculated to approximate a           a. Target AUC/PRISCILLA of <500          b. Trough of 10-15  mcg/mL     Plan: Continue current regimen, renal function stable, afebrile. Dose administration notes:   Doses given appropriately as scheduled    Date Dose & Interval Measured (mcg/mL) AUC per [facility]   10/1 ?1500mg q24h ?17.3 ?476   ? ? ? ?   ? ? ? ? Other Antimicrobial   (not dosed by pharmacist) none   Cultures 9/29 Blood: NGTD x 2 days (prelim)  9/30 back wound: 1+ GNRs, few GPC in pairs (prelim)  9/29 anaerobic: in process  9/30 Blood: NGTD x 1 day (prelim)   Serum Creatinine Lab Results   Component Value Date/Time    Creatinine 0.87 09/30/2021 03:39 AM    Creatinine (POC) 0.9 03/19/2021 08:21 AM         Creatinine Clearance Estimated Creatinine Clearance: 75.5 mL/min (based on SCr of 0.87 mg/dL). Temp Temp: 98 °F (36.7 °C)       WBC Lab Results   Component Value Date/Time    WBC 11.1 (H) 09/30/2021 03:39 AM        Procalcitonin No results found for: PCT     For Antifungals, Metronidazole and Nafcillin: Lab Results   Component Value Date/Time    ALT (SGPT) 18 09/29/2021 07:01 PM    AST (SGOT) 11 (L) 09/29/2021 07:01 PM    Alk.  phosphatase 106 09/29/2021 07:01 PM    Bilirubin, total 0.3 09/29/2021 07:01 PM        Pharmacist XIANG GreenD

## 2021-10-01 NOTE — DISCHARGE SUMMARY
Discharge Summary    Patient: April Mains               Sex: female          DOA: [unfilled]       YOB: 1945      Age:  77 y.o.        LOS:  LOS: 2 days                Admit Date: 9/29/2021    Discharge Date: 10/1/2021    Admission Diagnoses: Abscess [L02.91]    Discharge Diagnoses:    Problem List as of 10/1/2021 Date Reviewed: 7/12/2021        Codes Class Noted - Resolved    Shortness of breath ICD-10-CM: R06.02  ICD-9-CM: 786.05  3/3/2020 - Present        HTN (hypertension) ICD-10-CM: I10  ICD-9-CM: 401.9  7/26/2021 - Present        Chronic low back pain ICD-10-CM: M54.50, G89.29  ICD-9-CM: 724.2, 338.29  7/26/2021 - Present        DM (diabetes mellitus), type 2 ([facility] ICD-10-CM: E11.9  ICD-9-CM: 250.00  7/26/2021 - Present        Hypertensive urgency ICD-10-CM: I16.0  ICD-9-CM: 401.9  7/26/2021 - Present        Solitary pulmonary nodule ICD-10-CM: R91.1  ICD-9-CM: 793.11  9/14/2020 - Present        CHF (congestive heart failure) ([facility] ICD-10-CM: I50.9  ICD-9-CM: 428.0  3/4/2020 - Present        Infected sebaceous cyst ICD-10-CM: L72.3, L08.9  ICD-9-CM: 706.2  6/13/2018 - Present        Abscess ICD-10-CM: L02.91  ICD-9-CM: 682.9  6/11/2018 - Present        Morbid obesity ([facility] ICD-10-CM: E66.01  ICD-9-CM: 278.01  6/11/2018 - Present        SIRS (systemic inflammatory response syndrome) ([facility] ICD-10-CM: R65.10  ICD-9-CM: 995.90  6/11/2016 - Present        Depression with anxiety ICD-10-CM: F41.8  ICD-9-CM: 300.4  Unknown - Present        Glaucoma ICD-10-CM: H40.9  ICD-9-CM: 365.9  Unknown - Present        Breast cancer ([facility] ICD-10-CM: C50.919  ICD-9-CM: 174.9  Unknown - Present        RESOLVED: Hypertension ICD-10-CM: I10  ICD-9-CM: 401.9  8/3/2021 - 8/3/2021        RESOLVED: Type 2 diabetes mellitus ([facility] ICD-10-CM: E11.9  ICD-9-CM: 250.00  8/3/2021 - 8/3/2021        RESOLVED: Cellulitis of female breast ICD-10-CM: N61.0  ICD-9-CM: 611.0  6/15/2016 - 6/11/2018              Discharge Condition: Good    Hospital Course: Pt admitted and taken to surgery on 9/29 for large back abscess. Pt had I&D performed in OR and has tolerated since. Pt has tolerated packing and pain controlled with tylenol. Wound minimally with some oozing yesterday, but controlled with surgicel. No concerns for recurrent bleeding. Pt kept on antibiotics for gram + rods in gram stain    Pt will be discharged today with PO antibiotics, home health, and return to clinic for follow-up visit in 2 weeks. She will need close follow-up with diabetes provider due to more recent elevated BS's. Physical Exam:  General:         Alert, cooperative, NAD  Lungs:            Unlabored  Back:               packing midline. No bleeding- expected saturation from dakins. Brown tint on packing from surgicel. Beefy red tissue seen within incision. Replaced packing, outer dressings with 4x4 and mepilex for absorption and skin sensitivity. No tenderness, no erythema extending beyond incision,  Extremities:   Warm, moves all, no edema  Skin:               Warm and dry, no rash      Discharge Medications:     Current Discharge Medication List      START taking these medications    Details   amoxicillin-clavulanate (AUGMENTIN) 875-125 mg per tablet Take 1 Tablet by mouth every twelve (12) hours. Qty: 16 Tablet, Refills: 0         CONTINUE these medications which have NOT CHANGED    Details   furosemide (LASIX) 20 mg tablet Take 20 mg by mouth daily as needed (swelling). losartan (COZAAR) 50 mg tablet Take 50 mg by mouth daily. cholecalciferol (Vitamin D3) (1000 Units /25 mcg) tablet Take 1,000 Units by mouth daily. metoprolol tartrate (LOPRESSOR) 25 mg tablet Take 1 Tablet by mouth two (2) times a day.   Qty: 180 Tablet, Refills: 0      insulin regular (NovoLIN R Regular U-100 Insuln) 100 unit/mL injection Do not take if not eating or if blood sugar is less than 120  30 units am, 30 units noon, 35 units in the evening  Qty: 1 Vial, Refills: 0      vitamin E (AQUA GEMS) 400 unit capsule Take 400 Units by mouth daily. acetaminophen (TylenoL) 325 mg tablet Take 650 mg by mouth every four (4) hours as needed for Pain.      famotidine (Pepcid AC) 20 mg tablet Take 40 mg by mouth two (2) times a day. insulin NPH (NovoLIN N NPH U-100 Insulin) 100 unit/mL injection 60 Units by SubCUTAneous route two (2) times a day. Indications: 60 units in morning and 60 units in the evening      triamcinolone acetonide (KENALOG) 0.1 % ointment Apply  to affected area two (2) times daily as needed for Skin Irritation. use thin layer  Applies on face at bipap allergy site       aspirin delayed-release 81 mg tablet Take 81 mg by mouth daily. Activity: Activity as tolerated    Diet: Regular Diet- diabetic    Wound Care: As directed- home health    Follow-up: 2 weeks with Dr Paul Jaeger in office.     Pt seen and discussed with Dr Dell Holder, NP

## 2021-10-01 NOTE — ROUTINE PROCESS
Bedside and Verbal shift change report given to DOMINICK Brownlee (oncoming nurse) by Eileen Meyer (offgoing nurse). Report included the following information SBAR and Kardex.

## 2021-10-01 NOTE — PROGRESS NOTES
Problem: Falls - Risk of  Goal: *Absence of Falls  Outcome: Progressing Towards Goal  Note: Fall Risk Interventions:  Mobility Interventions: Bed/chair exit alarm, Communicate number of staff needed for ambulation/transfer, Patient to call before getting OOB         Medication Interventions: Patient to call before getting OOB, Teach patient to arise slowly    Elimination Interventions: Bed/chair exit alarm, Call light in reach              Problem: Pain  Goal: *Control of Pain  Outcome: Progressing Towards Goal     Problem: Patient Education: Go to Patient Education Activity  Goal: Patient/Family Education  Outcome: Progressing Towards Goal     Problem: Patient Education: Go to Patient Education Activity  Goal: Patient/Family Education  Outcome: Progressing Towards Goal

## 2021-10-01 NOTE — PROGRESS NOTES
Physician Progress Note      Maurice Ramirez  CSN #:                  [id number]  :                       1945  ADMIT DATE:       2021 4:48 PM  DISCH DATE:  RESPONDING  PROVIDER #:        General Pollo JUAREZ          QUERY TEXTSylvester Shade Afternoon    This patient admitted for abscess on back and was taken to the OR for I&D. The procedure notes 10 blade scalpel was used to make an incision thru the skin down to the abscess \"cavity\". The abscess cavity was approx. \" 8cm x 10cm. \"    To make sure the documentation is accurately reflect the procedure performed please further specify the depth of tissue incised and drained: The medical record reflects the following:  Risk Factors: Elderly female in 70s----with infected subacuteous cyst on her back x1 month tx with abx without improvement  Clinical Indicators: To OR for I&D, Per operative report 10 blade scalpel was used to make an incision thru the skin down to the abscess cavity. Several hundred ml of purulent fluid was evacuated from the abscess cavity. The abscess cavity was approximately 8 cm x 10 cm. Treatment: General surgery, To OR for I&D, and after I&D Irrigated, and 2 inch kerlix soaked in Dakins' solution was packed within the wound. covered with 4x4x and abds and tape. Thank you  Brandie Van Terril, [address] [facility], [address] [place] [address], [address] [place] [address]  Options provided:  -- Skin only  -- Subcutaneous tissue  -- Fascia  -- Muscle  -- Other - I will add my own diagnosis  -- Disagree - Not applicable / Not valid  -- Disagree - Clinically unable to determine / Unknown  -- Refer to Clinical Documentation Reviewer    PROVIDER RESPONSE TEXT:    Addendum to  procedure note: This depth of the drainage to abscess on back was skin only. Query created by: Anayeli Ware on 2021 11:29 AM      QUERY TEXT:    Good Morning,    This patient was admitted for abscess on the back and to OR for I&D of the abscess.     The medical record also notes known history of CHF. The patient is continiuing to take home meds of Lorsartan, Metoprolol, and Lasix during this admission. If possible, please document in progress notes and discharge summary further specificity regarding the type and acuity of CHF:    The medical record reflects the following:  Risk Factors: Elderly, HTN, Known CHF , Obesity  Clinical Indicators: Documented CHF, pt BP noted systolic up to 509K---KX is being maintained during this admission on home meds of lasix, losartan, and metoprolol Last echo July 2021 LVEF @ 60-65%  Treatment: PT cont. on home meds of Lasix, Losartan, Metoporol, monitor I&D, Monitor BP    Thank you  [facility], [address] [place] [address]  [phone]  Options provided:  -- Chronic Systolic CHF/HFrEF  -- Chronic Diastolic CHF/HFpEF  -- Chronic Systolic and Diastolic CHF  -- Other - I will add my own diagnosis  -- Disagree - Not applicable / Not valid  -- Disagree - Clinically unable to determine / Unknown  -- Refer to Clinical Documentation Reviewer    PROVIDER RESPONSE TEXT:    Provider is clinically unable to determine a response to this query.     Query created by: Dante Burks on 9/30/2021 11:35 AM      Electronically signed by:  Kendrick Hernandez MD 10/1/2021 7:42 AM

## 2021-10-01 NOTE — PROGRESS NOTES
I have reviewed discharge instructions with the patient. The patient verbalized understanding. Peripheral Iv removed upon D/C.

## 2021-10-01 NOTE — PROGRESS NOTES
10/1/2021  Case Management Progress Note    10:37 AM  Patient is 77year old female admitted 9/29 for an abscess. Patient's RUR is 21% yellow/moderate risk for readmission  Covid test: negative 9/29  Chart reviewed--patient discussed at IDR rounds this morning. Patient plans to return home with [facility] when she is ready. They can accept her but need a face to face attestation from the attending. I communicated this with NP yesterday and they will work on it. No other needs noted at this time. Transition of Care Plan   1. Continue medical management  2. Home with [facility] when ready   3. Family will transport  4. Follow up with PCP, surgery, other specialties as needed  5.  CM will follow    ASHLEY Cespedes

## 2021-10-01 NOTE — PROGRESS NOTES
Hospital Follow UP. Patient already had a appointment scheduled for 10/6/2021 at 10:30 am with PCP Matt Aviles DO. Scheduling is asking for the Discharge Summary be Faxed to [phone] will forward to CM.

## 2021-10-01 NOTE — DISCHARGE INSTRUCTIONS
Patient Education   You will need to take all your antibiotics  Please schedule appt for 2 weeks after hospital discharge to see Dr Benja Lim will have home health to help pack and clean wound  If you start to have fevers or general unwell feeling please call Dr Nadeen Parikh office  Please call diabetes doctor to schedule appt and have follow up     Skin Abscess: Care Instructions  Your Care Instructions     A skin abscess is a bacterial infection that forms a pocket of pus. A boil is a kind of skin abscess. The doctor may have cut an opening in the abscess so that the pus can drain out. You may have gauze in the cut so that the abscess will stay open and keep draining. You may need antibiotics. You will need to follow up with your doctor to make sure the infection has gone away. The doctor has checked you carefully, but problems can develop later. If you notice any problems or new symptoms, get medical treatment right away. Follow-up care is a key part of your treatment and safety. Be sure to make and go to all appointments, and call your doctor if you are having problems. It's also a good idea to know your test results and keep a list of the medicines you take. How can you care for yourself at home? · Apply warm and dry compresses, a heating pad set on low, or a hot water bottle 3 or 4 times a day for pain. Keep a cloth between the heat source and your skin. · If your doctor prescribed antibiotics, take them as directed. Do not stop taking them just because you feel better. You need to take the full course of antibiotics. · Take pain medicines exactly as directed. ? If the doctor gave you a prescription medicine for pain, take it as prescribed. ? If you are not taking a prescription pain medicine, ask your doctor if you can take an over-the-counter medicine. · Keep your bandage clean and dry. Change the bandage whenever it gets wet or dirty, or at least one time a day.   · If the abscess was packed with gauze:  ? Keep follow-up appointments to have the gauze changed or removed. If the doctor instructed you to remove the gauze, follow the instructions you were given for how to remove it. ? After the gauze is removed, soak the area in warm water for 15 to 20 minutes 2 times a day, until the wound closes. When should you call for help? Call your doctor now or seek immediate medical care if:    · You have signs of worsening infection, such as:  ? Increased pain, swelling, warmth, or redness. ? Red streaks leading from the infected skin. ? Pus draining from the wound. ? A fever. Watch closely for changes in your health, and be sure to contact your doctor if:    · You do not get better as expected. Where can you learn more? Go to http://www.gray.com/  Enter R227 in the search box to learn more about \"Skin Abscess: Care Instructions. \"  Current as of: March 3, 2021               Content Version: 13.0  © 2006-2021 [employer]. Care instructions adapted under license by [employer] (which disclaims liability or warranty for this information). If you have questions about a medical condition or this instruction, always ask your healthcare professional. [place] [address] any warranty or liability for your use of this information.        Patient Education

## 2021-10-02 LAB
BACTERIA SPEC CULT: ABNORMAL
BACTERIA SPEC CULT: NORMAL
GRAM STN SPEC: ABNORMAL
SERVICE CMNT-IMP: ABNORMAL
SERVICE CMNT-IMP: NORMAL

## 2021-10-04 ENCOUNTER — PATIENT OUTREACH (OUTPATIENT)
Dept: CASE MANAGEMENT | Age: 76
End: 2021-10-04

## 2021-10-05 LAB
BACTERIA SPEC CULT: NORMAL
SERVICE CMNT-IMP: NORMAL

## 2021-10-05 NOTE — PROGRESS NOTES
Care Transitions Initial Call    Call within 2 business days of discharge: Yes     Patient: Alondra Villasenor Patient : 1945 MRN: [id number]    [facility]       Complaint Diagnosis Description Type Department Provider    21 Abscess Abscess of back . .. ED to Hosp-Admission (Discharged) (ADMIT) [id number] Loren Dunn MD; Otto Arenas... Was this an external facility discharge? No    Challenges to be reviewed by the provider   n/a                 Discussed COVID-19 related testing which was available at this time. Test results were negative. Patient informed of results, if available? yes     Advance Care Planning:   Does patient have an Advance Directive:  yes; reviewed and current     Inpatient Readmission Risk score: Unplanned Readmit Risk Score: 21    Was this a readmission? no    Patients top risk factors for readmission: lack of knowledge about disease, medical condition-DM, CHf and multiple health system providers   Interventions to address risk factors: Obtained and reviewed discharge summary and/or continuity of care documents and Education of patient/family/caregiver/guardian to support self-management-see goals    Care Transition Nurse (CTN) contacted the patient by telephone to perform post hospital discharge assessment. Verified name and  with patient as identifiers. Provided introduction to self, and explanation of the CTN role. CTN reviewed discharge instructions, medical action plan and red flags with patient who verbalized understanding. Were discharge instructions available to patient? yes. Reviewed appropriate site of care based on symptoms and resources available to patient including: PCP, Specialist, [address] [place] [address] Justo Leslie and When to call 911. Patient given an opportunity to ask questions and does not have any further questions or concerns at this time. The patient agrees to contact the PCP office for questions related to their healthcare.      Medication reconciliation was performed with patient, who verbalizes understanding of administration of home medications. Advised obtaining a 90-day supply of all daily and as-needed medications. Referral to Pharm D needed: no     Home Health/Outpatient orders at discharge: Michael [facility]: [facility]   Date of initial visit: 10/2/2021    Durable Medical Equipment ordered at discharge: none    Covid Risk Education    Educated patient about risk for severe COVID-19 due to risk factors according to CDC guidelines. CTN reviewed discharge instructions, medical action plan and red flag symptoms with the patient who verbalized understanding. Discussed COVID vaccination status: yes. Education provided on COVID-19 vaccination as appropriate. Discussed exposure protocols and quarantine with CDC Guidelines. Patient was given an opportunity to verbalize any questions and concerns and agrees to contact CTN or health care provider for questions related to their healthcare. Was patient discharged with a pulse oximeter? no      Discussed follow-up appointments. If no appointment was previously scheduled, appointment scheduling offered: yes. Is follow up appointment scheduled within 7 days of discharge? yes. [facility] follow up appointment(s):   Future Appointments   Date Time Provider Carla Grant   10/12/2021  3:20 PM Adriane Samuels MD [facility] BS AMB   11/29/2021 11:20 AM Hema Ames NP [facility] BS [facility]     Non-[facility] follow up appointment(s): GS on 10/11 and PCP appt was changed to 10/21    Plan for follow-up call in 7-10 days based on severity of symptoms and risk factors. Plan for next call: symptom management-assess s/s  and follow up appointment-confirm attendance  CTN provided contact information for future needs. Goals      Prevent complications post hospitalization. 10/5/2021    - Spoke to patient today. Patient reports she is feeling well.    - she will see surgeon on 10/12, PCP on 10/6 and then 10/21 and cardiology on 10/12  - patient will take meds as prescribed. She will finish course of antibiotics, patient will also start probiotic  - patient checks glucose 1-2x a day, it was 133 recently. The highest it usually gets for her is around 155, she will keep log to take to her appt. Discussed importance of controlling glucose for healing. Per chart review patients glucose was high was in patient and patient does need to see endocrine, she stated she will discuss with PCP and make an appt, she will contact CTN with road blocks. Patient met with DM educator while in patient, she states she maybe willing to meet with one but wants to see if she can get her glucose controlled   - patient is open to [facility] who is doing her daily wound care. She reports that supplies were on back order but she was sent home with enough to last her and that the [facility] nurse is bringing more today. - Wound care dressing changes. Daily for packing. 2inch kerlix moistened with saline, packed, covered with 4x4's and dressing. Pt has adhesive complications, so mepilex buttock pad or similar will do. - Reviewed red flag s/s with patient, nausea, vomiting, inability to pass urine/stool, SOB, mental status change, chest pain, fever. Patient will contact Md/CTN if red flag s/s arise. CTN to f/u ~ 7-10 days. AR          Reduce risk of CHF exacerbations and complications. 10/5/2021    - patient takes Lasix PRN for edema. She does not weigh at this time. Encouraged patient to start. Patient is to weight daily, keep log and notify MD/CTN of gain greater then 3 lbs in a day or 5 lbs in a week. - patient will monitor for chest pain, edema and SOB. Educated patient on HF zones and she will contact MD if in the yellow or red zones. - briefly discussed low sodium diet, to discuss more at next call. Patient will contact Md/CTN if red flag s/s arise. CTN to f/u ~ 7-10 days.  AR                 START taking these medications     Details   amoxicillin-clavulanate (AUGMENTIN) 875-125 mg per tablet Take 1 Tablet by mouth every twelve (12) hours.   Qty: 16 Tablet, Refills: 0

## 2021-10-06 LAB
BACTERIA SPEC CULT: NORMAL
SERVICE CMNT-IMP: NORMAL

## 2021-10-11 NOTE — PROGRESS NOTES
Physician Progress Note      Ceci Banks  CSN #:                  [id number]  :                       1945  ADMIT DATE:       2021 4:48 PM  [address] DATE:        10/1/2021 3:40 PM  RESPONDING  PROVIDER #:        Yovani Arce MD          QUERY HALIEVevelyn Curling Afternoon  This patient admitted  on --10/01//2021--with an abscess of the back and treated with antibiotics without improvement PTA. If possible, please document in the progress notes and discharge summary if you are evaluating and /or treating any of the following: The medical record reflects the following:  Risk Factors: Elderly female ,Back abscess and had been treated with abx outpt PTA without improvement. DM type 2  Clinical Indicators: Patient was admitted with an abscess of the back and treated with antibiotics. They were noted to have a WBC of 13.8, , 107 and RR 23, 24  Treatment: Admitted General surgery and I&D of abscess, IV abx. IV zosyn and then discharged on Augmentin    Thank you for clarifying  Agapito King, [address] [place] [address], [facility]  [phone]  Options provided:  -- Sepsis was ruled out. This  patient had a localized infection only and is not septic. -- Sepsis POA. -- Other - I will add my own diagnosis  -- Disagree - Not applicable / Not valid  -- Disagree - Clinically unable to determine / Unknown  -- Refer to Clinical Documentation Reviewer    PROVIDER RESPONSE TEXT:    After study, this patient was noted to also with Sepsis POA.     Query created by: Raymond Chapa on 10/8/2021 11:16 AM      Electronically signed by:  Yovani Arce MD 10/11/2021 4:35 PM

## 2021-10-12 ENCOUNTER — OFFICE VISIT (OUTPATIENT)
Dept: CARDIOLOGY CLINIC | Age: 76
End: 2021-10-12
Payer: MEDICARE

## 2021-10-12 VITALS
BODY MASS INDEX: 40.34 KG/M2 | WEIGHT: 257 LBS | DIASTOLIC BLOOD PRESSURE: 90 MMHG | HEART RATE: 83 BPM | SYSTOLIC BLOOD PRESSURE: 170 MMHG | HEIGHT: 67 IN | OXYGEN SATURATION: 98 % | RESPIRATION RATE: 18 BRPM | TEMPERATURE: 97.6 F

## 2021-10-12 DIAGNOSIS — I50.32 CHRONIC DIASTOLIC CONGESTIVE HEART FAILURE (HCC): Primary | ICD-10-CM

## 2021-10-12 DIAGNOSIS — E66.01 MORBID OBESITY (HCC): ICD-10-CM

## 2021-10-12 DIAGNOSIS — I10 ESSENTIAL HYPERTENSION: ICD-10-CM

## 2021-10-12 DIAGNOSIS — I25.10 CORONARY ARTERY DISEASE INVOLVING NATIVE CORONARY ARTERY OF NATIVE HEART WITHOUT ANGINA PECTORIS: ICD-10-CM

## 2021-10-12 PROCEDURE — G9717 DOC PT DX DEP/BP F/U NT REQ: HCPCS | Performed by: INTERNAL MEDICINE

## 2021-10-12 PROCEDURE — 1090F PRES/ABSN URINE INCON ASSESS: CPT | Performed by: INTERNAL MEDICINE

## 2021-10-12 PROCEDURE — G8755 DIAS BP > OR = 90: HCPCS | Performed by: INTERNAL MEDICINE

## 2021-10-12 PROCEDURE — G8428 CUR MEDS NOT DOCUMENT: HCPCS | Performed by: INTERNAL MEDICINE

## 2021-10-12 PROCEDURE — G8400 PT W/DXA NO RESULTS DOC: HCPCS | Performed by: INTERNAL MEDICINE

## 2021-10-12 PROCEDURE — 99214 OFFICE O/P EST MOD 30 MIN: CPT | Performed by: INTERNAL MEDICINE

## 2021-10-12 PROCEDURE — G8536 NO DOC ELDER MAL SCRN: HCPCS | Performed by: INTERNAL MEDICINE

## 2021-10-12 PROCEDURE — 1111F DSCHRG MED/CURRENT MED MERGE: CPT | Performed by: INTERNAL MEDICINE

## 2021-10-12 PROCEDURE — G8417 CALC BMI ABV UP PARAM F/U: HCPCS | Performed by: INTERNAL MEDICINE

## 2021-10-12 PROCEDURE — 1101F PT FALLS ASSESS-DOCD LE1/YR: CPT | Performed by: INTERNAL MEDICINE

## 2021-10-12 PROCEDURE — G8753 SYS BP > OR = 140: HCPCS | Performed by: INTERNAL MEDICINE

## 2021-10-12 PROCEDURE — 3017F COLORECTAL CA SCREEN DOC REV: CPT | Performed by: INTERNAL MEDICINE

## 2021-10-12 PROCEDURE — G0463 HOSPITAL OUTPT CLINIC VISIT: HCPCS | Performed by: INTERNAL MEDICINE

## 2021-10-12 RX ORDER — LOSARTAN POTASSIUM 50 MG/1
50 TABLET ORAL DAILY
Qty: 90 TABLET | Refills: 3 | Status: SHIPPED | OUTPATIENT
Start: 2021-10-12 | End: 2022-10-21 | Stop reason: SDUPTHER

## 2021-10-12 RX ORDER — METOPROLOL TARTRATE 25 MG/1
25 TABLET, FILM COATED ORAL 2 TIMES DAILY
Qty: 180 TABLET | Refills: 3 | Status: SHIPPED | OUTPATIENT
Start: 2021-10-12 | End: 2022-10-21 | Stop reason: SDUPTHER

## 2021-10-12 RX ORDER — BLOOD SUGAR DIAGNOSTIC
STRIP MISCELLANEOUS
COMMUNITY
Start: 2021-04-22 | End: 2022-04-21

## 2021-10-12 NOTE — PROGRESS NOTES
Lauren Solorzano is a 77 y.o. female    Chief Complaint   Patient presents with    CHF    Hypertension    Shortness of Breath       1. Have you been to the ER, urgent care clinic since your last visit? Hospitalized since your last visit? 9/29/21 EVELYN Mccord    2. Have you seen or consulted any other health care providers outside of the [address] [place] [address] since your last visit? Include any pap smears or colon screening.  Dr Latisha Dempsey, Endo; Dr Claudia Rebolledo Doctor;     Dizziness:  NO    Sob:  NO    Edema:  NO    Visit Vitals  BP (!) 170/90 (BP 1 Location: Left upper arm, BP Patient Position: Sitting)   Pulse 83   Temp 97.6 °F (36.4 °C) (Temporal)   Resp 18   Ht 5' 7\" (1.702 m)   Wt 257 lb (116.6 kg)   SpO2 98%   BMI 40.25 kg/m²

## 2021-10-12 NOTE — PROGRESS NOTES
All orders entered per verbal orders of Dr. Zion Suarez  Refill Toprol XL 25mg po daily 90 day supply with 3 refills. Refill Losartan 50mg po daily 90 day supply with 3 refills. See Dr. Casandra Clemente in 6 months.      All orders entered per verbal orders of Dr. Zion Suarez

## 2021-10-12 NOTE — PROGRESS NOTES
Florin Fam is a 77 y.o. female    There were no vitals taken for this visit.     Chief Complaint   Patient presents with    CHF    Hypertension    Shortness of Breath

## 2021-10-12 NOTE — PROGRESS NOTES
Office Follow-up    NAME: Alondra Villasenor   :  1945  MRM:  [id number]    Date:  10/12/2021            Assessment:     Problem List  Date Reviewed: 2021        Codes Class Noted    Shortness of breath ICD-10-CM: R06.02  ICD-9-CM: 786.05  3/3/2020        HTN (hypertension) ICD-10-CM: I10  ICD-9-CM: 401.9  2021        Chronic low back pain ICD-10-CM: M54.50, G89.29  ICD-9-CM: 724.2, 338.29  2021        DM (diabetes mellitus), type 2 ([facility] ICD-10-CM: E11.9  ICD-9-CM: 250.00  2021        Hypertensive urgency ICD-10-CM: I16.0  ICD-9-CM: 401.9  2021        Solitary pulmonary nodule ICD-10-CM: R91.1  ICD-9-CM: 793.11  2020        CHF (congestive heart failure) ([facility] ICD-10-CM: I50.9  ICD-9-CM: 428.0  3/4/2020        Infected sebaceous cyst ICD-10-CM: L72.3, L08.9  ICD-9-CM: 706.2  2018        Abscess ICD-10-CM: L02.91  ICD-9-CM: 682.9  2018        Morbid obesity ([facility] ICD-10-CM: E66.01  ICD-9-CM: 278.01  2018        SIRS (systemic inflammatory response syndrome) ([facility] ICD-10-CM: R65.10  ICD-9-CM: 995.90  2016        Depression with anxiety ICD-10-CM: F41.8  ICD-9-CM: 300.4  Unknown        Glaucoma ICD-10-CM: H40.9  ICD-9-CM: 365.9  Unknown        Breast cancer ([facility] ICD-10-CM: C50.919  ICD-9-CM: 174.9  Unknown                 Plan:     1. Muscle cramps: These improved after addition of Kcl and Magnesium. 2. Accelerated hypertension: Blood pressure is better controlled. Home BP readings are in 130 range. Continue Toprol XL and Losartan. 3. Diastolic congestive heart failure: She is now euvolemic. There is no significant peripheral edema. Continue Lasix, potassium and magnesium. Control blood pressure. 4. CAD: Moderate CAD with heavy calcification of the coronary arteries. Continue medical management with blood pressure control. Continue baby aspirin every day. She does not want to be on statins.   5. See Dr. Pedro Pablo Gaming in 6months.                      Subjective:      Noni Rachel is a 77 y. o. female being seen today for f/u of HTN.  She has been following with Rachelle Phoenix in our office. Recently [place] had increased the losartan to 50 mg twice a day. In the past she could not tolerate HCTZ and Coreg and those were discontinued. She is concerned about possible medication side effects. She has been having leg cramps at night and she thinks it could be Lasix or losartan related. Also she is having some numbness involving the arms and hands when she is sleeping at night. 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 But she does not think that those are the cause for her leg cramps. ATTENTION:   This medical record was transcribed using an electronic medical records/speech recognition system. Although proofread, it may and can contain electronic, spelling and other errors. Corrections may be executed at a later time. Please feel free to contact us for any clarifications as needed.         Exam:     Physical Exam:  Visit Vitals  BP (!) 170/90 (BP 1 Location: Left upper arm, BP Patient Position: Sitting)   Pulse 83   Temp 97.6 °F (36.4 °C) (Temporal)   Resp 18   Ht 5' 7\" (1.702 m)   Wt 257 lb (116.6 kg)   SpO2 98%   BMI 40.25 kg/m²     General appearance - alert, well appearing, and in no distress  Mental status - affect appropriate to mood  Eyes - sclera anicteric, moist mucous membranes  Neck - supple, no significant adenopathy  Chest - clear to auscultation, no wheezes, rales or rhonchi  Heart - normal rate, regular rhythm, normal S1, S2, no murmurs, rubs, clicks or gallops  Abdomen - soft, nontender, nondistended, no masses or organomegaly  Extremities - peripheral pulses normal, no pedal edema  Skin - normal coloration  no rashes    Medications:     Current Outpatient Medications   Medication Sig    glucose blood VI test strips (OneTouch Verio test strips) strip for sugar mnonitoring    furosemide (LASIX) 20 mg tablet Take 20 mg by mouth daily as needed (swelling).  losartan (COZAAR) 50 mg tablet Take 50 mg by mouth daily.  cholecalciferol (Vitamin D3) (1000 Units /25 mcg) tablet Take 1,000 Units by mouth daily.  metoprolol tartrate (LOPRESSOR) 25 mg tablet Take 1 Tablet by mouth two (2) times a day.  insulin regular (NovoLIN R Regular U-100 Insuln) 100 unit/mL injection Do not take if not eating or if blood sugar is less than 120  30 units am, 30 units noon, 35 units in the evening    vitamin E (AQUA GEMS) 400 unit capsule Take 400 Units by mouth daily.  acetaminophen (TylenoL) 325 mg tablet Take 650 mg by mouth every four (4) hours as needed for Pain.  famotidine (Pepcid AC) 20 mg tablet Take 40 mg by mouth two (2) times a day.  insulin NPH (NovoLIN N NPH U-100 Insulin) 100 unit/mL injection 60 Units by SubCUTAneous route two (2) times a day. Indications: 60 units in morning and 60 units in the evening    triamcinolone acetonide (KENALOG) 0.1 % ointment Apply  to affected area two (2) times daily as needed for Skin Irritation. use thin layer  Applies on face at bipap allergy site     aspirin delayed-release 81 mg tablet Take 81 mg by mouth daily.  amoxicillin-clavulanate (AUGMENTIN) 875-125 mg per tablet Take 1 Tablet by mouth every twelve (12) hours. (Patient not taking: Reported on 10/12/2021)     No current facility-administered medications for this visit.       Diagnostic Data Review:         [facility] on 3/4/2020:    L Main: large caliber, calcified, no critical disease  LAD: large caliber, calcified, 40-50% stenosis at D2 bifurcation, supplies two diagonals, D1 small caliber and D2 moderate caliber with diffuse moderate disease  LCx: large caliber, calcified, ostial 30%, supplies a few very small marginals and continues a large caliber marginal with distal 40%  stenosis  RCA: moderate caliber, diffuse mild to moderate disease, small PDA and RPLV branch      Lab Review:     Lab Results   Component Value Date/Time    Cholesterol, total 178 07/28/2021 03:47 AM    HDL Cholesterol 26 07/28/2021 03:47 AM    LDL, calculated 124.6 (H) 07/28/2021 03:47 AM    Triglyceride 137 07/28/2021 03:47 AM    CHOL/HDL Ratio 6.8 (H) 07/28/2021 03:47 AM     Lab Results   Component Value Date/Time    Creatinine (POC) 0.9 03/19/2021 08:21 AM    Creatinine 0.87 09/30/2021 03:39 AM     Lab Results   Component Value Date/Time    BUN 9 09/30/2021 03:39 AM     Lab Results   Component Value Date/Time    Potassium 3.8 09/30/2021 03:39 AM     Lab Results   Component Value Date/Time    Hemoglobin A1c 7.6 (H) 07/26/2021 02:10 PM     Lab Results   Component Value Date/Time    HGB 10.5 (L) 09/30/2021 03:39 AM     Lab Results   Component Value Date/Time    PLATELET 373 (H) 94/58/5652 03:39 AM     No results for input(s): CPK, CKMB, TROIQ in the last 72 hours. No lab exists for component: CKQMB, CPKMB             ___________________________________________________    Arina Yi.  Valeriano Hernandez MD, [facility]

## 2021-10-20 ENCOUNTER — PATIENT OUTREACH (OUTPATIENT)
Dept: CASE MANAGEMENT | Age: 76
End: 2021-10-20

## 2021-10-20 NOTE — PROGRESS NOTES
Goals      Prevent complications post hospitalization. 10/5/2021    - Spoke to patient today. Patient reports she is feeling well. - she will see surgeon on 10/12, PCP on 10/6 and then 10/21 and cardiology on 10/12  - patient will take meds as prescribed. She will finish course of antibiotics, patient will also start probiotic  - patient checks glucose 1-2x a day, it was 133 recently. The highest it usually gets for her is around 155, she will keep log to take to her appt. Discussed importance of controlling glucose for healing. Per chart review patients glucose was high was in patient and patient does need to see endocrine, she stated she will discuss with PCP and make an appt, she will contact CTN with road blocks. Patient met with DM educator while in patient, she states she maybe willing to meet with one but wants to see if she can get her glucose controlled   - patient is open to [facility] who is doing her daily wound care. She reports that supplies were on back order but she was sent home with enough to last her and that the [facility] nurse is bringing more today. - Wound care dressing changes. Daily for packing. 2inch kerlix moistened with saline, packed, covered with 4x4's and dressing. Pt has adhesive complications, so mepilex buttock pad or similar will do. - Reviewed red flag s/s with patient, nausea, vomiting, inability to pass urine/stool, SOB, mental status change, chest pain, fever. Patient will contact Md/CTN if red flag s/s arise. CTN to f/u ~ 7-10 days. AR       10/20/21  Spoke to patient today. Patient will see PCP  tomorrow. Patient saw surgeon on 10/12 and she will see him again 10/26. Glucose today was 135, highest it has been was in the 200s but she states that is very rare for her. Patient remains open to [facility] and they are doing wound care. Patient saw cardiology. Patient reports no red flag s/s. Patient will contact Md/CTN if red flag s/s arise. CTN to f/u ~ 7-10 days.  AR  Reduce risk of CHF exacerbations and complications. 10/5/2021    - patient takes Lasix PRN for edema. She does not weigh at this time. Encouraged patient to start. Patient is to weight daily, keep log and notify MD/CTN of gain greater then 3 lbs in a day or 5 lbs in a week. - patient will monitor for chest pain, edema and SOB. Educated patient on HF zones and she will contact MD if in the yellow or red zones. - briefly discussed low sodium diet, to discuss more at next call. Patient will contact Md/CTN if red flag s/s arise. CTN to f/u ~ 7-10 days. AR     10/20/21  Patient is attempting to take daily weights but reports she has missed a couple days. Her most recent weight was 256#. She states that she has had to take Lasix a couple times for edema. She will continue to weigh and notify MD/CTN of gain greater then 3 lbs in a day or 5 lbs in a week. She will contact MD if in the yellow or red zone. Reviewed foods high in sodium. Patient will contact Md/CTN if red flag s/s arise. CTN to f/u ~ 7-10 days.  AR

## 2021-11-05 ENCOUNTER — PATIENT OUTREACH (OUTPATIENT)
Dept: CASE MANAGEMENT | Age: 76
End: 2021-11-05

## 2021-11-05 NOTE — PROGRESS NOTES
Patient has graduated from the Transitions of Care Coordination  program on 11/5/21. Patient/family has the ability to self-manage at this time Care management goals have been completed. Patient was not referred to the [facility] team for further management. Goals Addressed                 This Visit's Progress     COMPLETED: Prevent complications post hospitalization. 10/5/2021    - Spoke to patient today. Patient reports she is feeling well. - she will see surgeon on 10/12, PCP on 10/6 and then 10/21 and cardiology on 10/12  - patient will take meds as prescribed. She will finish course of antibiotics, patient will also start probiotic  - patient checks glucose 1-2x a day, it was 133 recently. The highest it usually gets for her is around 155, she will keep log to take to her appt. Discussed importance of controlling glucose for healing. Per chart review patients glucose was high was in patient and patient does need to see endocrine, she stated she will discuss with PCP and make an appt, she will contact CTN with road blocks. Patient met with DM educator while in patient, she states she maybe willing to meet with one but wants to see if she can get her glucose controlled   - patient is open to [facility] who is doing her daily wound care. She reports that supplies were on back order but she was sent home with enough to last her and that the [facility] nurse is bringing more today. - Wound care dressing changes. Daily for packing. 2inch kerlix moistened with saline, packed, covered with 4x4's and dressing. Pt has adhesive complications, so mepilex buttock pad or similar will do. - Reviewed red flag s/s with patient, nausea, vomiting, inability to pass urine/stool, SOB, mental status change, chest pain, fever. Patient will contact Md/CTN if red flag s/s arise. CTN to f/u ~ 7-10 days. AR       10/20/21  Spoke to patient today. Patient will see PCP  tomorrow.  Patient saw surgeon on 10/12 and she will see him again 10/26. Glucose today was 135, highest it has been was in the 200s but she states that is very rare for her. Patient remains open to [facility] and they are doing wound care. Patient saw cardiology. Patient reports no red flag s/s. Patient will contact Md/CTN if red flag s/s arise. CTN to f/u ~ 7-10 days. AR       11/05/21    Patient states she was seen by PCP on Monday and her A1C was 6.7.  Patient denies SOB, cough, fever, leg edema.  No longer has HH coming to do wound care, is pretty much healed up, but is hard tissue. Patient states she really is not weighing herself. Jason Nagy MSN, RN, CCM / Care Transition Nurse / [phone]        COMPLETED: Reduce risk of CHF exacerbations and complications. 10/5/2021    - patient takes Lasix PRN for edema. She does not weigh at this time. Encouraged patient to start. Patient is to weight daily, keep log and notify MD/CTN of gain greater then 3 lbs in a day or 5 lbs in a week. - patient will monitor for chest pain, edema and SOB. Educated patient on HF zones and she will contact MD if in the yellow or red zones. - briefly discussed low sodium diet, to discuss more at next call. Patient will contact Md/CTN if red flag s/s arise. CTN to f/u ~ 7-10 days. AR     10/20/21  Patient is attempting to take daily weights but reports she has missed a couple days. Her most recent weight was 256#. She states that she has had to take Lasix a couple times for edema. She will continue to weigh and notify MD/CTN of gain greater then 3 lbs in a day or 5 lbs in a week. She will contact MD if in the yellow or red zone. Reviewed foods high in sodium. Patient will contact Md/CTN if red flag s/s arise. CTN to f/u ~ 7-10 days. AR       11/05/21   Patient states she is taking lasix PRN, denies SOB, cough and leg edema.       Jason Nagy MSN, RN, CCM / Care Transition Nurse / [phone]             Patient has Care Transition Nurse's contact information for any further questions, concerns, or needs.   Patients upcoming visits:    Future Appointments   Date Time Provider Carla Grant   11/29/2021 11:20 AM LINDA NogueiraF BS AMB   4/14/2022 11:00 AM Arpan Samuels MD [facility] BS AMB

## 2022-03-09 NOTE — PROGRESS NOTES
VIRTUAL VISIT DOCUMENTATION     Pursuant to the emergency declaration under the [facility], [facility] waiver authority and the [employer] Act, this Virtual  Visit was conducted, with patient's consent, to reduce the patient's risk of exposure to COVID-19 and provide continuity of care for an established patient. Services were provided through a video synchronous discussion virtually to substitute for in-person clinic visit. CHIEF COMPLAINT      Javier Barcenas is a 77 y.o. female who was seen by synchronous (real-time) audio-video technology on 7/16/2020. Patient is being seen today for follow-up of leg swelling. ASSESSMENT        ICD-10-CM ICD-9-CM    1. Essential hypertension  T73 202.1 METABOLIC PANEL, BASIC      MAGNESIUM   2. Chronic heart failure with preserved ejection fraction (HCC)  I50.32 428.9    3. Morbid obesity ([facility]  E66.01 278.01    4. Coronary artery disease involving native coronary artery of native heart without angina pectoris  I25.10 414.01         PLAN     HTN, improved - normotensive on current meds. Low 130s/60s on ARB and BB. HFpEF - vol stable on lasix 40mg BID. Check updated BMP and Mag next week. Non-obstructive CAD: per cath 3/2020. Continue medical management with blood pressure control and aspirin 81mg/d. Per notes, pt does not want to be on statins. Reasses next visit nelida given DM. Lipids - will obtain recent results from PCP    F/u in 3 months or PRN     We discussed the expected course, resolution and complications of the diagnosis(es) in detail. Medication risks, benefits, costs, interactions, and alternatives were discussed as indicated. I advised her to contact the office if her condition worsens, changes or fails to improve as anticipated.  She expressed understanding with the diagnosis(es) and plan    HISTORY OF PRESENTING ILLNESS      Noni Damian is a 77 y.o. female being seen today for f/u of HTN and CRISTINA. Pt had multipile med changes prior to last visit - was on metoprolol (instead of coreg) and Lasix (instead of bumex). Pt states she is doing much better. Leg swelling resolved on lasix therapy. Home pressures also better 130s/60s. Patient denies any exertional chest pain, dyspnea, palpitations, syncope, orthopnea, edema, or paroxysmal nocturnal dyspnea. Has occasional flutters in her chest which are fleeting (last <1sec).        ACTIVE PROBLEM LIST     Patient Active Problem List    Diagnosis Date Noted    Shortness of breath 03/03/2020     Priority: 1 - One    CHF (congestive heart failure) ([address] 03/04/2020    Infected sebaceous cyst 06/13/2018    Abscess 06/11/2018    Morbid obesity ([address] 06/11/2018    SIRS (systemic inflammatory response syndrome) ([address] 06/11/2016    Hypertension     Type 2 diabetes mellitus ([address]     Depression with anxiety     Glaucoma     Breast cancer ([address]            PAST MEDICAL HISTORY     Past Medical History:   Diagnosis Date    Breast cancer ([address]     right masectomy, cervical    Cervical cancer (HCC)     Chronic pain     L4-L5 discectomy    Congestive heart failure (HCC)     Depression with anxiety     Diabetes ([address]     Ectopic pregnancy     Glaucoma     Hypertension     Miscarriage     Sleep apnea     CPAP compliant but not using now because of rash on face from the mask           PAST SURGICAL HISTORY     Past Surgical History:   Procedure Laterality Date    BREAST SURGERY PROCEDURE UNLISTED Right     right mastectomy    HX CARPAL TUNNEL RELEASE Bilateral     HX CATARACT REMOVAL Bilateral 2013    HX HYSTERECTOMY  1980's    cervical cancer    HX KNEE ARTHROSCOPY Bilateral     HX ORTHOPAEDIC  5394,5489    back surgery x 2    HX ORTHOPAEDIC Left 1990    ligament attachment arm    HX PELVIC LAPAROSCOPY  1970s    ectopic pregnancy    HX TUMOR REMOVAL Right     leg          ALLERGIES Allergies   Allergen Reactions    Lisinopril Cough    Percocet [Oxycodone-Acetaminophen] Nausea Only    Percodan [Oxycodone-Aspirin] Nausea Only    Prednisone Nausea Only    Sulfa (Sulfonamide Antibiotics) Unknown (comments)          FAMILY HISTORY     Family History   Problem Relation Age of Onset    Diabetes Mother     Hypertension Mother            SOCIAL HISTORY     Social History     Socioeconomic History    Marital status:      Spouse name: Not on file    Number of children: Not on file    Years of education: Not on file    Highest education level: Not on file   Tobacco Use    Smoking status: Never Smoker    Smokeless tobacco: Never Used   Substance and Sexual Activity    Alcohol use: No    Drug use: No         MEDICATIONS     Current Outpatient Medications   Medication Sig    furosemide (Lasix) 40 mg tablet Take 40 mg by mouth two (2) times a day.  famotidine (Pepcid AC) 20 mg tablet Take 40 mg by mouth two (2) times a day.  metoprolol tartrate (LOPRESSOR) 25 mg tablet Take 1 Tab by mouth two (2) times a day. Make sure you have stopped coreg when taking this med.  insulin NPH (NovoLIN N NPH U-100 Insulin) 100 unit/mL injection 35 Units by SubCUTAneous route once. 35u morning; 30u at bedtime  Indications: 60 units in morning and 60 units in the evening    insulin regular (NOVOLIN R REGULAR U-100 INSULN) 100 unit/mL injection 10 Units by SubCUTAneous route daily (with breakfast).  losartan (COZAAR) 100 mg tablet Take 50 mg by mouth daily.  triamcinolone acetonide (KENALOG) 0.1 % ointment Apply  to affected area two (2) times a day. use thin layer  Applies on face at bipap allergy site    aspirin delayed-release 81 mg tablet Take 81 mg by mouth daily. No current facility-administered medications for this visit. I have reviewed the nurses notes, vitals, problem list, allergy list, medical history, family, social history and medications.        REVIEW OF SYMPTOMS     Constitutional: Negative for fever, chills, and diaphoresis. Respiratory: Negative for cough, hemoptysis, sputum production, shortness of breath and wheezing. Cardiovascular: Negative for chest pain, palpitations, orthopnea, leg swelling and PND. Gastrointestinal: Negative for heartburn, nausea, vomiting, blood in stool and melena. Genitourinary: Negative for dysuria and flank pain. Neurological: Negative for focal weakness, seizures, loss of consciousness,   Endo/Heme/Allergies: Negative for abnormal bleeding. Psychiatric/Behavioral: Negative for memory loss. PHYSICAL EXAMINATION      Due to this being a TeleHealth evaluation, many elements of the physical examination are unable to be assessed. General: Well developed, in no acute distress, cooperative and alert  HEENT: No marked JVD visible on video. Respiratory: No audible wheezing, no signs of respiratory distress,  Neuro: A&Ox3, speech clear, no facial droop, answering questions appropriately  Skin: Skin color is normal. Non diaphoretic on visible skin during exam       DIAGNOSTIC DATA      02/20/20   ECHO ADULT COMPLETE 02/21/2020 2/21/2020    Narrative · Normal cavity size and systolic function (ejection fraction normal). Mild concentric hypertrophy. Estimated left ventricular ejection fraction   is 50 - 55%. Visually measured ejection fraction. · Mildly dilated RV with mild RV systolic dysfunction. · Image quality for this study was technically difficult. Contrast used:   DEFINITY.         Signed by: Aldo Mcgrath MD     03/03/20   CARDIAC PROCEDURE 03/04/2020 3/4/2020    Narrative · Dense coroanry calcification without significant obstructive disease  · Elevated lvedp     Findings:   L Main: large caliber, calcified, no critical disease  LAD: large caliber, calcified, 40-50% stenosis at D2 bifurcation, supplies   two diagonals, D1 small caliber and D2 moderate caliber with diffuse   moderate disease  LCx: large caliber, calcified, ostial 30%, supplies a few very small   marginals and continues a large caliber marginal with distal 40%  stenosis  RCA: moderate caliber, diffuse mild to moderate disease, small PDA and   RPLV branch     LVEDP:  25 mmhg       Signed by: Kelly Siegel DO      LABORATORY DATA      Lab Results   Component Value Date/Time    WBC 9.6 03/04/2020 09:00 AM    HGB 14.3 03/04/2020 09:00 AM    HCT 45.8 03/04/2020 09:00 AM    PLATELET 001 02/42/2489 09:00 AM    MCV 85.1 03/04/2020 09:00 AM      Lab Results   Component Value Date/Time    Sodium 137 03/06/2020 06:01 AM    Potassium 3.7 03/06/2020 06:01 AM    Chloride 100 03/06/2020 06:01 AM    CO2 33 (H) 03/06/2020 06:01 AM    Anion gap 4 (L) 03/06/2020 06:01 AM    Glucose 165 (H) 03/06/2020 06:01 AM    BUN 21 (H) 03/06/2020 06:01 AM    Creatinine 0.93 03/06/2020 06:01 AM    BUN/Creatinine ratio 23 (H) 03/06/2020 06:01 AM    GFR est AA >60 03/06/2020 06:01 AM    GFR est non-AA 59 (L) 03/06/2020 06:01 AM    Calcium 8.6 03/06/2020 06:01 AM    Bilirubin, total 0.7 03/03/2020 02:30 PM    Alk. phosphatase 100 03/03/2020 02:30 PM    Protein, total 8.2 03/03/2020 02:30 PM    Albumin 3.3 (L) 03/03/2020 02:30 PM    Globulin 4.9 (H) 03/03/2020 02:30 PM    A-G Ratio 0.7 (L) 03/03/2020 02:30 PM    ALT (SGPT) 106 (H) 03/03/2020 02:30 PM         FOLLOW-UP     Follow-up and Dispositions    · Return in about 3 months (around 10/16/2020), or if symptoms worsen or fail to improve. Patient was made aware and verbalized understanding that an appointment will be scheduled for them for a virtual visit and/or office visit within the above time frame. Patient understanding his/her responsibility to call and change time/date if he/she so chooses. Thank you, Trish Crenshaw NP for allowing me to participate in the care of Daisy Beaulieu. Please do not hesitate to contact me for further questions/concerns.      Greater than 20 minutes was spent in direct video patient care, planning and chart review. This visit was conducted using [employer] telemedicine services.        Ismael Chavez NP    [address]        [phone] / [phone] Fax no

## 2022-03-18 PROBLEM — R06.02 SHORTNESS OF BREATH: Status: ACTIVE | Noted: 2020-03-03

## 2022-03-18 PROBLEM — E11.9 DM (DIABETES MELLITUS), TYPE 2 (HCC): Status: ACTIVE | Noted: 2021-07-26

## 2022-03-18 PROBLEM — I16.0 HYPERTENSIVE URGENCY: Status: ACTIVE | Noted: 2021-07-26

## 2022-03-19 PROBLEM — L72.3 INFECTED SEBACEOUS CYST: Status: ACTIVE | Noted: 2018-06-13

## 2022-03-19 PROBLEM — I10 HTN (HYPERTENSION): Status: ACTIVE | Noted: 2021-07-26

## 2022-03-19 PROBLEM — M54.50 CHRONIC LOW BACK PAIN: Status: ACTIVE | Noted: 2021-07-26

## 2022-03-19 PROBLEM — L08.9 INFECTED SEBACEOUS CYST: Status: ACTIVE | Noted: 2018-06-13

## 2022-03-19 PROBLEM — R91.1 SOLITARY PULMONARY NODULE: Status: ACTIVE | Noted: 2020-09-14

## 2022-03-19 PROBLEM — L02.91 ABSCESS: Status: ACTIVE | Noted: 2018-06-11

## 2022-03-19 PROBLEM — G89.29 CHRONIC LOW BACK PAIN: Status: ACTIVE | Noted: 2021-07-26

## 2022-03-19 PROBLEM — I50.9 CHF (CONGESTIVE HEART FAILURE) (HCC): Status: ACTIVE | Noted: 2020-03-04

## 2022-03-20 PROBLEM — E66.01 MORBID OBESITY (HCC): Status: ACTIVE | Noted: 2018-06-11

## 2022-04-21 ENCOUNTER — HOSPITAL ENCOUNTER (OUTPATIENT)
Dept: PREADMISSION TESTING | Age: 77
Discharge: HOME | End: 2022-04-21

## 2022-04-21 ENCOUNTER — OFFICE VISIT (OUTPATIENT)
Dept: CARDIOLOGY CLINIC | Age: 77
End: 2022-04-21
Payer: MEDICARE

## 2022-04-21 VITALS
SYSTOLIC BLOOD PRESSURE: 188 MMHG | DIASTOLIC BLOOD PRESSURE: 90 MMHG | WEIGHT: 261.47 LBS | TEMPERATURE: 98.4 F | RESPIRATION RATE: 24 BRPM | OXYGEN SATURATION: 99 % | HEIGHT: 67 IN | HEART RATE: 73 BPM | BODY MASS INDEX: 41.04 KG/M2

## 2022-04-21 VITALS
WEIGHT: 261.6 LBS | OXYGEN SATURATION: 97 % | HEIGHT: 67 IN | BODY MASS INDEX: 41.06 KG/M2 | HEART RATE: 76 BPM | DIASTOLIC BLOOD PRESSURE: 80 MMHG | SYSTOLIC BLOOD PRESSURE: 144 MMHG

## 2022-04-21 DIAGNOSIS — I50.32 CHRONIC HEART FAILURE WITH PRESERVED EJECTION FRACTION (HCC): ICD-10-CM

## 2022-04-21 DIAGNOSIS — I10 ESSENTIAL HYPERTENSION: Primary | ICD-10-CM

## 2022-04-21 DIAGNOSIS — E66.01 OBESITY, CLASS III, BMI 40-49.9 (MORBID OBESITY) (HCC): ICD-10-CM

## 2022-04-21 DIAGNOSIS — I25.10 CORONARY ARTERY DISEASE INVOLVING NATIVE CORONARY ARTERY OF NATIVE HEART WITHOUT ANGINA PECTORIS: ICD-10-CM

## 2022-04-21 PROCEDURE — 1090F PRES/ABSN URINE INCON ASSESS: CPT | Performed by: INTERNAL MEDICINE

## 2022-04-21 PROCEDURE — 93005 ELECTROCARDIOGRAM TRACING: CPT | Performed by: INTERNAL MEDICINE

## 2022-04-21 PROCEDURE — G8427 DOCREV CUR MEDS BY ELIG CLIN: HCPCS | Performed by: INTERNAL MEDICINE

## 2022-04-21 PROCEDURE — G8754 DIAS BP LESS 90: HCPCS | Performed by: INTERNAL MEDICINE

## 2022-04-21 PROCEDURE — 1101F PT FALLS ASSESS-DOCD LE1/YR: CPT | Performed by: INTERNAL MEDICINE

## 2022-04-21 PROCEDURE — G8536 NO DOC ELDER MAL SCRN: HCPCS | Performed by: INTERNAL MEDICINE

## 2022-04-21 PROCEDURE — 93010 ELECTROCARDIOGRAM REPORT: CPT | Performed by: INTERNAL MEDICINE

## 2022-04-21 PROCEDURE — G9717 DOC PT DX DEP/BP F/U NT REQ: HCPCS | Performed by: INTERNAL MEDICINE

## 2022-04-21 PROCEDURE — G8400 PT W/DXA NO RESULTS DOC: HCPCS | Performed by: INTERNAL MEDICINE

## 2022-04-21 PROCEDURE — 99213 OFFICE O/P EST LOW 20 MIN: CPT | Performed by: INTERNAL MEDICINE

## 2022-04-21 PROCEDURE — G8753 SYS BP > OR = 140: HCPCS | Performed by: INTERNAL MEDICINE

## 2022-04-21 PROCEDURE — G0463 HOSPITAL OUTPT CLINIC VISIT: HCPCS | Performed by: INTERNAL MEDICINE

## 2022-04-21 PROCEDURE — G8417 CALC BMI ABV UP PARAM F/U: HCPCS | Performed by: INTERNAL MEDICINE

## 2022-04-21 RX ORDER — FAMOTIDINE 40 MG/1
40 TABLET, FILM COATED ORAL
COMMUNITY

## 2022-04-21 NOTE — PERIOP NOTES
4/21/22  Patient here for PAT, DOS 4/27/22. BP elevated at visit (202/87, 201/86, 188/90) and patient states that she has taken her BP medication this morning. Patient denies any CP, SOB, or HA but is tearful and nervous about her upcoming surgery, she also reports lower back pain 10/10. Her BP at her last endocrinology appointment was 124/82. Patient has a cardiology appointment today and will discuss elevated BPs with Dr. Cecile Hurt. Call to Dr. Elinor Britton office, [facility] requested. Patient concerned about surgery start time due to diabetes/NPO, LM at Dr. Roque Don office. 4/22/22  Received a call from Dez Fraga at Dr. Roque Don office who will move time of surgery. Call to Dr. Sina Fortune office, medication plan has been received but has not yet been completed.

## 2022-04-21 NOTE — PROGRESS NOTES
Office Follow-up    NAME: Christal Chino   :  1945  MRM:  [id number]    Date:  2022            Assessment:     Problem List  Date Reviewed: 2021          Codes Class Noted    Shortness of breath ICD-10-CM: R06.02  ICD-9-CM: 786.05  3/3/2020        HTN (hypertension) ICD-10-CM: I10  ICD-9-CM: 401.9  2021        Chronic low back pain ICD-10-CM: M54.50, G89.29  ICD-9-CM: 724.2, 338.29  2021        DM (diabetes mellitus), type 2 ([facility] ICD-10-CM: E11.9  ICD-9-CM: 250.00  2021        Hypertensive urgency ICD-10-CM: I16.0  ICD-9-CM: 401.9  2021        Solitary pulmonary nodule ICD-10-CM: R91.1  ICD-9-CM: 793.11  2020        CHF (congestive heart failure) ([facility] ICD-10-CM: I50.9  ICD-9-CM: 428.0  3/4/2020        Infected sebaceous cyst ICD-10-CM: L72.3, L08.9  ICD-9-CM: 706.2  2018        Abscess ICD-10-CM: L02.91  ICD-9-CM: 682.9  2018        Morbid obesity ([facility] ICD-10-CM: E66.01  ICD-9-CM: 278.01  2018        SIRS (systemic inflammatory response syndrome) ([facility] ICD-10-CM: R65.10  ICD-9-CM: 995.90  2016        Depression with anxiety ICD-10-CM: F41.8  ICD-9-CM: 300.4  Unknown        Glaucoma ICD-10-CM: H40.9  ICD-9-CM: 365.9  Unknown        Breast cancer ([facility] ICD-10-CM: C50.919  ICD-9-CM: 174.9  Unknown                 Plan:     1. Muscle cramps: These improved after addition of Kcl and Magnesium. 2. Accelerated hypertension: Blood pressure is better controlled. Home BP readings are in 130 range. Continue Toprol XL and Losartan. Today at PAT her BP was as high as 202 mmHg. In our office the SBP is 144 mmHg. 3. Diastolic congestive heart failure: She is now euvolemic. There is no significant peripheral edema. Continue Lasix, potassium and magnesium. Control blood pressure. 4. CAD: Moderate CAD with heavy calcification of the coronary arteries. Continue medical management with blood pressure control. Continue baby aspirin every day.  She does not want to be on statins. 5. See Dr. Jayla Olivares in 6 months.                       Subjective:      Returning for followup. Denies any symptoms of chest pain. Has SOB on exertion which is chronic. Has lower back pain.     -----------  Noni BLEVINS Wilson is a 77 y. o. female being seen today for f/u of HTN.  She has been following with Aamir Domínguez in our office. Recently Stefanie Keene had increased the losartan to 50 mg twice a day. In the past she could not tolerate HCTZ and Coreg and those were discontinued. She is concerned about possible medication side effects. She has been having leg cramps at night and she thinks it could be Lasix or losartan related. Also she is having some numbness involving the arms and hands when she is sleeping at night. 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 But she does not think that those are the cause for her leg cramps. ATTENTION:   This medical record was transcribed using an electronic medical records/speech recognition system. Although proofread, it may and can contain electronic, spelling and other errors. Corrections may be executed at a later time. Please feel free to contact us for any clarifications as needed.         Exam:     Physical Exam:  Visit Vitals  BP (!) 144/80 (BP 1 Location: Left upper arm, BP Patient Position: Sitting)   Pulse 76   Ht 5' 7\" (1.702 m)   Wt 261 lb 9.6 oz (118.7 kg)   SpO2 97%   BMI 40.97 kg/m²     General appearance - alert, well appearing, and in no distress  Mental status - affect appropriate to mood  Eyes - sclera anicteric, moist mucous membranes  Neck - supple, no significant adenopathy  Chest - clear to auscultation, no wheezes, rales or rhonchi  Heart - normal rate, regular rhythm, normal S1, S2, no murmurs, rubs, clicks or gallops  Abdomen - soft, nontender, nondistended, no masses or organomegaly  Extremities - peripheral pulses normal, no pedal edema  Skin - normal coloration  no rashes    Medications:     Current Outpatient Medications   Medication Sig    famotidine (Pepcid) 40 mg tablet Take 40 mg by mouth nightly.  insulin NPH (NOVOLIN N, HUMULIN N) 100 unit/mL injection 56 Units by SubCUTAneous route Daily (before dinner).  metoprolol tartrate (LOPRESSOR) 25 mg tablet Take 1 Tablet by mouth two (2) times a day.  losartan (COZAAR) 50 mg tablet Take 1 Tablet by mouth daily.  furosemide (LASIX) 20 mg tablet Take 20 mg by mouth daily.  cholecalciferol (Vitamin D3) (1000 Units /25 mcg) tablet Take 1,000 Units by mouth daily.  acetaminophen (TylenoL) 325 mg tablet Take 650 mg by mouth every four (4) hours as needed for Pain.  insulin NPH (NovoLIN N NPH U-100 Insulin) 100 unit/mL injection 64 Units by SubCUTAneous route Daily (before breakfast).  triamcinolone acetonide (KENALOG) 0.1 % ointment Apply  to affected area two (2) times daily as needed for Skin Irritation. use thin layer  Applies on face at bipap allergy site     aspirin delayed-release 81 mg tablet Take 81 mg by mouth daily. No current facility-administered medications for this visit.       Diagnostic Data Review:         [facility] 3/4/2020:    L Main: large caliber, calcified, no critical disease  LAD: large caliber, calcified, 40-50% stenosis at D2 bifurcation, supplies two diagonals, D1 small caliber and D2 moderate caliber with diffuse moderate disease  LCx: large caliber, calcified, ostial 30%, supplies a few very small marginals and continues a large caliber marginal with distal 40%  stenosis  RCA: moderate caliber, diffuse mild to moderate disease, small PDA and RPLV branch      Lab Review:     Lab Results   Component Value Date/Time    Cholesterol, total 178 07/28/2021 03:47 AM    HDL Cholesterol 26 07/28/2021 03:47 AM    LDL, calculated 124.6 (H) 07/28/2021 03:47 AM    Triglyceride 137 07/28/2021 03:47 AM    CHOL/HDL Ratio 6.8 (H) 07/28/2021 03:47 AM     Lab Results   Component Value Date/Time    Creatinine (POC) 0.9 03/19/2021 08:21 AM    Creatinine 0.87 09/30/2021 03:39 AM     Lab Results   Component Value Date/Time    BUN 9 09/30/2021 03:39 AM     Lab Results   Component Value Date/Time    Potassium 3.8 09/30/2021 03:39 AM     Lab Results   Component Value Date/Time    Hemoglobin A1c 7.6 (H) 07/26/2021 02:10 PM     Lab Results   Component Value Date/Time    HGB 10.5 (L) 09/30/2021 03:39 AM     Lab Results   Component Value Date/Time    PLATELET 946 (H) 14/74/0430 03:39 AM     No results for input(s): CPK, CKMB, TROIQ in the last 72 hours. No lab exists for component: CKQMB, CPKMB             ___________________________________________________    Michelle Left.  Marcelino Spurling, MD, [facility]

## 2022-04-21 NOTE — PERIOP NOTES
[address] [facility] [address], [address] [facility]   MAIN OR                                  [phone]   MAIN PRE OP                          [phone]                                                                                AMBULATORY PRE OP          [phone]  PRE-ADMISSION TESTING    [phone]   Surgery Date: Wednesday 4/27/22       Is surgery arrival time given by surgeon? NO  If NO, [facility] staff will call you between 3 and 7pm the day before your surgery with your arrival time. (If your surgery is on a Monday, we will call you the Friday before.)    Call [phone] after 7pm Monday-Friday if you did not receive this call. INSTRUCTIONS BEFORE YOUR SURGERY   When You  Arrive Arrive at the [address] [facility] on the day of your surgery  Have your insurance card, photo ID, and any copayment (if needed)   Food   and   Drink NO food or drink after midnight the night before surgery    This means NO water, gum, mints, coffee, juice, etc. (high protein snack before midnight, glucose tabs if needed)  No alcohol (beer, wine, liquor) 24 hours before and after surgery   Medications to   TAKE   Morning of Surgery MEDICATIONS TO TAKE THE MORNING OF SURGERY WITH A SIP OF WATER:      Contact endocrinologist for instructions with insulin   Metoprolol    Hold losartan on the evening before surgery     Medications  To  STOP      7 days before surgery  Non-Steroidal anti-inflammatory Drugs (NSAID's): for example, Ibuprofen (Advil, Motrin), Naproxen (Aleve)      Herbal supplements, vitamins, and fish oil   Other:  (Pain medications not listed above, including Tylenol may be taken)   Blood  Thinners  If you take  Aspirin, Plavix, Coumadin, or any blood-thinning or anti-blood clot medicine, talk to the doctor who prescribed the medications for pre-operative instructions.    Bathing Clothing  Jewelry  Valuables      If you shower the morning of surgery, please do not apply anything to your skin (lotions, powders, deodorant, or makeup, especially mascara)   Follow Chlorhexidine [employer] body wash instructions provided to you during PAT appointment. Begin 3 days prior to surgery.  Do not shave or trim anywhere 24 hours before surgery   Wear your hair loose or down; no pony-tails, buns, or metal hair clips   Wear loose, comfortable, clean clothes   Wear glasses instead of contacts  [employer] money, valuables, and jewelry, including body piercings, at home  [facility] - or Spending the Night  SAME-DAY SURGERY: You must have a responsible adult drive you home and stay with you 24 hours after surgery   ADMITS: If your doctor is keeping you in the hospital after surgery, leave personal belongings/luggage in your car until you have a hospital room number. Hospital discharge time is 12 noon  Drivers must be here before 12 noon unless you are told differently   Special Instructions        Follow all instructions so your surgery wont be cancelled. Please, be on time. If a situation occurs and you are delayed the day of surgery, call [phone] or [phone]. If your physical condition changes (like a fever, cold, flu, etc.) call your surgeon. Home medication(s) reviewed and verified via    LIST   VERBAL   during PAT appointment. The patient was contacted by      IN-PERSON  The patient verbalizes understanding of all instructions and     DOES NOT   need reinforcement.

## 2022-04-21 NOTE — PROGRESS NOTES
Merissa Bowie is a 69 y.o. female    Chief Complaint   Patient presents with    Follow-up     6 month f/u     CHF    Hypertension    Shortness of Breath     Patient having cyst removal from her back next week 4/27/2022 Dr Samantha Quarles     Chest pain No    SOB No    Dizziness No    Swelling No    Refills No    Visit Vitals  BP (!) 144/80 (BP 1 Location: Left upper arm, BP Patient Position: Sitting)   Pulse 76   Ht 5' 7\" (1.702 m)   Wt 261 lb 9.6 oz (118.7 kg)   SpO2 97%   BMI 40.97 kg/m²       1. Have you been to the ER, urgent care clinic since your last visit? Hospitalized since your last visit? No    2. Have you seen or consulted any other health care providers outside of the [address] [place] [address] since your last visit? Include any pap smears or colon screening.   No

## 2022-04-22 ENCOUNTER — DOCUMENTATION ONLY (OUTPATIENT)
Dept: CARDIOLOGY CLINIC | Age: 77
End: 2022-04-22

## 2022-04-26 ENCOUNTER — ANESTHESIA EVENT (OUTPATIENT)
Dept: SURGERY | Age: 77
End: 2022-04-26
Payer: MEDICARE

## 2022-04-27 ENCOUNTER — HOSPITAL ENCOUNTER (OUTPATIENT)
Age: 77
Setting detail: OUTPATIENT SURGERY
Discharge: HOME | End: 2022-04-27
Attending: SURGERY | Admitting: SURGERY
Payer: MEDICARE

## 2022-04-27 ENCOUNTER — ANESTHESIA (OUTPATIENT)
Dept: SURGERY | Age: 77
End: 2022-04-27
Payer: MEDICARE

## 2022-04-27 VITALS
RESPIRATION RATE: 16 BRPM | HEART RATE: 68 BPM | SYSTOLIC BLOOD PRESSURE: 146 MMHG | OXYGEN SATURATION: 98 % | DIASTOLIC BLOOD PRESSURE: 56 MMHG | TEMPERATURE: 98.2 F

## 2022-04-27 LAB
GLUCOSE BLD STRIP.AUTO-MCNC: 153 MG/DL (ref 65–117)
GLUCOSE BLD STRIP.AUTO-MCNC: 174 MG/DL (ref 65–117)
SERVICE CMNT-IMP: ABNORMAL
SERVICE CMNT-IMP: ABNORMAL

## 2022-04-27 PROCEDURE — 74011250636 HC RX REV CODE- 250/636: Performed by: NURSE ANESTHETIST, CERTIFIED REGISTERED

## 2022-04-27 PROCEDURE — 2709999900 HC NON-CHARGEABLE SUPPLY: Performed by: SURGERY

## 2022-04-27 PROCEDURE — 88304 TISSUE EXAM BY PATHOLOGIST: CPT

## 2022-04-27 PROCEDURE — 77030002916 HC SUT ETHLN J&J -A: Performed by: SURGERY

## 2022-04-27 PROCEDURE — 76210000006 HC OR PH I REC 0.5 TO 1 HR: Performed by: SURGERY

## 2022-04-27 PROCEDURE — 77030040361 HC SLV COMPR DVT MDII -B

## 2022-04-27 PROCEDURE — 77030031139 HC SUT VCRL2 J&J -A: Performed by: SURGERY

## 2022-04-27 PROCEDURE — 74011000250 HC RX REV CODE- 250: Performed by: NURSE ANESTHETIST, CERTIFIED REGISTERED

## 2022-04-27 PROCEDURE — 74011250636 HC RX REV CODE- 250/636: Performed by: SURGERY

## 2022-04-27 PROCEDURE — 74011250636 HC RX REV CODE- 250/636: Performed by: ANESTHESIOLOGY

## 2022-04-27 PROCEDURE — 76060000032 HC ANESTHESIA 0.5 TO 1 HR: Performed by: SURGERY

## 2022-04-27 PROCEDURE — 82962 GLUCOSE BLOOD TEST: CPT

## 2022-04-27 PROCEDURE — 74011000250 HC RX REV CODE- 250: Performed by: SURGERY

## 2022-04-27 PROCEDURE — 76010000138 HC OR TIME 0.5 TO 1 HR: Performed by: SURGERY

## 2022-04-27 PROCEDURE — 77030040922 HC BLNKT HYPOTHRM STRY -A

## 2022-04-27 PROCEDURE — 76210000020 HC REC RM PH II FIRST 0.5 HR: Performed by: SURGERY

## 2022-04-27 RX ORDER — SODIUM CHLORIDE 0.9 % (FLUSH) 0.9 %
5-40 SYRINGE (ML) INJECTION AS NEEDED
Status: DISCONTINUED | OUTPATIENT
Start: 2022-04-27 | End: 2022-04-27 | Stop reason: HOSPADM

## 2022-04-27 RX ORDER — ONDANSETRON 2 MG/ML
4 INJECTION INTRAMUSCULAR; INTRAVENOUS AS NEEDED
Status: DISCONTINUED | OUTPATIENT
Start: 2022-04-27 | End: 2022-04-27 | Stop reason: HOSPADM

## 2022-04-27 RX ORDER — LIDOCAINE HYDROCHLORIDE 20 MG/ML
INJECTION, SOLUTION INFILTRATION; PERINEURAL AS NEEDED
Status: DISCONTINUED | OUTPATIENT
Start: 2022-04-27 | End: 2022-04-27 | Stop reason: HOSPADM

## 2022-04-27 RX ORDER — SODIUM CHLORIDE, SODIUM LACTATE, POTASSIUM CHLORIDE, CALCIUM CHLORIDE 600; 310; 30; 20 MG/100ML; MG/100ML; MG/100ML; MG/100ML
INJECTION, SOLUTION INTRAVENOUS
Status: DISCONTINUED | OUTPATIENT
Start: 2022-04-27 | End: 2022-04-27

## 2022-04-27 RX ORDER — LIDOCAINE HYDROCHLORIDE 10 MG/ML
0.1 INJECTION, SOLUTION EPIDURAL; INFILTRATION; INTRACAUDAL; PERINEURAL AS NEEDED
Status: DISCONTINUED | OUTPATIENT
Start: 2022-04-27 | End: 2022-04-27 | Stop reason: HOSPADM

## 2022-04-27 RX ORDER — LIDOCAINE HYDROCHLORIDE AND EPINEPHRINE 10; 10 MG/ML; UG/ML
INJECTION, SOLUTION INFILTRATION; PERINEURAL AS NEEDED
Status: DISCONTINUED | OUTPATIENT
Start: 2022-04-27 | End: 2022-04-27 | Stop reason: HOSPADM

## 2022-04-27 RX ORDER — SODIUM CHLORIDE 0.9 % (FLUSH) 0.9 %
5-40 SYRINGE (ML) INJECTION EVERY 8 HOURS
Status: DISCONTINUED | OUTPATIENT
Start: 2022-04-27 | End: 2022-04-27 | Stop reason: HOSPADM

## 2022-04-27 RX ORDER — SODIUM CHLORIDE, SODIUM LACTATE, POTASSIUM CHLORIDE, CALCIUM CHLORIDE 600; 310; 30; 20 MG/100ML; MG/100ML; MG/100ML; MG/100ML
100 INJECTION, SOLUTION INTRAVENOUS CONTINUOUS
Status: DISCONTINUED | OUTPATIENT
Start: 2022-04-27 | End: 2022-04-27 | Stop reason: HOSPADM

## 2022-04-27 RX ORDER — FLUMAZENIL 0.1 MG/ML
0.2 INJECTION INTRAVENOUS
Status: DISCONTINUED | OUTPATIENT
Start: 2022-04-27 | End: 2022-04-27 | Stop reason: HOSPADM

## 2022-04-27 RX ORDER — SODIUM CHLORIDE, SODIUM LACTATE, POTASSIUM CHLORIDE, CALCIUM CHLORIDE 600; 310; 30; 20 MG/100ML; MG/100ML; MG/100ML; MG/100ML
125 INJECTION, SOLUTION INTRAVENOUS CONTINUOUS
Status: DISCONTINUED | OUTPATIENT
Start: 2022-04-27 | End: 2022-04-27 | Stop reason: HOSPADM

## 2022-04-27 RX ORDER — MIDAZOLAM HYDROCHLORIDE 1 MG/ML
INJECTION, SOLUTION INTRAMUSCULAR; INTRAVENOUS AS NEEDED
Status: DISCONTINUED | OUTPATIENT
Start: 2022-04-27 | End: 2022-04-27 | Stop reason: HOSPADM

## 2022-04-27 RX ORDER — HYDROMORPHONE HYDROCHLORIDE 1 MG/ML
.5-1 INJECTION, SOLUTION INTRAMUSCULAR; INTRAVENOUS; SUBCUTANEOUS
Status: DISCONTINUED | OUTPATIENT
Start: 2022-04-27 | End: 2022-04-27 | Stop reason: HOSPADM

## 2022-04-27 RX ORDER — NALOXONE HYDROCHLORIDE 0.4 MG/ML
0.2 INJECTION, SOLUTION INTRAMUSCULAR; INTRAVENOUS; SUBCUTANEOUS
Status: DISCONTINUED | OUTPATIENT
Start: 2022-04-27 | End: 2022-04-27 | Stop reason: HOSPADM

## 2022-04-27 RX ORDER — PROPOFOL 10 MG/ML
INJECTION, EMULSION INTRAVENOUS
Status: DISCONTINUED | OUTPATIENT
Start: 2022-04-27 | End: 2022-04-27 | Stop reason: HOSPADM

## 2022-04-27 RX ADMIN — MIDAZOLAM 2 MG: 1 INJECTION, SOLUTION INTRAMUSCULAR; INTRAVENOUS at 11:47

## 2022-04-27 RX ADMIN — CEFAZOLIN SODIUM 2 G: 1 POWDER, FOR SOLUTION INTRAMUSCULAR; INTRAVENOUS at 11:54

## 2022-04-27 RX ADMIN — PROPOFOL 100 MCG/KG/MIN: 10 INJECTION, EMULSION INTRAVENOUS at 11:49

## 2022-04-27 RX ADMIN — LIDOCAINE HYDROCHLORIDE 100 MG: 20 INJECTION, SOLUTION INFILTRATION; PERINEURAL at 11:49

## 2022-04-27 RX ADMIN — SODIUM CHLORIDE, POTASSIUM CHLORIDE, SODIUM LACTATE AND CALCIUM CHLORIDE 125 ML/HR: 600; 310; 30; 20 INJECTION, SOLUTION INTRAVENOUS at 10:34

## 2022-04-27 NOTE — BRIEF OP NOTE
Brief Postoperative Note    Patient: Willam Orozco  YOB: 1945  MRN: [id number]    Date of Procedure: 4/27/2022     Pre-Op Diagnosis: BACK CYST    Post-Op Diagnosis: Same as preoperative diagnosis.       Procedure(s):  EXCISION BACK CYST     Surgeon(s):  Trinity Haider MD    Surgical Assistant: Surg Asst-1: Lillian Van    Anesthesia: MAC     Estimated Blood Loss (mL): Minimal    Complications: None    Specimens:   ID Type Source Tests Collected by Time Destination   1 : sebaceous cyst of back Preservative Back  Trinity Haider MD 4/27/2022 1210 Pathology        Implants: * No implants in log *    Drains: * No LDAs found *    Findings: 5cm x 3 cm cyst    Electronically Signed by Christos Rosario MD on 4/27/2022 at 12:28 PM

## 2022-04-27 NOTE — H&P
4/27/2022    Chart reviewed and patient interviewed and examined.  No new changes since current H&P.

## 2022-04-27 NOTE — DISCHARGE INSTRUCTIONS
Patient Education   Please leave dressing in place for 2 days. Once dressing is removed it is ok to shower. Keep area covered with a clean dressing until sutures are removed. Skin Cyst: Care Instructions  Overview  A skin cyst is a lump just under the skin. These cysts can form when a hair follicle becomes blocked. They are common in acne and may occur on the face, neck, back, and genitals. But they can form anywhere on the body. These cysts aren't cancer, and they don't lead to cancer. They tend not to hurt, but they can sometimes become swollen and painful. They also may break open (rupture) and cause scarring. These cysts may not cause problems. They may not need treatment. If a cyst is swollen and hurts, the doctor may inject it with a medicine or treat it with antibiotics if it's infected. But sometimes a painful or infected cyst will need to be removed or opened. In those cases, the doctor will use numbing medicine and then will cut into the cyst to drain it or remove it. Follow-up care is a key part of your treatment and safety. Be sure to make and go to all appointments, and call your doctor if you are having problems. It's also a good idea to know your test results and keep a list of the medicines you take. How can you care for yourself at home? · Do not squeeze the cyst or poke it with a needle to open it. This can cause swelling, redness, and infection. · Always have a doctor look at any new lumps you get to make sure that they are not serious. · If you had stitches, you may get other instructions. You will have to come back to have the stitches removed. When should you call for help? Watch closely for changes in your health, and be sure to contact your doctor if:    · You have a fever, redness, or swelling after you get a shot of medicine in the cyst.     · You see or feel a new lump on your skin. Where can you learn more?   Go to http://www.gray.com/  Enter M700 in the search box to learn more about \"Skin Cyst: Care Instructions. \"  Current as of: November 15, 2021               Content Version: 13.2  © 2006-2022 [employer]. Care instructions adapted under license by [employer] (which disclaims liability or warranty for this information). If you have questions about a medical condition or this instruction, always ask your healthcare professional. [address] any warranty or liability for your use of this information. DISCHARGE SUMMARY from your Nurse      PATIENT INSTRUCTIONS    After general anesthesia or intravenous sedation, for 24 hours or while taking prescription Narcotics:  · Limit your activities  · Do not drive and operate hazardous machinery  · Do not make important personal or business decisions  · Do  not drink alcoholic beverages  · If you have not urinated within 8 hours after discharge, please contact your surgeon on call. Report the following to your surgeon:  · Excessive pain, swelling, redness or odor of or around the surgical area  · Temperature over 100.5  · Nausea and vomiting lasting longer than 4 hours or if unable to take medications  · Any signs of decreased circulation or nerve impairment to extremity: change in color, persistent  numbness, tingling, coldness or increase pain  · Any questions      GOOD HELP TO FIGHT AN INFECTION  Here are a few tip to help reduce the chance of getting an infection after surgery:   Wash Your Hands   Good handwashing is the most important thing you and your caregiver can do.  Wash before and after caring for any wounds. Dry your hand with a clean towel.  Wash with soap and water for at least 20 seconds. A TIP: sing the \"Happy Birthday\" song through one time while washing to help with the timing.  Use a hand  in between washings.      Shower   When your surgeon says it is OK to take a shower, use a new bar of antibacterial soap (if that is what you use, and keep that bar of soap ONLY for your use), or antibacterial body wash.  Use a clean wash cloth or sponge when you bathe.  Dry off with a clean towel  after every bath - be careful around any wounds, skin staples, sutures or surgical glue over/on wounds.  Do not enter swimming pools, hot tubs, lakes, rivers and/or ocean until wounds are healed and your doctor/surgeon says it is OK.  Use Clean Sheets   Sleep on freshly laundered sheets after your surgery.  Keep the surgery site covered with a clean, dry bandage (if instructed to do so). If the bandage becomes soiled, reapply a new, dry, clean bandage.  Do not allow pets to sleep with you while your wound is healing.  Lifestyle Modification and Controlling Your Blood Sugar   Smoking slows wound healing. Stop smoking and limit exposure to second-hand smoke.  High blood sugar slows wound healing. Eat a well-balanced diet to provide proper nutrition while healing   Monitor your blood sugar (if you are a diabetic) and take your medications as you are suppose to so you can control you blood sugar after surgery. COUGH AND DEEP BREATHE    Breathing deeply and coughing are very important exercises to do after surgery. Deep breathing and coughing open the little air tubes and air sacks in your lungs. You take deep breaths every day. You may not even notice - it is just something you do when you sigh or yawn. It is a natural exercise you do to keep these air passages open. After surgery, take deep breaths and cough, on purpose. DIRECTIONS:  · Take 10 to 15 slow deep breaths every hour while awake. · Breathe in deeply, and hold it for 2 seconds. · Exhale slowly through puckered lips, like blowing up a balloon. · After every 4th or 5th deep breath, hug your pillow to your chest or belly and give a hard, deep cough. Yes, it will probably hurt.   But doing this exercise is a very important part of healing after surgery. Take your pain medicine to help you do this exercise without too much pain. Coughing and deep breathing help prevent bronchitis and pneumonia after surgery. If you had chest or belly surgery, use a pillow as a \"hug deidre\" and hold it tightly to your chest or belly when you cough. ANKLE PUMPS    Ankle pumps increase the circulation of oxygenated blood to your lower extremities and decrease your risk for circulation problems such as blood clots. They also stretch the muscles, tendons and ligaments in your foot and ankle, and prevent joint contracture in the ankle and foot, especially after surgeries on the legs. It is important to do ankle pump exercises regularly after surgery because immobility increases your risk for developing a blood clot. Your doctor may also have you take an Aspirin for the next few days as well. If your doctor did not ask you to take an Aspirin, consult with him before starting Aspirin therapy on your own. The exercise is quite simple. · Slowly point your foot forward, feeling the muscles on the top of your lower leg stretch, and hold this position for 5 seconds. · Next, pull your foot back toward you as far as possible, stretching the calf muscles, and hold that position for 5 seconds. · Repeat with the other foot. · Perform 10 repetitions every hour while awake for both ankles if possible (down and then up with the foot once is one repetition). You should feel gentle stretching of the muscles in your lower leg when doing this exercise. If you feel pain, or your range of motion is limited, don't push too hard. Only go the limit your joint and muscles will let you go. If you have increasing pain, progressively worsening leg warmth or swelling, STOP the exercise and call your doctor.            MEDICATION AND   SIDE EFFECT GUIDE    The [facility] MEDICATION AND SIDE EFFECT GUIDE was provided to the PATIENT AND CARE PROVIDER. Information provided includes instruction about drug purpose and common side effects for the following medications:   · n/a        These are general instructions for a healthy lifestyle:    *   Please give a list of your current medications to your Primary Care Provider. *   Please update this list whenever your medications are discontinued, doses are changed, or new medications (including over-the-counter products) are added. *   Please carry medication information at all times in case of emergency situations. About Smoking  No smoking / No tobacco products  Avoid exposure to second hand smoke     Surgeon General's Warning:  Quitting smoking now greatly reduces serious risk to your health. Obesity, smoking, and sedentary lifestyle greatly increases your risk for illness and disease. A healthy diet, regular physical exercise & weight monitoring are important for maintaining a healthy lifestyle. Congestive Heart Failure  You may be retaining fluid if you have a history of heart failure or if you experience any of the following symptoms:  Weight gain of 3 pounds or more overnight or 5 pounds in a week, increased swelling in your hands or feet or shortness of breath while lying flat in bed. Please call your doctor as soon as you notice any of these symptoms; do not wait until your next office visit. Recognize signs and symptoms of STROKE:  F -  Face looks uneven  A -  Arms unable to move or move evenly  S -  Speech slurred or non-existent  T -  Time-call 911 as soon as signs and symptoms begin-DO NOT go          back to bed or wait to see if you get better-TIME IS BRAIN. Warning Signs of HEART ATTACK   Call 911 if you have these symptoms:     Chest discomfort. Most heart attacks involve discomfort in the center of the chest that lasts more than a few minutes, or that goes away and comes back.  It can feel like uncomfortable pressure, squeezing, fullness, or pain.   Discomfort in other areas of the upper body. Symptoms can include pain or discomfort in one or both arms, the back, neck, jaw, or stomach.  Shortness of breath with or without chest discomfort.  Other signs may include breaking out in a cold sweat, nausea, or lightheadedness. Don't wait more than five minutes to call 911 - MINUTES MATTER! Fast action can save your life. Calling 911 is almost always the fastest way to get lifesaving treatment. Emergency Medical Services staff can begin treatment when they arrive -- up to an hour sooner than if someone gets to the hospital by car. Learning About Coronavirus [phone])  Coronavirus [phone]): Overview  What is coronavirus (COVID-19)? The coronavirus disease (COVID-19) is caused by a virus. It is an illness that was first found in [place], [place], in December 2019. It has since spread worldwide. The virus can cause fever, cough, and trouble breathing. In severe cases, it can cause pneumonia and make it hard to breathe without help. It can cause death. Coronaviruses are a large group of viruses. They cause the common cold. They also cause more serious illnesses like Middle East respiratory syndrome (MERS) and severe acute respiratory syndrome (SARS). COVID-19 is caused by a novel coronavirus. That means it's a new type that has not been seen in people before. This virus spreads person-to-person through droplets from coughing and sneezing. It can also spread when you are close to someone who is infected. And it can spread when you touch something that has the virus on it, such as a doorknob or a tabletop. What can you do to protect yourself from coronavirus (COVID-19)? The best way to protect yourself from getting sick is to:  · Avoid areas where there is an outbreak. · Avoid contact with people who may be infected. · Wash your hands often with soap or alcohol-based hand sanitizers.   · Avoid crowds and try to stay at least 6 feet away from other people. · Wash your hands often, especially after you cough or sneeze. Use soap and water, and scrub for at least 20 seconds. If soap and water aren't available, use an alcohol-based hand . · Avoid touching your mouth, nose, and eyes. What can you do to avoid spreading the virus to others? To help avoid spreading the virus to others:  · Cover your mouth with a tissue when you cough or sneeze. Then throw the tissue in the trash. · Use a disinfectant to clean things that you touch often. · Stay home if you are sick or have been exposed to the virus. Don't go to school, work, or public areas. And don't use public transportation. · If you are sick:  ? Leave your home only if you need to get medical care. But call the doctor's office first so they know you're coming. And wear a face mask, if you have one.  ? If you have a face mask, wear it whenever you're around other people. It can help stop the spread of the virus when you cough or sneeze. ? Clean and disinfect your home every day. Use household  and disinfectant wipes or sprays. Take special care to clean things that you grab with your hands. These include doorknobs, remote controls, phones, and handles on your refrigerator and microwave. And don't forget countertops, tabletops, bathrooms, and computer keyboards. When to call for help  Call 911 anytime you think you may need emergency care. For example, call if:  · You have severe trouble breathing. (You can't talk at all.)  · You have constant chest pain or pressure. · You are severely dizzy or lightheaded. · You are confused or can't think clearly. · Your face and lips have a blue color. · You pass out (lose consciousness) or are very hard to wake up. Call your doctor now if you develop symptoms such as:  · Shortness of breath. · Fever. · Cough. If you need to get care, call ahead to the doctor's office for instructions before you go.  Make sure you wear a face mask, if you have one, to prevent exposing other people to the virus. Where can you get the latest information? The following health organizations are tracking and studying this virus. Their websites contain the most up-to-date information. Klever Briseida also learn what to do if you think you may have been exposed to the virus. · U.S. Centers for Disease Control and Prevention (CDC): The CDC provides updated news about the disease and travel advice. The website also tells you how to prevent the spread of infection. www.cdc.gov  · World Health Organization [facility]): WHO offers information about the virus outbreaks. WHO also has travel advice. www.who.int  Current as of: April 1, 2020               Content Version: 12.4  © 2006-2020 [employer]. Care instructions adapted under license by your healthcare professional. If you have questions about a medical condition or this instruction, always ask your healthcare professional. [place] [address] any warranty or liability for your use of this information. The discharge information has been reviewed with the patient and caregiver. Any questions and concerns from the patient and caregiver have been addressed. The patient and caregiver verbalized understanding.         CONTENTS FOUND IN YOUR DISCHARGE ENVELOPE:  [x]     Surgeon and Hospital Discharge Instructions  [x]     [facility] Surgical Services Care Provider Card  []     Medication & Side Effect Guide            (your newly prescribed medications have been marked/highlighted showing the most common side effects from   the classes of drugs on your prescriptions)  []     Medication Prescription(s) x n/a ( [] These have been sent electronically to your pharmacy by your surgeon,   - OR -       your surgeon has already provided these to you during a previous/pre-op office visit)  []     [address]  []     Physical Therapy Prescription  []     Follow-up Appointment Cards  []     Surgery-related Pictures/Media  []     Pain block and/or block with On-Q Catheter from Anesthesia Service (information included in your instructions above)  []     Medical device information sheets/pamphlets from their    []     School/work excuse note. []     /parent work excuse note. The following personal items collected during your admission are returned to you:   Dental Appliance: Dental Appliances: None  Vision: Visual Aid: Glasses,At bedside  Hearing Aid:    Jewelry: Jewelry: None  Clothing: Clothing: Undergarments,Footwear,Dress  Other Valuables:  Other Valuables: Eyeglasses  Valuables sent to safe:

## 2022-04-27 NOTE — ANESTHESIA PREPROCEDURE EVALUATION
Relevant Problems   RESPIRATORY SYSTEM   (+) Shortness of breath      NEUROLOGY   (+) Depression with anxiety      CARDIOVASCULAR   (+) CHF (congestive heart failure) (HCC)   (+) HTN (hypertension)   (+) Hypertensive urgency      ENDOCRINE   (+) DM (diabetes mellitus), type 2 (HCC)   (+) Morbid obesity (HCC)      PERSONAL HX & FAMILY HX OF CANCER   (+) Breast cancer (HCC)       Anesthetic History   No history of anesthetic complications            Review of Systems / Medical History  Patient summary reviewed, nursing notes reviewed and pertinent labs reviewed    Pulmonary  Within defined limits              Comments: S/p RL lobectomy due to suspicious mass-non cancerous   Neuro/Psych   Within defined limits           Cardiovascular    Hypertension: well controlled              Exercise tolerance: <4 METS     GI/Hepatic/Renal     GERD: well controlled           Endo/Other    Diabetes: well controlled, type 2, using insulin    Morbid obesity, arthritis and cancer (right breast, cervical )     Other Findings   Comments: Chronic back pain         Physical Exam    Airway  Mallampati: III  TM Distance: > 6 cm  Neck ROM: normal range of motion   Mouth opening: Normal     Cardiovascular  Regular rate and rhythm,  S1 and S2 normal,  no murmur, click, rub, or gallop             Dental  No notable dental hx       Pulmonary  Breath sounds clear to auscultation               Abdominal  GI exam deferred       Other Findings            Anesthetic Plan    ASA: 3  Anesthesia type: MAC          Induction: Intravenous  Anesthetic plan and risks discussed with: Patient

## 2022-04-27 NOTE — ANESTHESIA POSTPROCEDURE EVALUATION
Procedure(s):  EXCISION BACK CYST .    MAC    Anesthesia Post Evaluation      Multimodal analgesia: multimodal analgesia used between 6 hours prior to anesthesia start to PACU discharge  Patient location during evaluation: bedside  Patient participation: complete - patient participated  Level of consciousness: awake  Pain management: adequate  Airway patency: patent  Anesthetic complications: no  Cardiovascular status: acceptable  Respiratory status: acceptable  Hydration status: acceptable        INITIAL Post-op Vital signs:   Vitals Value Taken Time   /56 04/27/22 1330   Temp 36.6 °C (97.9 °F) 04/27/22 1236   Pulse 72 04/27/22 1332   Resp 15 04/27/22 1332   SpO2 99 % 04/27/22 1332   Vitals shown include unvalidated device data.

## 2022-04-30 NOTE — OP NOTES
Anupam Eid [facility] 80  OPERATIVE REPORT    Name:  Santana Eller  MR#:  [id number]  :  1945  ACCOUNT #:  [de-identified]  DATE OF SERVICE:  2022    CLINICAL SERVICE:  General Surgery. PREOPERATIVE DIAGNOSIS:  Sebaceous cyst.    POSTOPERATIVE DIAGNOSIS:  Sebaceous cyst.    PROCEDURE PERFORMED:  Excision of sebaceous cyst.    SURGEON:  Jerri Wells MD    ASSISTANT:  Claudia    ANESTHESIA:  MAC and local    COMPLICATIONS:  None. SPECIMENS REMOVED:  Sebaceous cyst.    IMPLANTS:  none. ESTIMATED BLOOD LOSS:  minimal.    DRAINS/TUBES:  None. INDICATIONS:  The patient is a 80-year-old woman with a history of multiple sebaceous cyst in the past, who presented to the office with complaints of an enlarging and painful area. It was noted to be a sebaceous cyst.  She presents at this time for excision. PROCEDURE:  After obtaining informed consent, the patient was brought to the operating room where she was laid in a lateral decubitus position. After adequate sedation was achieved, the area was prepped and draped in standard fashion. A surgical time out was conducted. 2 g of IV Ancef was administered as a preoperative antibiotic. Using a combination of lidocaine-Marcaine, the area was infiltrated to allow for analgesia. Once there was adequate analgesia, an elliptical incision was made to encompass the sebaceous cyst.  The cyst was carefully divided from the surrounding tissues using electrocautery. The cyst once excised measured 5 cm by approximately 3 cm in size. The wound bed was inspected and appeared to be hemostatic. Using a vertical mattress 3-0 nylon, the wound was reapproximated. A pressure dressing was placed at the end. The patient tolerated the procedure well. There were no complications. All instrument, needle and sponge counts were correct at the end of the case.       Jhon Chandler MD [id number]  D:  2022 14:32  T:  2022 23:36  JOB #:  [id number]

## 2022-05-02 RX ORDER — LOSARTAN POTASSIUM 50 MG/1
TABLET ORAL
Qty: 180 TABLET | OUTPATIENT
Start: 2022-05-02

## 2022-05-02 NOTE — TELEPHONE ENCOUNTER
Refill per VO of Dr. Diana Claros:    Last appt: 4/21/2022    Future Appointments   Date Time Provider Carla Juanita   10/21/2022 11:00 AM Maribel Samuels MD [facility]       Requested Prescriptions     Pending Prescriptions Disp Refills    losartan (COZAAR) 50 mg tablet [Pharmacy Med Name: LOSARTAN POTASSIUM 50 MG TAB] 180 Tablet      Sig: TAKE 1 TABLET BY MOUTH TWICE A DAY     Refill was denied because,  REFILLS STILL AVAILABLE AT THE PHARMACY

## 2022-06-05 NOTE — Clinical Note
Mallampati: Class III - soft palate, base of uvula visible. ASA: Class 3 - patient with severe systemic disease. Message sent to patient by My Chart:  Your urine drug screen returned good. It showed you are taking Tramadol as expected.     Dr. Aracelis Gonzales

## 2022-08-05 ENCOUNTER — TRANSCRIBE ORDER (OUTPATIENT)
Dept: SCHEDULING | Age: 77
End: 2022-08-05

## 2022-08-05 DIAGNOSIS — R10.11 ABDOMINAL PAIN, RIGHT UPPER QUADRANT: Primary | ICD-10-CM

## 2022-08-19 ENCOUNTER — HOSPITAL ENCOUNTER (OUTPATIENT)
Dept: CT IMAGING | Age: 77
Discharge: HOME | End: 2022-08-19
Payer: MEDICARE

## 2022-08-19 DIAGNOSIS — R10.11 ABDOMINAL PAIN, RIGHT UPPER QUADRANT: ICD-10-CM

## 2022-08-19 PROCEDURE — 74150 CT ABDOMEN W/O CONTRAST: CPT

## 2022-10-21 ENCOUNTER — OFFICE VISIT (OUTPATIENT)
Dept: CARDIOLOGY CLINIC | Age: 77
End: 2022-10-21
Payer: MEDICARE

## 2022-10-21 VITALS
OXYGEN SATURATION: 100 % | HEIGHT: 67 IN | WEIGHT: 262 LBS | HEART RATE: 68 BPM | SYSTOLIC BLOOD PRESSURE: 160 MMHG | BODY MASS INDEX: 41.12 KG/M2 | DIASTOLIC BLOOD PRESSURE: 81 MMHG

## 2022-10-21 DIAGNOSIS — I50.32 CHRONIC DIASTOLIC CONGESTIVE HEART FAILURE (HCC): ICD-10-CM

## 2022-10-21 DIAGNOSIS — I10 ESSENTIAL HYPERTENSION: ICD-10-CM

## 2022-10-21 DIAGNOSIS — I50.32 CHRONIC HEART FAILURE WITH PRESERVED EJECTION FRACTION (HCC): Primary | ICD-10-CM

## 2022-10-21 DIAGNOSIS — I25.10 CORONARY ARTERY DISEASE INVOLVING NATIVE CORONARY ARTERY OF NATIVE HEART WITHOUT ANGINA PECTORIS: ICD-10-CM

## 2022-10-21 PROCEDURE — G8754 DIAS BP LESS 90: HCPCS | Performed by: INTERNAL MEDICINE

## 2022-10-21 PROCEDURE — G0463 HOSPITAL OUTPT CLINIC VISIT: HCPCS | Performed by: INTERNAL MEDICINE

## 2022-10-21 PROCEDURE — 1090F PRES/ABSN URINE INCON ASSESS: CPT | Performed by: INTERNAL MEDICINE

## 2022-10-21 PROCEDURE — 1123F ACP DISCUSS/DSCN MKR DOCD: CPT | Performed by: INTERNAL MEDICINE

## 2022-10-21 PROCEDURE — G8536 NO DOC ELDER MAL SCRN: HCPCS | Performed by: INTERNAL MEDICINE

## 2022-10-21 PROCEDURE — 99214 OFFICE O/P EST MOD 30 MIN: CPT | Performed by: INTERNAL MEDICINE

## 2022-10-21 PROCEDURE — G8417 CALC BMI ABV UP PARAM F/U: HCPCS | Performed by: INTERNAL MEDICINE

## 2022-10-21 PROCEDURE — G8753 SYS BP > OR = 140: HCPCS | Performed by: INTERNAL MEDICINE

## 2022-10-21 PROCEDURE — G8427 DOCREV CUR MEDS BY ELIG CLIN: HCPCS | Performed by: INTERNAL MEDICINE

## 2022-10-21 PROCEDURE — 1101F PT FALLS ASSESS-DOCD LE1/YR: CPT | Performed by: INTERNAL MEDICINE

## 2022-10-21 PROCEDURE — G9717 DOC PT DX DEP/BP F/U NT REQ: HCPCS | Performed by: INTERNAL MEDICINE

## 2022-10-21 PROCEDURE — G8400 PT W/DXA NO RESULTS DOC: HCPCS | Performed by: INTERNAL MEDICINE

## 2022-10-21 RX ORDER — METOPROLOL TARTRATE 25 MG/1
25 TABLET, FILM COATED ORAL 2 TIMES DAILY
Qty: 180 TABLET | Refills: 4 | Status: SHIPPED | OUTPATIENT
Start: 2022-10-21

## 2022-10-21 RX ORDER — LANOLIN ALCOHOL/MO/W.PET/CERES
400 CREAM (GRAM) TOPICAL 2 TIMES DAILY
Qty: 180 TABLET | Refills: 4 | Status: SHIPPED | OUTPATIENT
Start: 2022-10-21

## 2022-10-21 RX ORDER — POTASSIUM CHLORIDE 20 MEQ/1
40 TABLET, EXTENDED RELEASE ORAL DAILY
Qty: 180 TABLET | Refills: 4 | Status: SHIPPED | OUTPATIENT
Start: 2022-10-21

## 2022-10-21 RX ORDER — LOSARTAN POTASSIUM 50 MG/1
50 TABLET ORAL DAILY
Qty: 90 TABLET | Refills: 4 | Status: SHIPPED | OUTPATIENT
Start: 2022-10-21
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
## 2022-10-21 NOTE — PATIENT INSTRUCTIONS
KCL 40mg po daily     Magnesium 400mg po twice daily. Refill Losartan 90 day supply     Refill Metoprolol 90 day supply. See Dr. Jose Tena in 6 months.

## 2022-10-21 NOTE — PROGRESS NOTES
All orders entered per verbal orders of Dr. Natacha Simms. MD Abril  KCL 40mg po daily     Magnesium 400mg po twice daily. Refill Losartan 90 day supply     Refill Metoprolol 90 day supply. See Dr. Sarah Shah in 6 months. Refill per VO of Dr. Irving Eye:    Last appt: 4/21/2022    No future appointments. Requested Prescriptions     Pending Prescriptions Disp Refills    potassium chloride (K-DUR, KLOR-CON M20) 20 mEq tablet 180 Tablet 4     Sig: Take 2 Tablets by mouth daily. magnesium oxide (MAG-OX) 400 mg tablet 180 Tablet 4     Sig: Take 1 Tablet by mouth two (2) times a day. losartan (COZAAR) 50 mg tablet 90 Tablet 4     Sig: Take 1 Tablet by mouth daily. metoprolol tartrate (LOPRESSOR) 25 mg tablet 180 Tablet 4     Sig: Take 1 Tablet by mouth two (2) times a day.

## 2022-10-21 NOTE — PROGRESS NOTES
Chief Complaint   Patient presents with    CHF    Hypertension    Coronary Artery Disease    Shortness of Breath    Follow-up     6 months     Vitals:    10/21/22 1108 10/21/22 1119   BP: (!) 162/81 (!) 160/81   BP 1 Location: Left upper arm Right arm   BP Patient Position: Sitting Sitting   Pulse: 68    Height: 5' 7\" (1.702 m)    Weight: 262 lb (118.8 kg)    SpO2: 100%      Chest pain denied     Palpations denied    SOB denied    Dizziness denied    Swelling in hands/feet denied     Recent hospital stays denied

## 2023-03-24 ENCOUNTER — OFFICE VISIT (OUTPATIENT)
Dept: SLEEP MEDICINE | Age: 78
End: 2023-03-24
Payer: MEDICARE

## 2023-03-24 VITALS
DIASTOLIC BLOOD PRESSURE: 83 MMHG | BODY MASS INDEX: 41.55 KG/M2 | WEIGHT: 264.7 LBS | HEIGHT: 67 IN | OXYGEN SATURATION: 98 % | HEART RATE: 79 BPM | SYSTOLIC BLOOD PRESSURE: 192 MMHG

## 2023-03-24 DIAGNOSIS — G47.33 OSA (OBSTRUCTIVE SLEEP APNEA): Primary | ICD-10-CM

## 2023-03-24 DIAGNOSIS — E66.01 MORBID OBESITY WITH BMI OF 40.0-44.9, ADULT (HCC): ICD-10-CM

## 2023-03-24 PROCEDURE — G8536 NO DOC ELDER MAL SCRN: HCPCS | Performed by: SPECIALIST

## 2023-03-24 PROCEDURE — 99204 OFFICE O/P NEW MOD 45 MIN: CPT | Performed by: SPECIALIST

## 2023-03-24 PROCEDURE — G9717 DOC PT DX DEP/BP F/U NT REQ: HCPCS | Performed by: SPECIALIST

## 2023-03-24 PROCEDURE — 1090F PRES/ABSN URINE INCON ASSESS: CPT | Performed by: SPECIALIST

## 2023-03-24 PROCEDURE — G8400 PT W/DXA NO RESULTS DOC: HCPCS | Performed by: SPECIALIST

## 2023-03-24 PROCEDURE — G8417 CALC BMI ABV UP PARAM F/U: HCPCS | Performed by: SPECIALIST

## 2023-03-24 PROCEDURE — 3077F SYST BP >= 140 MM HG: CPT | Performed by: SPECIALIST

## 2023-03-24 PROCEDURE — 1101F PT FALLS ASSESS-DOCD LE1/YR: CPT | Performed by: SPECIALIST

## 2023-03-24 PROCEDURE — 1123F ACP DISCUSS/DSCN MKR DOCD: CPT | Performed by: SPECIALIST

## 2023-03-24 PROCEDURE — 3079F DIAST BP 80-89 MM HG: CPT | Performed by: SPECIALIST

## 2023-03-24 PROCEDURE — G8427 DOCREV CUR MEDS BY ELIG CLIN: HCPCS | Performed by: SPECIALIST

## 2023-03-24 NOTE — PROGRESS NOTES
[address] [facility] [place] [place], [address]  Tel.  [phone]  Fax. 100 [facility] [address]  [place], [address] [facility]  Tel.  [phone]  Fax. [phone] [address] [facility] [address] [place] [address]   Tel.  [phone]  Fax. [phone]       Chief Complaint       Chief Complaint   Patient presents with    Sleep Problem     NP refd by Joyce Delgado, DO for RAHEL eval; has PAP device currently on recall; previous SS       HPI      Neo Ferrara is 69 y.o. female seen for evaluation of a sleep disorder. The patient reports she was diagnosed with sleep apnea 15-20 years ago at which time she was started on CPAP. She noted facial mask irritation and discontinued CPAP approximately 10 years ago. .       Normally retires between 10-11 PM and will get out of bed between 8-9 AM.  May awaken 3-5 times during the night. Notes back pain which impacts sleep. Does not fall asleep inappropriately during the day. Does note nightmares. Denies sleep talking or sleepwalking, bruxism or nocturnal incontinence, abnormal arm or leg movements, hypnagogue hallucinations, sleep paralysis or cataplexy. Lincoln Sleepiness Score: (P) 5       Allergies   Allergen Reactions    Contrast Dye [Iodine] Nausea and Vomiting    Lisinopril Cough    Percocet [Oxycodone-Acetaminophen] Nausea Only    Percodan [Oxycodone-Aspirin] Nausea Only    Prednisone Nausea Only    Sulfa (Sulfonamide Antibiotics) Unknown (comments)       Current Outpatient Medications   Medication Sig Dispense Refill    potassium chloride (K-DUR, KLOR-CON M20) 20 mEq tablet Take 2 Tablets by mouth daily. 180 Tablet 4    magnesium oxide (MAG-OX) 400 mg tablet Take 1 Tablet by mouth two (2) times a day. 180 Tablet 4    losartan (COZAAR) 50 mg tablet Take 1 Tablet by mouth daily. 90 Tablet 4    metoprolol tartrate (LOPRESSOR) 25 mg tablet Take 1 Tablet by mouth two (2) times a day.  180 Tablet 4    famotidine (PEPCID) 40 mg tablet Take 40 mg by mouth nightly. insulin NPH (NOVOLIN N, HUMULIN N) 100 unit/mL injection 56 Units by SubCUTAneous route Daily (before dinner). furosemide (LASIX) 40 mg tablet Take 40 mg by mouth two (2) times a day. cholecalciferol (VITAMIN D3) (1000 Units /25 mcg) tablet Take 1,000 Units by mouth daily. acetaminophen (TYLENOL) 325 mg tablet Take 650 mg by mouth every four (4) hours as needed for Pain. insulin NPH (NovoLIN N NPH U-100 Insulin) 100 unit/mL injection 64 Units by SubCUTAneous route Daily (before breakfast). triamcinolone acetonide (KENALOG) 0.1 % ointment Apply  to affected area two (2) times daily as needed for Skin Irritation. use thin layer  Applies on face at bipap allergy site           She  has a past medical history of Adverse effect of anesthesia, Arthritis, Breast cancer ([address] (1993), Cervical cancer ([address] (1980s), Chronic pain, Congestive heart failure ([address] Depression with anxiety, Diverticulitis (2021), Ectopic pregnancy, GERD (gastroesophageal reflux disease), Hypertension, Ill-defined condition, Miscarriage, Sleep apnea (2005), Swelling of both ankles, Tonsil stone, and Type 2 diabetes mellitus ([address] She  has a past surgical history that includes hx carpal tunnel release (Right); hx tumor removal (Right); hx colonoscopy; ir inj foramin epid lumb anes/ster sngl (7/29/2021); hx hysterectomy (1980s); hx pelvic laparoscopy (1970s); hx other surgical (2018); hx other surgical (Right, 2020); hx cyst incision and drainage (Right, 9/29/2021); hx mastectomy (Right); hx orthopaedic ([id number],[id number]); hx orthopaedic (Left, 1990); hx knee arthroscopy (Left); hx knee arthroscopy (Right); hx cataract removal (Left, 2013); hx cataract removal (Right); and hx carpal tunnel release (Left). She family history includes Cancer in her brother; Diabetes in her mother and sister; Hypertension in her mother. She  reports that she has never smoked.  She has never used smokeless tobacco. She reports that she does not drink alcohol and does not use drugs. Review of Systems:  ROS      Objective:   Visit Vitals  BP (!) 192/83 (BP 1 Location: Left upper arm, BP Patient Position: Sitting)   Pulse 79   Ht 5' 7\" (1.702 m)   Wt 264 lb 11.2 oz (120.1 kg)   SpO2 98%   BMI 41.46 kg/m²     Body mass index is 41.46 kg/m². General:   Conversant, cooperative   Eyes:   no nystagmus   Oropharynx:   Mallampati score II, tongue large       Neck:   No carotid bruits; Neck circ. in \"inches\": 19.75   Chest/Lungs:  Clear on auscultation    CVS:  Normal rate, regular rhythm   Skin:  Warm to touch; no obvious rashes   Neuro:  Speech fluent, face symmetrical, tongue movement normal   Psych:  Normal affect,  normal countenance        Assessment:       ICD-10-CM ICD-9-CM    1. RAHEL (obstructive sleep apnea)  G47.33 327.23 POLYSOMNOGRAPHY 1 NIGHT      2. Morbid obesity with BMI of 40.0-44.9, adult ([facility])  E66.01 278.01     Z68.41 V85.41         Potential sleep disordered breathing. Patient does note vivid dreaming/nightmares. Potentially sleep breathing abnormalities more prominent when supine, and/or in rem sleep. Would benefit from weight reduction. Was advised weight loss measures more effective when sleep disordered breathing concurrently treated. Plan:     Orders Placed This Encounter    POLYSOMNOGRAPHY 1 NIGHT     Standing Status:   Future     Standing Expiration Date:   9/24/2023     Order Specific Question:   Reason for Exam     Answer:   history of sleep apnea       * Patient has a history and examination consistent with the diagnosis of sleep apnea. * Sleep testing was ordered for initial evaluation. * She was provided information on sleep apnea including corresponding risk factors and the importance of proper treatment. * Treatment options if indicated were reviewed today. Instructions: The patient would benefit from weight reduction measures.   Do not engage in activities requiring a normal degree of alertness if fatigue is present. The patient understands that untreated or undertreated sleep apnea could impair judgement and the ability to function normally during the day. Call or return if symptoms worsen or persist.          Janak Hua MD, [facility]  Electronically signed 03/24/23       This note was created using voice recognition software. Despite editing, there may be syntax errors. This note will not be viewable in [address]

## 2023-04-03 PROBLEM — E11.9 TYPE 2 DIABETES MELLITUS (HCC): Status: RESOLVED | Noted: 2021-08-03 | Resolved: 2021-08-03

## 2023-04-19 ENCOUNTER — HOSPITAL ENCOUNTER (OUTPATIENT)
Dept: SLEEP MEDICINE | Age: 78
Discharge: HOME | End: 2023-04-19
Payer: MEDICARE

## 2023-04-19 VITALS
BODY MASS INDEX: 41.44 KG/M2 | DIASTOLIC BLOOD PRESSURE: 83 MMHG | SYSTOLIC BLOOD PRESSURE: 180 MMHG | HEART RATE: 95 BPM | TEMPERATURE: 98.2 F | HEIGHT: 67 IN | WEIGHT: 264 LBS | OXYGEN SATURATION: 98 %

## 2023-04-19 DIAGNOSIS — G47.33 OSA (OBSTRUCTIVE SLEEP APNEA): ICD-10-CM

## 2023-04-19 PROCEDURE — 95810 POLYSOM 6/> YRS 4/> PARAM: CPT | Performed by: SPECIALIST

## 2023-04-20 ENCOUNTER — DOCUMENTATION ONLY (OUTPATIENT)
Dept: SLEEP MEDICINE | Age: 78
End: 2023-04-20

## 2023-04-20 NOTE — PROGRESS NOTES
[address] [facility] [address] [place] [place], [address]  Tel.  [phone]  Fax. 100 [facility] 60  [place], [address] [facility]  Tel.  [phone]  Fax. [phone] [address] [facility] [address]  Tel.  [phone]  Fax. [phone]     Sleep Study Technical Notes        PRE-Test:  Gloria Obrien (: 1945) arrived in the lobby. Patient was greeted and screening questions asked. The patient was taken to the Sleep Center and taken directly to his/her room. Vitals:  Temperature (98.2)   BP (180/83)   SaO2 (98%)   Weight per patient (264)  Procedure explained to the patient and questions were answered. The patient expressed understanding of the procedure. Electrodes were applied without incident. The patient was placed in bed and the study was started. PAP mask acclimation for **min. Patient didn't tolerate mask. Sleep aid taken:  N      Acquisition Notes:  Lights off: 10:17pm    Respiratory events:   A__Y    H__Y  C__Y  M__N  ECG:    Arrhythmias   N  Snoring:  Mild snore  Sleep Stages:  REM   Y  PAP titration:   Eliminated events: N  Reduced events:    N  Events at  final pressure N  Leak:  N/A  Desensitization Mask(s) Used: N/A  Positional therapy with:   Patient slept with positional therapy: Y used  2 pillows  Patient slept with head of bed elevated:  N  Supine sleep assessed per physician order:  Y. If not, why??   Patient wore an oral appliance:  N  Other comments: PT slept well,apnea, and mild snore noted. Patient had caregiver in attendance:  N  Patient watched TV or on phone after lights out for ** hours  Patient to bathroom 0 times  Pediatric Patient:  Parent accompanied patient: N  Parent slept in bed with patient: N      POST Test:  Patient was awakened. Electrodes were removed. The patient was discharged after answering the Post Questionnaire. Patient/caregiver stated that she was alert and ok to drive.      Equipment and room cleaned per infection control policy.

## 2023-04-30 ENCOUNTER — TELEPHONE (OUTPATIENT)
Dept: SLEEP MEDICINE | Age: 78
End: 2023-04-30

## 2023-04-30 DIAGNOSIS — G47.33 OSA (OBSTRUCTIVE SLEEP APNEA): Primary | ICD-10-CM

## 2023-05-04 ENCOUNTER — TELEPHONE (OUTPATIENT)
Dept: SLEEP MEDICINE | Age: 78
End: 2023-05-04

## 2023-05-04 DIAGNOSIS — G47.33 OSA (OBSTRUCTIVE SLEEP APNEA): Primary | ICD-10-CM

## 2023-05-08 ENCOUNTER — CLINICAL DOCUMENTATION (OUTPATIENT)
Age: 78
End: 2023-05-08

## 2023-05-19 ENCOUNTER — OFFICE VISIT (OUTPATIENT)
Age: 78
End: 2023-05-19
Payer: MEDICARE

## 2023-05-19 VITALS
HEIGHT: 67 IN | WEIGHT: 264 LBS | DIASTOLIC BLOOD PRESSURE: 80 MMHG | OXYGEN SATURATION: 95 % | BODY MASS INDEX: 41.44 KG/M2 | SYSTOLIC BLOOD PRESSURE: 138 MMHG | HEART RATE: 68 BPM

## 2023-05-19 DIAGNOSIS — I50.32 CHRONIC DIASTOLIC (CONGESTIVE) HEART FAILURE (HCC): Primary | ICD-10-CM

## 2023-05-19 DIAGNOSIS — I10 ESSENTIAL (PRIMARY) HYPERTENSION: ICD-10-CM

## 2023-05-19 DIAGNOSIS — I25.10 ATHEROSCLEROSIS OF NATIVE CORONARY ARTERY OF NATIVE HEART WITHOUT ANGINA PECTORIS: ICD-10-CM

## 2023-05-19 PROCEDURE — 3075F SYST BP GE 130 - 139MM HG: CPT | Performed by: INTERNAL MEDICINE

## 2023-05-19 PROCEDURE — G8428 CUR MEDS NOT DOCUMENT: HCPCS | Performed by: INTERNAL MEDICINE

## 2023-05-19 PROCEDURE — G8400 PT W/DXA NO RESULTS DOC: HCPCS | Performed by: INTERNAL MEDICINE

## 2023-05-19 PROCEDURE — 3079F DIAST BP 80-89 MM HG: CPT | Performed by: INTERNAL MEDICINE

## 2023-05-19 PROCEDURE — G8417 CALC BMI ABV UP PARAM F/U: HCPCS | Performed by: INTERNAL MEDICINE

## 2023-05-19 PROCEDURE — 1036F TOBACCO NON-USER: CPT | Performed by: INTERNAL MEDICINE

## 2023-05-19 PROCEDURE — 99213 OFFICE O/P EST LOW 20 MIN: CPT | Performed by: INTERNAL MEDICINE

## 2023-05-19 PROCEDURE — 1123F ACP DISCUSS/DSCN MKR DOCD: CPT | Performed by: INTERNAL MEDICINE

## 2023-05-19 PROCEDURE — 1090F PRES/ABSN URINE INCON ASSESS: CPT | Performed by: INTERNAL MEDICINE

## 2023-05-19 RX ORDER — FUROSEMIDE 40 MG/1
40 TABLET ORAL 2 TIMES DAILY
COMMUNITY

## 2023-05-19 RX ORDER — LOSARTAN POTASSIUM 50 MG/1
50 TABLET ORAL DAILY
COMMUNITY

## 2023-05-19 RX ORDER — TRIAMCINOLONE ACETONIDE 0.25 MG/G
OINTMENT TOPICAL 2 TIMES DAILY
COMMUNITY

## 2023-05-19 NOTE — PROGRESS NOTES
Chief Complaint   Patient presents with    Other     A.S.     Vitals:    05/19/23 1049   BP: 138/80   Site: Left Upper Arm   Position: Sitting   Cuff Size: Large Adult   Pulse: 68   SpO2: 95%   Weight: 264 lb (119.7 kg)   Height: 5' 7\" (1.702 m)      /80 (Site: Left Upper Arm, Position: Sitting, Cuff Size: Large Adult)   Pulse 68   Ht 5' 7\" (1.702 m)   Wt 264 lb (119.7 kg)   SpO2 95%   BMI 41.35 kg/m²      Chest pain             NO  SOB                       NO  Swelling                 NO  Dizziness               NO  Recent hospital     NO  Refills                    NO         Covid vaccination  YES  Covid                     NO

## 2023-05-19 NOTE — PROGRESS NOTES
Office Follow-up    NAME: Yuan Hernandez   :  1945  MRM:  [id number]    Date:  10/21/2022            Assessment:     Problem List  Date Reviewed: 2022            Codes Class Noted    Shortness of breath ICD-10-CM: R06.02  ICD-9-CM: 786.05  3/3/2020        HTN (hypertension) ICD-10-CM: I10  ICD-9-CM: 401.9  2021        Chronic low back pain ICD-10-CM: M54.50, G89.29  ICD-9-CM: 724.2, 338.29  2021        DM (diabetes mellitus), type 2 ([facility] ICD-10-CM: E11.9  ICD-9-CM: 250.00  2021        Hypertensive urgency ICD-10-CM: I16.0  ICD-9-CM: 401.9  2021        Solitary pulmonary nodule ICD-10-CM: R91.1  ICD-9-CM: 793.11  2020        CHF (congestive heart failure) ([facility] ICD-10-CM: I50.9  ICD-9-CM: 428.0  3/4/2020        Infected sebaceous cyst ICD-10-CM: L72.3, L08.9  ICD-9-CM: 706.2  2018        Abscess ICD-10-CM: L02.91  ICD-9-CM: 682.9  2018        Morbid obesity ([facility] ICD-10-CM: E66.01  ICD-9-CM: 278.01  2018        SIRS (systemic inflammatory response syndrome) ([facility] ICD-10-CM: R65.10  ICD-9-CM: 995.90  2016        Depression with anxiety ICD-10-CM: F41.8  ICD-9-CM: 300.4  Unknown        Glaucoma ICD-10-CM: H40.9  ICD-9-CM: 365.9  Unknown        Breast cancer ([facility] ICD-10-CM: C50.919  ICD-9-CM: 174.9  Unknown              Plan:     Muscle cramps: These improved after starting KCL and Magnesium. Accelerated hypertension: Blood pressure is better controlled. Home BP readings are in 130 range. Continue Toprol XL and Losartan. Diastolic congestive heart failure: She is now euvolemic. There is no significant peripheral edema. Continue Lasix, potassium and magnesium. Control blood pressure. CAD: Moderate CAD with heavy calcification of the coronary arteries. Continue medical management with blood pressure control. She is not taking ASA as it bothers her stomach. She does not want to be on statins. See Kelsey Saucedo NP in 6 months.

## 2023-05-20 RX ORDER — LOSARTAN POTASSIUM 50 MG/1
50 TABLET ORAL DAILY
COMMUNITY
Start: 2022-10-21

## 2023-05-20 RX ORDER — POTASSIUM CHLORIDE 20 MEQ/1
40 TABLET, EXTENDED RELEASE ORAL DAILY
COMMUNITY
Start: 2022-10-21

## 2023-05-20 RX ORDER — FAMOTIDINE 40 MG/1
40 TABLET, FILM COATED ORAL NIGHTLY
COMMUNITY

## 2023-05-20 RX ORDER — MAGNESIUM OXIDE 400 MG/1
400 TABLET ORAL 2 TIMES DAILY
COMMUNITY
Start: 2022-10-21

## 2023-05-20 RX ORDER — FUROSEMIDE 40 MG/1
40 TABLET ORAL 2 TIMES DAILY
COMMUNITY

## 2023-05-20 RX ORDER — ACETAMINOPHEN 325 MG/1
650 TABLET ORAL EVERY 4 HOURS PRN
COMMUNITY

## 2023-06-05 ENCOUNTER — TRANSCRIBE ORDERS (OUTPATIENT)
Facility: HOSPITAL | Age: 78
End: 2023-06-05

## 2023-06-05 DIAGNOSIS — R10.9 STOMACH ACHE: Primary | ICD-10-CM

## 2023-06-08 ENCOUNTER — HOSPITAL ENCOUNTER (OUTPATIENT)
Facility: HOSPITAL | Age: 78
Discharge: HOME | End: 2023-06-08
Payer: MEDICARE

## 2023-06-08 DIAGNOSIS — R10.9 STOMACH ACHE: ICD-10-CM

## 2023-06-08 PROCEDURE — 74176 CT ABD & PELVIS W/O CONTRAST: CPT

## 2023-09-14 ENCOUNTER — HOSPITAL ENCOUNTER (EMERGENCY)
Facility: HOSPITAL | Age: 78
Discharge: HOME | End: 2023-09-14
Attending: EMERGENCY MEDICINE
Payer: MEDICARE

## 2023-09-14 VITALS
DIASTOLIC BLOOD PRESSURE: 69 MMHG | OXYGEN SATURATION: 99 % | WEIGHT: 264 LBS | HEIGHT: 67 IN | TEMPERATURE: 98.9 F | HEART RATE: 87 BPM | SYSTOLIC BLOOD PRESSURE: 213 MMHG | BODY MASS INDEX: 41.44 KG/M2 | RESPIRATION RATE: 20 BRPM

## 2023-09-14 DIAGNOSIS — R22.1 NECK MASS: Primary | ICD-10-CM

## 2023-09-14 PROCEDURE — 99282 EMERGENCY DEPT VISIT SF MDM: CPT

## 2023-09-14 ASSESSMENT — PAIN - FUNCTIONAL ASSESSMENT: PAIN_FUNCTIONAL_ASSESSMENT: NONE - DENIES PAIN

## 2023-09-14 NOTE — ED PROVIDER NOTES
[facility] EMERGENCY DEPT  EMERGENCY DEPARTMENT ENCOUNTER      Patient Name: Alisson Elizalde  MRN: [id number]  [address] [facility] 1945  Date of Evaluation: 9/14/2023  Physician: Danae Contreras MD    CHIEF COMPLAINT     No chief complaint on file. HISTORY OF PRESENT ILLNESS   (Location/Symptom, Timing/Onset, Context/Setting, Quality, Duration, Modifying Factors, Severity)   Alisson Elizalde, 69 y.o., female     67-year-old female presents with chief complaint of a right neck mass. Patient has noticed the mass for the last 6 months. Over the last 2 weeks she has noticed a \"sour milk smell\". She believes that the smell may be coming from the mass. She denies having any pain. Nursing Notes were reviewed.     REVIEW OF SYSTEMS    (Not required)   Review of Systems    PAST MEDICAL HISTORY     Past Medical History:   Diagnosis Date    Adverse effect of anesthesia     WOKE UP EARLY DURING BACK SURGERY    Arthritis     Breast cancer ([address]) 1993    right    Cervical cancer ([address]) 1980s    Chronic pain     lower back    Congestive heart failure ([address])     Depression with anxiety     Diverticulitis 2021    Ectopic pregnancy     GERD (gastroesophageal reflux disease)     Hypertension     Ill-defined condition     back cyst    Miscarriage     Sleep apnea 2005    not using CPAP    Swelling of both ankles     Tonsil stone     Type 2 diabetes mellitus ([address])     on insulin       SURGICAL HISTORY       Past Surgical History:   Procedure Laterality Date    CARPAL TUNNEL RELEASE Left     CARPAL TUNNEL RELEASE Right     CATARACT REMOVAL Right     CATARACT REMOVAL Left 2013    COLONOSCOPY      CYST INCISION AND DRAINAGE Right 9/29/2021    back cyst     HYSTERECTOMY (CERVIX STATUS UNKNOWN)  1980s    IR LUMBAR TRANSFORAMINAL EPIDURAL SINGLE  7/29/2021    KNEE ARTHROSCOPY Left     KNEE ARTHROSCOPY Right     MASTECTOMY Right     ORTHOPEDIC SURGERY  [id number]    back surgery x 2 ( HERNIATED DISC REPAIR, AND ANOTHER SURGERY SHE CANT REMEMBER

## 2023-10-03 ENCOUNTER — HOSPITAL ENCOUNTER (OUTPATIENT)
Facility: HOSPITAL | Age: 78
Discharge: HOME | End: 2023-10-06
Payer: MEDICARE

## 2023-10-03 VITALS
BODY MASS INDEX: 41.59 KG/M2 | TEMPERATURE: 96.9 F | WEIGHT: 265 LBS | RESPIRATION RATE: 20 BRPM | OXYGEN SATURATION: 98 % | SYSTOLIC BLOOD PRESSURE: 203 MMHG | HEART RATE: 88 BPM | HEIGHT: 67 IN | DIASTOLIC BLOOD PRESSURE: 79 MMHG

## 2023-10-03 LAB
ANION GAP SERPL CALC-SCNC: 3 MMOL/L (ref 5–15)
BUN SERPL-MCNC: 19 MG/DL (ref 6–20)
BUN/CREAT SERPL: 19 (ref 12–20)
CALCIUM SERPL-MCNC: 9 MG/DL (ref 8.5–10.1)
CHLORIDE SERPL-SCNC: 104 MMOL/L (ref 97–108)
CO2 SERPL-SCNC: 30 MMOL/L (ref 21–32)
CREAT SERPL-MCNC: 1 MG/DL (ref 0.55–1.02)
GLUCOSE SERPL-MCNC: 191 MG/DL (ref 65–100)
POTASSIUM SERPL-SCNC: 4.4 MMOL/L (ref 3.5–5.1)
SODIUM SERPL-SCNC: 137 MMOL/L (ref 136–145)

## 2023-10-03 PROCEDURE — 93005 ELECTROCARDIOGRAM TRACING: CPT | Performed by: SURGERY

## 2023-10-03 PROCEDURE — 80048 BASIC METABOLIC PNL TOTAL CA: CPT

## 2023-10-03 PROCEDURE — 36415 COLL VENOUS BLD VENIPUNCTURE: CPT

## 2023-10-04 LAB
EKG ATRIAL RATE: 66 BPM
EKG DIAGNOSIS: NORMAL
EKG P AXIS: 66 DEGREES
EKG P-R INTERVAL: 180 MS
EKG Q-T INTERVAL: 428 MS
EKG QRS DURATION: 90 MS
EKG QTC CALCULATION (BAZETT): 448 MS
EKG R AXIS: -1 DEGREES
EKG T AXIS: 91 DEGREES
EKG VENTRICULAR RATE: 66 BPM

## 2023-10-04 PROCEDURE — 93010 ELECTROCARDIOGRAM REPORT: CPT | Performed by: STUDENT IN AN ORGANIZED HEALTH CARE EDUCATION/TRAINING PROGRAM

## 2023-10-09 ENCOUNTER — ANESTHESIA EVENT (OUTPATIENT)
Facility: HOSPITAL | Age: 78
End: 2023-10-09
Payer: MEDICARE

## 2023-10-11 ENCOUNTER — ANESTHESIA (OUTPATIENT)
Facility: HOSPITAL | Age: 78
End: 2023-10-11
Payer: MEDICARE

## 2023-10-11 ENCOUNTER — HOSPITAL ENCOUNTER (OUTPATIENT)
Facility: HOSPITAL | Age: 78
Setting detail: OUTPATIENT SURGERY
Discharge: HOME | End: 2023-10-11
Attending: SURGERY | Admitting: SURGERY
Payer: MEDICARE

## 2023-10-11 VITALS
RESPIRATION RATE: 13 BRPM | HEART RATE: 80 BPM | BODY MASS INDEX: 41.8 KG/M2 | TEMPERATURE: 98.2 F | SYSTOLIC BLOOD PRESSURE: 105 MMHG | HEIGHT: 67 IN | WEIGHT: 266.32 LBS | OXYGEN SATURATION: 97 % | DIASTOLIC BLOOD PRESSURE: 68 MMHG

## 2023-10-11 LAB
GLUCOSE BLD STRIP.AUTO-MCNC: 131 MG/DL (ref 65–117)
GLUCOSE BLD STRIP.AUTO-MCNC: 155 MG/DL (ref 65–117)
SERVICE CMNT-IMP: ABNORMAL
SERVICE CMNT-IMP: ABNORMAL

## 2023-10-11 PROCEDURE — 82962 GLUCOSE BLOOD TEST: CPT

## 2023-10-11 PROCEDURE — 7100000000 HC PACU RECOVERY - FIRST 15 MIN: Performed by: SURGERY

## 2023-10-11 PROCEDURE — 2709999900 HC NON-CHARGEABLE SUPPLY: Performed by: SURGERY

## 2023-10-11 PROCEDURE — 88304 TISSUE EXAM BY PATHOLOGIST: CPT

## 2023-10-11 PROCEDURE — 2580000003 HC RX 258: Performed by: ANESTHESIOLOGY

## 2023-10-11 PROCEDURE — 3700000001 HC ADD 15 MINUTES (ANESTHESIA): Performed by: SURGERY

## 2023-10-11 PROCEDURE — 3700000000 HC ANESTHESIA ATTENDED CARE: Performed by: SURGERY

## 2023-10-11 PROCEDURE — 2500000003 HC RX 250 WO HCPCS: Performed by: SURGERY

## 2023-10-11 PROCEDURE — 3600000012 HC SURGERY LEVEL 2 ADDTL 15MIN: Performed by: SURGERY

## 2023-10-11 PROCEDURE — 2500000003 HC RX 250 WO HCPCS: Performed by: NURSE ANESTHETIST, CERTIFIED REGISTERED

## 2023-10-11 PROCEDURE — 6360000002 HC RX W HCPCS: Performed by: SURGERY

## 2023-10-11 PROCEDURE — 6360000002 HC RX W HCPCS: Performed by: NURSE ANESTHETIST, CERTIFIED REGISTERED

## 2023-10-11 PROCEDURE — 7100000001 HC PACU RECOVERY - ADDTL 15 MIN: Performed by: SURGERY

## 2023-10-11 PROCEDURE — 3600000002 HC SURGERY LEVEL 2 BASE: Performed by: SURGERY

## 2023-10-11 PROCEDURE — 7100000010 HC PHASE II RECOVERY - FIRST 15 MIN: Performed by: SURGERY

## 2023-10-11 RX ORDER — LIDOCAINE HYDROCHLORIDE 10 MG/ML
1 INJECTION, SOLUTION EPIDURAL; INFILTRATION; INTRACAUDAL; PERINEURAL
Status: DISCONTINUED | OUTPATIENT
Start: 2023-10-11 | End: 2023-10-11 | Stop reason: HOSPADM

## 2023-10-11 RX ORDER — SODIUM CHLORIDE, SODIUM LACTATE, POTASSIUM CHLORIDE, CALCIUM CHLORIDE 600; 310; 30; 20 MG/100ML; MG/100ML; MG/100ML; MG/100ML
INJECTION, SOLUTION INTRAVENOUS CONTINUOUS
Status: DISCONTINUED | OUTPATIENT
Start: 2023-10-11 | End: 2023-10-11 | Stop reason: HOSPADM

## 2023-10-11 RX ORDER — PROPOFOL 10 MG/ML
INJECTION, EMULSION INTRAVENOUS CONTINUOUS PRN
Status: DISCONTINUED | OUTPATIENT
Start: 2023-10-11 | End: 2023-10-11 | Stop reason: SDUPTHER

## 2023-10-11 RX ORDER — MIDAZOLAM HYDROCHLORIDE 2 MG/2ML
2 INJECTION, SOLUTION INTRAMUSCULAR; INTRAVENOUS
Status: DISCONTINUED | OUTPATIENT
Start: 2023-10-11 | End: 2023-10-11 | Stop reason: HOSPADM

## 2023-10-11 RX ORDER — FENTANYL CITRATE 50 UG/ML
100 INJECTION, SOLUTION INTRAMUSCULAR; INTRAVENOUS
Status: DISCONTINUED | OUTPATIENT
Start: 2023-10-11 | End: 2023-10-11 | Stop reason: HOSPADM

## 2023-10-11 RX ORDER — MIDAZOLAM HYDROCHLORIDE 1 MG/ML
INJECTION INTRAMUSCULAR; INTRAVENOUS PRN
Status: DISCONTINUED | OUTPATIENT
Start: 2023-10-11 | End: 2023-10-11 | Stop reason: SDUPTHER

## 2023-10-11 RX ORDER — FAMOTIDINE 10 MG/ML
INJECTION, SOLUTION INTRAVENOUS PRN
Status: DISCONTINUED | OUTPATIENT
Start: 2023-10-11 | End: 2023-10-11 | Stop reason: SDUPTHER

## 2023-10-11 RX ORDER — DIPHENHYDRAMINE HYDROCHLORIDE 50 MG/ML
12.5 INJECTION INTRAMUSCULAR; INTRAVENOUS
Status: DISCONTINUED | OUTPATIENT
Start: 2023-10-11 | End: 2023-10-11 | Stop reason: HOSPADM

## 2023-10-11 RX ORDER — ONDANSETRON 2 MG/ML
4 INJECTION INTRAMUSCULAR; INTRAVENOUS
Status: DISCONTINUED | OUTPATIENT
Start: 2023-10-11 | End: 2023-10-11 | Stop reason: HOSPADM

## 2023-10-11 RX ORDER — LIDOCAINE HYDROCHLORIDE AND EPINEPHRINE 10; 10 MG/ML; UG/ML
INJECTION, SOLUTION INFILTRATION; PERINEURAL PRN
Status: DISCONTINUED | OUTPATIENT
Start: 2023-10-11 | End: 2023-10-11 | Stop reason: ALTCHOICE

## 2023-10-11 RX ORDER — DEXMEDETOMIDINE HYDROCHLORIDE 100 UG/ML
INJECTION, SOLUTION INTRAVENOUS PRN
Status: DISCONTINUED | OUTPATIENT
Start: 2023-10-11 | End: 2023-10-11 | Stop reason: SDUPTHER

## 2023-10-11 RX ADMIN — MIDAZOLAM HYDROCHLORIDE 1 MG: 1 INJECTION, SOLUTION INTRAMUSCULAR; INTRAVENOUS at 07:37

## 2023-10-11 RX ADMIN — DEXMEDETOMIDINE 6 MCG: 100 INJECTION, SOLUTION INTRAVENOUS at 07:37

## 2023-10-11 RX ADMIN — SODIUM CHLORIDE, POTASSIUM CHLORIDE, SODIUM LACTATE AND CALCIUM CHLORIDE: 600; 310; 30; 20 INJECTION, SOLUTION INTRAVENOUS at 06:50

## 2023-10-11 RX ADMIN — Medication 3000 MG: at 07:37

## 2023-10-11 RX ADMIN — MIDAZOLAM HYDROCHLORIDE 1 MG: 1 INJECTION, SOLUTION INTRAMUSCULAR; INTRAVENOUS at 07:43

## 2023-10-11 RX ADMIN — DEXMEDETOMIDINE 6 MCG: 100 INJECTION, SOLUTION INTRAVENOUS at 07:49

## 2023-10-11 RX ADMIN — PROPOFOL 100 MCG/KG/MIN: 10 INJECTION, EMULSION INTRAVENOUS at 07:47

## 2023-10-11 RX ADMIN — FAMOTIDINE 20 MG: 10 INJECTION INTRAVENOUS at 07:37

## 2023-10-11 ASSESSMENT — PAIN DESCRIPTION - DESCRIPTORS: DESCRIPTORS: DISCOMFORT

## 2023-10-11 ASSESSMENT — ENCOUNTER SYMPTOMS: SHORTNESS OF BREATH: 1

## 2023-10-11 ASSESSMENT — PAIN SCALES - GENERAL: PAINLEVEL_OUTOF10: 0

## 2023-10-11 ASSESSMENT — PAIN - FUNCTIONAL ASSESSMENT: PAIN_FUNCTIONAL_ASSESSMENT: 0-10

## 2023-10-11 NOTE — ADDENDUM NOTE
Addendum  created 10/11/23 1048 by Elfego Manjarrez MD    Review and Sign - Ready for Procedure, Review and Sign - Undo

## 2023-10-11 NOTE — ANESTHESIA POSTPROCEDURE EVALUATION
Department of Anesthesiology  Postprocedure Note    Patient: Elida Jamison  MRN: [id number]  YOB: 1945  Date of evaluation: 10/11/2023      Procedure Summary     Date: 10/11/23 Room / Location: [facility] MAIN OR F3 / [facility] OR    Anesthesia Start: 6507 Anesthesia Stop: 0830    Procedure: EXCISION SEBACEOUS RIGHT NECK CYST (MAC/LOCAL) (Right: Neck) Diagnosis:       Sebaceous cyst      (Sebaceous cyst [L72.3])    Surgeons: Amador Nascimento MD Responsible Provider: Brittni Gabriel MD    Anesthesia Type: MAC ASA Status: 3          Anesthesia Type: No value filed.     Mona Phase I: Mona Score: 6    Mona Phase II:        Anesthesia Post Evaluation    Patient location during evaluation: PACU  Patient participation: complete - patient participated  Level of consciousness: awake  Airway patency: patent  Nausea & Vomiting: no vomiting and no nausea  Complications: no  Cardiovascular status: hemodynamically stable  Respiratory status: acceptable  Hydration status: stable  Pain management: adequate

## 2023-10-11 NOTE — DISCHARGE INSTRUCTIONS
DISCHARGE SUMMARY from your Nurse      PATIENT INSTRUCTIONS    After general anesthesia or intravenous sedation, for 24 hours or while taking prescription Narcotics:  Limit your activities  Do not drive and operate hazardous machinery  Do not make important personal or business decisions  Do  not drink alcoholic beverages  If you have not urinated within 8 hours after discharge, please contact your surgeon on call. Report the following to your surgeon:  Excessive pain, swelling, redness or odor of or around the surgical area  Temperature over 100.5  Nausea and vomiting lasting longer than 4 hours or if unable to take medications  Any signs of decreased circulation or nerve impairment to extremity: change in color, persistent  numbness, tingling, coldness or increase pain  Any questions      GOOD HELP TO FIGHT AN INFECTION  Here are a few tip to help reduce the chance of getting an infection after surgery:  Wash Your Hands  Good handwashing is the most important thing you and your caregiver can do. Wash before and after caring for any wounds. Dry your hand with a clean towel. Wash with soap and water for at least 20 seconds. A TIP: sing the \"Happy Birthday\" song through one time while washing to help with the timing. Use a hand  in between washings. Shower  When your surgeon says it is OK to take a shower, use a new bar of antibacterial soap (if that is what you use, and keep that bar of soap ONLY for your use), or antibacterial body wash. Use a clean wash cloth or sponge when you bathe. Dry off with a clean towel  after every bath - be careful around any wounds, skin staples, sutures or surgical glue over/on wounds. Do not enter swimming pools, hot tubs, lakes, rivers and/or ocean until wounds are healed and your doctor/surgeon says it is OK. Use Clean Sheets  Sleep on freshly laundered sheets after your surgery.   Keep the surgery site covered with a clean, dry bandage (if instructed to do areas. And don't use public transportation. If you are sick:  Leave your home only if you need to get medical care. But call the doctor's office first so they know you're coming. And wear a face mask, if you have one. If you have a face mask, wear it whenever you're around other people. It can help stop the spread of the virus when you cough or sneeze. Clean and disinfect your home every day. Use household  and disinfectant wipes or sprays. Take special care to clean things that you grab with your hands. These include doorknobs, remote controls, phones, and handles on your refrigerator and microwave. And don't forget countertops, tabletops, bathrooms, and computer keyboards. When to call for help  Call 911 anytime you think you may need emergency care. For example, call if:  You have severe trouble breathing. (You can't talk at all.)  You have constant chest pain or pressure. You are severely dizzy or lightheaded. You are confused or can't think clearly. Your face and lips have a blue color. You pass out (lose consciousness) or are very hard to wake up. Call your doctor now if you develop symptoms such as:  Shortness of breath. Fever. Cough. If you need to get care, call ahead to the doctor's office for instructions before you go. Make sure you wear a face mask, if you have one, to prevent exposing other people to the virus. Where can you get the latest information? The following health organizations are tracking and studying this virus. Their websites contain the most up-to-date information. Ghazala Black also learn what to do if you think you may have been exposed to the virus. U.[place]. Centers for Disease Control and Prevention (CDC): The CDC provides updated news about the disease and travel advice. The website also tells you how to prevent the spread of infection. www.cdc.gov  World Health Organization [facility]): WHO offers information about the virus outbreaks. WHO also has travel advice.

## 2023-10-11 NOTE — H&P
10/11/2023    Chart reviewed and patient interviewed and examined.  No new changes since current H&P.

## 2023-10-11 NOTE — BRIEF OP NOTE
Brief Postoperative Note      Patient: Rodríguez Marks  YOB: 1945  MRN: [id number]    Date of Procedure: 10/11/2023    Pre-Op Diagnosis Codes:     * Sebaceous cyst [L72.3]    Post-Op Diagnosis: Same       Procedure(s):  EXCISION SEBACEOUS RIGHT NECK CYST (MAC/LOCAL)    Surgeon(s):  Glenetta Holter, MD    Assistant:  Surgical Assistant: Sandra DAVILA    Anesthesia: Monitor Anesthesia Care    Estimated Blood Loss (mL): Minimal    Complications: None    Specimens:   ID Type Source Tests Collected by Time Destination   1 : Sebacious cyst, right anterior neck Tissue Neck SURGICAL PATHOLOGY Glenetta Holter, MD 10/11/2023 4037        Implants:  * No implants in log *      Drains: * No LDAs found *    Findings: sebaceous cyst  This procedure was not performed to treat primary cutaneous melanoma through wide local excision      Electronically signed by Ricardo Franz MD on 10/11/2023 at 8:23 AM

## 2023-10-14 NOTE — OP NOTE
[facility]  OPERATIVE REPORT    Name:  Bridger Short  MR#:  [id number]  :  1945  ACCOUNT #:  [de-identified]  DATE OF SERVICE:  10/11/2023    CLINICAL SERVICE:  General Surgery. PREOPERATIVE DIAGNOSIS:  Sebaceous cyst.    POSTOPERATIVE DIAGNOSIS:  Sebaceous cyst.    PROCEDURE PERFORMED:  Excision of sebaceous cyst.    SURGEON:  Adolfo Baker MD    ASSISTANT:  ***    ANESTHESIA:  ***. COMPLICATIONS:  None. SPECIMENS REMOVED:  Sebaceous cyst.    IMPLANTS:  ***. ESTIMATED BLOOD LOSS:  ***. DRAINS/TUBES:  None. INDICATIONS:  The patient is a 67-year-old woman who complains of drainage and foul odor from a cyst on her neck. She is here today for excision. PROCEDURE:  After obtaining informed consent, the patient was brought to the operating room where she was laid supine on the operating table. After adequate sedation was achieved, the area was prepped and draped in standard fashion. A surgical time-out was conducted. 2 g of IV Ancef was administered as a preoperative antibiotic. With this complete, 1% lidocaine was infiltrated in the area to allow for analgesia. Once there was adequate analgesia, an elliptical incision was made over the area of the cyst and the cyst was excised in its entirety. The incision was inspected and hemostasis was achieved using electrocautery. The cyst was measured approximately 2 cm in size. With the cyst completely excised, 3-0 Vicryl was used to reapproximate the dermal layer and Dermabond was used to reapproximate the skin. The patient tolerated the procedure well. There were no complications. All instrument, needle, and sponge counts were correct at the end of the case.       Adolfo Baker MD [id number]  D:  10/13/2023 16:13  T:  10/14/2023 0:37  JOB #:  [id number]

## 2023-10-24 RX ORDER — LOSARTAN POTASSIUM 50 MG/1
50 TABLET ORAL DAILY
Qty: 90 TABLET | Refills: 3 | Status: SHIPPED | OUTPATIENT
Start: 2023-10-24

## 2023-10-24 RX ORDER — MAGNESIUM OXIDE 400 MG/1
1 TABLET ORAL 2 TIMES DAILY
Qty: 180 TABLET | Refills: 3 | Status: SHIPPED | OUTPATIENT
Start: 2023-10-24

## 2023-10-24 NOTE — TELEPHONE ENCOUNTER
Refill per VO of Dr. James Garrett:    5/19/2023    Future Appointments   Date Time Provider [facility] [address]   11/3/2023  1:20 PM Marisa Perry, APRN - NP [facility]       Requested Prescriptions     Pending Prescriptions Disp Refills    magnesium oxide (MAG-OX) 400 MG tablet [Pharmacy Med Name: MAGNESIUM OXIDE 400 MG TABLET] 180 tablet 4     Sig: TAKE 1 TABLET BY MOUTH TWO TIMES A DAY.     losartan (COZAAR) 50 MG tablet [Pharmacy Med Name: LOSARTAN POTASSIUM 50 MG TAB] 90 tablet 4     Sig: TAKE 1 TABLET BY MOUTH EVERY DAY

## 2023-10-30 NOTE — PROGRESS NOTES
Office Follow-up    NAME: Vini Woods   :  1945  MRM:  [id number]    Date:  11/3/2023  Primary Cardiologist:  DEB Cates NP          Assessment:     Patient Active Problem List   Diagnosis    DM (diabetes mellitus), type 2 ([facility])    Hypertensive urgency    Shortness of breath    Solitary pulmonary nodule    CHF (congestive heart failure) (HCC)    Chronic low back pain    Infected sebaceous cyst    Depression with anxiety    Abscess    HTN (hypertension)    SIRS (systemic inflammatory response syndrome) (HCC)    Breast cancer ([address] [facility])    Glaucoma    Morbid obesity ([address] [facility])            Plan:     Muscle cramps: These improved after starting KCL and Magnesium. Accelerated hypertension: Blood pressure is better controlled. Continue Metoprolol 25mg BID and Losartan 50mg. Diastolic congestive heart failure: She is now euvolemic. There is no significant peripheral edema. Continue Lasix 40mg BID, potassium and magnesium. Control blood pressure. CAD: Moderate CAD with heavy calcification of the coronary arteries. Continue medical management with blood pressure control. She is not taking ASA as it bothers her stomach. She does not want to be on statins. See Dr. Angeles Cameron in 6 months. Subjective:      Here for followup. Denies cardiac complains. Chronic BETTS.  ------  Returning for followup. Denies any symptoms of chest pain. Has SOB on exertion which is chronic. Has lower back pain.     -----------  Edgardo Rizo Mao Lepe is a 77 y.o. female being seen today for f/u of HTN. She has been following with Janelle Croft in our office. Recently Adlai Martino had increased the losartan to 50 mg twice a day. In the past she could not tolerate HCTZ and Coreg and those were discontinued. She is concerned about possible medication side effects. She has been having leg cramps at night and she thinks it could be Lasix or losartan related.   Also she is having some numbness involving

## 2023-11-03 ENCOUNTER — OFFICE VISIT (OUTPATIENT)
Age: 78
End: 2023-11-03
Payer: MEDICARE

## 2023-11-03 VITALS
HEIGHT: 67 IN | BODY MASS INDEX: 41.75 KG/M2 | DIASTOLIC BLOOD PRESSURE: 78 MMHG | WEIGHT: 266 LBS | SYSTOLIC BLOOD PRESSURE: 130 MMHG | HEART RATE: 73 BPM | OXYGEN SATURATION: 94 %

## 2023-11-03 DIAGNOSIS — I10 ESSENTIAL (PRIMARY) HYPERTENSION: ICD-10-CM

## 2023-11-03 DIAGNOSIS — R25.2 MUSCLE CRAMPS: ICD-10-CM

## 2023-11-03 DIAGNOSIS — I50.32 CHRONIC DIASTOLIC CONGESTIVE HEART FAILURE (HCC): ICD-10-CM

## 2023-11-03 DIAGNOSIS — I25.10 ATHEROSCLEROSIS OF NATIVE CORONARY ARTERY OF NATIVE HEART WITHOUT ANGINA PECTORIS: Primary | ICD-10-CM

## 2023-11-03 PROCEDURE — 99214 OFFICE O/P EST MOD 30 MIN: CPT

## 2023-11-03 PROCEDURE — 3078F DIAST BP <80 MM HG: CPT

## 2023-11-03 PROCEDURE — G8484 FLU IMMUNIZE NO ADMIN: HCPCS

## 2023-11-03 PROCEDURE — 1123F ACP DISCUSS/DSCN MKR DOCD: CPT

## 2023-11-03 PROCEDURE — G8417 CALC BMI ABV UP PARAM F/U: HCPCS

## 2023-11-03 PROCEDURE — 1036F TOBACCO NON-USER: CPT

## 2023-11-03 PROCEDURE — 3075F SYST BP GE 130 - 139MM HG: CPT

## 2023-11-03 PROCEDURE — G8427 DOCREV CUR MEDS BY ELIG CLIN: HCPCS

## 2023-11-03 PROCEDURE — 1090F PRES/ABSN URINE INCON ASSESS: CPT

## 2023-11-03 PROCEDURE — G8400 PT W/DXA NO RESULTS DOC: HCPCS

## 2023-11-03 NOTE — ED TRIAGE NOTES
"Matteo Barnes came to [facility] today for a lab and assess following a  donor kidney transplant on 10/28.      Discharge date:   Transplant coordinator: Elsa Adrian RN  Phone number patient can be reached at: [phone]       Physical Assessment:  See physical assessment located under \"Document Flowsheets\".  Incision site: RLQ, open to air, staples intact  Lines: Left arm fistula  Duval: Not in place  Urine clarity: Clear yellow per pt report. Total of 1,326mL urine output   Hydration: Reports drinking 2.5L yesterday   Nutrition: Appetite improving  Last BM: , soft, formed per patient report.  Pain:  incisional pain, tolerable. Patient has been managing at home with PRN tylenol throughout the day, using PRN oxy at bedtime.  My transplant place videos watched: Yes    Labs drawn by [facility] staff: No, drawn by first floor lab.    Plan of care for today:   Labs and assessment  Medication bottle numbering and education  Being seen by provider    Medication changes: none    Medications administered: none      Patient education:    The following teaching topics were addressed: Importance of drinking 2L of non-caffeinated fluids daily, Incisional care, Signs/symptoms of infection, Good handwashing, Medications (purposes, doses and times of administration), Phone numbers to call with concenrs (Transplant coordinator, [facility] and [facility]), and Plan of care   Patient and Fiorella, significant others daughter,  verbalized understanding and all questions answered.    Drug level:  Patient took 6mg of tacrolimus last evening at 8pm.  Care coordinator to follow up with the result.    Face to face time: 90 minutes  Discharge Plan    Pt will follow up with labs tomorrow Saturday, it will be determined if patient needs dialysis, patient aware of plan  Discharge instructions reviewed with patient: YES  Patient/Representative verbalized understanding, all questions answered: YES    Discharged from unit at " Has a cyst on her back that is new, was admitted to the hospital for a skin infection that started like this and she is concerned. Has had some temp elevations in 99's last couple of nights. 9:30 am with whom: Fiorella to home.    Kelli Rodriguez, RN  Nubia Corley RN

## 2023-11-03 NOTE — PROGRESS NOTES
Chief Complaint   Patient presents with    Follow-up     6 mo     Hypertension    Congestive Heart Failure     Vitals:    11/03/23 1328   BP: 130/88   Site: Left Upper Arm   Position: Sitting   Pulse: 73   SpO2: 94%   Weight: 120.7 kg (266 lb)   Height: 1.702 m (5' 7\")         Chest pain denied     SOB - BETTS     Palpitations denied     Swelling in hands/feet denied     Dizziness denied     Recent hospital stays denied     Refills denied

## 2024-05-03 ENCOUNTER — OFFICE VISIT (OUTPATIENT)
Age: 79
End: 2024-05-03
Payer: MEDICARE

## 2024-05-03 VITALS
OXYGEN SATURATION: 96 % | BODY MASS INDEX: 41.75 KG/M2 | HEIGHT: 67 IN | DIASTOLIC BLOOD PRESSURE: 80 MMHG | WEIGHT: 266 LBS | HEART RATE: 70 BPM | SYSTOLIC BLOOD PRESSURE: 184 MMHG

## 2024-05-03 DIAGNOSIS — I50.32 CHRONIC DIASTOLIC CONGESTIVE HEART FAILURE (HCC): ICD-10-CM

## 2024-05-03 DIAGNOSIS — I10 ESSENTIAL (PRIMARY) HYPERTENSION: ICD-10-CM

## 2024-05-03 DIAGNOSIS — I25.10 ATHEROSCLEROSIS OF NATIVE CORONARY ARTERY OF NATIVE HEART WITHOUT ANGINA PECTORIS: Primary | ICD-10-CM

## 2024-05-03 PROCEDURE — 1123F ACP DISCUSS/DSCN MKR DOCD: CPT | Performed by: INTERNAL MEDICINE

## 2024-05-03 PROCEDURE — 99214 OFFICE O/P EST MOD 30 MIN: CPT | Performed by: INTERNAL MEDICINE

## 2024-05-03 PROCEDURE — G8400 PT W/DXA NO RESULTS DOC: HCPCS | Performed by: INTERNAL MEDICINE

## 2024-05-03 PROCEDURE — G8417 CALC BMI ABV UP PARAM F/U: HCPCS | Performed by: INTERNAL MEDICINE

## 2024-05-03 PROCEDURE — G8427 DOCREV CUR MEDS BY ELIG CLIN: HCPCS | Performed by: INTERNAL MEDICINE

## 2024-05-03 PROCEDURE — 1090F PRES/ABSN URINE INCON ASSESS: CPT | Performed by: INTERNAL MEDICINE

## 2024-05-03 PROCEDURE — 3077F SYST BP >= 140 MM HG: CPT | Performed by: INTERNAL MEDICINE

## 2024-05-03 PROCEDURE — 3079F DIAST BP 80-89 MM HG: CPT | Performed by: INTERNAL MEDICINE

## 2024-05-03 PROCEDURE — 1036F TOBACCO NON-USER: CPT | Performed by: INTERNAL MEDICINE

## 2024-05-03 NOTE — PROGRESS NOTES
Chief Complaint   Patient presents with    Coronary Artery Disease    Hypertension    Congestive Heart Failure     Vitals:    05/03/24 1250 05/03/24 1300   BP: (!) 184/80 (!) 184/80   Site: Left Upper Arm    Position: Sitting    Cuff Size: Medium Adult    Pulse: 70    SpO2: 96%    Weight: 120.7 kg (266 lb)    Height: 1.702 m (5' 7\")       BP (!) 184/80   Pulse 70   Ht 1.702 m (5' 7\")   Wt 120.7 kg (266 lb)   SpO2 96%   BMI 41.66 kg/m²        Chief Complaint   Patient presents with    Coronary Artery Disease    Hypertension    Congestive Heart Failure     Vitals:    05/03/24 1250 05/03/24 1300   BP: (!) 184/80 (!) 184/80   Site: Left Upper Arm    Position: Sitting    Cuff Size: Medium Adult    Pulse: 70    SpO2: 96%    Weight: 120.7 kg (266 lb)    Height: 1.702 m (5' 7\")       BP (!) 184/80   Pulse 70   Ht 1.702 m (5' 7\")   Wt 120.7 kg (266 lb)   SpO2 96%   BMI 41.66 kg/m²

## 2024-05-03 NOTE — PROGRESS NOTES
Office Follow-up    NAME: Shanta Velazquez   :  1945  MRM:  [id number]    Date:  24         Plan:     Muscle cramps: These improved after starting KCL and Magnesium.    Accelerated hypertension: Blood pressure is better controlled. BP high today due to severe back pain as she forgot walker. Home BP readings are in 130 range. Continue Toprol XL and Losartan.   Diastolic congestive heart failure: She is now euvolemic.  There is no significant peripheral edema.  Continue Lasix, potassium and magnesium.  Control blood pressure.   CAD: Moderate CAD with heavy calcification of the coronary arteries.  Continue medical management with blood pressure control.  She is not taking ASA as it bothers her stomach. She does not want to be on statins.  See Neha Perry NP in 6 months.                      Subjective:      Returning for followup. Denies any symptoms of chest pain. Has SOB on exertion which is chronic. Has lower back pain.     -----------  Shanta Velazquez is a 75 y.o. female being seen today for f/u of HTN.  She has been following with Mayte Michel in our office.  Recently Mayte had increased the losartan to 50 mg twice a day.  In the past she could not tolerate HCTZ and Coreg and those were discontinued.  She is concerned about possible medication side effects.  She has been having leg cramps at night and she thinks it could be Lasix or losartan related.  Also she is having some numbness involving the arms and hands when she is sleeping at night.  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  But she does not think that those are the cause for her leg cramps.        ATTENTION:   This medical record was transcribed using an electronic medical records/speech recognition system.  Although proofread, it may and can contain electronic, spelling and other errors.  Corrections may be executed at a later time.  Please feel free to contact us for any clarifications as needed.        Exam:

## 2024-05-04 ENCOUNTER — HOSPITAL ENCOUNTER (OUTPATIENT)
Facility: HOSPITAL | Age: 79
End: 2024-05-04
Payer: MEDICARE

## 2024-05-04 DIAGNOSIS — R10.11 RUQ PAIN: ICD-10-CM

## 2024-05-04 PROCEDURE — 76700 US EXAM ABDOM COMPLETE: CPT

## 2024-10-18 RX ORDER — LOSARTAN POTASSIUM 50 MG/1
50 TABLET ORAL DAILY
Qty: 90 TABLET | Refills: 2 | Status: SHIPPED | OUTPATIENT
Start: 2024-10-18

## 2024-10-18 NOTE — TELEPHONE ENCOUNTER
Refill per VO of Dr. Ino Miranda:    5/3/2024    Future Appointments   Date Time Provider Department Center   11/4/2024 11:00 AM Neha Perry, APRN - NP [facility]       Requested Prescriptions     Pending Prescriptions Disp Refills    losartan (COZAAR) 50 MG tablet [Pharmacy Med Name: LOSARTAN POTASSIUM 50 MG TAB] 90 tablet 3     Sig: TAKE 1 TABLET BY MOUTH EVERY DAY

## 2024-10-30 NOTE — PROGRESS NOTES
Office Follow-up    NAME: Shanta Velazquez   :  1945  MRM:  [id number]    Date:  24  Primary Cardiologist:  Dr. Ruth Perry, APRN - NP          Plan:     Muscle cramps: These improved after starting KCL and Magnesium.    Accelerated hypertension: Blood pressure is better controlled. BP high today due to severe back pain as she forgot walker. Home BP readings are in 130 range. Continue Toprol XL and Losartan.   Diastolic congestive heart failure: She is now euvolemic.  There is no significant peripheral edema.  Continue Lasix. Control blood pressure. Consider echo next year.  CAD: Moderate CAD with heavy calcification of the coronary arteries.  Continue medical management with blood pressure control.  She is not taking ASA as it bothers her stomach. She does not want to be on statins. Labs with PCP  Pre op Endoscopy in Dec. Previous esphoageal diliation  Pt Low cardiac risk, may proceed with procedure.(Per Revised Cardiac Risk Index (Fabian Criteria) )    See Dr. Miranda in 6 months.                    Subjective:      Returning for followup. Denies any symptoms of chest pain. Has SOB on exertion which is chronic. Has lower back pain. GERD has been acting up.    -----------  Shanta Velazquez is a 79 y.o. female being seen today for f/u of HTN.  She has been following with Mayte Michel in our office.  Recently Mayte had increased the losartan to 50 mg twice a day.  In the past she could not tolerate HCTZ and Coreg and those were discontinued.  She is concerned about possible medication side effects.  She has been having leg cramps at night and she thinks it could be Lasix or losartan related.  Also she is having some numbness involving the arms and hands when she is sleeping at night.  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  But she does not think that those are the cause for her leg cramps.        ATTENTION:   This medical record was transcribed using an electronic medical

## 2024-11-04 ENCOUNTER — OFFICE VISIT (OUTPATIENT)
Age: 79
End: 2024-11-04
Payer: MEDICARE

## 2024-11-04 VITALS
HEIGHT: 67 IN | WEIGHT: 262.6 LBS | BODY MASS INDEX: 41.21 KG/M2 | DIASTOLIC BLOOD PRESSURE: 82 MMHG | OXYGEN SATURATION: 97 % | SYSTOLIC BLOOD PRESSURE: 132 MMHG | HEART RATE: 84 BPM | RESPIRATION RATE: 18 BRPM

## 2024-11-04 DIAGNOSIS — I25.10 ATHEROSCLEROSIS OF NATIVE CORONARY ARTERY OF NATIVE HEART WITHOUT ANGINA PECTORIS: ICD-10-CM

## 2024-11-04 DIAGNOSIS — R25.2 MUSCLE CRAMPS: ICD-10-CM

## 2024-11-04 DIAGNOSIS — I50.32 CHRONIC HEART FAILURE WITH PRESERVED EJECTION FRACTION (HCC): ICD-10-CM

## 2024-11-04 DIAGNOSIS — I10 ESSENTIAL (PRIMARY) HYPERTENSION: Primary | ICD-10-CM

## 2024-11-04 DIAGNOSIS — I50.32 CHRONIC DIASTOLIC CONGESTIVE HEART FAILURE (HCC): ICD-10-CM

## 2024-11-04 DIAGNOSIS — Z01.818 PRE-OP EVALUATION: ICD-10-CM

## 2024-11-04 PROCEDURE — 93005 ELECTROCARDIOGRAM TRACING: CPT

## 2024-11-04 PROCEDURE — 99214 OFFICE O/P EST MOD 30 MIN: CPT

## 2024-11-04 RX ORDER — METOPROLOL TARTRATE 25 MG/1
25 TABLET, FILM COATED ORAL 2 TIMES DAILY
Qty: 180 TABLET | Refills: 3 | Status: SHIPPED | OUTPATIENT
Start: 2024-11-04

## 2024-11-04 NOTE — PROGRESS NOTES
had concerns including Coronary Artery Disease, Hypertension, and Congestive Heart Failure.    Vitals:    11/04/24 1111   BP: 132/82   Site: Left Upper Arm   Position: Sitting   Pulse: 84   Resp: 18   SpO2: 97%   Weight: 119.1 kg (262 lb 9.6 oz)   Height: 1.702 m (5' 7\")        Chest pain some slight discomfort    Refills No        1. Have you been to the ER, urgent care clinic since your last visit? No       Hospitalized since your last visit? No       Where?        Date?

## 2025-03-07 ENCOUNTER — TRANSCRIBE ORDERS (OUTPATIENT)
Facility: HOSPITAL | Age: 80
End: 2025-03-07

## 2025-03-07 DIAGNOSIS — Z12.31 ENCOUNTER FOR SCREENING MAMMOGRAM FOR MALIGNANT NEOPLASM OF BREAST: Primary | ICD-10-CM

## 2025-03-10 ENCOUNTER — TRANSCRIBE ORDERS (OUTPATIENT)
Facility: HOSPITAL | Age: 80
End: 2025-03-10

## 2025-03-10 DIAGNOSIS — Z12.31 OTHER SCREENING MAMMOGRAM: Primary | ICD-10-CM

## 2025-03-21 ENCOUNTER — HOSPITAL ENCOUNTER (OUTPATIENT)
Facility: HOSPITAL | Age: 80
Discharge: HOME | End: 2025-03-24
Payer: MEDICARE

## 2025-03-21 VITALS — BODY MASS INDEX: 41.12 KG/M2 | HEIGHT: 67 IN | WEIGHT: 262 LBS

## 2025-03-21 DIAGNOSIS — Z12.31 OTHER SCREENING MAMMOGRAM: ICD-10-CM

## 2025-03-21 PROCEDURE — 77067 SCR MAMMO BI INCL CAD: CPT

## 2025-05-23 ENCOUNTER — OFFICE VISIT (OUTPATIENT)
Age: 80
End: 2025-05-23
Payer: MEDICARE

## 2025-05-23 VITALS
HEIGHT: 67 IN | WEIGHT: 270 LBS | OXYGEN SATURATION: 97 % | BODY MASS INDEX: 42.38 KG/M2 | SYSTOLIC BLOOD PRESSURE: 168 MMHG | DIASTOLIC BLOOD PRESSURE: 84 MMHG | HEART RATE: 72 BPM

## 2025-05-23 DIAGNOSIS — I10 ESSENTIAL (PRIMARY) HYPERTENSION: ICD-10-CM

## 2025-05-23 DIAGNOSIS — I25.10 ATHEROSCLEROSIS OF NATIVE CORONARY ARTERY OF NATIVE HEART WITHOUT ANGINA PECTORIS: ICD-10-CM

## 2025-05-23 DIAGNOSIS — R07.9 CHEST PAIN, UNSPECIFIED TYPE: Primary | ICD-10-CM

## 2025-05-23 PROCEDURE — 1126F AMNT PAIN NOTED NONE PRSNT: CPT | Performed by: INTERNAL MEDICINE

## 2025-05-23 PROCEDURE — 99214 OFFICE O/P EST MOD 30 MIN: CPT | Performed by: INTERNAL MEDICINE

## 2025-05-23 PROCEDURE — G8427 DOCREV CUR MEDS BY ELIG CLIN: HCPCS | Performed by: INTERNAL MEDICINE

## 2025-05-23 PROCEDURE — 1159F MED LIST DOCD IN RCRD: CPT | Performed by: INTERNAL MEDICINE

## 2025-05-23 PROCEDURE — 1090F PRES/ABSN URINE INCON ASSESS: CPT | Performed by: INTERNAL MEDICINE

## 2025-05-23 PROCEDURE — G8400 PT W/DXA NO RESULTS DOC: HCPCS | Performed by: INTERNAL MEDICINE

## 2025-05-23 PROCEDURE — 3077F SYST BP >= 140 MM HG: CPT | Performed by: INTERNAL MEDICINE

## 2025-05-23 PROCEDURE — 1123F ACP DISCUSS/DSCN MKR DOCD: CPT | Performed by: INTERNAL MEDICINE

## 2025-05-23 PROCEDURE — 3079F DIAST BP 80-89 MM HG: CPT | Performed by: INTERNAL MEDICINE

## 2025-05-23 PROCEDURE — G8417 CALC BMI ABV UP PARAM F/U: HCPCS | Performed by: INTERNAL MEDICINE

## 2025-05-23 PROCEDURE — 1036F TOBACCO NON-USER: CPT | Performed by: INTERNAL MEDICINE

## 2025-05-23 NOTE — PROGRESS NOTES
Chief Complaint   Patient presents with    Hypertension    Congestive Heart Failure    Coronary Artery Disease     Vitals:    05/23/25 1111 05/23/25 1118   BP: (!) 178/86 (!) 168/84   BP Site: Left Upper Arm Left Upper Arm   Patient Position: Sitting Sitting   Pulse: 72    SpO2: 97%    Weight: 122.5 kg (270 lb)    Height: 1.702 m (5' 7\")          Chest pain: DENIED     Recent hospital stays: DENIED     Refills: DENIED   
UMA. received from Dr. SUNIL Miranda MD:    Lexican stress Nuc- Chest pain.   
        [facility] on 3/4/2020:    L Main: large caliber, calcified, no critical disease  LAD: large caliber, calcified, 40-50% stenosis at D2 bifurcation, supplies two diagonals, D1 small caliber and D2 moderate caliber with diffuse moderate disease  LCx: large caliber, calcified, ostial 30%, supplies a few very small marginals and continues a large caliber marginal with distal 40%  stenosis  RCA: moderate caliber, diffuse mild to moderate disease, small PDA and RPLV branch      Lab Review:     Labs 8/15/24 K 4.3, Cr 0.86, TSH 1.42, T4 5.8, T3 29, A1C 7.9  Lab Results   Component Value Date     10/03/2023    K 4.4 10/03/2023     10/03/2023    CO2 30 10/03/2023    BUN 19 10/03/2023    CREATININE 1.00 10/03/2023    GLUCOSE 191 (H) 10/03/2023    CALCIUM 9.0 10/03/2023    BILITOT 0.3 09/29/2021    ALKPHOS 106 09/29/2021    AST 11 (L) 09/29/2021    ALT 18 09/29/2021    LABGLOM 58 (L) 10/03/2023    GFRAA >60 09/30/2021    AGRATIO 0.5 (L) 09/29/2021    GLOB 5.5 (H) 09/29/2021     Lab Results   Component Value Date    WBC 11.1 (H) 09/30/2021    HGB 10.5 (L) 09/30/2021    HCT 33.4 (L) 09/30/2021    MCV 84.3 09/30/2021     (H) 09/30/2021     Cholesterol, Total   Date Value Ref Range Status   07/28/2021 178 <200 MG/DL Final     Triglycerides   Date Value Ref Range Status   07/28/2021 137 <150 MG/DL Final     Comment:     Based on NCEP-ATP III:  Triglycerides <150 mg/dL  is considered normal, 150-199 mg/dL  borderline high,  200-499 mg/dL high and  greater than or equal to 500 mg/dL very high.     HDL   Date Value Ref Range Status   07/28/2021 26 MG/DL Final     Comment:     Based on NCEP ATP III, HDL Cholesterol <40 mg/dL is considered low and >60 mg/dL is elevated.     No results found for: \"TSH\", \"TSHFT4\", \"TSHELE\", \"YXS0CKO\", \"TSHHS\"

## 2025-06-17 ENCOUNTER — ANCILLARY PROCEDURE (OUTPATIENT)
Age: 80
End: 2025-06-17
Payer: MEDICARE

## 2025-06-17 VITALS
SYSTOLIC BLOOD PRESSURE: 176 MMHG | DIASTOLIC BLOOD PRESSURE: 82 MMHG | WEIGHT: 270 LBS | HEART RATE: 71 BPM | HEIGHT: 67 IN | BODY MASS INDEX: 42.38 KG/M2

## 2025-06-17 DIAGNOSIS — R07.9 CHEST PAIN, UNSPECIFIED TYPE: ICD-10-CM

## 2025-06-17 PROCEDURE — PBSHW PBB SHADOW CHARGE: Performed by: INTERNAL MEDICINE

## 2025-06-17 PROCEDURE — A9500 TC99M SESTAMIBI: HCPCS | Performed by: INTERNAL MEDICINE

## 2025-06-17 RX ORDER — TETRAKIS(2-METHOXYISOBUTYLISOCYANIDE)COPPER(I) TETRAFLUOROBORATE 1 MG/ML
25.5 INJECTION, POWDER, LYOPHILIZED, FOR SOLUTION INTRAVENOUS
Status: COMPLETED | OUTPATIENT
Start: 2025-06-17 | End: 2025-06-17

## 2025-06-17 RX ORDER — REGADENOSON 0.08 MG/ML
0.4 INJECTION, SOLUTION INTRAVENOUS
Status: COMPLETED | OUTPATIENT
Start: 2025-06-17 | End: 2025-06-17

## 2025-06-17 RX ADMIN — REGADENOSON 0.4 MG: 0.08 INJECTION, SOLUTION INTRAVENOUS at 08:56

## 2025-06-17 RX ADMIN — TECHNETIUM TC 99M SESTAMIBI 25.5 MILLICURIE: 1 INJECTION, POWDER, FOR SOLUTION INTRAVENOUS at 09:00

## 2025-06-20 ENCOUNTER — ANCILLARY PROCEDURE (OUTPATIENT)
Age: 80
End: 2025-06-20
Payer: MEDICARE

## 2025-06-20 LAB
ECHO BSA: 2.41 M2
NUC STRESS EJECTION FRACTION: 35 %
STRESS BASELINE DIAS BP: 82 MMHG
STRESS BASELINE HR: 77 BPM
STRESS BASELINE SYS BP: 176 MMHG
STRESS ESTIMATED WORKLOAD: 0 METS
STRESS EXERCISE DUR MIN: 0 MIN
STRESS EXERCISE DUR SEC: 0 SEC
STRESS O2 SAT PEAK: 99 %
STRESS O2 SAT REST: 97 %
STRESS PEAK DIAS BP: 82 MMHG
STRESS PEAK SYS BP: 176 MMHG
STRESS PERCENT HR ACHIEVED: 72 %
STRESS POST PEAK HR: 102 BPM
STRESS RATE PRESSURE PRODUCT: NORMAL BPM*MMHG
STRESS TARGET HR: 141 BPM
TID: 1.24

## 2025-06-20 PROCEDURE — A9500 TC99M SESTAMIBI: HCPCS | Performed by: INTERNAL MEDICINE

## 2025-06-20 PROCEDURE — PBSHW PBB SHADOW CHARGE: Performed by: INTERNAL MEDICINE

## 2025-06-20 RX ORDER — TETRAKIS(2-METHOXYISOBUTYLISOCYANIDE)COPPER(I) TETRAFLUOROBORATE 1 MG/ML
25.3 INJECTION, POWDER, LYOPHILIZED, FOR SOLUTION INTRAVENOUS
Status: COMPLETED | OUTPATIENT
Start: 2025-06-20 | End: 2025-06-20

## 2025-06-20 RX ADMIN — TECHNETIUM TC 99M SESTAMIBI 25.3 MILLICURIE: 1 INJECTION, POWDER, FOR SOLUTION INTRAVENOUS at 08:36

## 2025-07-09 ENCOUNTER — TELEPHONE (OUTPATIENT)
Age: 80
End: 2025-07-09

## 2025-07-09 RX ORDER — LOSARTAN POTASSIUM 50 MG/1
50 TABLET ORAL DAILY
Qty: 90 TABLET | Refills: 3 | Status: SHIPPED | OUTPATIENT
Start: 2025-07-09

## 2025-07-09 NOTE — TELEPHONE ENCOUNTER
Patient is calling for the results from her Nuclear Stress test.Please give a call back.    Patient is also calling because she needs a refill on her Losartan 50 mg.    Pharmacy:  [facility]/pharmacy #[id number] - [place] [address] [facility] [address] [phone] - F [phone] patient

## 2025-07-09 NOTE — TELEPHONE ENCOUNTER
Refill per VO of Dr. Ino Miranda:    5/23/2025    No future appointments.    Requested Prescriptions     Pending Prescriptions Disp Refills    losartan (COZAAR) 50 MG tablet [Pharmacy Med Name: LOSARTAN POTASSIUM 50 MG TAB] 90 tablet 2     Sig: TAKE 1 TABLET BY MOUTH EVERY DAY

## 2025-07-26 ENCOUNTER — HOSPITAL ENCOUNTER (INPATIENT)
Facility: HOSPITAL | Age: 80
LOS: 1 days | Discharge: HOME | DRG: 603 | End: 2025-07-27
Attending: EMERGENCY MEDICINE | Admitting: INTERNAL MEDICINE
Payer: MEDICARE

## 2025-07-26 ENCOUNTER — APPOINTMENT (OUTPATIENT)
Facility: HOSPITAL | Age: 80
DRG: 603 | End: 2025-07-26
Payer: MEDICARE

## 2025-07-26 DIAGNOSIS — I10 ESSENTIAL HYPERTENSION: ICD-10-CM

## 2025-07-26 DIAGNOSIS — E11.65 TYPE 2 DIABETES MELLITUS WITH HYPERGLYCEMIA, UNSPECIFIED WHETHER LONG TERM INSULIN USE (HCC): ICD-10-CM

## 2025-07-26 DIAGNOSIS — A41.9 SEPSIS, DUE TO UNSPECIFIED ORGANISM, UNSPECIFIED WHETHER ACUTE ORGAN DYSFUNCTION PRESENT (HCC): ICD-10-CM

## 2025-07-26 DIAGNOSIS — L02.31 LEFT BUTTOCK ABSCESS: ICD-10-CM

## 2025-07-26 DIAGNOSIS — L02.31 ABSCESS OF BUTTOCK, LEFT: Primary | ICD-10-CM

## 2025-07-26 LAB
ALBUMIN SERPL-MCNC: 2.8 G/DL (ref 3.5–5)
ALBUMIN/GLOB SERPL: 0.5 (ref 1.1–2.2)
ALP SERPL-CCNC: 131 U/L (ref 45–117)
ALT SERPL-CCNC: 19 U/L (ref 12–78)
ANION GAP SERPL CALC-SCNC: 7 MMOL/L (ref 2–12)
AST SERPL-CCNC: 18 U/L (ref 15–37)
BASOPHILS # BLD: 0.08 K/UL (ref 0–0.1)
BASOPHILS NFR BLD: 0.4 % (ref 0–1)
BILIRUB SERPL-MCNC: 0.6 MG/DL (ref 0.2–1)
BUN SERPL-MCNC: 13 MG/DL (ref 6–20)
BUN/CREAT SERPL: 12 (ref 12–20)
CALCIUM SERPL-MCNC: 8.7 MG/DL (ref 8.5–10.1)
CHLORIDE SERPL-SCNC: 104 MMOL/L (ref 97–108)
CO2 SERPL-SCNC: 28 MMOL/L (ref 21–32)
CREAT SERPL-MCNC: 1.08 MG/DL (ref 0.55–1.02)
DIFFERENTIAL METHOD BLD: ABNORMAL
EOSINOPHIL # BLD: 0.45 K/UL (ref 0–0.4)
EOSINOPHIL NFR BLD: 2 % (ref 0–7)
ERYTHROCYTE [DISTWIDTH] IN BLOOD BY AUTOMATED COUNT: 15.4 % (ref 11.5–14.5)
GLOBULIN SER CALC-MCNC: 5.1 G/DL (ref 2–4)
GLUCOSE BLD-MCNC: 227 MG/DL (ref 74–99)
GLUCOSE SERPL-MCNC: 227 MG/DL (ref 65–100)
HCT VFR BLD AUTO: 44.2 % (ref 35–47)
HGB BLD-MCNC: 14 G/DL (ref 11.5–16)
IMM GRANULOCYTES # BLD AUTO: 0.23 K/UL (ref 0–0.04)
IMM GRANULOCYTES NFR BLD AUTO: 1 % (ref 0–0.5)
LYMPHOCYTES # BLD: 1.88 K/UL (ref 0.8–3.5)
LYMPHOCYTES NFR BLD: 8.4 % (ref 12–49)
MCH RBC QN AUTO: 26.8 PG (ref 26–34)
MCHC RBC AUTO-ENTMCNC: 31.7 G/DL (ref 30–36.5)
MCV RBC AUTO: 84.5 FL (ref 80–99)
MONOCYTES # BLD: 0.99 K/UL (ref 0–1)
MONOCYTES NFR BLD: 4.4 % (ref 5–13)
NEUTS SEG # BLD: 18.73 K/UL (ref 1.8–8)
NEUTS SEG NFR BLD: 83.8 % (ref 32–75)
NRBC # BLD: 0 K/UL (ref 0–0.01)
NRBC BLD-RTO: 0 PER 100 WBC
NT PRO BNP: 329 PG/ML
PLATELET # BLD AUTO: 286 K/UL (ref 150–400)
PMV BLD AUTO: 11.5 FL (ref 8.9–12.9)
POTASSIUM SERPL-SCNC: 3.4 MMOL/L (ref 3.5–5.1)
PROCALCITONIN SERPL-MCNC: 0.56 NG/ML
PROT SERPL-MCNC: 7.9 G/DL (ref 6.4–8.2)
RBC # BLD AUTO: 5.23 M/UL (ref 3.8–5.2)
SERVICE CMNT-IMP: ABNORMAL
SODIUM SERPL-SCNC: 139 MMOL/L (ref 136–145)
TROPONIN I SERPL HS-MCNC: 38 NG/L (ref 0–51)
WBC # BLD AUTO: 22.4 K/UL (ref 3.6–11)

## 2025-07-26 PROCEDURE — 84484 ASSAY OF TROPONIN QUANT: CPT

## 2025-07-26 PROCEDURE — 84145 PROCALCITONIN (PCT): CPT

## 2025-07-26 PROCEDURE — 87040 BLOOD CULTURE FOR BACTERIA: CPT

## 2025-07-26 PROCEDURE — 71045 X-RAY EXAM CHEST 1 VIEW: CPT

## 2025-07-26 PROCEDURE — 74176 CT ABD & PELVIS W/O CONTRAST: CPT

## 2025-07-26 PROCEDURE — 2580000003 HC RX 258: Performed by: EMERGENCY MEDICINE

## 2025-07-26 PROCEDURE — 96375 TX/PRO/DX INJ NEW DRUG ADDON: CPT

## 2025-07-26 PROCEDURE — 6360000002 HC RX W HCPCS: Performed by: EMERGENCY MEDICINE

## 2025-07-26 PROCEDURE — 85025 COMPLETE CBC W/AUTO DIFF WBC: CPT

## 2025-07-26 PROCEDURE — 80053 COMPREHEN METABOLIC PANEL: CPT

## 2025-07-26 PROCEDURE — 83880 ASSAY OF NATRIURETIC PEPTIDE: CPT

## 2025-07-26 PROCEDURE — 99285 EMERGENCY DEPT VISIT HI MDM: CPT

## 2025-07-26 PROCEDURE — 6370000000 HC RX 637 (ALT 250 FOR IP): Performed by: EMERGENCY MEDICINE

## 2025-07-26 PROCEDURE — 96361 HYDRATE IV INFUSION ADD-ON: CPT

## 2025-07-26 PROCEDURE — 96365 THER/PROPH/DIAG IV INF INIT: CPT

## 2025-07-26 PROCEDURE — 82962 GLUCOSE BLOOD TEST: CPT

## 2025-07-26 PROCEDURE — 36415 COLL VENOUS BLD VENIPUNCTURE: CPT

## 2025-07-26 PROCEDURE — 93005 ELECTROCARDIOGRAM TRACING: CPT | Performed by: EMERGENCY MEDICINE

## 2025-07-26 RX ORDER — ACETAMINOPHEN 500 MG
1000 TABLET ORAL
Status: COMPLETED | OUTPATIENT
Start: 2025-07-26 | End: 2025-07-26

## 2025-07-26 RX ORDER — 0.9 % SODIUM CHLORIDE 0.9 %
500 INTRAVENOUS SOLUTION INTRAVENOUS ONCE
Status: COMPLETED | OUTPATIENT
Start: 2025-07-26 | End: 2025-07-27

## 2025-07-26 RX ORDER — 0.9 % SODIUM CHLORIDE 0.9 %
1000 INTRAVENOUS SOLUTION INTRAVENOUS ONCE
Status: DISCONTINUED | OUTPATIENT
Start: 2025-07-26 | End: 2025-07-26

## 2025-07-26 RX ORDER — LIDOCAINE HYDROCHLORIDE AND EPINEPHRINE 15; 5 MG/ML; UG/ML
10 INJECTION, SOLUTION EPIDURAL ONCE
Status: DISCONTINUED | OUTPATIENT
Start: 2025-07-26 | End: 2025-07-27 | Stop reason: HOSPADM

## 2025-07-26 RX ORDER — HYDRALAZINE HYDROCHLORIDE 20 MG/ML
10 INJECTION INTRAMUSCULAR; INTRAVENOUS ONCE
Status: COMPLETED | OUTPATIENT
Start: 2025-07-26 | End: 2025-07-26

## 2025-07-26 RX ADMIN — SODIUM CHLORIDE 3000 MG: 9 INJECTION, SOLUTION INTRAVENOUS at 22:09

## 2025-07-26 RX ADMIN — HYDRALAZINE HYDROCHLORIDE 10 MG: 20 INJECTION, SOLUTION INTRAMUSCULAR; INTRAVENOUS at 22:50

## 2025-07-26 RX ADMIN — SODIUM CHLORIDE 500 ML: 0.9 INJECTION, SOLUTION INTRAVENOUS at 22:06

## 2025-07-26 RX ADMIN — ACETAMINOPHEN 1000 MG: 500 TABLET ORAL at 22:06

## 2025-07-26 ASSESSMENT — PAIN SCALES - GENERAL
PAINLEVEL_OUTOF10: 3
PAINLEVEL_OUTOF10: 5

## 2025-07-26 ASSESSMENT — PAIN DESCRIPTION - LOCATION
LOCATION: SACRUM;BUTTOCKS
LOCATION: BUTTOCKS;SACRUM

## 2025-07-26 ASSESSMENT — PAIN DESCRIPTION - DESCRIPTORS: DESCRIPTORS: ACHING

## 2025-07-27 VITALS
BODY MASS INDEX: 42.3 KG/M2 | WEIGHT: 269.5 LBS | HEART RATE: 70 BPM | OXYGEN SATURATION: 99 % | RESPIRATION RATE: 18 BRPM | DIASTOLIC BLOOD PRESSURE: 77 MMHG | TEMPERATURE: 99 F | HEIGHT: 67 IN | SYSTOLIC BLOOD PRESSURE: 168 MMHG

## 2025-07-27 PROBLEM — L02.31 ABSCESS OF BUTTOCK, LEFT: Status: ACTIVE | Noted: 2025-07-27

## 2025-07-27 LAB
ANION GAP SERPL CALC-SCNC: 5 MMOL/L (ref 2–12)
APPEARANCE UR: ABNORMAL
BACTERIA URNS QL MICRO: ABNORMAL /HPF
BILIRUB UR QL: NEGATIVE
BUN SERPL-MCNC: 10 MG/DL (ref 6–20)
BUN/CREAT SERPL: 13 (ref 12–20)
CALCIUM SERPL-MCNC: 8.5 MG/DL (ref 8.5–10.1)
CHLORIDE SERPL-SCNC: 107 MMOL/L (ref 97–108)
CO2 SERPL-SCNC: 30 MMOL/L (ref 21–32)
COLOR UR: ABNORMAL
CREAT SERPL-MCNC: 0.79 MG/DL (ref 0.55–1.02)
EPITH CASTS URNS QL MICRO: ABNORMAL /LPF
GLUCOSE SERPL-MCNC: 132 MG/DL (ref 65–100)
GLUCOSE UR STRIP.AUTO-MCNC: NEGATIVE MG/DL
HGB UR QL STRIP: NEGATIVE
KETONES UR QL STRIP.AUTO: ABNORMAL MG/DL
LEUKOCYTE ESTERASE UR QL STRIP.AUTO: ABNORMAL
NITRITE UR QL STRIP.AUTO: NEGATIVE
PH UR STRIP: 5.5 (ref 5–8)
POTASSIUM SERPL-SCNC: 3.4 MMOL/L (ref 3.5–5.1)
PROT UR STRIP-MCNC: 30 MG/DL
RBC #/AREA URNS HPF: ABNORMAL /HPF (ref 0–5)
SODIUM SERPL-SCNC: 142 MMOL/L (ref 136–145)
SP GR UR REFRACTOMETRY: 1.02 (ref 1–1.03)
URINE CULTURE IF INDICATED: ABNORMAL
UROBILINOGEN UR QL STRIP.AUTO: 1 EU/DL (ref 0.2–1)
WBC URNS QL MICRO: ABNORMAL /HPF (ref 0–4)
YEAST URNS QL MICRO: PRESENT

## 2025-07-27 PROCEDURE — 87185 SC STD ENZYME DETCJ PER NZM: CPT

## 2025-07-27 PROCEDURE — 87205 SMEAR GRAM STAIN: CPT

## 2025-07-27 PROCEDURE — 96375 TX/PRO/DX INJ NEW DRUG ADDON: CPT

## 2025-07-27 PROCEDURE — 97161 PT EVAL LOW COMPLEX 20 MIN: CPT

## 2025-07-27 PROCEDURE — 99222 1ST HOSP IP/OBS MODERATE 55: CPT | Performed by: SURGERY

## 2025-07-27 PROCEDURE — 87075 CULTR BACTERIA EXCEPT BLOOD: CPT

## 2025-07-27 PROCEDURE — 87186 SC STD MICRODIL/AGAR DIL: CPT

## 2025-07-27 PROCEDURE — 96372 THER/PROPH/DIAG INJ SC/IM: CPT

## 2025-07-27 PROCEDURE — 2580000003 HC RX 258: Performed by: INTERNAL MEDICINE

## 2025-07-27 PROCEDURE — 97165 OT EVAL LOW COMPLEX 30 MIN: CPT

## 2025-07-27 PROCEDURE — 87070 CULTURE OTHR SPECIMN AEROBIC: CPT

## 2025-07-27 PROCEDURE — 6360000002 HC RX W HCPCS: Performed by: EMERGENCY MEDICINE

## 2025-07-27 PROCEDURE — 87076 CULTURE ANAEROBE IDENT EACH: CPT

## 2025-07-27 PROCEDURE — 81001 URINALYSIS AUTO W/SCOPE: CPT

## 2025-07-27 PROCEDURE — 2500000003 HC RX 250 WO HCPCS: Performed by: INTERNAL MEDICINE

## 2025-07-27 PROCEDURE — 87086 URINE CULTURE/COLONY COUNT: CPT

## 2025-07-27 PROCEDURE — 80048 BASIC METABOLIC PNL TOTAL CA: CPT

## 2025-07-27 PROCEDURE — 6370000000 HC RX 637 (ALT 250 FOR IP): Performed by: INTERNAL MEDICINE

## 2025-07-27 PROCEDURE — 6360000002 HC RX W HCPCS: Performed by: INTERNAL MEDICINE

## 2025-07-27 PROCEDURE — 87077 CULTURE AEROBIC IDENTIFY: CPT

## 2025-07-27 PROCEDURE — 97535 SELF CARE MNGMENT TRAINING: CPT

## 2025-07-27 PROCEDURE — 94761 N-INVAS EAR/PLS OXIMETRY MLT: CPT

## 2025-07-27 PROCEDURE — 36415 COLL VENOUS BLD VENIPUNCTURE: CPT

## 2025-07-27 PROCEDURE — 97116 GAIT TRAINING THERAPY: CPT

## 2025-07-27 PROCEDURE — 6370000000 HC RX 637 (ALT 250 FOR IP): Performed by: EMERGENCY MEDICINE

## 2025-07-27 PROCEDURE — 87040 BLOOD CULTURE FOR BACTERIA: CPT

## 2025-07-27 PROCEDURE — 0Y9100Z DRAINAGE OF LEFT BUTTOCK WITH DRAINAGE DEVICE, OPEN APPROACH: ICD-10-PCS | Performed by: INTERNAL MEDICINE

## 2025-07-27 PROCEDURE — 46050 I&D PERIANAL ABSCESS SUPFC: CPT

## 2025-07-27 PROCEDURE — 1100000000 HC RM PRIVATE

## 2025-07-27 RX ORDER — MAGNESIUM SULFATE IN WATER 40 MG/ML
2000 INJECTION, SOLUTION INTRAVENOUS PRN
Status: DISCONTINUED | OUTPATIENT
Start: 2025-07-27 | End: 2025-07-27 | Stop reason: HOSPADM

## 2025-07-27 RX ORDER — ENOXAPARIN SODIUM 100 MG/ML
40 INJECTION SUBCUTANEOUS DAILY
Status: DISCONTINUED | OUTPATIENT
Start: 2025-07-27 | End: 2025-07-27 | Stop reason: HOSPADM

## 2025-07-27 RX ORDER — LABETALOL HYDROCHLORIDE 5 MG/ML
20 INJECTION, SOLUTION INTRAVENOUS
Status: COMPLETED | OUTPATIENT
Start: 2025-07-27 | End: 2025-07-27

## 2025-07-27 RX ORDER — FUROSEMIDE 40 MG/1
40 TABLET ORAL 2 TIMES DAILY
Status: DISCONTINUED | OUTPATIENT
Start: 2025-07-27 | End: 2025-07-27

## 2025-07-27 RX ORDER — ACETAMINOPHEN 650 MG/1
650 SUPPOSITORY RECTAL EVERY 6 HOURS PRN
Status: DISCONTINUED | OUTPATIENT
Start: 2025-07-27 | End: 2025-07-27 | Stop reason: HOSPADM

## 2025-07-27 RX ORDER — SODIUM CHLORIDE 0.9 % (FLUSH) 0.9 %
5-40 SYRINGE (ML) INJECTION EVERY 12 HOURS SCHEDULED
Status: DISCONTINUED | OUTPATIENT
Start: 2025-07-27 | End: 2025-07-27 | Stop reason: HOSPADM

## 2025-07-27 RX ORDER — POTASSIUM CHLORIDE 7.45 MG/ML
10 INJECTION INTRAVENOUS PRN
Status: DISCONTINUED | OUTPATIENT
Start: 2025-07-27 | End: 2025-07-27 | Stop reason: HOSPADM

## 2025-07-27 RX ORDER — LOSARTAN POTASSIUM 50 MG/1
100 TABLET ORAL DAILY
Status: DISCONTINUED | OUTPATIENT
Start: 2025-07-27 | End: 2025-07-27

## 2025-07-27 RX ORDER — METOPROLOL TARTRATE 50 MG
50 TABLET ORAL 2 TIMES DAILY
Status: DISCONTINUED | OUTPATIENT
Start: 2025-07-27 | End: 2025-07-27 | Stop reason: HOSPADM

## 2025-07-27 RX ORDER — ONDANSETRON 2 MG/ML
4 INJECTION INTRAMUSCULAR; INTRAVENOUS EVERY 6 HOURS PRN
Status: DISCONTINUED | OUTPATIENT
Start: 2025-07-27 | End: 2025-07-27 | Stop reason: HOSPADM

## 2025-07-27 RX ORDER — FAMOTIDINE 20 MG/1
40 TABLET, FILM COATED ORAL 2 TIMES DAILY
Status: DISCONTINUED | OUTPATIENT
Start: 2025-07-27 | End: 2025-07-27 | Stop reason: HOSPADM

## 2025-07-27 RX ORDER — SODIUM CHLORIDE 9 MG/ML
INJECTION, SOLUTION INTRAVENOUS PRN
Status: DISCONTINUED | OUTPATIENT
Start: 2025-07-27 | End: 2025-07-27 | Stop reason: HOSPADM

## 2025-07-27 RX ORDER — SODIUM CHLORIDE 0.9 % (FLUSH) 0.9 %
5-40 SYRINGE (ML) INJECTION PRN
Status: DISCONTINUED | OUTPATIENT
Start: 2025-07-27 | End: 2025-07-27 | Stop reason: HOSPADM

## 2025-07-27 RX ORDER — FUROSEMIDE 40 MG/1
40 TABLET ORAL 2 TIMES DAILY
Status: DISCONTINUED | OUTPATIENT
Start: 2025-07-27 | End: 2025-07-27 | Stop reason: HOSPADM

## 2025-07-27 RX ORDER — METOPROLOL TARTRATE 50 MG
50 TABLET ORAL 2 TIMES DAILY
Status: DISCONTINUED | OUTPATIENT
Start: 2025-07-27 | End: 2025-07-27

## 2025-07-27 RX ORDER — LOSARTAN POTASSIUM 50 MG/1
50 TABLET ORAL DAILY
Status: DISCONTINUED | OUTPATIENT
Start: 2025-07-27 | End: 2025-07-27

## 2025-07-27 RX ORDER — POTASSIUM CHLORIDE 750 MG/1
40 TABLET, EXTENDED RELEASE ORAL PRN
Status: DISCONTINUED | OUTPATIENT
Start: 2025-07-27 | End: 2025-07-27 | Stop reason: HOSPADM

## 2025-07-27 RX ORDER — MAGNESIUM HYDROXIDE/ALUMINUM HYDROXICE/SIMETHICONE 120; 1200; 1200 MG/30ML; MG/30ML; MG/30ML
30 SUSPENSION ORAL EVERY 6 HOURS PRN
Status: DISCONTINUED | OUTPATIENT
Start: 2025-07-27 | End: 2025-07-27 | Stop reason: HOSPADM

## 2025-07-27 RX ORDER — POLYETHYLENE GLYCOL 3350 17 G
2 POWDER IN PACKET (EA) ORAL
Status: DISCONTINUED | OUTPATIENT
Start: 2025-07-27 | End: 2025-07-27 | Stop reason: HOSPADM

## 2025-07-27 RX ORDER — DOXYCYCLINE HYCLATE 100 MG
100 TABLET ORAL 2 TIMES DAILY
Qty: 14 TABLET | Refills: 0 | Status: SHIPPED | OUTPATIENT
Start: 2025-07-27 | End: 2025-08-03

## 2025-07-27 RX ORDER — ACETAMINOPHEN 325 MG/1
650 TABLET ORAL EVERY 6 HOURS PRN
Status: DISCONTINUED | OUTPATIENT
Start: 2025-07-27 | End: 2025-07-27 | Stop reason: HOSPADM

## 2025-07-27 RX ORDER — ONDANSETRON 4 MG/1
4 TABLET, ORALLY DISINTEGRATING ORAL EVERY 8 HOURS PRN
Status: DISCONTINUED | OUTPATIENT
Start: 2025-07-27 | End: 2025-07-27 | Stop reason: HOSPADM

## 2025-07-27 RX ORDER — METRONIDAZOLE 500 MG/100ML
500 INJECTION, SOLUTION INTRAVENOUS EVERY 8 HOURS
Status: DISCONTINUED | OUTPATIENT
Start: 2025-07-27 | End: 2025-07-27 | Stop reason: HOSPADM

## 2025-07-27 RX ORDER — POLYETHYLENE GLYCOL 3350 17 G/17G
17 POWDER, FOR SOLUTION ORAL DAILY PRN
Status: DISCONTINUED | OUTPATIENT
Start: 2025-07-27 | End: 2025-07-27 | Stop reason: HOSPADM

## 2025-07-27 RX ORDER — LOSARTAN POTASSIUM 50 MG/1
50 TABLET ORAL DAILY
Status: DISCONTINUED | OUTPATIENT
Start: 2025-07-28 | End: 2025-07-27 | Stop reason: HOSPADM

## 2025-07-27 RX ORDER — OXYCODONE AND ACETAMINOPHEN 7.5; 325 MG/1; MG/1
1 TABLET ORAL 2 TIMES DAILY
Qty: 14 TABLET | Refills: 0 | Status: SHIPPED | OUTPATIENT
Start: 2025-07-27 | End: 2025-08-03

## 2025-07-27 RX ADMIN — METOPROLOL TARTRATE 50 MG: 50 TABLET, FILM COATED ORAL at 02:19

## 2025-07-27 RX ADMIN — FAMOTIDINE 40 MG: 20 TABLET, FILM COATED ORAL at 08:04

## 2025-07-27 RX ADMIN — METOPROLOL TARTRATE 50 MG: 50 TABLET, FILM COATED ORAL at 08:04

## 2025-07-27 RX ADMIN — LABETALOL HYDROCHLORIDE 20 MG: 5 INJECTION, SOLUTION INTRAVENOUS at 01:46

## 2025-07-27 RX ADMIN — METRONIDAZOLE 500 MG: 500 INJECTION, SOLUTION INTRAVENOUS at 02:15

## 2025-07-27 RX ADMIN — FUROSEMIDE 40 MG: 40 TABLET ORAL at 02:49

## 2025-07-27 RX ADMIN — ENOXAPARIN SODIUM 40 MG: 100 INJECTION SUBCUTANEOUS at 08:07

## 2025-07-27 RX ADMIN — Medication 2500 MG: at 02:16

## 2025-07-27 RX ADMIN — SODIUM CHLORIDE, PRESERVATIVE FREE 10 ML: 5 INJECTION INTRAVENOUS at 08:05

## 2025-07-27 RX ADMIN — CEFEPIME 2000 MG: 2 INJECTION, POWDER, FOR SOLUTION INTRAVENOUS at 06:18

## 2025-07-27 RX ADMIN — METRONIDAZOLE 500 MG: 500 INJECTION, SOLUTION INTRAVENOUS at 10:30

## 2025-07-27 RX ADMIN — FAMOTIDINE 40 MG: 20 TABLET, FILM COATED ORAL at 02:49

## 2025-07-27 RX ADMIN — HUMAN INSULIN 10 UNITS: 100 INJECTION, SOLUTION SUBCUTANEOUS at 01:50

## 2025-07-27 ASSESSMENT — PAIN SCALES - GENERAL: PAINLEVEL_OUTOF10: 0

## 2025-07-27 NOTE — H&P
History and Physical    Date of Service:  7/27/2025  Primary Care Provider: Nicolle Mckenna DO  Source of information: Patient, Family, External Medical Records    Chief Complaint: Fever      History of Presenting Illness:   Shanta Velazquez is a very pleasant 80 y.o. female with a past medical history of diabetes, hypertension, hyperlipidemia, recurrent boils, who presents to the emergency room due to a recurrent boil/cyst on her buttocks.    Patient states she has had intermittent admissions due to these infections in the past.  She states they have occurred on her back, neck, and that the one on her left buttocks occurred over a week ago.  She thought it was improving, but then it got much worse today.  She endorses pain, swelling, and generally feeling poorly.  She came to the ER for further evaluation.      In the ER patient was found to have hypertensive urgency with a blood pressure of 210/100, hyperglycemia, a leukocytosis of 22, slightly elevated procalcitonin, and a dirty urinalysis.  Internal medicine was consulted for admission.  She was given 1 dose of hydralazine and 2 doses of 20 mg of labetalol with improvement in blood pressure.  She did not take her antihypertensives tonight.     REVIEW OF SYSTEMS:  A comprehensive review of systems was negative except for that written in the History of Present Illness.     Past Medical History:   Diagnosis Date    Adverse effect of anesthesia     WOKE UP EARLY DURING BACK SURGERY    Arthritis     Breast cancer (HCC) 1993    right    Cervical cancer (HCC) 1980s    Chronic pain     lower back    Congestive heart failure (HCC)     Depression with anxiety     Diverticulitis 2021    Ectopic pregnancy     GERD (gastroesophageal reflux disease)     Hypertension     Ill-defined condition     back cyst    Miscarriage     Sleep apnea 2005    not using CPAP    Swelling of both ankles     Tonsil stone     Type 2 diabetes mellitus (HCC)     on insulin      Past  Initial blood pressure was 220 systolic  - Received hydralazine and labetalol x 2 in the ER  - Did not get home meds  - Blood pressure is now improved at 147/55  - Closely watch blood pressure and adjust meds as needed    Diabetes with hyperglycemia, POA  --Hold home meds  --Start SSI  --Long acting as needed  --diabetes nurse consult    Hypertension, POA  --Currently hypertensive as above  --Continue home meds  --Vital signs as per unit routine    Hyperlipidemia, POA  --Check lipid panel  --Continue statin    VA POLST COMPLETED: No, declined    DIET: ADULT DIET; Regular   ISOLATION PRECAUTIONS: No active isolations  CODE STATUS: Full Code   DVT PROPHYLAXIS: LMW/Heparin  EMERGENCY CONTACT/SURROGATE DECISION MAKER: Extended Emergency Contact Information  Primary Emergency Contact: Felisha Velazquez  Home Phone: [phone]  Relation: Child  Secondary Emergency Contact: Darron Velazquez  Address: [address] [place] [address]  Home Phone: [phone]  Mobile Phone: [phone]  Relation: Spouse     CRITICAL CARE WAS PERFORMED FOR THIS ENCOUNTER: 0 minutes performed in critical care, excluding procedures      Signed By: Nikki Faulkner MD     July 27, 2025         Please note that this dictation may have been completed with Dragon, the [employer] voice recognition software.  Quite often unanticipated grammatical, syntax, homophones, and other interpretive errors are inadvertently transcribed by the computer software.  Please disregard these errors.  Please excuse any errors that have escaped final proofreading.

## 2025-07-27 NOTE — ACP (ADVANCE CARE PLANNING)
Advance Care Planning     Advance Care Planning (ACP) Physician/NP/PA Conversation    Date of Conversation: 7/26/2025  Conducted with: Patient with Decision Making Capacity and Legal next of kin    Healthcare Decision Maker:  No healthcare decision makers have been documented.       Today we documented Decision Maker(s) consistent with Legal Next of Kin hierarchy.    Care Preferences:    Hospitalization:  \"If your health worsens and it becomes clear that your chance of recovery is unlikely, what would be your preference regarding hospitalization?\"  The patient would prefer hospitalization.    Ventilation:  \"If you were unable to breath on your own and your chance of recovery was unlikely, what would be your preference about the use of a ventilator (breathing machine) if it was available to you?\"  The patient would desire the use of a ventilator.    Resuscitation:  \"In the event your heart stopped as a result of an underlying serious health condition, would you want attempts made to restart your heart, or would you prefer a natural death?\"  Yes, attempt to resuscitate.    treatment goals, ventilation preferences, resuscitation preferences, and end of life care preferences (vegetative state/imminent death)    Conversation Outcomes / Follow-Up Plan:  ACP in process - information provided, considering goals and options  Reviewed DNR/DNI and patient elects Full Code (Attempt Resuscitation)    Patient is a pleasant 80-year-old woman being admitted for abscess and hypertensive urgency.  We had a discussion regarding CODE STATUS, and what her preferences would be.  She states she would want to be full code for now, but she would never want to be on a machine long-term.  She states she does not want \"too much\" stuff done, and so we discussed the possibility of having palliative care discuss options with her, or doing an ACP.  Patient states the idea of working with palliative care makes her nervous, and patient's daughter  states that they have been having these discussions about patient, but her parents are hesitant to make these decisions.  Patient is clear that she would not want to remain on life support or be dependent on others long-term for care, but she would not desire to talk with palliative care yet, as this is a difficult conversation for her.  She will continue to decide what she would like to do.  For now she remains full code    Length of Voluntary ACP Conversation in minutes:  16 minutes    Nikki Faulkner MD

## 2025-07-27 NOTE — PLAN OF CARE
Problem: Physical Therapy - Adult  Goal: By Discharge: Performs mobility at highest level of function for planned discharge setting.  See evaluation for individualized goals.  Outcome: Progressing   PHYSICAL THERAPY EVALUATION/DISCHARGE    Patient: Shanta Velazquez (80 y.o. female)  Date: 7/27/2025  Primary Diagnosis: Abscess of buttock, left [L02.31]       Precautions:              ASSESSMENT AND RECOMMENDATIONS:  Based on the objective data below, the patient admitted due to draining abscess on L buttock with fever.  Pt received supine in bed stating hx of low back pain.  She demonstrated independence with OOB and gait in room holding IV pole with Mod I for 50'.  Provided waffle cushion and chair set up for pt to sit more comfortably when OOB of which she tried and was quite appreciative.    Functional Outcome Measure:  The patient scored 24/24 on the [facility] outcome measure        Further skilled acute physical therapy is not indicated at this time.       PLAN :  Recommendation for discharge: (in order for the patient to meet his/her long term goals):   No skilled physical therapy    Other factors to consider for discharge: per above    IF patient discharges home will need the following DME: has RW       SUBJECTIVE:   Patient stated “I am feeling better.”    OBJECTIVE DATA SUMMARY:     Past Medical History:   Diagnosis Date    Adverse effect of anesthesia     WOKE UP EARLY DURING BACK SURGERY    Arthritis     Breast cancer (HCC) 1993    right    Cervical cancer (HCC) 1980s    Chronic pain     lower back    Congestive heart failure (HCC)     Depression with anxiety     Diverticulitis 2021    Ectopic pregnancy     GERD (gastroesophageal reflux disease)     Hypertension     Ill-defined condition     back cyst    Miscarriage     Sleep apnea 2005    not using CPAP    Swelling of both ankles     Tonsil stone     Type 2 diabetes mellitus (HCC)     on insulin     Past Surgical History:   Procedure Laterality Date

## 2025-07-27 NOTE — FLOWSHEET NOTE
07/27/25 0735   Handoff   Communication Given Shift Handoff  (Patient had an uneventful night, no s/s of acute distress noted, oncomin nurse made aware of pt current plan of care.)   Handoff Given To Olive RODRIGUEZ   Handoff Received From Tati CALLAHAN RN   Handoff Communication Face to Face;At bedside   Time Handoff Given 0720

## 2025-07-27 NOTE — ED NOTES
I&D performed by Dr. Conte with writer chaperoning at bedside. Wound culture taken and sent to lab. Wound packed and gauze placed over wound to catch drainage.

## 2025-07-27 NOTE — ED TRIAGE NOTES
Pt to ED with c/o cyst on buttocks and fevers x 1 week. Pt states it drains grey malodorous fluid. Pt reports fevers at home was 101. Pt states she \"just doesn't not feel good.\" Endorses loss of appetite.       Pt reports every few years a cyst forms and she gets sick.    Hx HTN, DM, CHF

## 2025-07-27 NOTE — PLAN OF CARE
Problem: Chronic Conditions and Co-morbidities  Goal: Patient's chronic conditions and co-morbidity symptoms are monitored and maintained or improved  7/27/2025 0914 by Nicolle Elder RN  Outcome: Progressing  7/27/2025 0536 by Tati Howell RN  Outcome: Progressing  Flowsheets (Taken 7/27/2025 0346)  Care Plan - Patient's Chronic Conditions and Co-Morbidity Symptoms are Monitored and Maintained or Improved:   Monitor and assess patient's chronic conditions and comorbid symptoms for stability, deterioration, or improvement   Collaborate with multidisciplinary team to address chronic and comorbid conditions and prevent exacerbation or deterioration   Update acute care plan with appropriate goals if chronic or comorbid symptoms are exacerbated and prevent overall improvement and discharge     Problem: Discharge Planning  Goal: Discharge to home or other facility with appropriate resources  7/27/2025 0914 by Nicolle Elder RN  Outcome: Progressing  7/27/2025 0536 by Tati Howell RN  Outcome: Progressing  Flowsheets (Taken 7/27/2025 0500)  Discharge to home or other facility with appropriate resources:   Identify barriers to discharge with patient and caregiver   Arrange for needed discharge resources and transportation as appropriate   Identify discharge learning needs (meds, wound care, etc)   Refer to discharge planning if patient needs post-hospital services based on physician order or complex needs related to functional status, cognitive ability or social support system     Problem: Pain  Goal: Verbalizes/displays adequate comfort level or baseline comfort level  7/27/2025 0914 by Nicolle Elder RN  Outcome: Progressing  7/27/2025 0536 by Tati Howell RN  Outcome: Progressing     Problem: Safety - Adult  Goal: Free from fall injury  7/27/2025 0914 by Nicolle Elder RN  Outcome: Progressing  7/27/2025 0536 by Tati Howell RN  Outcome: Progressing

## 2025-07-27 NOTE — CONSULTS
Surgery Consult    Subjective:      Shanta Velazquez is a 80 y.o. female who presents with a boil of the left buttock. She has not had a prior one in this area but gets them in other places frequently. Did have fevers at home. Did open and spontaneously drain. Was packed last night and cultured. She currently reports minimal discomfort at rest, only with movement/manipulation.     Past Medical History:   Diagnosis Date    Adverse effect of anesthesia     WOKE UP EARLY DURING BACK SURGERY    Arthritis     Breast cancer (HCC) 1993    right    Cervical cancer (HCC) 1980s    Chronic pain     lower back    Congestive heart failure (HCC)     Depression with anxiety     Diverticulitis 2021    Ectopic pregnancy     GERD (gastroesophageal reflux disease)     Hypertension     Ill-defined condition     back cyst    Miscarriage     Sleep apnea 2005    not using CPAP    Swelling of both ankles     Tonsil stone     Type 2 diabetes mellitus (HCC)     on insulin     Past Surgical History:   Procedure Laterality Date    CARPAL TUNNEL RELEASE Left     CARPAL TUNNEL RELEASE Right     CATARACT REMOVAL Right     CATARACT REMOVAL Left 2013    COLONOSCOPY      CYST INCISION AND DRAINAGE Right 9/29/2021    back cyst     HYSTERECTOMY (CERVIX STATUS UNKNOWN)  1980s    IR GUIDED INJ LUMB/SAC TRANSFORAMINAL EPI SINGLE LEVEL  7/29/2021    KNEE ARTHROSCOPY Left     KNEE ARTHROSCOPY Right     MASTECTOMY Right     NECK SURGERY Right 10/11/2023    EXCISION SEBACEOUS RIGHT NECK CYST (MAC/LOCAL) performed by Migdalia Russell MD at [facility] MAIN OR    ORTHOPEDIC SURGERY  1980,2014    back surgery x 2 ( HERNIATED DISC REPAIR, AND ANOTHER SURGERY SHE CANT REMEMBER    ORTHOPEDIC SURGERY Left 1990    ligament attachment arm    OTHER SURGICAL HISTORY  2018    procedure for back cyst    OTHER SURGICAL HISTORY Right 2020    lower lobe resection    PELVIC LAPAROSCOPY  1970s    ectopic pregnancy    TUMOR REMOVAL Right     leg      Family History   Problem  atraumatic, no cyanosis or edema.   Skin: On the left buttock wound the packing was removed. There is a 1 cm circular wound about 2.5 cm deep with mild surrounding induration without cellulitis or warmth. Repacked.    Neuro: Alert, oriented, speech clear     Labs:   Recent Labs     07/26/25 2045   WBC 22.4*   HGB 14.0   HCT 44.2        Recent Labs     07/26/25 2045 07/27/25  0758    142   K 3.4* 3.4*    107   CO2 28 30   BUN 13 10   ALT 19  --      No results for input(s): \"INR\" in the last 72 hours.      Assessment and Plan:     Remove packing in 48 hrs and do not replace. Sitz baths after bowel movements. Complete 10 days of po abx. Followup in surgery clinic in 2 weeks.       Signed By: Jennifer Aden MD     July 27, 2025

## 2025-07-27 NOTE — PLAN OF CARE
Patient discharged home, VSS, PIV removed. Discharge paperwork reviewed with patient and daughter in detail. Questions answered. Sitz bath stuff given to patient. Patient wheeled out via wheelchair.     Problem: Physical Therapy - Adult  Goal: By Discharge: Performs mobility at highest level of function for planned discharge setting.  See evaluation for individualized goals.  7/27/2025 1152 by Malika Mercado, PT  Outcome: Progressing     Problem: Chronic Conditions and Co-morbidities  Goal: Patient's chronic conditions and co-morbidity symptoms are monitored and maintained or improved  7/27/2025 1243 by Nicolle Elder, RN  Outcome: Completed  7/27/2025 0914 by Nicolle Elder RN  Outcome: Progressing  7/27/2025 0536 by Tati Howell RN  Outcome: Progressing  Flowsheets (Taken 7/27/2025 0346)  Care Plan - Patient's Chronic Conditions and Co-Morbidity Symptoms are Monitored and Maintained or Improved:   Monitor and assess patient's chronic conditions and comorbid symptoms for stability, deterioration, or improvement   Collaborate with multidisciplinary team to address chronic and comorbid conditions and prevent exacerbation or deterioration   Update acute care plan with appropriate goals if chronic or comorbid symptoms are exacerbated and prevent overall improvement and discharge     Problem: Discharge Planning  Goal: Discharge to home or other facility with appropriate resources  7/27/2025 1243 by Nicolle Elder RN  Outcome: Completed  7/27/2025 0914 by Nicolle Elder RN  Outcome: Progressing  7/27/2025 0536 by Tati Howell RN  Outcome: Progressing  Flowsheets (Taken 7/27/2025 0500)  Discharge to home or other facility with appropriate resources:   Identify barriers to discharge with patient and caregiver   Arrange for needed discharge resources and transportation as appropriate   Identify discharge learning needs (meds, wound care, etc)   Refer to discharge planning if patient needs

## 2025-07-27 NOTE — PROGRESS NOTES
OCCUPATIONAL THERAPY EVALUATION/DISCHARGE  Patient: Shanta Velazquez (80 y.o. female)  Date: 7/27/2025  Primary Diagnosis: Abscess of buttock, left [L02.31]         Precautions:                    ASSESSMENT :  Based on the objective data below, the patient is functioning at her baseline independent-mod I level for ADLs and functional mobility following admission for L buttock abscess, s/p I&D. She was received in bed, agreeable to participate. Pt performed bed mobility and ambulated in room with independence-mod I, no LOB observed. She reports being up ad wilfrido to the bathroom and standing at sink to perform grooming ADLs. Demo'd ability to reach distal Les for LB dressing. Waffle cushion provided for pressure relief when sitting in chair, which pt tried and found helpful. She returned to bed at end of session with rest, needs met and VSS, family present. Pt had no further questions or concerns for acute OT, and no further skilled OT needs identified at this time.    Functional Outcome Measure:  The patient scored 90/100 on the Barthel Index outcome measure.      Further skilled acute occupational therapy is not indicated at this time.     PLAN :    Recommendation for discharge: (in order for the patient to meet his/her long term goals):   No skilled occupational therapy    Other factors to consider for discharge: no additional factors    IF patient discharges home will need the following DME: none     SUBJECTIVE:   Patient stated, “Thank you.”    OBJECTIVE DATA SUMMARY:     Past Medical History:   Diagnosis Date    Adverse effect of anesthesia     WOKE UP EARLY DURING BACK SURGERY    Arthritis     Breast cancer (HCC) 1993    right    Cervical cancer (HCC) 1980s    Chronic pain     lower back    Congestive heart failure (HCC)     Depression with anxiety     Diverticulitis 2021    Ectopic pregnancy     GERD (gastroesophageal reflux disease)     Hypertension     Ill-defined condition     back cyst    Miscarriage

## 2025-07-27 NOTE — DISCHARGE SUMMARY
Hospitalist Discharge Summary     Patient ID:    Shanta Velazquez  [id number]  80 y.o.  1945    Admit date of service: 7/26/2025    Discharge date of service: 7/27/2025    Admission Diagnoses: Abscess of buttock, left [L02.31]    Chronic Diagnoses:      Discharge Medications:   Current Discharge Medication List        START taking these medications    Details   oxyCODONE-acetaminophen (PERCOCET) 7.5-325 MG per tablet Take 1 tablet by mouth 2 times daily for 7 days. Intended supply: 14 days Max Daily Amount: 2 tablets  Qty: 14 tablet, Refills: 0    Comments: Reduce doses taken as pain becomes manageable  Associated Diagnoses: Abscess of buttock, left      amoxicillin-clavulanate (AUGMENTIN) 875-125 MG per tablet Take 1 tablet by mouth 2 times daily for 7 days  Qty: 14 tablet, Refills: 0      doxycycline hyclate (VIBRA-TABS) 100 MG tablet Take 1 tablet by mouth 2 times daily for 7 days  Qty: 14 tablet, Refills: 0           CONTINUE these medications which have NOT CHANGED    Details   losartan (COZAAR) 50 MG tablet TAKE 1 TABLET BY MOUTH EVERY DAY  Qty: 90 tablet, Refills: 3      metoprolol tartrate (LOPRESSOR) 25 MG tablet Take 1 tablet by mouth 2 times daily  Qty: 180 tablet, Refills: 3      Insulin Glargine (TOUJEO SOLOSTAR SC) Inject 100 Units into the skin Daily      acetaminophen (TYLENOL) 325 MG tablet Take 2 tablets by mouth every 4 hours as needed      famotidine (PEPCID) 40 MG tablet Take 1 tablet by mouth 2 times daily      furosemide (LASIX) 40 MG tablet Take 1 tablet by mouth 2 times daily      triamcinolone (KENALOG) 0.1 % ointment Apply topically 2 times daily as needed      Insulin Aspart (NOVOLOG FLEXPEN SC) Inject 20 Units into the skin 3 times daily           STOP taking these medications       vitamin D (CHOLECALCIFEROL) 25 MCG (1000 UT) TABS tablet Comments:   Reason for Stopping:               Follow up Care:    1. No follow-up provider specified. in 1-2 weeks  2.      Diet:  diabetic  Continue home losartan, metoprolol     3.  Diabetes with hyperglycemia.  Continue home medication.     4.  Hyperlipidemia.  Continue statin           Discharged in improved condition.    Spent 35 minutes    Signed:  Julian Erickson MD  7/27/2025  12:24 PM

## 2025-07-27 NOTE — PROGRESS NOTES
[facility] Dosing Services: Antimicrobial Stewardship Daily Doc  Consult for antibiotic dosing of Vancomycin by Dr. Faulkner  Indication: SSTI (cyst on buttocks)  Day of Therapy: 1/7    Ht Readings from Last 1 Encounters:   07/26/25 1.702 m (5' 7\")        Wt Readings from Last 1 Encounters:   07/27/25 122.2 kg (269 lb 8 oz)      Vancomycin therapy:  Loading dose: Vancomycin 2500 mg x1 dose now/given  Maintenance dose: Vancomycin 1000 mg IV every 24 hours   Dose calculated to approximate a           a. Target AUC/SHAWN of 400-600          b. Trough of 15-20 mcg/mL   Last level: n/a  Plan:   -T 99.9 (Febrile at home), WBC 22.4, SCR 1.08  -will start 1000 mg (8.2 mg/kg) every 24 hrs, predicted OCCpz=662, Trough=13.2  -will order a random level once 1st maintenance dose is given    Dose administration notes: Doses given appropriately as scheduled      Other Antimicrobial   (not dosed by pharmacist) Cefepime 2 grams q12h EI (BMI>40)   Cultures 07/26 Blood1: IP  07/27 Blood2: IP  07/27 Urine: IP   Serum Creatinine Lab Results   Component Value Date/Time    CREATININE 1.08 07/26/2025 08:45 PM          Creatinine Clearance Estimated Creatinine Clearance: 57 mL/min (A) (based on SCr of 1.08 mg/dL (H)).     Temp Temp: 99.9 °F (37.7 °C) (Oral)       WBC Lab Results   Component Value Date/Time    WBC 22.4 07/26/2025 08:45 PM          Procalcitonin Lab Results   Component Value Date/Time    PROCAL 0.56 07/26/2025 08:45 PM      For Antifungals, Metronidazole and Nafcillin: Lab Results   Component Value Date/Time    ALT 19 07/26/2025 08:45 PM    AST 18 07/26/2025 08:45 PM        Pharmacist: Richie Peralta, BusterD, MS, BCPS    [phone]

## 2025-07-27 NOTE — DISCHARGE INSTRUCTIONS
HOSPITALIST DISCHARGE INSTRUCTIONS  NAME:  Shanta Velazquez   :  1945   MRN:  [id number]     Date/Time:  2025 12:39 PM    ADMIT DATE: 2025     DISCHARGE DATE: 2025     DISCHARGE DIAGNOSIS:  Cellulitis and abscess of left buttock    DISCHARGE INSTRUCTIONS:  Thank you for allowing us to participate in your care. Your discharging Hospitalist is Julian Erickson MD. You were admitted for evaluation and treatment of the above.        MEDICATIONS:    It is important that you take the medication exactly as they are prescribed.   Keep your medication in the bottles provided by the pharmacist and keep a list of the medication names, dosages, and times to be taken in your wallet.   Do not take other medications without consulting your doctor.             If you experience any of the following symptoms then please call your primary care physician or return to the emergency room if you cannot get hold of your doctor:  Fever, chills, nausea, vomiting, diarrhea, change in mentation, falling, bleeding, shortness of breath    Follow Up:  Please call the below provider to arrange hospital follow up appointment      No follow-up provider specified.    For questions regarding your Hospitalization or to contact the Hospital Medicine team, please call [phone]       Information obtained by :  I understand that if any problems occur once I am at home I am to contact my physician.    I understand and acknowledge receipt of the instructions indicated above.                                                                                                                                           Physician's or R.N.'s Signature                                                                  Date/Time                                                                                                                                              Patient or Representative Signature

## 2025-07-27 NOTE — ED PROVIDER NOTES
[facility] EMERGENCY DEPARTMENT  EMERGENCY DEPARTMENT ENCOUNTER      Pt Name: Shanta Velazquez  MRN: [id number]  Birthdate 1945  Date of evaluation: 7/26/2025  Provider: Corey Conte MD    CHIEF COMPLAINT       Chief Complaint   Patient presents with    Fever       EMERGENCY DEPARTMENT COURSE and DIFFERENTIAL DIAGNOSIS/MDM:   Medical Decision Making    80-year-old female presenting to the ER with report of recurrent cyst on her left buttocks/rectal area and fever.  Patient reports this started 1 week ago and has been worsening.  Has had these drained in the past.  Reports having some drainage from the area.-Associated pain.  Reports having fevers that started several days ago.  Fever at home 101 °F.  Patient reports generalized fatigue malaise and not feeling well.  Reports decreased appetite.  Denies any URI-like symptoms or ear pain or sore throat.  No cough.  Patient denies any abdominal pain and denies any specific urinary symptoms.  No report of any rash.  Reports drainage from the grayish malodorous fluid.  Patient has history of diabetes hypertension and CHF.  Reports that she has taken her blood pressure meds today however on arrival patient extremely hypertensive patient is also febrile and tachycardic meeting SIRS criteria.  On examination patient is tachycardic ill-appearing but nontoxic.  Lungs are clear to auscultation.  No abdominal pain.  No rash.  Patient does have fluctuant mass on the left perianal region/gluteal fold.  Mild drainage.  Patient meeting SIRS criteria with tach labs electrolytes lactate blood cultures.  Rule out other infectious etiologies.  Performed I&D of abscess.  With patient's recurrent symptoms ordered imaging of the abdomen.  Imaging with no evidence of any obvious fistulous.  Urine with questionable infection versus contamination.  Will send culture.  Chest x-ray is clear.  Patient does have leukocytosis.  Lactate is normal.  Ordered broad-spectrum

## 2025-07-27 NOTE — ED NOTES
TRANSFER - OUT REPORT:    Verbal report given to JENNIFER Duffy on Shanta Velazquez  being transferred to [facility] for routine progression of patient care       Report consisted of patient's Situation, Background, Assessment and   Recommendations(SBAR).     Information from the following report(s) Nurse Handoff Report, ED Encounter Summary, ED SBAR, MAR, Cardiac Rhythm NSR, Neuro Assessment, and Event Log was reviewed with the receiving nurse.    [place] Assessment:                           Lines:   Peripheral IV Left;Dorsal Hand (Active)        Opportunity for questions and clarification was provided.      Patient transported with:  Monitor and Tech

## 2025-07-28 LAB
BACTERIA SPEC CULT: NORMAL
EKG ATRIAL RATE: 115 BPM
EKG DIAGNOSIS: NORMAL
EKG P AXIS: 54 DEGREES
EKG P-R INTERVAL: 160 MS
EKG Q-T INTERVAL: 330 MS
EKG QRS DURATION: 90 MS
EKG QTC CALCULATION (BAZETT): 456 MS
EKG R AXIS: -15 DEGREES
EKG T AXIS: 64 DEGREES
EKG VENTRICULAR RATE: 115 BPM
SERVICE CMNT-IMP: NORMAL

## 2025-07-28 PROCEDURE — 93010 ELECTROCARDIOGRAM REPORT: CPT | Performed by: STUDENT IN AN ORGANIZED HEALTH CARE EDUCATION/TRAINING PROGRAM

## 2025-07-29 LAB
BACTERIA SPEC CULT: ABNORMAL
BACTERIA SPEC CULT: ABNORMAL
SERVICE CMNT-IMP: ABNORMAL

## 2025-07-30 LAB
BACTERIA SPEC CULT: ABNORMAL
BACTERIA SPEC CULT: ABNORMAL
GRAM STN SPEC: ABNORMAL
SERVICE CMNT-IMP: ABNORMAL

## 2025-08-01 LAB
BACTERIA SPEC CULT: NORMAL
SERVICE CMNT-IMP: NORMAL

## 2025-08-02 LAB
BACTERIA SPEC CULT: NORMAL
SERVICE CMNT-IMP: NORMAL

## 2025-08-07 ENCOUNTER — OFFICE VISIT (OUTPATIENT)
Age: 80
End: 2025-08-07
Payer: MEDICARE

## 2025-08-07 VITALS
HEIGHT: 67 IN | DIASTOLIC BLOOD PRESSURE: 83 MMHG | BODY MASS INDEX: 42.63 KG/M2 | RESPIRATION RATE: 18 BRPM | TEMPERATURE: 98 F | SYSTOLIC BLOOD PRESSURE: 196 MMHG | OXYGEN SATURATION: 96 % | HEART RATE: 78 BPM | WEIGHT: 271.6 LBS

## 2025-08-07 DIAGNOSIS — L02.91 ABSCESS: Primary | ICD-10-CM

## 2025-08-07 PROCEDURE — 99203 OFFICE O/P NEW LOW 30 MIN: CPT | Performed by: SURGERY

## 2025-08-07 PROCEDURE — 3077F SYST BP >= 140 MM HG: CPT | Performed by: SURGERY

## 2025-08-07 PROCEDURE — G8417 CALC BMI ABV UP PARAM F/U: HCPCS | Performed by: SURGERY

## 2025-08-07 PROCEDURE — 1125F AMNT PAIN NOTED PAIN PRSNT: CPT | Performed by: SURGERY

## 2025-08-07 PROCEDURE — 1111F DSCHRG MED/CURRENT MED MERGE: CPT | Performed by: SURGERY

## 2025-08-07 PROCEDURE — G8400 PT W/DXA NO RESULTS DOC: HCPCS | Performed by: SURGERY

## 2025-08-07 PROCEDURE — 1123F ACP DISCUSS/DSCN MKR DOCD: CPT | Performed by: SURGERY

## 2025-08-07 PROCEDURE — 1090F PRES/ABSN URINE INCON ASSESS: CPT | Performed by: SURGERY

## 2025-08-07 PROCEDURE — G8427 DOCREV CUR MEDS BY ELIG CLIN: HCPCS | Performed by: SURGERY

## 2025-08-07 PROCEDURE — 3079F DIAST BP 80-89 MM HG: CPT | Performed by: SURGERY

## 2025-08-07 PROCEDURE — 1036F TOBACCO NON-USER: CPT | Performed by: SURGERY

## 2025-08-07 PROCEDURE — 1159F MED LIST DOCD IN RCRD: CPT | Performed by: SURGERY

## 2025-08-07 ASSESSMENT — PATIENT HEALTH QUESTIONNAIRE - PHQ9
SUM OF ALL RESPONSES TO PHQ QUESTIONS 1-9: 1
1. LITTLE INTEREST OR PLEASURE IN DOING THINGS: NOT AT ALL
SUM OF ALL RESPONSES TO PHQ QUESTIONS 1-9: 1
SUM OF ALL RESPONSES TO PHQ QUESTIONS 1-9: 1
2. FEELING DOWN, DEPRESSED OR HOPELESS: SEVERAL DAYS
SUM OF ALL RESPONSES TO PHQ QUESTIONS 1-9: 1

## (undated) DEVICE — LIQUIBAND RAPID ADHESIVE 36/CS 0.8ML: Brand: MEDLINE

## (undated) DEVICE — NDL PRT INJ NSAF BLNT 18GX1.5 --

## (undated) DEVICE — SUTURE PERMA-HAND 0 L18IN NONABSORBABLE BLK CT-1 L36MM 1/2 C021D

## (undated) DEVICE — GLOVE SURG SZ 65 THK91MIL LTX FREE SYN POLYISOPRENE

## (undated) DEVICE — NEEDLE HYPO 22GA L1.5IN BLK S STL HUB POLYPR SHLD REG BVL

## (undated) DEVICE — LIGHT HANDLE: Brand: DEVON

## (undated) DEVICE — NEEDLE SPNL 22GA L3.5IN BLK HUB S STL REG WALL FIT STYL W/

## (undated) DEVICE — ARM DRAPE

## (undated) DEVICE — GAUZE SPONGES,12 PLY: Brand: CURITY

## (undated) DEVICE — SUTURE MCRYL SZ 4-0 L27IN ABSRB UD L19MM PS-2 1/2 CIR PRIM Y426H

## (undated) DEVICE — TUBING, SUCTION, 1/4" X 10', STRAIGHT: Brand: MEDLINE

## (undated) DEVICE — DERMABOND SKIN ADH 0.7ML -- DERMABOND ADVANCED 12/BX

## (undated) DEVICE — 3M™ IOBAN™ 2 ANTIMICROBIAL INCISE DRAPE 6648EZ: Brand: IOBAN™ 2

## (undated) DEVICE — REDUCER CANN ENDOWRIST 12-8MM -- DA VINCI XI - SNGL USE

## (undated) DEVICE — BASIC GENERAL-SFMC: Brand: MEDLINE INDUSTRIES, INC.

## (undated) DEVICE — SUTURE VCRL SZ 3-0 L27IN ABSRB UD L26MM SH 1/2 CIR J416H

## (undated) DEVICE — DRAPE,REIN 53X77,STERILE: Brand: MEDLINE

## (undated) DEVICE — PREP SKN CHLRAPRP APL 26ML STR --

## (undated) DEVICE — SUPPORT WRST AD W3.5XL9IN DIA14.5IN ART SFT ADJ HK AND LOOP

## (undated) DEVICE — AIRSEAL 12 MM ACCESS PORT AND PALM GRIP OBTURATOR WITH BLADELESS OPTICAL TIP, 120 MM LENGTH: Brand: AIRSEAL

## (undated) DEVICE — SEAL

## (undated) DEVICE — DEVICE TRNSF SPIK STL 2008S] MICROTEK MEDICAL INC]

## (undated) DEVICE — VISUALIZATION SYSTEM: Brand: CLEARIFY

## (undated) DEVICE — STAPLER 30 RELOAD GREEN: Brand: ENDOWRIST

## (undated) DEVICE — STRIP,CLOSURE,WOUND,MEDI-STRIP,1/2X4: Brand: MEDLINE

## (undated) DEVICE — SYRINGE ANGIO 10ML RED FLAT GRP FIX M LUER CONN MEDALLION

## (undated) DEVICE — SOLUTION IRRIG 500ML 0.9% SOD CHLO USP POUR PLAS BTL

## (undated) DEVICE — TOWEL SURG W17XL27IN STD BLU COT NONFENESTRATED PREWASHED

## (undated) DEVICE — Device

## (undated) DEVICE — SOL IRRIGATION INJ NACL 0.9% 500ML BTL

## (undated) DEVICE — STERILE POLYISOPRENE POWDER-FREE SURGICAL GLOVES WITH EMOLLIENT COATING: Brand: PROTEXIS

## (undated) DEVICE — MARKER,SKIN,WI/RULER AND LABELS: Brand: MEDLINE

## (undated) DEVICE — STERILE POLYISOPRENE POWDER-FREE SURGICAL GLOVES: Brand: PROTEXIS

## (undated) DEVICE — (D)PREP SKN CHLRAPRP APPL 26ML -- CONVERT TO ITEM 371833

## (undated) DEVICE — DEVON™ KNEE AND BODY STRAP 60" X 3" (1.5 M X 7.6 CM): Brand: DEVON

## (undated) DEVICE — INFECTION CONTROL KIT SYS

## (undated) DEVICE — SUTURE PERMAHAND SZ 0 L30IN NONABSORBABLE BLK SILK BRAID A306H

## (undated) DEVICE — SPONGE,DRAIN,NONWVN,4"X4",6PLY,STRL,LF: Brand: MEDLINE

## (undated) DEVICE — ABDOMINAL PAD: Brand: DERMACEA

## (undated) DEVICE — STAPLER SKIN H3.9MM WIRE DIA0.58MM CRWN 6.9MM 35 STPL ROT

## (undated) DEVICE — BAG SPEC RETRV 275ML 10ML DISPOSABLE RELIACATCH

## (undated) DEVICE — TUBING PRSS MON L12IN PVC RIG NONEXPANDING M TO FEM CONN

## (undated) DEVICE — DRAPE,LAPAROTOMY,T,PEDI,STERILE: Brand: MEDLINE

## (undated) DEVICE — SUTURE SZ 0 27IN 5/8 CIR UR-6  TAPER PT VIOLET ABSRB VICRYL J603H

## (undated) DEVICE — STRAP,POSITIONING,KNEE/BODY,FOAM,4X60": Brand: MEDLINE

## (undated) DEVICE — SUT ETHLN 3-0 18IN PS1 BLK --

## (undated) DEVICE — KENDALL SCD EXPRESS SLEEVES, KNEE LENGTH, MEDIUM: Brand: KENDALL SCD

## (undated) DEVICE — SUTURE VCRL SZ 4-0 L27IN ABSRB UD L19MM PS-2 3/8 CIR PRIM J426H

## (undated) DEVICE — PACK,BASIC,SIRUS,V: Brand: MEDLINE

## (undated) DEVICE — PAD,NON-ADHERENT,3X8,STERILE,LF,1/PK: Brand: MEDLINE

## (undated) DEVICE — ROCKER SWITCH PENCIL BLADE ELECTRODE, HOLSTER: Brand: EDGE

## (undated) DEVICE — REM POLYHESIVE ADULT PATIENT RETURN ELECTRODE: Brand: VALLEYLAB

## (undated) DEVICE — CONNECTOR TBNG WHT PLAS SUCT STR 5IN1 LTWT W/ M CONN

## (undated) DEVICE — SPONGE GZ W4XL4IN COT 12 PLY TYP VII WVN C FLD DSGN

## (undated) DEVICE — PROCEDURE KIT FLUID MGMT CUST MAINFOLD STRL

## (undated) DEVICE — MINOR ENT-SFMCASU: Brand: MEDLINE INDUSTRIES, INC.

## (undated) DEVICE — CATHETER THORACENTESIS STR 28 FRX23 IN 6 EYELET TAPR TIP LF

## (undated) DEVICE — GLIDESHEATH SLENDER STAINLESS STEEL KIT: Brand: GLIDESHEATH SLENDER

## (undated) DEVICE — SURGICAL PROCEDURE PACK BASIN MAJ SET CUST NO CAUT

## (undated) DEVICE — ELECTRO LUBE IS A SINGLE PATIENT USE DEVICE THAT IS INTENDED TO BE USED ON ELECTROSURGICAL ELECTRODES TO REDUCE STICKING.: Brand: KEY SURGICAL ELECTRO LUBE

## (undated) DEVICE — SURGICAL PROCEDURE KIT GEN LAPAROSCOPY LF

## (undated) DEVICE — GARMENT,MEDLINE,DVT,INT,CALF,MED, GEN2: Brand: MEDLINE

## (undated) DEVICE — SPONGE TONSIL MED X RAY DETECTABLE STRL LTX FREE

## (undated) DEVICE — STAPLER SHEATH: Brand: ENDOWRIST

## (undated) DEVICE — STAPLER 45 RELOAD: Brand: ENDOWRIST

## (undated) DEVICE — BLADELESS OBTURATOR: Brand: WECK VISTA

## (undated) DEVICE — AGENT HEMSTAT W2XL14IN OXIDIZED REGENERATED CELOS ABSRB FOR

## (undated) DEVICE — SOLUTION IV 1000ML 0.9% SOD CHL

## (undated) DEVICE — PENCIL ES CRD L10FT HND SWCHING ROCK SWCH W/ EDGE COAT BLDE

## (undated) DEVICE — FIXED CORE WIRE GUIDE SAFE-T-J, CURVED: Brand: COOK

## (undated) DEVICE — GUIDE CATH CONVEY 5FR JR4 -- 39228-686

## (undated) DEVICE — BLADE CLIPPER GEN PURP NS

## (undated) DEVICE — TRI-LUMEN FILTERED TUBE SET WITH ACTIVATED CHARCOAL FILTER: Brand: AIRSEAL

## (undated) DEVICE — CODMAN® SURGICAL STRIPS 3 1/2" X 6" (89MM X 152MM): Brand: CODMAN®

## (undated) DEVICE — PACK PROCEDURE SURG HRT CATH

## (undated) DEVICE — TR BAND RADIAL ARTERY COMPRESSION DEVICE: Brand: TR BAND

## (undated) DEVICE — SEAL UNIV 5-8MM DISP BX/10 -- DA VINCI XI - SNGL USE

## (undated) DEVICE — CANISTER, RIGID, 3000CC: Brand: MEDLINE INDUSTRIES, INC.

## (undated) DEVICE — TRANSFER SET 3": Brand: MEDLINE INDUSTRIES, INC.

## (undated) DEVICE — SYR 10ML LUER LOK 1/5ML GRAD --

## (undated) DEVICE — DBD-PACK,LAPAROTOMY,2 REINFORCED GOWNS: Brand: MEDLINE

## (undated) DEVICE — GUIDE CATH CONVEY 5FR JL4 -- 39228-662

## (undated) DEVICE — GLOVE ORANGE PI 7 1/2   MSG9075

## (undated) DEVICE — HANDLE LT SNAP ON ULT DURABLE LENS FOR TRUMPF ALC DISPOSABLE

## (undated) DEVICE — CONTAINER,SPECIMEN,3OZ,OR STRL: Brand: MEDLINE